# Patient Record
Sex: MALE | NOT HISPANIC OR LATINO | Employment: PART TIME | URBAN - METROPOLITAN AREA
[De-identification: names, ages, dates, MRNs, and addresses within clinical notes are randomized per-mention and may not be internally consistent; named-entity substitution may affect disease eponyms.]

---

## 2017-04-13 DIAGNOSIS — E11.51 TYPE 2 DIABETES MELLITUS WITH PERIPHERAL ANGIOPATHY (HCC): Primary | ICD-10-CM

## 2017-04-13 DIAGNOSIS — E55.9 AVITAMINOSIS D: ICD-10-CM

## 2017-04-13 DIAGNOSIS — N40.0 BENIGN LOCALIZED HYPERPLASIA OF PROSTATE WITHOUT URINARY OBSTRUCTION: ICD-10-CM

## 2017-04-13 DIAGNOSIS — I10 BENIGN ESSENTIAL HTN: ICD-10-CM

## 2017-04-14 ENCOUNTER — RESULTS ENCOUNTER (OUTPATIENT)
Dept: ENDOCRINOLOGY | Age: 82
End: 2017-04-14

## 2017-04-14 DIAGNOSIS — E55.9 AVITAMINOSIS D: ICD-10-CM

## 2017-04-14 DIAGNOSIS — I10 BENIGN ESSENTIAL HTN: ICD-10-CM

## 2017-04-14 DIAGNOSIS — E11.51 TYPE 2 DIABETES MELLITUS WITH PERIPHERAL ANGIOPATHY (HCC): ICD-10-CM

## 2017-04-14 DIAGNOSIS — N40.0 BENIGN LOCALIZED HYPERPLASIA OF PROSTATE WITHOUT URINARY OBSTRUCTION: ICD-10-CM

## 2017-04-20 RX ORDER — ROSUVASTATIN CALCIUM 10 MG/1
TABLET, COATED ORAL
Qty: 90 TABLET | Refills: 2 | OUTPATIENT
Start: 2017-04-20

## 2017-04-25 DIAGNOSIS — N40.0 BENIGN LOCALIZED HYPERPLASIA OF PROSTATE WITHOUT URINARY OBSTRUCTION: ICD-10-CM

## 2017-04-25 DIAGNOSIS — E78.2 MIXED HYPERLIPIDEMIA: ICD-10-CM

## 2017-04-25 DIAGNOSIS — E11.9 DIABETES MELLITUS TYPE 2, NONINSULIN DEPENDENT (HCC): Primary | ICD-10-CM

## 2017-04-25 DIAGNOSIS — I10 BENIGN ESSENTIAL HTN: ICD-10-CM

## 2017-04-25 DIAGNOSIS — E55.9 AVITAMINOSIS D: ICD-10-CM

## 2017-04-26 DIAGNOSIS — E55.9 AVITAMINOSIS D: ICD-10-CM

## 2017-04-26 DIAGNOSIS — E55.9 AVITAMINOSIS D: Primary | ICD-10-CM

## 2017-04-27 LAB
25(OH)D3+25(OH)D2 SERPL-MCNC: 68.2 NG/ML (ref 30–100)
ALBUMIN SERPL-MCNC: 4.7 G/DL (ref 3.5–5.2)
ALBUMIN/GLOB SERPL: 1.5 G/DL
ALP SERPL-CCNC: 55 U/L (ref 39–117)
ALT SERPL-CCNC: 15 U/L (ref 1–41)
AST SERPL-CCNC: 23 U/L (ref 1–40)
BASOPHILS # BLD AUTO: 0.06 10*3/MM3 (ref 0–0.2)
BASOPHILS NFR BLD AUTO: 0.9 % (ref 0–1.5)
BILIRUB SERPL-MCNC: 0.6 MG/DL (ref 0.1–1.2)
BUN SERPL-MCNC: 22 MG/DL (ref 8–23)
BUN/CREAT SERPL: 17.7 (ref 7–25)
C PEPTIDE SERPL-MCNC: 2.8 NG/ML (ref 1.1–4.4)
CALCIUM SERPL-MCNC: 9.7 MG/DL (ref 8.6–10.5)
CHLORIDE SERPL-SCNC: 99 MMOL/L (ref 98–107)
CHOLEST SERPL-MCNC: 143 MG/DL (ref 0–200)
CO2 SERPL-SCNC: 22.9 MMOL/L (ref 22–29)
CREAT SERPL-MCNC: 1.24 MG/DL (ref 0.76–1.27)
EOSINOPHIL # BLD AUTO: 0.21 10*3/MM3 (ref 0–0.7)
EOSINOPHIL NFR BLD AUTO: 3 % (ref 0.3–6.2)
ERYTHROCYTE [DISTWIDTH] IN BLOOD BY AUTOMATED COUNT: 13.8 % (ref 11.5–14.5)
GLOBULIN SER CALC-MCNC: 3.1 GM/DL
GLUCOSE SERPL-MCNC: 103 MG/DL (ref 65–99)
HBA1C MFR BLD: 5.39 % (ref 4.8–5.6)
HCT VFR BLD AUTO: 44.9 % (ref 40.4–52.2)
HDLC SERPL-MCNC: 51 MG/DL (ref 40–60)
HGB BLD-MCNC: 14.5 G/DL (ref 13.7–17.6)
IMM GRANULOCYTES # BLD: 0 10*3/MM3 (ref 0–0.03)
IMM GRANULOCYTES NFR BLD: 0 % (ref 0–0.5)
LDLC SERPL CALC-MCNC: 73 MG/DL (ref 0–100)
LYMPHOCYTES # BLD AUTO: 1.96 10*3/MM3 (ref 0.9–4.8)
LYMPHOCYTES NFR BLD AUTO: 28.2 % (ref 19.6–45.3)
MCH RBC QN AUTO: 28.4 PG (ref 27–32.7)
MCHC RBC AUTO-ENTMCNC: 32.3 G/DL (ref 32.6–36.4)
MCV RBC AUTO: 87.9 FL (ref 79.8–96.2)
MONOCYTES # BLD AUTO: 0.67 10*3/MM3 (ref 0.2–1.2)
MONOCYTES NFR BLD AUTO: 9.6 % (ref 5–12)
NEUTROPHILS # BLD AUTO: 4.06 10*3/MM3 (ref 1.9–8.1)
NEUTROPHILS NFR BLD AUTO: 58.3 % (ref 42.7–76)
PLATELET # BLD AUTO: 204 10*3/MM3 (ref 140–500)
POTASSIUM SERPL-SCNC: 4.5 MMOL/L (ref 3.5–5.2)
PROT SERPL-MCNC: 7.8 G/DL (ref 6–8.5)
PSA SERPL-MCNC: 2.97 NG/ML (ref 0–4)
RBC # BLD AUTO: 5.11 10*6/MM3 (ref 4.6–6)
SODIUM SERPL-SCNC: 138 MMOL/L (ref 136–145)
T4 SERPL-MCNC: 7.95 MCG/DL (ref 4.5–11.7)
TRIGL SERPL-MCNC: 93 MG/DL (ref 0–150)
TSH SERPL DL<=0.005 MIU/L-ACNC: 2.63 MIU/ML (ref 0.27–4.2)
URATE SERPL-MCNC: 6.8 MG/DL (ref 3.4–7)
VLDLC SERPL CALC-MCNC: 18.6 MG/DL (ref 5–40)
WBC # BLD AUTO: 6.96 10*3/MM3 (ref 4.5–10.7)

## 2017-04-28 ENCOUNTER — RESULTS ENCOUNTER (OUTPATIENT)
Dept: ENDOCRINOLOGY | Age: 82
End: 2017-04-28

## 2017-04-28 DIAGNOSIS — E78.2 MIXED HYPERLIPIDEMIA: ICD-10-CM

## 2017-04-28 DIAGNOSIS — N40.0 BENIGN LOCALIZED HYPERPLASIA OF PROSTATE WITHOUT URINARY OBSTRUCTION: ICD-10-CM

## 2017-04-28 DIAGNOSIS — E11.9 DIABETES MELLITUS TYPE 2, NONINSULIN DEPENDENT (HCC): ICD-10-CM

## 2017-04-28 DIAGNOSIS — I10 BENIGN ESSENTIAL HTN: ICD-10-CM

## 2017-04-28 DIAGNOSIS — E55.9 AVITAMINOSIS D: ICD-10-CM

## 2017-06-06 DIAGNOSIS — R05.9 COUGH: Primary | ICD-10-CM

## 2017-06-07 ENCOUNTER — HOSPITAL ENCOUNTER (OUTPATIENT)
Dept: GENERAL RADIOLOGY | Facility: HOSPITAL | Age: 82
Discharge: HOME | End: 2017-06-07
Attending: INTERNAL MEDICINE | Admitting: INTERNAL MEDICINE

## 2017-06-07 DIAGNOSIS — R05.9 COUGH: ICD-10-CM

## 2017-06-07 PROCEDURE — 71020 HC CHEST PA AND LATERAL: CPT

## 2017-07-21 ENCOUNTER — HOSPITAL ENCOUNTER (OUTPATIENT)
Dept: GENERAL RADIOLOGY | Facility: HOSPITAL | Age: 82
Discharge: HOME | End: 2017-07-21
Attending: INTERNAL MEDICINE | Admitting: INTERNAL MEDICINE

## 2017-07-21 ENCOUNTER — HOSPITAL ENCOUNTER (OUTPATIENT)
Dept: GENERAL RADIOLOGY | Facility: HOSPITAL | Age: 82
Discharge: HOME | End: 2017-07-21
Attending: INTERNAL MEDICINE

## 2017-07-21 ENCOUNTER — HOSPITAL ENCOUNTER (OUTPATIENT)
Dept: GENERAL RADIOLOGY | Facility: HOSPITAL | Age: 82
Discharge: HOME | End: 2017-07-21

## 2017-07-21 DIAGNOSIS — R52 PAIN AGGRAVATED BY PHYSICAL ACTIVITY: Primary | ICD-10-CM

## 2017-07-21 DIAGNOSIS — M79.604 PAIN IN RIGHT LEG: ICD-10-CM

## 2017-07-21 DIAGNOSIS — R52 PAIN AGGRAVATED BY PHYSICAL ACTIVITY: ICD-10-CM

## 2017-07-21 DIAGNOSIS — M79.604 PAIN IN RIGHT LEG: Primary | ICD-10-CM

## 2017-07-21 PROCEDURE — 73502 X-RAY EXAM HIP UNI 2-3 VIEWS: CPT

## 2017-07-21 PROCEDURE — 73552 X-RAY EXAM OF FEMUR 2/>: CPT

## 2017-07-21 PROCEDURE — 72110 X-RAY EXAM L-2 SPINE 4/>VWS: CPT

## 2017-08-14 DIAGNOSIS — G89.29 CHRONIC BILATERAL LOW BACK PAIN WITH BILATERAL SCIATICA: Primary | ICD-10-CM

## 2017-08-14 DIAGNOSIS — M54.42 CHRONIC BILATERAL LOW BACK PAIN WITH BILATERAL SCIATICA: Primary | ICD-10-CM

## 2017-08-14 DIAGNOSIS — M54.41 CHRONIC BILATERAL LOW BACK PAIN WITH BILATERAL SCIATICA: Primary | ICD-10-CM

## 2017-08-20 ENCOUNTER — HOSPITAL ENCOUNTER (OUTPATIENT)
Dept: MRI IMAGING | Facility: HOSPITAL | Age: 82
Discharge: HOME | End: 2017-08-20
Attending: INTERNAL MEDICINE | Admitting: INTERNAL MEDICINE

## 2017-08-20 DIAGNOSIS — M54.41 CHRONIC BILATERAL LOW BACK PAIN WITH BILATERAL SCIATICA: ICD-10-CM

## 2017-08-20 DIAGNOSIS — G89.29 CHRONIC BILATERAL LOW BACK PAIN WITH BILATERAL SCIATICA: ICD-10-CM

## 2017-08-20 DIAGNOSIS — M54.42 CHRONIC BILATERAL LOW BACK PAIN WITH BILATERAL SCIATICA: ICD-10-CM

## 2017-08-20 PROCEDURE — 72148 MRI LUMBAR SPINE W/O DYE: CPT

## 2017-08-22 DIAGNOSIS — E78.2 MIXED HYPERLIPIDEMIA: ICD-10-CM

## 2017-08-22 DIAGNOSIS — E55.9 AVITAMINOSIS D: ICD-10-CM

## 2017-08-22 DIAGNOSIS — E11.51 TYPE 2 DIABETES MELLITUS WITH PERIPHERAL ANGIOPATHY (HCC): Primary | ICD-10-CM

## 2017-08-22 DIAGNOSIS — R63.4 WEIGHT LOSS: ICD-10-CM

## 2017-08-22 DIAGNOSIS — R52 WHOLE BODY PAIN: ICD-10-CM

## 2017-08-22 DIAGNOSIS — I10 BENIGN ESSENTIAL HTN: ICD-10-CM

## 2017-08-22 DIAGNOSIS — D64.9 ANEMIA, UNSPECIFIED TYPE: ICD-10-CM

## 2017-08-22 DIAGNOSIS — I10 ESSENTIAL HYPERTENSION: ICD-10-CM

## 2017-08-22 DIAGNOSIS — N40.0 BENIGN LOCALIZED HYPERPLASIA OF PROSTATE WITHOUT URINARY OBSTRUCTION: ICD-10-CM

## 2017-08-22 RX ORDER — CYCLOBENZAPRINE HCL 10 MG
10 TABLET ORAL 2 TIMES DAILY PRN
Qty: 30 TABLET | Refills: 1 | Status: SHIPPED | OUTPATIENT
Start: 2017-08-22 | End: 2018-08-22

## 2017-08-22 RX ORDER — METHYLPREDNISOLONE 4 MG/1
TABLET ORAL
Qty: 21 TABLET | Refills: 1 | Status: SHIPPED | OUTPATIENT
Start: 2017-08-22 | End: 2017-10-22

## 2017-08-23 ENCOUNTER — LAB (OUTPATIENT)
Dept: ENDOCRINOLOGY | Age: 82
End: 2017-08-23

## 2017-08-23 DIAGNOSIS — E11.51 TYPE 2 DIABETES MELLITUS WITH PERIPHERAL ANGIOPATHY (HCC): ICD-10-CM

## 2017-08-23 DIAGNOSIS — R63.4 WEIGHT LOSS: ICD-10-CM

## 2017-08-23 DIAGNOSIS — I10 BENIGN ESSENTIAL HTN: ICD-10-CM

## 2017-08-23 DIAGNOSIS — I10 ESSENTIAL HYPERTENSION: ICD-10-CM

## 2017-08-23 DIAGNOSIS — D64.9 ANEMIA, UNSPECIFIED TYPE: ICD-10-CM

## 2017-08-23 DIAGNOSIS — R52 WHOLE BODY PAIN: ICD-10-CM

## 2017-08-23 DIAGNOSIS — E55.9 AVITAMINOSIS D: ICD-10-CM

## 2017-08-23 DIAGNOSIS — E78.2 MIXED HYPERLIPIDEMIA: ICD-10-CM

## 2017-08-23 DIAGNOSIS — N40.0 BENIGN LOCALIZED HYPERPLASIA OF PROSTATE WITHOUT URINARY OBSTRUCTION: ICD-10-CM

## 2017-08-24 LAB
25(OH)D3+25(OH)D2 SERPL-MCNC: 58 NG/ML (ref 30–100)
AFP-TM SERPL-MCNC: 1.9 NG/ML (ref 0–8.3)
ALBUMIN 24H MFR UR ELPH: 23.1 %
ALBUMIN SERPL-MCNC: 4 G/DL (ref 3.5–5.2)
ALBUMIN/GLOB SERPL: 1.3 G/DL
ALP SERPL-CCNC: 62 U/L (ref 39–117)
ALPHA1 GLOB 24H MFR UR ELPH: 8.3 %
ALPHA2 GLOB 24H MFR UR ELPH: 13.9 %
ALT SERPL-CCNC: 11 U/L (ref 1–41)
AST SERPL-CCNC: 18 U/L (ref 1–40)
B-GLOBULIN MFR UR ELPH: 7.6 %
BASOPHILS # BLD AUTO: 0.04 10*3/MM3 (ref 0–0.2)
BASOPHILS NFR BLD AUTO: 0.3 % (ref 0–1.5)
BILIRUB SERPL-MCNC: 0.7 MG/DL (ref 0.1–1.2)
BUN SERPL-MCNC: 17 MG/DL (ref 8–23)
BUN/CREAT SERPL: 16.3 (ref 7–25)
C PEPTIDE SERPL-MCNC: 2.8 NG/ML (ref 1.1–4.4)
CALCIUM SERPL-MCNC: 9.1 MG/DL (ref 8.6–10.5)
CHLORIDE SERPL-SCNC: 92 MMOL/L (ref 98–107)
CHOLEST SERPL-MCNC: 148 MG/DL (ref 0–200)
CO2 SERPL-SCNC: 22.5 MMOL/L (ref 22–29)
CREAT SERPL-MCNC: 1.04 MG/DL (ref 0.76–1.27)
EOSINOPHIL # BLD AUTO: 0.18 10*3/MM3 (ref 0–0.7)
EOSINOPHIL NFR BLD AUTO: 1.5 % (ref 0.3–6.2)
ERYTHROCYTE [DISTWIDTH] IN BLOOD BY AUTOMATED COUNT: 13.4 % (ref 11.5–14.5)
ERYTHROCYTE [SEDIMENTATION RATE] IN BLOOD BY WESTERGREN METHOD: 17 MM/HR (ref 0–20)
GAMMA GLOB 24H MFR UR ELPH: 47 %
GLOBULIN SER CALC-MCNC: 3.2 GM/DL
GLUCOSE SERPL-MCNC: 122 MG/DL (ref 65–99)
HBA1C MFR BLD: 6 % (ref 4.8–5.6)
HCT VFR BLD AUTO: 40.7 % (ref 40.4–52.2)
HDLC SERPL-MCNC: 52 MG/DL (ref 40–60)
HGB BLD-MCNC: 13.3 G/DL (ref 13.7–17.6)
HIV 1 & 2 AB SER-IMP: NORMAL
IMM GRANULOCYTES # BLD: 0.12 10*3/MM3 (ref 0–0.03)
IMM GRANULOCYTES NFR BLD: 1 % (ref 0–0.5)
INTERPRETATION UR IFE-IMP: NORMAL
LDLC SERPL CALC-MCNC: 82 MG/DL (ref 0–100)
LYMPHOCYTES # BLD AUTO: 1.89 10*3/MM3 (ref 0.9–4.8)
LYMPHOCYTES NFR BLD AUTO: 15.5 % (ref 19.6–45.3)
M PROTEIN 24H MFR UR ELPH: NORMAL %
MCH RBC QN AUTO: 27.5 PG (ref 27–32.7)
MCHC RBC AUTO-ENTMCNC: 32.7 G/DL (ref 32.6–36.4)
MCV RBC AUTO: 84.3 FL (ref 79.8–96.2)
MONOCYTES # BLD AUTO: 1.02 10*3/MM3 (ref 0.2–1.2)
MONOCYTES NFR BLD AUTO: 8.4 % (ref 5–12)
NEUTROPHILS # BLD AUTO: 8.95 10*3/MM3 (ref 1.9–8.1)
NEUTROPHILS NFR BLD AUTO: 73.3 % (ref 42.7–76)
PLATELET # BLD AUTO: 257 10*3/MM3 (ref 140–500)
POTASSIUM SERPL-SCNC: 4.7 MMOL/L (ref 3.5–5.2)
PROT SERPL-MCNC: 7.2 G/DL (ref 6–8.5)
PROT UR-MCNC: 4.4 MG/DL
PSA SERPL-MCNC: 3.15 NG/ML (ref 0–4)
RBC # BLD AUTO: 4.83 10*6/MM3 (ref 4.6–6)
SODIUM SERPL-SCNC: 130 MMOL/L (ref 136–145)
T4 SERPL-MCNC: 7.9 MCG/DL (ref 4.5–11.7)
TRIGL SERPL-MCNC: 68 MG/DL (ref 0–150)
TSH SERPL DL<=0.005 MIU/L-ACNC: 1.82 MIU/ML (ref 0.27–4.2)
URATE SERPL-MCNC: 6.8 MG/DL (ref 3.4–7)
VLDLC SERPL CALC-MCNC: 13.6 MG/DL (ref 5–40)
WBC # BLD AUTO: 12.2 10*3/MM3 (ref 4.5–10.7)

## 2017-08-25 ENCOUNTER — RESULTS ENCOUNTER (OUTPATIENT)
Dept: ENDOCRINOLOGY | Age: 82
End: 2017-08-25

## 2017-08-25 DIAGNOSIS — N40.0 BENIGN LOCALIZED HYPERPLASIA OF PROSTATE WITHOUT URINARY OBSTRUCTION: ICD-10-CM

## 2017-08-25 DIAGNOSIS — E78.2 MIXED HYPERLIPIDEMIA: ICD-10-CM

## 2017-08-25 DIAGNOSIS — D64.9 ANEMIA, UNSPECIFIED TYPE: ICD-10-CM

## 2017-08-25 DIAGNOSIS — R52 WHOLE BODY PAIN: ICD-10-CM

## 2017-08-25 DIAGNOSIS — E11.51 TYPE 2 DIABETES MELLITUS WITH PERIPHERAL ANGIOPATHY (HCC): ICD-10-CM

## 2017-08-25 DIAGNOSIS — I10 BENIGN ESSENTIAL HTN: ICD-10-CM

## 2017-08-25 DIAGNOSIS — R63.4 WEIGHT LOSS: ICD-10-CM

## 2017-08-25 DIAGNOSIS — E55.9 AVITAMINOSIS D: ICD-10-CM

## 2017-08-25 DIAGNOSIS — I10 ESSENTIAL HYPERTENSION: ICD-10-CM

## 2017-09-05 RX ORDER — PREDNISONE 1 MG/1
5 TABLET ORAL 2 TIMES DAILY
Qty: 60 TABLET | Refills: 3 | OUTPATIENT
Start: 2017-09-05 | End: 2019-05-18

## 2017-09-07 LAB
Lab: NORMAL
Lab: NORMAL
VERBAL ADD-ON: NORMAL

## 2017-09-15 LAB
CORTIS SERPL-MCNC: 8.9 UG/DL
RHEUMATOID FACT SERPL-ACNC: <10 IU/ML (ref 0–13.9)
WRITTEN AUTHORIZATION: NORMAL

## 2017-09-26 DIAGNOSIS — R52 DIFFUSE PAIN: Primary | ICD-10-CM

## 2017-09-29 RX ORDER — PIOGLITAZONEHYDROCHLORIDE 30 MG/1
30 TABLET ORAL DAILY
Qty: 90 TABLET | Refills: 3 | Status: SHIPPED | OUTPATIENT
Start: 2017-09-29 | End: 2018-03-30 | Stop reason: SDUPTHER

## 2017-09-29 RX ORDER — NATEGLINIDE 120 MG/1
120 TABLET ORAL
Qty: 270 TABLET | Refills: 3 | Status: SHIPPED | OUTPATIENT
Start: 2017-09-29 | End: 2018-03-30 | Stop reason: SDUPTHER

## 2017-11-29 DIAGNOSIS — I10 ESSENTIAL HYPERTENSION: ICD-10-CM

## 2017-11-29 DIAGNOSIS — E55.9 AVITAMINOSIS D: ICD-10-CM

## 2017-11-29 DIAGNOSIS — E11.51 TYPE 2 DIABETES MELLITUS WITH PERIPHERAL ANGIOPATHY (HCC): Primary | ICD-10-CM

## 2017-11-29 DIAGNOSIS — E78.2 MIXED HYPERLIPIDEMIA: ICD-10-CM

## 2017-11-29 DIAGNOSIS — N40.0 BENIGN LOCALIZED HYPERPLASIA OF PROSTATE WITHOUT URINARY OBSTRUCTION: ICD-10-CM

## 2017-11-29 DIAGNOSIS — I10 BENIGN ESSENTIAL HTN: ICD-10-CM

## 2017-12-01 ENCOUNTER — RESULTS ENCOUNTER (OUTPATIENT)
Dept: ENDOCRINOLOGY | Age: 82
End: 2017-12-01

## 2017-12-01 ENCOUNTER — LAB (OUTPATIENT)
Dept: ENDOCRINOLOGY | Age: 82
End: 2017-12-01

## 2017-12-01 DIAGNOSIS — E78.2 MIXED HYPERLIPIDEMIA: ICD-10-CM

## 2017-12-01 DIAGNOSIS — I10 BENIGN ESSENTIAL HTN: ICD-10-CM

## 2017-12-01 DIAGNOSIS — E11.51 TYPE 2 DIABETES MELLITUS WITH PERIPHERAL ANGIOPATHY (HCC): ICD-10-CM

## 2017-12-01 DIAGNOSIS — E55.9 AVITAMINOSIS D: ICD-10-CM

## 2017-12-01 DIAGNOSIS — N40.0 BENIGN LOCALIZED HYPERPLASIA OF PROSTATE WITHOUT URINARY OBSTRUCTION: ICD-10-CM

## 2017-12-01 DIAGNOSIS — I10 ESSENTIAL HYPERTENSION: ICD-10-CM

## 2017-12-04 LAB
25(OH)D3+25(OH)D2 SERPL-MCNC: 56.2 NG/ML (ref 30–100)
ALBUMIN SERPL-MCNC: 4.3 G/DL (ref 3.5–5.2)
ALBUMIN/GLOB SERPL: 1.4 G/DL
ALP SERPL-CCNC: 72 U/L (ref 39–117)
ALT SERPL-CCNC: 13 U/L (ref 1–41)
AST SERPL-CCNC: 12 U/L (ref 1–40)
BILIRUB SERPL-MCNC: 0.5 MG/DL (ref 0.1–1.2)
BUN SERPL-MCNC: 21 MG/DL (ref 8–23)
BUN/CREAT SERPL: 17.4 (ref 7–25)
C PEPTIDE SERPL-MCNC: 2.1 NG/ML (ref 1.1–4.4)
CALCIUM SERPL-MCNC: 9.6 MG/DL (ref 8.6–10.5)
CHLORIDE SERPL-SCNC: 98 MMOL/L (ref 98–107)
CHOLEST SERPL-MCNC: 226 MG/DL (ref 0–200)
CO2 SERPL-SCNC: 27.8 MMOL/L (ref 22–29)
CREAT SERPL-MCNC: 1.21 MG/DL (ref 0.76–1.27)
ERYTHROCYTE [SEDIMENTATION RATE] IN BLOOD BY WESTERGREN METHOD: 2 MM/HR (ref 0–20)
GFR SERPLBLD CREATININE-BSD FMLA CKD-EPI: 57 ML/MIN/1.73
GFR SERPLBLD CREATININE-BSD FMLA CKD-EPI: 70 ML/MIN/1.73
GLOBULIN SER CALC-MCNC: 3 GM/DL
GLUCOSE SERPL-MCNC: 64 MG/DL (ref 65–99)
HBA1C MFR BLD: 6.3 % (ref 4.8–5.6)
HDLC SERPL-MCNC: 75 MG/DL (ref 40–60)
INTERPRETATION: NORMAL
LDLC SERPL CALC-MCNC: 139 MG/DL (ref 0–100)
Lab: NORMAL
MICROALBUMIN UR-MCNC: 6.2 UG/ML
POTASSIUM SERPL-SCNC: 4.4 MMOL/L (ref 3.5–5.2)
PROT SERPL-MCNC: 7.3 G/DL (ref 6–8.5)
SODIUM SERPL-SCNC: 136 MMOL/L (ref 136–145)
T3FREE SERPL-MCNC: 1.9 PG/ML (ref 2–4.4)
T4 FREE SERPL-MCNC: 1.24 NG/DL (ref 0.93–1.7)
T4 SERPL-MCNC: 6.42 MCG/DL (ref 4.5–11.7)
THYROGLOB AB SERPL-ACNC: <1 IU/ML (ref 0–0.9)
THYROGLOB SERPL-MCNC: 3.5 NG/ML (ref 1.4–29.2)
TRIGL SERPL-MCNC: 59 MG/DL (ref 0–150)
TSH SERPL DL<=0.005 MIU/L-ACNC: 2.89 MIU/ML (ref 0.27–4.2)
URATE SERPL-MCNC: 6.1 MG/DL (ref 3.4–7)
VLDLC SERPL CALC-MCNC: 11.8 MG/DL (ref 5–40)

## 2018-01-17 RX ORDER — LABETALOL 200 MG/1
200 TABLET, FILM COATED ORAL EVERY 12 HOURS SCHEDULED
Qty: 60 TABLET | Refills: 5 | Status: SHIPPED | OUTPATIENT
Start: 2018-01-17 | End: 2018-03-30 | Stop reason: SDUPTHER

## 2018-03-14 DIAGNOSIS — E11.51 TYPE 2 DIABETES MELLITUS WITH PERIPHERAL ANGIOPATHY (HCC): Primary | ICD-10-CM

## 2018-03-14 DIAGNOSIS — I10 BENIGN ESSENTIAL HTN: ICD-10-CM

## 2018-03-14 DIAGNOSIS — E55.9 AVITAMINOSIS D: ICD-10-CM

## 2018-03-14 DIAGNOSIS — D64.9 ANEMIA, UNSPECIFIED TYPE: ICD-10-CM

## 2018-03-14 DIAGNOSIS — I10 ESSENTIAL HYPERTENSION: ICD-10-CM

## 2018-03-14 DIAGNOSIS — E78.2 MIXED HYPERLIPIDEMIA: ICD-10-CM

## 2018-03-14 DIAGNOSIS — N40.0 BENIGN LOCALIZED HYPERPLASIA OF PROSTATE WITHOUT URINARY OBSTRUCTION: Primary | ICD-10-CM

## 2018-03-14 RX ORDER — LANCETS
EACH MISCELLANEOUS
Qty: 100 EACH | Refills: 11 | Status: SHIPPED | OUTPATIENT
Start: 2018-03-14 | End: 2018-03-16 | Stop reason: SDUPTHER

## 2018-03-14 RX ORDER — BLOOD SUGAR DIAGNOSTIC
STRIP MISCELLANEOUS
Qty: 100 EACH | Refills: 12 | Status: SHIPPED | OUTPATIENT
Start: 2018-03-14 | End: 2018-03-16 | Stop reason: SDUPTHER

## 2018-03-15 LAB
25(OH)D3+25(OH)D2 SERPL-MCNC: 70.4 NG/ML (ref 30–100)
ALBUMIN SERPL-MCNC: 4.6 G/DL (ref 3.5–5.2)
ALBUMIN/GLOB SERPL: 1.7 G/DL
ALP SERPL-CCNC: 56 U/L (ref 39–117)
ALT SERPL-CCNC: 17 U/L (ref 1–41)
AST SERPL-CCNC: 21 U/L (ref 1–40)
BASOPHILS # BLD AUTO: 0 10*3/MM3 (ref 0–0.2)
BASOPHILS NFR BLD AUTO: 0 % (ref 0–1.5)
BILIRUB SERPL-MCNC: 1 MG/DL (ref 0.1–1.2)
BUN SERPL-MCNC: 38 MG/DL (ref 8–23)
BUN/CREAT SERPL: 29 (ref 7–25)
C PEPTIDE SERPL-MCNC: 1.8 NG/ML (ref 1.1–4.4)
CALCIUM SERPL-MCNC: 9.9 MG/DL (ref 8.6–10.5)
CHLORIDE SERPL-SCNC: 89 MMOL/L (ref 98–107)
CHOLEST SERPL-MCNC: 171 MG/DL (ref 0–200)
CO2 SERPL-SCNC: 24.7 MMOL/L (ref 22–29)
CREAT SERPL-MCNC: 1.31 MG/DL (ref 0.76–1.27)
EOSINOPHIL # BLD AUTO: 0.01 10*3/MM3 (ref 0–0.7)
EOSINOPHIL NFR BLD AUTO: 0.1 % (ref 0.3–6.2)
ERYTHROCYTE [DISTWIDTH] IN BLOOD BY AUTOMATED COUNT: 16.5 % (ref 11.5–14.5)
GFR SERPLBLD CREATININE-BSD FMLA CKD-EPI: 52 ML/MIN/1.73
GFR SERPLBLD CREATININE-BSD FMLA CKD-EPI: 63 ML/MIN/1.73
GLOBULIN SER CALC-MCNC: 2.7 GM/DL
GLUCOSE SERPL-MCNC: 71 MG/DL (ref 65–99)
HBA1C MFR BLD: 6 % (ref 4.8–5.6)
HCT VFR BLD AUTO: 44.2 % (ref 40.4–52.2)
HDLC SERPL-MCNC: 86 MG/DL (ref 40–60)
HGB BLD-MCNC: 14.9 G/DL (ref 13.7–17.6)
IMM GRANULOCYTES # BLD: 0.1 10*3/MM3 (ref 0–0.03)
IMM GRANULOCYTES NFR BLD: 0.9 % (ref 0–0.5)
INTERPRETATION: NORMAL
LDLC SERPL CALC-MCNC: 66 MG/DL (ref 0–100)
LYMPHOCYTES # BLD AUTO: 1.37 10*3/MM3 (ref 0.9–4.8)
LYMPHOCYTES NFR BLD AUTO: 12.2 % (ref 19.6–45.3)
Lab: NORMAL
MCH RBC QN AUTO: 28.7 PG (ref 27–32.7)
MCHC RBC AUTO-ENTMCNC: 33.7 G/DL (ref 32.6–36.4)
MCV RBC AUTO: 85 FL (ref 79.8–96.2)
MICROALBUMIN UR-MCNC: 5.8 UG/ML
MONOCYTES # BLD AUTO: 0.81 10*3/MM3 (ref 0.2–1.2)
MONOCYTES NFR BLD AUTO: 7.2 % (ref 5–12)
NEUTROPHILS # BLD AUTO: 8.9 10*3/MM3 (ref 1.9–8.1)
NEUTROPHILS NFR BLD AUTO: 79.6 % (ref 42.7–76)
PLATELET # BLD AUTO: 226 10*3/MM3 (ref 140–500)
POTASSIUM SERPL-SCNC: 4.1 MMOL/L (ref 3.5–5.2)
PROT SERPL-MCNC: 7.3 G/DL (ref 6–8.5)
RBC # BLD AUTO: 5.2 10*6/MM3 (ref 4.6–6)
SODIUM SERPL-SCNC: 129 MMOL/L (ref 136–145)
T4 SERPL-MCNC: 6.25 MCG/DL (ref 4.5–11.7)
TRIGL SERPL-MCNC: 94 MG/DL (ref 0–150)
TSH SERPL DL<=0.005 MIU/L-ACNC: 1.52 MIU/ML (ref 0.27–4.2)
URATE SERPL-MCNC: 7.1 MG/DL (ref 3.4–7)
VLDLC SERPL CALC-MCNC: 18.8 MG/DL (ref 5–40)
WBC # BLD AUTO: 11.19 10*3/MM3 (ref 4.5–10.7)

## 2018-03-16 RX ORDER — BLOOD SUGAR DIAGNOSTIC
STRIP MISCELLANEOUS
Qty: 300 EACH | Refills: 3 | Status: SHIPPED | OUTPATIENT
Start: 2018-03-16 | End: 2018-03-19 | Stop reason: SDUPTHER

## 2018-03-16 RX ORDER — LANCETS
EACH MISCELLANEOUS
Qty: 200 EACH | Refills: 3 | Status: SHIPPED | OUTPATIENT
Start: 2018-03-16 | End: 2018-03-19 | Stop reason: CLARIF

## 2018-03-19 RX ORDER — LANCETS
EACH MISCELLANEOUS
Qty: 300 EACH | Refills: 1 | Status: SHIPPED | OUTPATIENT
Start: 2018-03-19 | End: 2018-03-19

## 2018-03-19 RX ORDER — BLOOD SUGAR DIAGNOSTIC
STRIP MISCELLANEOUS
Qty: 300 EACH | Refills: 3 | Status: SHIPPED | OUTPATIENT
Start: 2018-03-19 | End: 2018-03-19 | Stop reason: CLARIF

## 2018-03-19 RX ORDER — LANCETS
EACH MISCELLANEOUS
Qty: 306 EACH | Refills: 3 | Status: SHIPPED | OUTPATIENT
Start: 2018-03-19 | End: 2018-03-27

## 2018-03-19 RX ORDER — BLOOD-GLUCOSE METER
EACH MISCELLANEOUS
Qty: 1 KIT | Refills: 1 | Status: SHIPPED | OUTPATIENT
Start: 2018-03-19

## 2018-03-27 RX ORDER — BLOOD SUGAR DIAGNOSTIC
STRIP MISCELLANEOUS
Qty: 300 EACH | Refills: 3 | Status: SHIPPED | OUTPATIENT
Start: 2018-03-27 | End: 2018-03-30 | Stop reason: SDUPTHER

## 2018-03-27 RX ORDER — LANCETS
EACH MISCELLANEOUS
Qty: 300 EACH | Refills: 3 | Status: SHIPPED | OUTPATIENT
Start: 2018-03-27 | End: 2018-03-30 | Stop reason: SDUPTHER

## 2018-03-30 RX ORDER — AMLODIPINE BESYLATE 5 MG/1
5 TABLET ORAL 2 TIMES DAILY
Qty: 180 TABLET | Refills: 3 | Status: SHIPPED | OUTPATIENT
Start: 2018-03-30 | End: 2019-08-26

## 2018-03-30 RX ORDER — LANCETS
EACH MISCELLANEOUS
Qty: 300 EACH | Refills: 3 | Status: SHIPPED | OUTPATIENT
Start: 2018-03-30 | End: 2018-03-30 | Stop reason: SDUPTHER

## 2018-03-30 RX ORDER — BLOOD SUGAR DIAGNOSTIC
STRIP MISCELLANEOUS
Qty: 300 EACH | Refills: 3 | Status: SHIPPED | OUTPATIENT
Start: 2018-03-30 | End: 2018-03-30 | Stop reason: SDUPTHER

## 2018-03-30 RX ORDER — OMEGA-3-ACID ETHYL ESTERS 1 G/1
2 CAPSULE, LIQUID FILLED ORAL 2 TIMES DAILY
Qty: 360 CAPSULE | Refills: 3 | Status: SHIPPED | OUTPATIENT
Start: 2018-03-30

## 2018-03-30 RX ORDER — NATEGLINIDE 120 MG/1
120 TABLET ORAL
Qty: 270 TABLET | Refills: 3 | Status: SHIPPED | OUTPATIENT
Start: 2018-03-30 | End: 2018-03-30 | Stop reason: SDUPTHER

## 2018-03-30 RX ORDER — LABETALOL 200 MG/1
200 TABLET, FILM COATED ORAL EVERY 12 HOURS SCHEDULED
Qty: 180 TABLET | Refills: 3 | Status: SHIPPED | OUTPATIENT
Start: 2018-03-30 | End: 2018-03-30 | Stop reason: SDUPTHER

## 2018-03-30 RX ORDER — BLOOD SUGAR DIAGNOSTIC
STRIP MISCELLANEOUS
Qty: 300 EACH | Refills: 3 | Status: SHIPPED | OUTPATIENT
Start: 2018-03-30 | End: 2019-06-10 | Stop reason: SDUPTHER

## 2018-03-30 RX ORDER — NATEGLINIDE 120 MG/1
120 TABLET ORAL
Qty: 270 TABLET | Refills: 3 | Status: SHIPPED | OUTPATIENT
Start: 2018-03-30

## 2018-03-30 RX ORDER — PIOGLITAZONEHYDROCHLORIDE 30 MG/1
30 TABLET ORAL DAILY
Qty: 90 TABLET | Refills: 3 | Status: SHIPPED | OUTPATIENT
Start: 2018-03-30 | End: 2018-03-30 | Stop reason: SDUPTHER

## 2018-03-30 RX ORDER — AMLODIPINE BESYLATE 5 MG/1
5 TABLET ORAL 2 TIMES DAILY
Qty: 180 TABLET | Refills: 3 | Status: SHIPPED | OUTPATIENT
Start: 2018-03-30 | End: 2018-03-30 | Stop reason: SDUPTHER

## 2018-03-30 RX ORDER — PIOGLITAZONEHYDROCHLORIDE 30 MG/1
30 TABLET ORAL DAILY
Qty: 90 TABLET | Refills: 3 | Status: SHIPPED | OUTPATIENT
Start: 2018-03-30 | End: 2019-03-30

## 2018-03-30 RX ORDER — LABETALOL 200 MG/1
200 TABLET, FILM COATED ORAL EVERY 12 HOURS SCHEDULED
Qty: 180 TABLET | Refills: 3 | Status: SHIPPED | OUTPATIENT
Start: 2018-03-30 | End: 2019-03-15 | Stop reason: SDUPTHER

## 2018-03-30 RX ORDER — LANCETS
EACH MISCELLANEOUS
Qty: 300 EACH | Refills: 3 | Status: SHIPPED | OUTPATIENT
Start: 2018-03-30 | End: 2019-06-10 | Stop reason: SDUPTHER

## 2018-07-11 ENCOUNTER — LAB (OUTPATIENT)
Dept: ENDOCRINOLOGY | Age: 83
End: 2018-07-11

## 2018-07-11 DIAGNOSIS — E55.9 AVITAMINOSIS D: ICD-10-CM

## 2018-07-11 DIAGNOSIS — N40.0 BENIGN LOCALIZED HYPERPLASIA OF PROSTATE WITHOUT URINARY OBSTRUCTION: ICD-10-CM

## 2018-07-11 DIAGNOSIS — I10 BENIGN ESSENTIAL HTN: ICD-10-CM

## 2018-07-11 DIAGNOSIS — I10 ESSENTIAL HYPERTENSION: ICD-10-CM

## 2018-07-11 DIAGNOSIS — D64.9 ANEMIA, UNSPECIFIED TYPE: ICD-10-CM

## 2018-07-11 DIAGNOSIS — E78.2 MIXED HYPERLIPIDEMIA: ICD-10-CM

## 2018-07-11 DIAGNOSIS — E11.51 TYPE 2 DIABETES MELLITUS WITH PERIPHERAL ANGIOPATHY (HCC): Primary | ICD-10-CM

## 2018-07-11 DIAGNOSIS — E11.51 TYPE 2 DIABETES MELLITUS WITH PERIPHERAL ANGIOPATHY (HCC): ICD-10-CM

## 2018-07-12 LAB
25(OH)D3+25(OH)D2 SERPL-MCNC: 58.4 NG/ML (ref 30–100)
BASOPHILS # BLD AUTO: 0.02 10*3/MM3 (ref 0–0.2)
BASOPHILS NFR BLD AUTO: 0.3 % (ref 0–1.5)
C PEPTIDE SERPL-MCNC: 4.3 NG/ML (ref 1.1–4.4)
CENTROMERE B AB SER-ACNC: <0.2 AI (ref 0–0.9)
CHOLEST SERPL-MCNC: 136 MG/DL (ref 0–200)
CHROMATIN AB SERPL-ACNC: <0.2 AI (ref 0–0.9)
DSDNA AB SER-ACNC: 2 IU/ML (ref 0–9)
ENA JO1 AB SER-ACNC: <0.2 AI (ref 0–0.9)
ENA RNP AB SER-ACNC: <0.2 AI (ref 0–0.9)
ENA SCL70 AB SER-ACNC: <0.2 AI (ref 0–0.9)
ENA SM AB SER-ACNC: <0.2 AI (ref 0–0.9)
ENA SM+RNP AB SER-ACNC: <0.2 AI (ref 0–0.9)
ENA SS-A AB SER-ACNC: <0.2 AI (ref 0–0.9)
ENA SS-B AB SER-ACNC: <0.2 AI (ref 0–0.9)
EOSINOPHIL # BLD AUTO: 0.04 10*3/MM3 (ref 0–0.7)
EOSINOPHIL NFR BLD AUTO: 0.5 % (ref 0.3–6.2)
ERYTHROCYTE [DISTWIDTH] IN BLOOD BY AUTOMATED COUNT: 14.2 % (ref 11.5–14.5)
HBA1C MFR BLD: 5.59 % (ref 4.8–5.6)
HCT VFR BLD AUTO: 39.3 % (ref 40.4–52.2)
HDLC SERPL-MCNC: 66 MG/DL (ref 40–60)
HGB BLD-MCNC: 13 G/DL (ref 13.7–17.6)
IMM GRANULOCYTES # BLD: 0.04 10*3/MM3 (ref 0–0.03)
IMM GRANULOCYTES NFR BLD: 0.5 % (ref 0–0.5)
INTERPRETATION: NORMAL
LDLC SERPL CALC-MCNC: 37 MG/DL (ref 0–100)
LYMPHOCYTES # BLD AUTO: 1.38 10*3/MM3 (ref 0.9–4.8)
LYMPHOCYTES NFR BLD AUTO: 18.7 % (ref 19.6–45.3)
Lab: NORMAL
Lab: NORMAL
MAGNESIUM SERPL-MCNC: 2.2 MG/DL (ref 1.6–2.4)
MCH RBC QN AUTO: 30.7 PG (ref 27–32.7)
MCHC RBC AUTO-ENTMCNC: 33.1 G/DL (ref 32.6–36.4)
MCV RBC AUTO: 92.9 FL (ref 79.8–96.2)
MICROALBUMIN UR-MCNC: <3 UG/ML
MONOCYTES # BLD AUTO: 0.7 10*3/MM3 (ref 0.2–1.2)
MONOCYTES NFR BLD AUTO: 9.5 % (ref 5–12)
NEUTROPHILS # BLD AUTO: 5.23 10*3/MM3 (ref 1.9–8.1)
NEUTROPHILS NFR BLD AUTO: 71 % (ref 42.7–76)
PLATELET # BLD AUTO: 186 10*3/MM3 (ref 140–500)
RBC # BLD AUTO: 4.23 10*6/MM3 (ref 4.6–6)
RIBOSOMAL P AB SER-ACNC: <0.2 AI (ref 0–0.9)
T3FREE SERPL-MCNC: 1.9 PG/ML (ref 2–4.4)
T4 FREE SERPL-MCNC: 1.29 NG/DL (ref 0.93–1.7)
T4 SERPL-MCNC: 6.41 MCG/DL (ref 4.5–11.7)
THYROGLOB AB SERPL-ACNC: <1 IU/ML (ref 0–0.9)
THYROGLOB SERPL-MCNC: 4 NG/ML (ref 1.4–29.2)
TRIGL SERPL-MCNC: 164 MG/DL (ref 0–150)
TSH SERPL DL<=0.005 MIU/L-ACNC: 2.05 MIU/ML (ref 0.27–4.2)
URATE SERPL-MCNC: 5.9 MG/DL (ref 3.4–7)
VLDLC SERPL CALC-MCNC: 32.8 MG/DL (ref 5–40)
WBC # BLD AUTO: 7.37 10*3/MM3 (ref 4.5–10.7)

## 2018-07-23 DIAGNOSIS — G62.89 OTHER POLYNEUROPATHY: Primary | ICD-10-CM

## 2018-07-23 DIAGNOSIS — R20.2 PARESTHESIA OF FOOT, BILATERAL: ICD-10-CM

## 2018-07-23 DIAGNOSIS — E11.51 TYPE 2 DIABETES MELLITUS WITH PERIPHERAL ANGIOPATHY (HCC): Primary | ICD-10-CM

## 2018-07-23 DIAGNOSIS — E11.51 TYPE 2 DIABETES MELLITUS WITH PERIPHERAL ANGIOPATHY (HCC): ICD-10-CM

## 2018-07-23 DIAGNOSIS — E78.2 MIXED HYPERLIPIDEMIA: ICD-10-CM

## 2018-07-23 DIAGNOSIS — E11.43 DIABETIC AUTONOMIC NEUROPATHY ASSOCIATED WITH TYPE 2 DIABETES MELLITUS (HCC): ICD-10-CM

## 2018-07-23 RX ORDER — L-METHYLFOLATE-ALGAE-VIT B12-B6 CAP 3-90.314-2-35 MG 3-90.314-2-35 MG
1 CAP ORAL 2 TIMES DAILY
Qty: 180 CAPSULE | Refills: 3 | Status: SHIPPED | OUTPATIENT
Start: 2018-07-23

## 2018-08-02 DIAGNOSIS — E11.43 DIABETIC AUTONOMIC NEUROPATHY ASSOCIATED WITH TYPE 2 DIABETES MELLITUS (HCC): Primary | ICD-10-CM

## 2018-08-02 DIAGNOSIS — R20.2 PARESTHESIA OF BOTH FEET: Primary | ICD-10-CM

## 2018-08-02 DIAGNOSIS — E11.42 DM TYPE 2 WITH DIABETIC PERIPHERAL NEUROPATHY (HCC): ICD-10-CM

## 2018-08-02 DIAGNOSIS — E11.43 DIABETIC AUTONOMIC NEUROPATHY ASSOCIATED WITH TYPE 2 DIABETES MELLITUS (HCC): ICD-10-CM

## 2018-08-15 ENCOUNTER — HOSPITAL ENCOUNTER (OUTPATIENT)
Dept: INFUSION THERAPY | Facility: HOSPITAL | Age: 83
Discharge: HOME | End: 2018-08-15
Attending: INTERNAL MEDICINE | Admitting: PSYCHIATRY & NEUROLOGY

## 2018-08-15 DIAGNOSIS — E11.43 DIABETIC AUTONOMIC NEUROPATHY ASSOCIATED WITH TYPE 2 DIABETES MELLITUS (HCC): ICD-10-CM

## 2018-08-15 DIAGNOSIS — E11.42 DM TYPE 2 WITH DIABETIC PERIPHERAL NEUROPATHY (HCC): ICD-10-CM

## 2018-08-15 PROCEDURE — 95886 MUSC TEST DONE W/N TEST COMP: CPT

## 2018-08-15 PROCEDURE — 95886 MUSC TEST DONE W/N TEST COMP: CPT | Performed by: PSYCHIATRY & NEUROLOGY

## 2018-08-15 PROCEDURE — 95910 NRV CNDJ TEST 7-8 STUDIES: CPT

## 2018-08-15 PROCEDURE — 95910 NRV CNDJ TEST 7-8 STUDIES: CPT | Performed by: PSYCHIATRY & NEUROLOGY

## 2018-08-15 NOTE — PROCEDURES
EMG and Nerve Conduction Studies    Please see data sheets for details on methods, temperatures and lab standards. EMG muscles tested for upper extremity studies include the deltoid, biceps, triceps, pronator teres, extensor digitorum communis, first dorsal interosseous and abductor pollicis brevis.  EMG muscles tested for lower extremity studies include the vastus lateralis, tibialis anterior, peroneus longus, medial gastrocnemius and extensor digitorum brevis.  Additional muscles tested as needed.  Paraspinal muscles tested as needed.  The complete report includes the data sheets.    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  This has been present off and on for 20 years.  He is diabetic with good control of his blood sugars.  No back pain described.      Ht: 157.5 cm  Wt: Not reported  HbA1C:   Lab Results   Component Value Date    HGBA1C 5.59 07/11/2018     TSH:   Lab Results   Component Value Date    TSH 2.050 07/11/2018       Technical summary:  Nerve conduction studies were obtained in the left leg with some comparisons on the right.  Skin temperature was 30°C measured at the ankles and on the soles of each foot.  Needle examination was obtained on selected muscles in both legs.    Results:  1.  Normal left sural sensory study.  2.  Normal left superficial peroneal sensory study.  3.  Normal medial orthodromic plantar mixed studies bilaterally.  4.  Normal left peroneal motor study.  5.  Normal tibial motor studies bilaterally including the distal latencies along the medial and lateral plantar motor nerves.  6.  Needle examination of selected muscles in both legs showed normal insertional activities throughout.  There were voluntary motor units with normal recruitment in each muscle  tested.    Impression:  Normal study.  No evidence of peripheral neuropathy, a tibial neuropathy at either ankle or a lumbosacral radiculopathy on either side by this study.  Study results were discussed with the patient.    Yehuda Crisostomo M.D.              Dictated utilizing Dragon dictation.

## 2018-10-27 RX ORDER — ROSUVASTATIN CALCIUM 20 MG/1
TABLET, COATED ORAL
Qty: 90 TABLET | Refills: 2 | Status: SHIPPED | OUTPATIENT
Start: 2018-10-27

## 2018-11-02 ENCOUNTER — OFFICE VISIT (OUTPATIENT)
Dept: NEUROLOGY | Facility: CLINIC | Age: 83
End: 2018-11-02

## 2018-11-02 VITALS
SYSTOLIC BLOOD PRESSURE: 126 MMHG | OXYGEN SATURATION: 98 % | HEART RATE: 52 BPM | BODY MASS INDEX: 25.16 KG/M2 | HEIGHT: 63 IN | DIASTOLIC BLOOD PRESSURE: 70 MMHG | WEIGHT: 142 LBS

## 2018-11-02 DIAGNOSIS — I10 ESSENTIAL HYPERTENSION: ICD-10-CM

## 2018-11-02 DIAGNOSIS — M79.672 FOOT PAIN, BILATERAL: Primary | ICD-10-CM

## 2018-11-02 DIAGNOSIS — D64.9 ANEMIA, UNSPECIFIED TYPE: ICD-10-CM

## 2018-11-02 DIAGNOSIS — E11.51 TYPE 2 DIABETES MELLITUS WITH PERIPHERAL ANGIOPATHY (HCC): Primary | ICD-10-CM

## 2018-11-02 DIAGNOSIS — E78.2 MIXED HYPERLIPIDEMIA: ICD-10-CM

## 2018-11-02 DIAGNOSIS — E55.9 AVITAMINOSIS D: ICD-10-CM

## 2018-11-02 DIAGNOSIS — M79.671 FOOT PAIN, BILATERAL: Primary | ICD-10-CM

## 2018-11-02 DIAGNOSIS — I10 BENIGN ESSENTIAL HTN: ICD-10-CM

## 2018-11-02 PROCEDURE — 99214 OFFICE O/P EST MOD 30 MIN: CPT | Performed by: PSYCHIATRY & NEUROLOGY

## 2018-11-02 RX ORDER — PEN NEEDLE, DIABETIC 32GX 5/32"
NEEDLE, DISPOSABLE MISCELLANEOUS
COMMUNITY
Start: 2018-08-10

## 2018-11-02 RX ORDER — TRIAMCINOLONE ACETONIDE 1 MG/G
CREAM TOPICAL
COMMUNITY
Start: 2018-10-29

## 2018-11-02 NOTE — PROGRESS NOTES
CC: Burning on the soles of the feet    HPI:  Radha Azevedo is a  82 y.o. extremely pleasant right-handed white male who was sent for neurologic consultation by Dr. Jiang regarding burning on the soles of his feet.  He had actually been sent previously for an EMG which I performed 8/15/18.  There was no evidence of polyneuropathy, a tibial neuropathy at either ankle or a lumbosacral radiculopathy on either side.  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  He runs every day for 45 minutes to an hour before sunrise.  He has no burning in his feet in the morning.  By noon the burning gradually starts and it becomes a worst at about bedtime.  When he goes to bed however his feet don't really keep him awake and he sleeps okay and wakes up without any pain.  He denies numbness in the feet.  There are no symptoms in the hands.  He does not have back pain.  He has had no operations.  He does have diabetes which is under excellent control as well as hyperlipidemia which is also under excellent control.  He developed apparent polymyalgia rheumatica August 2017 which apparently was a side effect of Januvia and he has been on prednisone since then.  He was taking 5 mg twice daily but developed some facial swelling and hand tremor but upon reducing the dosage to half a tablet twice a day both dissipated.  He continues this drug.  He states that he takes aspirin 325 mg daily which does seem to help his pain.    He has no history of significant head or spine injuries meningitis seizures stroke or syncope.  He has no family history of stroke brain tumor brain hemorrhage or aneurysm of a brain artery.  No family history of polyneuropathy.        Past Medical History:   Diagnosis Date   • Coronary artery disease    • Hyperlipidemia    • Hypertension          Past Surgical History:   Procedure Laterality Date   • CARDIAC CATHETERIZATION     • CAROTID STENT     • COLONOSCOPY     • CORONARY ANGIOPLASTY              Current Outpatient Prescriptions:   •  ACCU-CHEK GUIDE test strip, Check blood glucose 3 Times daily, Disp: 300 each, Rfl: 3  •  Alpha-Lipoic Acid 150 MG capsule, Take 250 mg by mouth 2 (two) times a day., Disp: , Rfl:   •  amLODIPine (NORVASC) 5 MG tablet, Take 1 tablet by mouth 2 (Two) Times a Day., Disp: 180 tablet, Rfl: 3  •  Azilsartan Medoxomil (EDARBI) 40 MG tablet, Take  by mouth., Disp: , Rfl:   •  BD PEN NEEDLE ALEE U/F 32G X 4 MM misc, , Disp: , Rfl:   •  Blood Glucose Monitoring Suppl (ACCU-CHEK MABLE PLUS) w/Device kit, Use 3 times a day, Disp: 1 kit, Rfl: 1  •  COCONUT OIL PO, Take  by mouth., Disp: , Rfl:   •  finasteride (PROSCAR) 5 MG tablet, Take 5 mg by mouth daily., Disp: , Rfl:   •  Insulin Glargine (TOUJEO SOLOSTAR) 300 UNIT/ML solution pen-injector, Inject 5 Units under the skin Daily With Breakfast., Disp: 9 pen, Rfl: 3  •  l-methylfolate-algae-B6-B12 (METANX) 3-90.314-2-35 MG capsule capsule, Take 1 capsule by mouth 2 (Two) Times a Day., Disp: 180 capsule, Rfl: 3  •  labetalol (NORMODYNE) 200 MG tablet, Take 1 tablet by mouth Every 12 (Twelve) Hours., Disp: 180 tablet, Rfl: 3  •  Lancets (ACCU-CHEK MULTICLIX) lancets, Check blood glucose 3 Times daily, Disp: 300 each, Rfl: 3  •  nateglinide (STARLIX) 120 MG tablet, Take 1 tablet by mouth 3 (Three) Times a Day Before Meals., Disp: 270 tablet, Rfl: 3  •  omega-3 acid ethyl esters (LOVAZA) 1 g capsule, Take 2 capsules by mouth 2 (Two) Times a Day., Disp: 360 capsule, Rfl: 3  •  pioglitazone (ACTOS) 30 MG tablet, Take 1 tablet by mouth Daily., Disp: 90 tablet, Rfl: 3  •  rosuvastatin (CRESTOR) 20 MG tablet, TAKE ONE TABLET BY MOUTH DAILY, Disp: 90 tablet, Rfl: 2  •  triamcinolone (KENALOG) 0.1 % cream, , Disp: , Rfl:   •  vitamin D (ERGOCALCIFEROL) 40834 UNITS capsule capsule, TAKE 2 CAPSULES BY MOUTH EVERY WEEK, Disp: 24 capsule, Rfl: 1  •  predniSONE (DELTASONE) 5 MG tablet, Take 1 tablet by mouth 2 (Two) Times a Day., Disp: 60  tablet, Rfl: 3  •  TURMERIC PO, Take  by mouth., Disp: , Rfl:       Family History   Problem Relation Age of Onset   • Alzheimer's disease Mother          Social History     Social History   • Marital status:      Spouse name: N/A   • Number of children: N/A   • Years of education: N/A     Occupational History   • Not on file.     Social History Main Topics   • Smoking status: Never Smoker   • Smokeless tobacco: Never Used   • Alcohol use No   • Drug use: No   • Sexual activity: Defer     Other Topics Concern   • Not on file     Social History Narrative   • No narrative on file         No Known Allergies      Pain Scale:        ROS:  Review of Systems   Constitutional: Negative for chills, fatigue and fever.   HENT: Negative for hearing loss, tinnitus and trouble swallowing.    Eyes: Negative for pain, redness and itching.   Respiratory: Negative for cough, shortness of breath and wheezing.    Cardiovascular: Negative for chest pain, palpitations and leg swelling.   Gastrointestinal: Negative for constipation, diarrhea, nausea and vomiting.   Endocrine: Negative for cold intolerance, heat intolerance and polydipsia.   Genitourinary: Negative for decreased urine volume, difficulty urinating, frequency and urgency.   Musculoskeletal: Negative for back pain, gait problem, neck pain and neck stiffness.   Skin: Negative for color change, rash and wound.   Allergic/Immunologic: Negative for environmental allergies, food allergies and immunocompromised state.   Neurological: Positive for tremors (hand) and numbness. Negative for dizziness, seizures, syncope, facial asymmetry, speech difficulty, weakness, light-headedness and headaches.   Hematological: Negative for adenopathy. Does not bruise/bleed easily.   Psychiatric/Behavioral: Negative for agitation, behavioral problems, confusion, decreased concentration, dysphoric mood, hallucinations, self-injury, sleep disturbance and suicidal ideas. The patient is not  "nervous/anxious and is not hyperactive.            Physical Exam:  Vitals:    11/02/18 0738   BP: 126/70   Pulse: 52   SpO2: 98%   Weight: 64.4 kg (142 lb)   Height: 160 cm (63\")     Body mass index is 25.15 kg/m².    Physical Exam  Gen.: Well-developed white male no acute distress  HEENT: Normocephalic no evidence of trauma.  Discs flat.  No A-V nicking.  He is status post cataract extractions with lens implants.  Throat negative.  Neck: Supple.  No thyromegaly.  No cervical bruits.  Radial pulses were strong and simultaneous.  Heart: Regular rate and rhythm no murmurs.  No pedal edema.      Neurological Exam:   Mental status: Awake alert oriented and conversant without evidence of an affective disorder thought disorder delusions or hallucinations.  HCF: No aphasia or apraxia or dysarthria.  Recent and remote memory intact.  Knowledge of recent events intact.  Cranial nerves: 2-12 intact  Motor: Normal tone and bulk with full power in all muscles tested in the arms and legs.  Reflexes: Diffusely +1 or trace with trace ankle jerks and downgoing toe signs.  Sensory: Normal light touch and pinprick throughout the arms and legs.  Monofilament testing on the soles was normal.  Vibration and position senses were normal in the feet.  Cerebellar: Finger to nose rapid movement heel-to-shin normal  Gait and Station: Casual toe heel and tandem walk normal no Romberg no drift        Results:      Lab Results   Component Value Date    BUN 38 (H) 03/14/2018    CREATININE 1.31 (H) 03/14/2018    EGFRIFNONA 52 (L) 03/14/2018    EGFRIFAFRI 63 03/14/2018    BCR 29.0 (H) 03/14/2018    CO2 24.7 03/14/2018    CALCIUM 9.9 03/14/2018    PROTENTOTREF 7.3 03/14/2018    ALBUMIN 4.60 03/14/2018    LABIL2 1.7 03/14/2018    AST 21 03/14/2018    ALT 17 03/14/2018       Lab Results   Component Value Date    WBC 11.19 (H) 03/14/2018    HGB 13.0 (L) 07/11/2018    HCT 39.3 (L) 07/11/2018    MCV 92.9 07/11/2018     07/11/2018         .No " results found for: RPR      Lab Results   Component Value Date    TSH 2.050 07/11/2018    A6KPOGJ 6.41 07/11/2018         No results found for: [id number]      No results found for: FOLATE      Lab Results   Component Value Date    HGBA1C 5.59 07/11/2018       Result of EMG from 8/15/18 were reviewed as noted above        Assessment:   1.  Bilateral foot burning on the soles of the feet-I suspect this is a soft tissue pain generator and set of small fiber neuropathy although I cannot entirely exclude small fiber neuropathy.  Clinical history is not typical in that the pain is worse the longer he is on his feet and is better when he is off of his feet, it responds to aspirin and he has a normal neurologic exam and nerve conduction studies.  2.  Polymyalgia rheumatica-asymptomatic        Plan:  1.  I had a lengthy discussion with the patient.  Options include a trial of gabapentin however since I don't think the pain generator is neuropathy I am not convinced that gabapentin will help.  I don't recommend daily nonsteroidal anti-inflammatory agents in this age group because of complication hazards but occasional use may be useful.  He may consider seeing a podiatrist or orthopedic surgeon with foot specialty for any further suggestions.  He was invited to return in 6-12 months if symptoms seem to be worsening or if the pattern changes to sound more like neuropathic pain.  2.  Consider slow taper of steroids until off    Return when necessary                          Dictated utilizing Dragon dictation.

## 2018-11-09 ENCOUNTER — RESULTS ENCOUNTER (OUTPATIENT)
Dept: ENDOCRINOLOGY | Age: 83
End: 2018-11-09

## 2018-11-09 DIAGNOSIS — I10 BENIGN ESSENTIAL HTN: ICD-10-CM

## 2018-11-09 DIAGNOSIS — E78.2 MIXED HYPERLIPIDEMIA: ICD-10-CM

## 2018-11-09 DIAGNOSIS — E55.9 AVITAMINOSIS D: ICD-10-CM

## 2018-11-09 DIAGNOSIS — I10 ESSENTIAL HYPERTENSION: ICD-10-CM

## 2018-11-09 DIAGNOSIS — E11.51 TYPE 2 DIABETES MELLITUS WITH PERIPHERAL ANGIOPATHY (HCC): ICD-10-CM

## 2018-11-09 DIAGNOSIS — D64.9 ANEMIA, UNSPECIFIED TYPE: ICD-10-CM

## 2018-12-20 DIAGNOSIS — E78.5 DYSLIPIDEMIA: ICD-10-CM

## 2018-12-20 DIAGNOSIS — N40.0 BENIGN PROSTATIC HYPERPLASIA WITHOUT LOWER URINARY TRACT SYMPTOMS: ICD-10-CM

## 2018-12-20 DIAGNOSIS — I10 BENIGN ESSENTIAL HTN: ICD-10-CM

## 2018-12-20 DIAGNOSIS — D64.9 ANEMIA, UNSPECIFIED TYPE: ICD-10-CM

## 2018-12-20 DIAGNOSIS — E55.9 AVITAMINOSIS D: ICD-10-CM

## 2018-12-20 DIAGNOSIS — E11.51 TYPE 2 DIABETES MELLITUS WITH PERIPHERAL ANGIOPATHY (HCC): Primary | ICD-10-CM

## 2018-12-20 DIAGNOSIS — E78.2 MIXED HYPERLIPIDEMIA: ICD-10-CM

## 2018-12-28 ENCOUNTER — RESULTS ENCOUNTER (OUTPATIENT)
Dept: ENDOCRINOLOGY | Age: 83
End: 2018-12-28

## 2018-12-28 DIAGNOSIS — D64.9 ANEMIA, UNSPECIFIED TYPE: ICD-10-CM

## 2018-12-28 DIAGNOSIS — I10 BENIGN ESSENTIAL HTN: ICD-10-CM

## 2018-12-28 DIAGNOSIS — N40.0 BENIGN PROSTATIC HYPERPLASIA WITHOUT LOWER URINARY TRACT SYMPTOMS: ICD-10-CM

## 2018-12-28 DIAGNOSIS — E78.2 MIXED HYPERLIPIDEMIA: ICD-10-CM

## 2018-12-28 DIAGNOSIS — E78.5 DYSLIPIDEMIA: ICD-10-CM

## 2018-12-28 DIAGNOSIS — E55.9 AVITAMINOSIS D: ICD-10-CM

## 2018-12-28 DIAGNOSIS — E11.51 TYPE 2 DIABETES MELLITUS WITH PERIPHERAL ANGIOPATHY (HCC): ICD-10-CM

## 2018-12-29 LAB
25(OH)D3+25(OH)D2 SERPL-MCNC: 61.7 NG/ML (ref 30–100)
ALBUMIN SERPL-MCNC: 3.7 G/DL (ref 3.5–5.2)
ALBUMIN/GLOB SERPL: 1.2 G/DL
ALP SERPL-CCNC: 60 U/L (ref 39–117)
ALT SERPL-CCNC: 11 U/L (ref 1–41)
AST SERPL-CCNC: 19 U/L (ref 1–40)
BASOPHILS # BLD AUTO: 0.04 10*3/MM3 (ref 0–0.2)
BASOPHILS NFR BLD AUTO: 0.5 % (ref 0–1.5)
BILIRUB SERPL-MCNC: 0.4 MG/DL (ref 0.1–1.2)
BUN SERPL-MCNC: 17 MG/DL (ref 8–23)
BUN/CREAT SERPL: 12.8 (ref 7–25)
C PEPTIDE SERPL-MCNC: 1.4 NG/ML (ref 1.1–4.4)
CALCIUM SERPL-MCNC: 9.3 MG/DL (ref 8.6–10.5)
CHLORIDE SERPL-SCNC: 99 MMOL/L (ref 98–107)
CHOLEST SERPL-MCNC: 120 MG/DL (ref 0–200)
CO2 SERPL-SCNC: 27.1 MMOL/L (ref 22–29)
CREAT SERPL-MCNC: 1.33 MG/DL (ref 0.76–1.27)
EOSINOPHIL # BLD AUTO: 0.12 10*3/MM3 (ref 0–0.7)
EOSINOPHIL NFR BLD AUTO: 1.5 % (ref 0.3–6.2)
ERYTHROCYTE [DISTWIDTH] IN BLOOD BY AUTOMATED COUNT: 14.4 % (ref 11.5–14.5)
FERRITIN SERPL-MCNC: 42.33 NG/ML (ref 30–400)
FOLATE SERPL-MCNC: >20 NG/ML (ref 4.78–24.2)
GLOBULIN SER CALC-MCNC: 3.1 GM/DL
GLUCOSE SERPL-MCNC: 59 MG/DL (ref 65–99)
HBA1C MFR BLD: 5.4 % (ref 4.8–5.6)
HCT VFR BLD AUTO: 42.6 % (ref 40.4–52.2)
HDLC SERPL-MCNC: 54 MG/DL (ref 40–60)
HGB BLD-MCNC: 13.9 G/DL (ref 13.7–17.6)
IMM GRANULOCYTES # BLD: 0.03 10*3/MM3 (ref 0–0.03)
IMM GRANULOCYTES NFR BLD: 0.4 % (ref 0–0.5)
INTERPRETATION: NORMAL
IRON SERPL-MCNC: 101 MCG/DL (ref 59–158)
LDLC SERPL CALC-MCNC: 48 MG/DL (ref 0–100)
LYMPHOCYTES # BLD AUTO: 2.07 10*3/MM3 (ref 0.9–4.8)
LYMPHOCYTES NFR BLD AUTO: 26.3 % (ref 19.6–45.3)
Lab: NORMAL
MCH RBC QN AUTO: 28.1 PG (ref 27–32.7)
MCHC RBC AUTO-ENTMCNC: 32.6 G/DL (ref 32.6–36.4)
MCV RBC AUTO: 86.1 FL (ref 79.8–96.2)
MICROALBUMIN UR-MCNC: 22.1 UG/ML
MONOCYTES # BLD AUTO: 0.96 10*3/MM3 (ref 0.2–1.2)
MONOCYTES NFR BLD AUTO: 12.2 % (ref 5–12)
NEUTROPHILS # BLD AUTO: 4.67 10*3/MM3 (ref 1.9–8.1)
NEUTROPHILS NFR BLD AUTO: 59.5 % (ref 42.7–76)
PLATELET # BLD AUTO: 193 10*3/MM3 (ref 140–500)
POTASSIUM SERPL-SCNC: 4 MMOL/L (ref 3.5–5.2)
PROT SERPL-MCNC: 6.8 G/DL (ref 6–8.5)
PSA SERPL-MCNC: 2.09 NG/ML (ref 0–4)
RBC # BLD AUTO: 4.95 10*6/MM3 (ref 4.6–6)
RETICS/RBC NFR AUTO: 1.96 % (ref 0.5–1.5)
SODIUM SERPL-SCNC: 138 MMOL/L (ref 136–145)
T4 SERPL-MCNC: 6.62 MCG/DL (ref 4.5–11.7)
TRIGL SERPL-MCNC: 91 MG/DL (ref 0–150)
TSH SERPL DL<=0.005 MIU/L-ACNC: 3.59 MIU/ML (ref 0.27–4.2)
URATE SERPL-MCNC: 7.3 MG/DL (ref 3.4–7)
VIT B12 SERPL-MCNC: 561 PG/ML (ref 211–946)
VLDLC SERPL CALC-MCNC: 18.2 MG/DL (ref 5–40)
WBC # BLD AUTO: 7.86 10*3/MM3 (ref 4.5–10.7)

## 2019-01-18 RX ORDER — CEFDINIR 300 MG/1
300 CAPSULE ORAL 2 TIMES DAILY
Qty: 20 CAPSULE | Refills: 3 | OUTPATIENT
Start: 2019-01-18 | End: 2019-05-18

## 2019-03-15 RX ORDER — LABETALOL 200 MG/1
200 TABLET, FILM COATED ORAL EVERY 12 HOURS SCHEDULED
Qty: 180 TABLET | Refills: 3 | Status: SHIPPED | OUTPATIENT
Start: 2019-03-15 | End: 2019-03-18 | Stop reason: SDUPTHER

## 2019-03-18 RX ORDER — LABETALOL 200 MG/1
200 TABLET, FILM COATED ORAL EVERY 12 HOURS SCHEDULED
Qty: 180 TABLET | Refills: 3 | Status: SHIPPED | OUTPATIENT
Start: 2019-03-18 | End: 2019-06-24

## 2019-03-20 RX ORDER — BLOOD SUGAR DIAGNOSTIC
STRIP MISCELLANEOUS
Refills: 3 | OUTPATIENT
Start: 2019-03-20

## 2019-04-04 DIAGNOSIS — E78.2 MIXED HYPERLIPIDEMIA: ICD-10-CM

## 2019-04-04 DIAGNOSIS — I10 BENIGN ESSENTIAL HTN: ICD-10-CM

## 2019-04-04 DIAGNOSIS — E11.51 TYPE 2 DIABETES MELLITUS WITH PERIPHERAL ANGIOPATHY (HCC): Primary | ICD-10-CM

## 2019-04-04 DIAGNOSIS — E55.9 VITAMIN D DEFICIENCY: ICD-10-CM

## 2019-04-04 DIAGNOSIS — D64.9 ANEMIA, UNSPECIFIED TYPE: ICD-10-CM

## 2019-04-04 DIAGNOSIS — E11.9 DIABETES MELLITUS TYPE 2, NONINSULIN DEPENDENT (HCC): ICD-10-CM

## 2019-04-05 ENCOUNTER — RESULTS ENCOUNTER (OUTPATIENT)
Dept: ENDOCRINOLOGY | Age: 84
End: 2019-04-05

## 2019-04-05 DIAGNOSIS — E11.51 TYPE 2 DIABETES MELLITUS WITH PERIPHERAL ANGIOPATHY (HCC): ICD-10-CM

## 2019-04-05 DIAGNOSIS — D64.9 ANEMIA, UNSPECIFIED TYPE: ICD-10-CM

## 2019-04-05 DIAGNOSIS — E78.2 MIXED HYPERLIPIDEMIA: ICD-10-CM

## 2019-04-05 DIAGNOSIS — E11.9 DIABETES MELLITUS TYPE 2, NONINSULIN DEPENDENT (HCC): ICD-10-CM

## 2019-04-05 DIAGNOSIS — I10 BENIGN ESSENTIAL HTN: ICD-10-CM

## 2019-04-05 DIAGNOSIS — E55.9 VITAMIN D DEFICIENCY: ICD-10-CM

## 2019-04-06 LAB
25(OH)D3+25(OH)D2 SERPL-MCNC: 64.7 NG/ML (ref 30–100)
ALBUMIN SERPL-MCNC: 4.3 G/DL (ref 3.5–5.2)
ALBUMIN/GLOB SERPL: 1.7 G/DL
ALP SERPL-CCNC: 63 U/L (ref 39–117)
ALT SERPL-CCNC: 10 U/L (ref 1–41)
AST SERPL-CCNC: 18 U/L (ref 1–40)
BILIRUB SERPL-MCNC: 0.6 MG/DL (ref 0.2–1.2)
BUN SERPL-MCNC: 23 MG/DL (ref 8–23)
BUN/CREAT SERPL: 19.3 (ref 7–25)
C PEPTIDE SERPL-MCNC: 3.6 NG/ML (ref 1.1–4.4)
CALCIUM SERPL-MCNC: 9.4 MG/DL (ref 8.6–10.5)
CHLORIDE SERPL-SCNC: 98 MMOL/L (ref 98–107)
CHOLEST SERPL-MCNC: 120 MG/DL (ref 0–200)
CO2 SERPL-SCNC: 26.5 MMOL/L (ref 22–29)
CREAT SERPL-MCNC: 1.19 MG/DL (ref 0.76–1.27)
ERYTHROCYTE [SEDIMENTATION RATE] IN BLOOD BY WESTERGREN METHOD: 7 MM/HR (ref 0–20)
FERRITIN SERPL-MCNC: 97.4 NG/ML (ref 30–400)
GLOBULIN SER CALC-MCNC: 2.6 GM/DL
GLUCOSE SERPL-MCNC: 88 MG/DL (ref 65–99)
HBA1C MFR BLD: 5.5 % (ref 4.8–5.6)
HDLC SERPL-MCNC: 52 MG/DL (ref 40–60)
INTERPRETATION: NORMAL
IRON SATN MFR SERPL: 28 % (ref 20–50)
IRON SERPL-MCNC: 97 MCG/DL (ref 59–158)
LDLC SERPL CALC-MCNC: 56 MG/DL (ref 0–100)
Lab: NORMAL
MICROALBUMIN UR-MCNC: 8.2 UG/ML
POTASSIUM SERPL-SCNC: 4.3 MMOL/L (ref 3.5–5.2)
PROT SERPL-MCNC: 6.9 G/DL (ref 6–8.5)
SODIUM SERPL-SCNC: 136 MMOL/L (ref 136–145)
T4 SERPL-MCNC: 6.27 MCG/DL (ref 4.5–11.7)
TIBC SERPL-MCNC: 341 MCG/DL
TRIGL SERPL-MCNC: 60 MG/DL (ref 0–150)
TSH SERPL DL<=0.005 MIU/L-ACNC: 0.94 MIU/ML (ref 0.27–4.2)
UIBC SERPL-MCNC: 244 MCG/DL
URATE SERPL-MCNC: 6.4 MG/DL (ref 3.4–7)
VLDLC SERPL CALC-MCNC: 12 MG/DL

## 2019-05-23 RX ORDER — NATEGLINIDE 120 MG/1
TABLET ORAL
Qty: 270 TABLET | Refills: 3 | OUTPATIENT
Start: 2019-05-23

## 2019-05-28 RX ORDER — BLOOD SUGAR DIAGNOSTIC
STRIP MISCELLANEOUS
Refills: 3 | OUTPATIENT
Start: 2019-05-28

## 2019-06-10 RX ORDER — BLOOD SUGAR DIAGNOSTIC
STRIP MISCELLANEOUS
Qty: 300 EACH | Refills: 3 | Status: SHIPPED | OUTPATIENT
Start: 2019-06-10 | End: 2019-07-26 | Stop reason: SDUPTHER

## 2019-06-10 RX ORDER — LANCETS
EACH MISCELLANEOUS
Qty: 300 EACH | Refills: 3 | Status: SHIPPED | OUTPATIENT
Start: 2019-06-10 | End: 2019-08-07 | Stop reason: SDUPTHER

## 2019-06-24 RX ORDER — LABETALOL 300 MG/1
300 TABLET, FILM COATED ORAL 2 TIMES DAILY
Qty: 180 TABLET | Refills: 3 | Status: SHIPPED | OUTPATIENT
Start: 2019-06-24 | End: 2019-07-26 | Stop reason: SDUPTHER

## 2019-07-25 RX ORDER — BLOOD SUGAR DIAGNOSTIC
STRIP MISCELLANEOUS
Refills: 3 | OUTPATIENT
Start: 2019-07-25

## 2019-07-25 RX ORDER — LABETALOL 200 MG/1
TABLET, FILM COATED ORAL
Qty: 180 TABLET | Refills: 3 | OUTPATIENT
Start: 2019-07-25

## 2019-07-26 RX ORDER — BLOOD SUGAR DIAGNOSTIC
STRIP MISCELLANEOUS
Qty: 300 EACH | Refills: 3 | Status: SHIPPED | OUTPATIENT
Start: 2019-07-26 | End: 2019-08-07 | Stop reason: SDUPTHER

## 2019-07-26 RX ORDER — LABETALOL 300 MG/1
300 TABLET, FILM COATED ORAL 2 TIMES DAILY
Qty: 180 TABLET | Refills: 3 | Status: SHIPPED | OUTPATIENT
Start: 2019-07-26 | End: 2019-08-07 | Stop reason: SDUPTHER

## 2019-08-05 RX ORDER — MUPIROCIN CALCIUM 20 MG/G
CREAM TOPICAL
Qty: 30 G | Refills: 3 | Status: SHIPPED | OUTPATIENT
Start: 2019-08-05 | End: 2020-08-04

## 2019-08-05 RX ORDER — AMOXICILLIN AND CLAVULANATE POTASSIUM 875; 125 MG/1; MG/1
1 TABLET, FILM COATED ORAL EVERY 12 HOURS
Qty: 20 TABLET | Refills: 0 | Status: SHIPPED | OUTPATIENT
Start: 2019-08-05 | End: 2019-08-15

## 2019-08-07 ENCOUNTER — PRIOR AUTHORIZATION (OUTPATIENT)
Dept: ENDOCRINOLOGY | Age: 84
End: 2019-08-07

## 2019-08-07 RX ORDER — BLOOD SUGAR DIAGNOSTIC
STRIP MISCELLANEOUS
Qty: 300 EACH | Refills: 3 | Status: SHIPPED | OUTPATIENT
Start: 2019-08-07 | End: 2020-07-30 | Stop reason: SDUPTHER

## 2019-08-07 RX ORDER — LABETALOL 300 MG/1
300 TABLET, FILM COATED ORAL 2 TIMES DAILY
Qty: 180 TABLET | Refills: 3 | Status: SHIPPED | OUTPATIENT
Start: 2019-08-07

## 2019-08-07 RX ORDER — LANCETS
EACH MISCELLANEOUS
Qty: 300 EACH | Refills: 3 | Status: SHIPPED | OUTPATIENT
Start: 2019-08-07

## 2019-08-07 NOTE — TELEPHONE ENCOUNTER
Glucagon 1mg emergency kit    PA Case: [id number], Status: Approved, Coverage Starts on: 8/7/2019 Coverage Ends on: 8/6/2021

## 2019-08-09 RX ORDER — LABETALOL 200 MG/1
TABLET, FILM COATED ORAL
Qty: 180 TABLET | Refills: 3 | OUTPATIENT
Start: 2019-08-09

## 2019-08-26 RX ORDER — AMLODIPINE BESYLATE 10 MG/1
10 TABLET ORAL DAILY
Qty: 90 TABLET | Refills: 3 | Status: SHIPPED | OUTPATIENT
Start: 2019-08-26 | End: 2020-08-25

## 2019-10-24 RX ORDER — ROSUVASTATIN CALCIUM 20 MG/1
TABLET, COATED ORAL
Qty: 90 TABLET | Refills: 1 | OUTPATIENT
Start: 2019-10-24

## 2019-12-02 RX ORDER — ROSUVASTATIN CALCIUM 20 MG/1
TABLET, COATED ORAL
Qty: 90 TABLET | Refills: 1 | OUTPATIENT
Start: 2019-12-02

## 2019-12-03 DIAGNOSIS — E55.9 VITAMIN D DEFICIENCY: ICD-10-CM

## 2019-12-03 DIAGNOSIS — I10 ESSENTIAL HYPERTENSION: ICD-10-CM

## 2019-12-03 DIAGNOSIS — D64.9 ANEMIA, UNSPECIFIED TYPE: ICD-10-CM

## 2019-12-03 DIAGNOSIS — I10 BENIGN ESSENTIAL HTN: ICD-10-CM

## 2019-12-03 DIAGNOSIS — N40.0 BENIGN LOCALIZED HYPERPLASIA OF PROSTATE WITHOUT URINARY OBSTRUCTION: ICD-10-CM

## 2019-12-03 DIAGNOSIS — E11.51 TYPE 2 DIABETES MELLITUS WITH PERIPHERAL ANGIOPATHY (HCC): Primary | ICD-10-CM

## 2019-12-03 DIAGNOSIS — E78.2 MIXED HYPERLIPIDEMIA: ICD-10-CM

## 2019-12-06 ENCOUNTER — RESULTS ENCOUNTER (OUTPATIENT)
Dept: ENDOCRINOLOGY | Age: 84
End: 2019-12-06

## 2019-12-06 DIAGNOSIS — D64.9 ANEMIA, UNSPECIFIED TYPE: ICD-10-CM

## 2019-12-06 DIAGNOSIS — E55.9 VITAMIN D DEFICIENCY: ICD-10-CM

## 2019-12-06 DIAGNOSIS — I10 ESSENTIAL HYPERTENSION: ICD-10-CM

## 2019-12-06 DIAGNOSIS — N40.0 BENIGN LOCALIZED HYPERPLASIA OF PROSTATE WITHOUT URINARY OBSTRUCTION: ICD-10-CM

## 2019-12-06 DIAGNOSIS — E78.2 MIXED HYPERLIPIDEMIA: ICD-10-CM

## 2019-12-06 DIAGNOSIS — I10 BENIGN ESSENTIAL HTN: ICD-10-CM

## 2019-12-06 DIAGNOSIS — E11.51 TYPE 2 DIABETES MELLITUS WITH PERIPHERAL ANGIOPATHY (HCC): ICD-10-CM

## 2019-12-07 LAB
25(OH)D3+25(OH)D2 SERPL-MCNC: 74.3 NG/ML (ref 30–100)
ALBUMIN SERPL-MCNC: 4.4 G/DL (ref 3.5–5.2)
ALBUMIN/GLOB SERPL: 1.6 G/DL
ALP SERPL-CCNC: 64 U/L (ref 39–117)
ALT SERPL-CCNC: 12 U/L (ref 1–41)
AST SERPL-CCNC: 18 U/L (ref 1–40)
BILIRUB SERPL-MCNC: 0.7 MG/DL (ref 0.2–1.2)
BUN SERPL-MCNC: 14 MG/DL (ref 8–23)
BUN/CREAT SERPL: 12.7 (ref 7–25)
C PEPTIDE SERPL-MCNC: 0.9 NG/ML (ref 1.1–4.4)
CALCIUM SERPL-MCNC: 9 MG/DL (ref 8.6–10.5)
CHLORIDE SERPL-SCNC: 97 MMOL/L (ref 98–107)
CHOLEST SERPL-MCNC: 119 MG/DL (ref 0–200)
CO2 SERPL-SCNC: 25.6 MMOL/L (ref 22–29)
CREAT SERPL-MCNC: 1.1 MG/DL (ref 0.76–1.27)
GLOBULIN SER CALC-MCNC: 2.7 GM/DL
GLUCOSE SERPL-MCNC: 66 MG/DL (ref 65–99)
HBA1C MFR BLD: 5.8 % (ref 4.8–5.6)
HDLC SERPL-MCNC: 60 MG/DL (ref 40–60)
INTERPRETATION: NORMAL
LDLC SERPL CALC-MCNC: 48 MG/DL (ref 0–100)
Lab: NORMAL
MICROALBUMIN UR-MCNC: 12.9 UG/ML
POTASSIUM SERPL-SCNC: 4.3 MMOL/L (ref 3.5–5.2)
PROT SERPL-MCNC: 7.1 G/DL (ref 6–8.5)
PSA SERPL-MCNC: 2.72 NG/ML (ref 0–4)
SODIUM SERPL-SCNC: 134 MMOL/L (ref 136–145)
T4 SERPL-MCNC: 7.19 MCG/DL (ref 4.5–11.7)
TRIGL SERPL-MCNC: 57 MG/DL (ref 0–150)
TSH SERPL DL<=0.005 MIU/L-ACNC: 3.78 UIU/ML (ref 0.27–4.2)
URATE SERPL-MCNC: 5.3 MG/DL (ref 3.4–7)
VLDLC SERPL CALC-MCNC: 11.4 MG/DL

## 2020-04-06 DIAGNOSIS — J20.9 ACUTE BRONCHITIS, UNSPECIFIED ORGANISM: Primary | ICD-10-CM

## 2020-04-06 RX ORDER — AZITHROMYCIN 250 MG/1
TABLET, FILM COATED ORAL
Qty: 5 TABLET | Refills: 0 | Status: SHIPPED | OUTPATIENT
Start: 2020-04-06 | End: 2020-04-11

## 2020-05-20 RX ORDER — TELMISARTAN 80 MG/1
80 TABLET ORAL DAILY
Qty: 90 TABLET | Refills: 3 | Status: SHIPPED | OUTPATIENT
Start: 2020-05-20 | End: 2021-05-20

## 2020-06-01 DIAGNOSIS — E78.2 MIXED HYPERLIPIDEMIA: ICD-10-CM

## 2020-06-01 DIAGNOSIS — E78.5 DYSLIPIDEMIA: ICD-10-CM

## 2020-06-01 DIAGNOSIS — D64.9 ANEMIA, UNSPECIFIED TYPE: ICD-10-CM

## 2020-06-01 DIAGNOSIS — N40.0 BENIGN LOCALIZED HYPERPLASIA OF PROSTATE WITHOUT URINARY OBSTRUCTION: ICD-10-CM

## 2020-06-01 DIAGNOSIS — E11.51 TYPE 2 DIABETES MELLITUS WITH PERIPHERAL ANGIOPATHY (HCC): ICD-10-CM

## 2020-06-01 DIAGNOSIS — I10 BENIGN ESSENTIAL HTN: ICD-10-CM

## 2020-06-01 DIAGNOSIS — E11.59 TYPE 2 DIABETES MELLITUS WITH OTHER CIRCULATORY COMPLICATION, WITH LONG-TERM CURRENT USE OF INSULIN (HCC): Primary | ICD-10-CM

## 2020-06-01 DIAGNOSIS — Z79.4 TYPE 2 DIABETES MELLITUS WITH OTHER CIRCULATORY COMPLICATION, WITH LONG-TERM CURRENT USE OF INSULIN (HCC): Primary | ICD-10-CM

## 2020-06-01 DIAGNOSIS — E55.9 AVITAMINOSIS D: ICD-10-CM

## 2020-06-04 LAB
25(OH)D3+25(OH)D2 SERPL-MCNC: 59.7 NG/ML (ref 30–100)
ALBUMIN SERPL-MCNC: 4.2 G/DL (ref 3.5–5.2)
ALBUMIN/GLOB SERPL: 1.6 G/DL
ALP SERPL-CCNC: 71 U/L (ref 39–117)
ALT SERPL-CCNC: 11 U/L (ref 1–41)
AST SERPL-CCNC: 16 U/L (ref 1–40)
BASOPHILS # BLD AUTO: 0.11 10*3/MM3 (ref 0–0.2)
BASOPHILS NFR BLD AUTO: 1 % (ref 0–1.5)
BILIRUB SERPL-MCNC: 0.4 MG/DL (ref 0.2–1.2)
BUN SERPL-MCNC: 19 MG/DL (ref 8–23)
BUN/CREAT SERPL: 19.4 (ref 7–25)
C PEPTIDE SERPL-MCNC: 2.5 NG/ML (ref 1.1–4.4)
CALCIUM SERPL-MCNC: 9.2 MG/DL (ref 8.6–10.5)
CHLORIDE SERPL-SCNC: 95 MMOL/L (ref 98–107)
CHOLEST SERPL-MCNC: 123 MG/DL (ref 100–199)
CO2 SERPL-SCNC: 24 MMOL/L (ref 22–29)
CREAT SERPL-MCNC: 0.98 MG/DL (ref 0.76–1.27)
EOSINOPHIL # BLD AUTO: 0.17 10*3/MM3 (ref 0–0.4)
EOSINOPHIL NFR BLD AUTO: 1.5 % (ref 0.3–6.2)
ERYTHROCYTE [DISTWIDTH] IN BLOOD BY AUTOMATED COUNT: 13 % (ref 12.3–15.4)
FOLATE SERPL-MCNC: >20 NG/ML (ref 4.78–24.2)
GLOBULIN SER CALC-MCNC: 2.6 GM/DL
GLUCOSE SERPL-MCNC: 80 MG/DL (ref 65–99)
HBA1C MFR BLD: 6 % (ref 4.8–5.6)
HCT VFR BLD AUTO: 37.3 % (ref 37.5–51)
HDL SERPL-SCNC: 28.2 UMOL/L
HDLC SERPL-MCNC: 63 MG/DL
HGB BLD-MCNC: 12.5 G/DL (ref 13–17.7)
IMM GRANULOCYTES # BLD AUTO: 0.04 10*3/MM3 (ref 0–0.05)
IMM GRANULOCYTES NFR BLD AUTO: 0.4 % (ref 0–0.5)
LDL SERPL QN: 20.3 NM
LDL SERPL-SCNC: 410 NMOL/L
LDL SMALL SERPL-SCNC: 182 NMOL/L
LDLC SERPL CALC-MCNC: 45 MG/DL (ref 0–99)
LYMPHOCYTES # BLD AUTO: 2.83 10*3/MM3 (ref 0.7–3.1)
LYMPHOCYTES NFR BLD AUTO: 25.8 % (ref 19.6–45.3)
MCH RBC QN AUTO: 28.2 PG (ref 26.6–33)
MCHC RBC AUTO-ENTMCNC: 33.5 G/DL (ref 31.5–35.7)
MCV RBC AUTO: 84 FL (ref 79–97)
MICROALBUMIN UR-MCNC: 7.8 UG/ML
MONOCYTES # BLD AUTO: 1.12 10*3/MM3 (ref 0.1–0.9)
MONOCYTES NFR BLD AUTO: 10.2 % (ref 5–12)
NEUTROPHILS # BLD AUTO: 6.72 10*3/MM3 (ref 1.7–7)
NEUTROPHILS NFR BLD AUTO: 61.1 % (ref 42.7–76)
NRBC BLD AUTO-RTO: 0 /100 WBC (ref 0–0.2)
PLATELET # BLD AUTO: 237 10*3/MM3 (ref 140–450)
POTASSIUM SERPL-SCNC: 4 MMOL/L (ref 3.5–5.2)
PROT SERPL-MCNC: 6.8 G/DL (ref 6–8.5)
RBC # BLD AUTO: 4.44 10*6/MM3 (ref 4.14–5.8)
SODIUM SERPL-SCNC: 127 MMOL/L (ref 136–145)
T4 SERPL-MCNC: 6.61 MCG/DL (ref 4.5–11.7)
TRIGL SERPL-MCNC: 76 MG/DL (ref 0–149)
TSH SERPL DL<=0.005 MIU/L-ACNC: 2.92 UIU/ML (ref 0.27–4.2)
URATE SERPL-MCNC: 5.5 MG/DL (ref 3.4–7)
VIT B12 SERPL-MCNC: 405 PG/ML (ref 211–946)
WBC # BLD AUTO: 10.99 10*3/MM3 (ref 3.4–10.8)

## 2020-06-05 ENCOUNTER — RESULTS ENCOUNTER (OUTPATIENT)
Dept: ENDOCRINOLOGY | Age: 85
End: 2020-06-05

## 2020-06-05 DIAGNOSIS — E78.5 DYSLIPIDEMIA: ICD-10-CM

## 2020-06-05 DIAGNOSIS — I10 BENIGN ESSENTIAL HTN: ICD-10-CM

## 2020-06-05 DIAGNOSIS — D64.9 ANEMIA, UNSPECIFIED TYPE: ICD-10-CM

## 2020-06-05 DIAGNOSIS — N40.0 BENIGN LOCALIZED HYPERPLASIA OF PROSTATE WITHOUT URINARY OBSTRUCTION: ICD-10-CM

## 2020-06-05 DIAGNOSIS — Z79.4 TYPE 2 DIABETES MELLITUS WITH OTHER CIRCULATORY COMPLICATION, WITH LONG-TERM CURRENT USE OF INSULIN (HCC): ICD-10-CM

## 2020-06-05 DIAGNOSIS — E78.2 MIXED HYPERLIPIDEMIA: ICD-10-CM

## 2020-06-05 DIAGNOSIS — E55.9 AVITAMINOSIS D: ICD-10-CM

## 2020-06-05 DIAGNOSIS — E11.51 TYPE 2 DIABETES MELLITUS WITH PERIPHERAL ANGIOPATHY (HCC): ICD-10-CM

## 2020-06-05 DIAGNOSIS — E11.59 TYPE 2 DIABETES MELLITUS WITH OTHER CIRCULATORY COMPLICATION, WITH LONG-TERM CURRENT USE OF INSULIN (HCC): ICD-10-CM

## 2020-07-20 RX ORDER — BLOOD SUGAR DIAGNOSTIC
STRIP MISCELLANEOUS
OUTPATIENT
Start: 2020-07-20

## 2020-07-30 RX ORDER — BLOOD SUGAR DIAGNOSTIC
STRIP MISCELLANEOUS
Qty: 300 EACH | Refills: 1 | Status: SHIPPED | OUTPATIENT
Start: 2020-07-30

## 2020-12-18 RX ORDER — BLOOD SUGAR DIAGNOSTIC
STRIP MISCELLANEOUS
OUTPATIENT
Start: 2020-12-18

## 2021-02-18 RX ORDER — BLOOD SUGAR DIAGNOSTIC
STRIP MISCELLANEOUS
OUTPATIENT
Start: 2021-02-18

## 2021-10-28 ENCOUNTER — OFFICE VISIT (OUTPATIENT)
Dept: NEUROLOGY | Facility: CLINIC | Age: 86
End: 2021-10-28

## 2021-10-28 VITALS
WEIGHT: 145 LBS | BODY MASS INDEX: 24.75 KG/M2 | SYSTOLIC BLOOD PRESSURE: 148 MMHG | DIASTOLIC BLOOD PRESSURE: 90 MMHG | OXYGEN SATURATION: 97 % | HEIGHT: 64 IN

## 2021-10-28 DIAGNOSIS — M79.671 BILATERAL FOOT PAIN: Primary | ICD-10-CM

## 2021-10-28 DIAGNOSIS — M79.672 BILATERAL FOOT PAIN: Primary | ICD-10-CM

## 2021-10-28 PROCEDURE — 99215 OFFICE O/P EST HI 40 MIN: CPT | Performed by: PSYCHIATRY & NEUROLOGY

## 2021-10-28 NOTE — PROGRESS NOTES
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  I saw him previously 11/2/2018 after performing an EMG on the lower extremities 8/15/2018.  He had normal nerve conductions including bilateral medial mixed orthodromic plantar studies.  His physical exam was negative for evidence of peripheral neuropathy.  I thought the source of his symptoms was likely soft tissue/plantar fasciitis.  The patient is an avid runner and he runs for about 1 hour every day for many years.  He continues to do so.  When he gets up in the morning his feet feel fine.  After he has been on them a while the end hurt and they hurt more later in the day but then when he gets off of his feet the pain is improved.  It does have a burning quality he states.  Once in a while he has some burning extends onto the top of the feet but this is unusual.  He denies any history of or current complaints of significant neck or back pain and he denies any weakness or change in his balance or gait.    He denies having seen a foot specialist and has not tried any medication for this.  He does not use any type of orthotics.  He denies dry eye dry mouth syndrome and has no history of congestive heart failure.  He states that his legs sweat normally.  He denies any issues with transit in the GI tract.  He has no family history of neuropathy or congestive heart failure.    He does have history of coronary artery disease status post angioplasty and he has a carotid stent.  He has diabetes which is very well controlled for which he takes Starlix.  He is also on rosuvastatin and labetalol.        Past Medical History:   Diagnosis Date   • Colon polyps     Followed by Dr. Leo Morales at [facility].   • Coronary artery disease    • Hyperlipidemia    • Hypertension          Past Surgical History:   Procedure Laterality Date   • CARDIAC CATHETERIZATION     • CAROTID STENT     •  COLONOSCOPY N/A 01/29/2014    Dr. Leo Morales at [facility].   • COLONOSCOPY N/A 01/13/2016    Dr. Leo Morales at [facility].   • CORONARY ANGIOPLASTY             Current Outpatient Medications:   •  finasteride (PROSCAR) 5 MG tablet, Take 5 mg by mouth daily., Disp: , Rfl:   •  nateglinide (STARLIX) 120 MG tablet, Take 1 tablet by mouth 3 (Three) Times a Day Before Meals., Disp: 270 tablet, Rfl: 3  •  rosuvastatin (CRESTOR) 20 MG tablet, TAKE ONE TABLET BY MOUTH DAILY, Disp: 90 tablet, Rfl: 2  •  ACCU-CHEK GUIDE test strip, DX CODE E11.9 Check blood glucose 3 Times daily, Disp: 300 each, Rfl: 1  •  Alpha-Lipoic Acid 150 MG capsule, Take 250 mg by mouth 2 (two) times a day., Disp: , Rfl:   •  BD PEN NEEDLE ALEE U/F 32G X 4 MM misc, , Disp: , Rfl:   •  Blood Glucose Monitoring Suppl (ACCU-CHEK MABLE PLUS) w/Device kit, Use 3 times a day, Disp: 1 kit, Rfl: 1  •  COCONUT OIL PO, Take  by mouth., Disp: , Rfl:   •  l-methylfolate-algae-B6-B12 (METANX) 3-90.314-2-35 MG capsule capsule, Take 1 capsule by mouth 2 (Two) Times a Day., Disp: 180 capsule, Rfl: 3  •  labetalol (NORMODYNE) 300 MG tablet, Take 1 tablet by mouth 2 (Two) Times a Day., Disp: 180 tablet, Rfl: 3  •  Lancets (ACCU-CHEK MULTICLIX) lancets, Check blood glucose 3 Times daily, Disp: 300 each, Rfl: 3  •  omega-3 acid ethyl esters (LOVAZA) 1 g capsule, Take 2 capsules by mouth 2 (Two) Times a Day., Disp: 360 capsule, Rfl: 3  •  triamcinolone (KENALOG) 0.1 % cream, , Disp: , Rfl:   •  TURMERIC PO, Take  by mouth., Disp: , Rfl:   •  vitamin D (ERGOCALCIFEROL) 72628 UNITS capsule capsule, TAKE 2 CAPSULES BY MOUTH EVERY WEEK, Disp: 24 capsule, Rfl: 1      Family History   Problem Relation Age of Onset   • Alzheimer's disease Mother          Social History     Socioeconomic History   • Marital status:    Tobacco Use   • Smoking status: Never Smoker   • Smokeless tobacco: Never Used   Substance and Sexual Activity   • Alcohol use: No   • Drug use: No   • Sexual  "activity: Defer         No Known Allergies      Pain Scale: 0/10 currently        ROS:  Review of Systems   Constitutional: Negative for activity change, appetite change and fatigue.   HENT: Negative for ear pain, facial swelling and hearing loss.    Eyes: Negative for pain, redness and itching.   Respiratory: Negative for cough, choking and shortness of breath.    Cardiovascular: Negative for chest pain and leg swelling.   Gastrointestinal: Negative for abdominal pain, nausea and vomiting.   Endocrine: Negative for cold intolerance and heat intolerance.   Musculoskeletal: Negative for arthralgias, back pain and joint swelling.   Skin: Negative for color change, pallor and rash.   Allergic/Immunologic: Negative for environmental allergies and food allergies.   Neurological: Negative for dizziness, tremors, seizures, syncope, facial asymmetry, speech difficulty, weakness, light-headedness, numbness and headaches.   Psychiatric/Behavioral: Negative for agitation, behavioral problems, confusion, decreased concentration, dysphoric mood, hallucinations, self-injury, sleep disturbance and suicidal ideas. The patient is not nervous/anxious and is not hyperactive.          I have reviewed and agree with the above ROS completed by the medical assistant.      Physical Exam:  Vitals:    10/28/21 1111   BP: 148/90   SpO2: 97%   Weight: 65.8 kg (145 lb)   Height: 162.6 cm (64\")     Orthostatic BP:    Body mass index is 24.89 kg/m².    Physical Exam  General: Well-developed  male no acute distress.  HEENT: Normocephalic no evidence of trauma.  Discs flat.  No AV nicking.  Throat negative  Neck: Supple.  No Lhermitte sign.  No thyromegaly.  No cervical bruits.  Heart: Regular rate and rhythm no murmurs.  No pedal edema.  Extremities: Radial pulses were strong and simultaneous.      Neurological Exam:   Mental Status: Awake, alert, oriented to person, place and time.  Conversant without evidence of an affective disorder, " thought disorder, delusions or hallucinations.  Attention span and concentration are normal.  HCF: No aphasia, apraxia or dysarthria.  Recent and remote memory intact.  Knowledge of recent events intact.  CN: I:   II: Visual fields full without left inattention   III, IV, VI: Eye movements intact without nystagmus or ptosis.  Pupils equal  round and reactive to light.   V,VII: Light touch and pinprick intact all 3 divisions of V.  Facial muscles symmetrical.   VIII: Hearing intact to finger rub   IX,X: Soft palate elevates symmetrically   XI: Sternomastoid and trapezius are strong.   XII: Tongue midline without atrophy or fasciculations  Motor: Normal tone and bulk in the upper and lower extremities   Power testing: Full power in all muscles tested in the arms and legs also including intrinsic hand and foot muscles.  Reflexes: Upper extremities: +1 diffusely        Lower extremities: +1 diffusely        Toe signs: Downgoing bilaterally  Sensory: Light touch: Diffusely intact arms and legs.  Specifically on the feet and toes including the bottoms of the toes all intact.        Pinprick: Diffusely intact arms and legs similar to light touch        Vibration: Intact at the ankles        Position: Intact at the great toes        Monofilament testing on the soles of the feet was normal bilaterally.    Cerebellar: Finger-to-nose: Normal             Rapid movement: Normal           Heel-to-shin: Normal  Gait and Station: Normal casual toe heel and tandem walk.  No Romberg and no drift    Results:      Lab Results   Component Value Date    BUN 19 06/03/2020    CREATININE 0.98 06/03/2020    EGFRIFNONA 73 06/03/2020    EGFRIFAFRI 88 06/03/2020    BCR 19.4 06/03/2020    CO2 24.0 06/03/2020    CALCIUM 9.2 06/03/2020    PROTENTOTREF 6.8 06/03/2020    ALBUMIN 4.20 06/03/2020    LABIL2 1.6 06/03/2020    AST 16 06/03/2020    ALT 11 06/03/2020       Lab Results   Component Value Date    WBC 10.99 (H) 06/03/2020    HGB 12.5 (L)  06/03/2020    HCT 37.3 (L) 06/03/2020    MCV 84.0 06/03/2020     06/03/2020         .No results found for: RPR      Lab Results   Component Value Date    TSH 2.920 06/03/2020    [id number] 6.61 06/03/2020         Lab Results   Component Value Date    [id number] 405 06/03/2020         Lab Results   Component Value Date    FOLATE >20.00 06/03/2020         Lab Results   Component Value Date    HGBA1C 6.00 (H) 06/03/2020         Lab Results   Component Value Date    BUN 19 06/03/2020    CREATININE 0.98 06/03/2020    EGFRIFNONA 73 06/03/2020    EGFRIFAFRI 88 06/03/2020    BCR 19.4 06/03/2020    K 4.0 06/03/2020    CO2 24.0 06/03/2020    CALCIUM 9.2 06/03/2020    PROTENTOTREF 6.8 06/03/2020    ALBUMIN 4.20 06/03/2020    LABIL2 1.6 06/03/2020    AST 16 06/03/2020    ALT 11 06/03/2020         Lab Results   Component Value Date    WBC 10.99 (H) 06/03/2020    HGB 12.5 (L) 06/03/2020    HCT 37.3 (L) 06/03/2020    MCV 84.0 06/03/2020     06/03/2020       Reviewed previous EMG      Assessment:   1.  Bilateral foot pain with burning quality mostly the soles of the feet-I still do not find evidence that he has peripheral neuropathy.  I think it is most likely due to plantar fasciitis or other soft tissue connective origin.          Plan:  1.  Options were reviewed including skin biopsy to count nerve fibers and look for amyloid.  He does not have any family history or clinical symptom history to suggest amyloidosis however.  Likewise he does not have symptoms to suggest Sjogren's syndrome.  Diabetes can cause a small fiber neuropathy however.  He specifically declines for now.  2.  Another option is to send him to a foot and ankle specialist such as Dr. Marsh.  The patient currently declines.  3.  Other option is a trial of foot cream for neuropathy or perhaps for arthritis if neuropathy cream does not seem to help.  We will choose neuropathy cream #1 at [facility].  Also mention a trial of gabapentin.  Trials  of oral nonsteroidal anti-inflammatory agents I am less inclined to try because of age and history of coronary artery disease decreasing risks of complication.  4.  Follow-up in about a month with ALVARO Arias            Time: 40 minutes                Dictated utilizing Dragon dictation.

## 2022-03-28 ENCOUNTER — TELEPHONE (OUTPATIENT)
Dept: NEUROLOGY | Facility: CLINIC | Age: 87
End: 2022-03-28

## 2023-03-03 ENCOUNTER — TELEPHONE (OUTPATIENT)
Dept: SURGERY | Facility: CLINIC | Age: 88
End: 2023-03-03
Payer: MEDICARE

## 2023-03-03 ENCOUNTER — PREP FOR SURGERY (OUTPATIENT)
Dept: OTHER | Facility: HOSPITAL | Age: 88
End: 2023-03-03

## 2023-03-03 DIAGNOSIS — Z86.010 PERSONAL HISTORY OF COLONIC POLYPS: Primary | ICD-10-CM

## 2023-03-03 NOTE — TELEPHONE ENCOUNTER
CALLED TO SCHEDULE, LVM FOR DR. CHRISTENSEN TO CALL OUR OFFICE AND ASK FOR BETO TO SCHEDULE COLONOSCOPY.

## 2023-03-06 PROBLEM — Z86.010 PERSONAL HISTORY OF COLONIC POLYPS: Status: ACTIVE | Noted: 2023-03-06

## 2023-03-15 ENCOUNTER — ANESTHESIA EVENT (OUTPATIENT)
Dept: GASTROENTEROLOGY | Facility: HOSPITAL | Age: 88
End: 2023-03-15
Payer: MEDICARE

## 2023-03-15 ENCOUNTER — ANESTHESIA (OUTPATIENT)
Dept: GASTROENTEROLOGY | Facility: HOSPITAL | Age: 88
End: 2023-03-15
Payer: MEDICARE

## 2023-03-15 ENCOUNTER — HOSPITAL ENCOUNTER (OUTPATIENT)
Facility: HOSPITAL | Age: 88
Setting detail: HOSPITAL OUTPATIENT SURGERY
Discharge: HOME | End: 2023-03-15
Attending: COLON & RECTAL SURGERY | Admitting: COLON & RECTAL SURGERY
Payer: MEDICARE

## 2023-03-15 VITALS
BODY MASS INDEX: 24.46 KG/M2 | DIASTOLIC BLOOD PRESSURE: 81 MMHG | HEIGHT: 60 IN | RESPIRATION RATE: 14 BRPM | SYSTOLIC BLOOD PRESSURE: 103 MMHG | OXYGEN SATURATION: 98 % | WEIGHT: 124.6 LBS | HEART RATE: 70 BPM

## 2023-03-15 DIAGNOSIS — Z86.010 PERSONAL HISTORY OF COLONIC POLYPS: ICD-10-CM

## 2023-03-15 PROCEDURE — 88305 TISSUE EXAM BY PATHOLOGIST: CPT | Performed by: COLON & RECTAL SURGERY

## 2023-03-15 PROCEDURE — 25010000002 PROPOFOL 10 MG/ML EMULSION: Performed by: NURSE ANESTHETIST, CERTIFIED REGISTERED

## 2023-03-15 RX ORDER — SODIUM CHLORIDE 9 MG/ML
40 INJECTION, SOLUTION INTRAVENOUS AS NEEDED
Status: DISCONTINUED | OUTPATIENT
Start: 2023-03-15 | End: 2023-03-15 | Stop reason: HOSPADM

## 2023-03-15 RX ORDER — SODIUM CHLORIDE, SODIUM LACTATE, POTASSIUM CHLORIDE, CALCIUM CHLORIDE 600; 310; 30; 20 MG/100ML; MG/100ML; MG/100ML; MG/100ML
30 INJECTION, SOLUTION INTRAVENOUS CONTINUOUS PRN
Status: DISCONTINUED | OUTPATIENT
Start: 2023-03-15 | End: 2023-03-15 | Stop reason: HOSPADM

## 2023-03-15 RX ORDER — PROPOFOL 10 MG/ML
VIAL (ML) INTRAVENOUS AS NEEDED
Status: DISCONTINUED | OUTPATIENT
Start: 2023-03-15 | End: 2023-03-15 | Stop reason: SURG

## 2023-03-15 RX ORDER — PROPOFOL 10 MG/ML
VIAL (ML) INTRAVENOUS CONTINUOUS PRN
Status: DISCONTINUED | OUTPATIENT
Start: 2023-03-15 | End: 2023-03-15 | Stop reason: SURG

## 2023-03-15 RX ORDER — LIDOCAINE HYDROCHLORIDE 20 MG/ML
INJECTION, SOLUTION INFILTRATION; PERINEURAL AS NEEDED
Status: DISCONTINUED | OUTPATIENT
Start: 2023-03-15 | End: 2023-03-15 | Stop reason: SURG

## 2023-03-15 RX ADMIN — SODIUM CHLORIDE, POTASSIUM CHLORIDE, SODIUM LACTATE AND CALCIUM CHLORIDE 30 ML/HR: 600; 310; 30; 20 INJECTION, SOLUTION INTRAVENOUS at 07:22

## 2023-03-15 RX ADMIN — LIDOCAINE HYDROCHLORIDE 60 MG: 20 INJECTION, SOLUTION INFILTRATION; PERINEURAL at 07:45

## 2023-03-15 RX ADMIN — Medication 200 MCG/KG/MIN: at 07:45

## 2023-03-15 RX ADMIN — PROPOFOL 80 MG: 10 INJECTION, EMULSION INTRAVENOUS at 07:45

## 2023-03-15 NOTE — ANESTHESIA PREPROCEDURE EVALUATION
Anesthesia Evaluation     no history of anesthetic complications:               Airway   Mallampati: II  TM distance: >3 FB  Dental    (+) partials    Pulmonary    Cardiovascular     (+) hypertension, CAD, cardiac stents hyperlipidemia,  carotid artery disease (stent)      Neuro/Psych  GI/Hepatic/Renal/Endo    (+)   diabetes mellitus,     Musculoskeletal     Abdominal    Substance History      OB/GYN          Other                      Anesthesia Plan    ASA 3     MAC     intravenous induction     Anesthetic plan, risks, benefits, and alternatives have been provided, discussed and informed consent has been obtained with: patient.        CODE STATUS:

## 2023-03-15 NOTE — ANESTHESIA POSTPROCEDURE EVALUATION
"Patient: Radha Azevedo    Procedure Summary     Date: 03/15/23 Room / Location: [facility] ENDOSCOPY 6 /  [facility]    Anesthesia Start: 0740 Anesthesia Stop: 0820    Procedure: COLONOSCOPY TO SIGMOID COLON WITH COLD FORCEP POLYPECTOMIES Diagnosis:       Personal history of colonic polyps      (Personal history of colonic polyps [Z86.010])    Surgeons: Leo Morales MD Provider: Erika Crisostomo MD    Anesthesia Type: MAC ASA Status: 3          Anesthesia Type: MAC    Vitals  Vitals Value Taken Time   /81 03/15/23 0837   Temp     Pulse 70 03/15/23 0831   Resp 14 03/15/23 0837   SpO2 98 % 03/15/23 0837           Post Anesthesia Care and Evaluation    Patient location during evaluation: PHASE II  Patient participation: complete - patient participated  Level of consciousness: awake and alert  Pain management: adequate    Airway patency: patent  Anesthetic complications: No anesthetic complications    Cardiovascular status: acceptable  Respiratory status: acceptable  Hydration status: acceptable    Comments: /81 (BP Location: Left arm, Patient Position: Lying)   Pulse 70   Resp 14   Ht 152.4 cm (60\")   Wt 56.5 kg (124 lb 9.6 oz)   SpO2 98%   BMI 24.33 kg/m²         "

## 2023-03-15 NOTE — H&P
Radha Azevedo is a 87 y.o. male  who is referred by Leo Morales MD for a colonoscopy. He   has an indications: previous adenomatous polyp.     He denies any change in bowel function, melena, or hematochezia.    Past Medical History:   Diagnosis Date   • Colon polyps     Followed by Dr. Leo Morales at [facility].   • Coronary artery disease    • Diabetes mellitus (HCC)    • Hyperlipidemia    • Hypertension        Past Surgical History:   Procedure Laterality Date   • CARDIAC CATHETERIZATION     • CAROTID STENT      patient denies, but family states he did have a stent placed   • COLONOSCOPY N/A 01/29/2014    Dr. Leo Morales at [facility].   • COLONOSCOPY N/A 01/13/2016    Dr. Leo Morales at [facility].   • CORONARY ANGIOPLASTY         Medications Prior to Admission   Medication Sig Dispense Refill Last Dose   • ACCU-CHEK GUIDE test strip DX CODE E11.9 Check blood glucose 3 Times daily 300 each 1    • Alpha-Lipoic Acid 150 MG capsule Take 250 mg by mouth 2 (two) times a day.      • BD PEN NEEDLE ALEE U/F 32G X 4 MM misc       • Blood Glucose Monitoring Suppl (ACCU-CHEK MABLE PLUS) w/Device kit Use 3 times a day 1 kit 1    • COCONUT OIL PO Take  by mouth.      • finasteride (PROSCAR) 5 MG tablet Take 1 tablet by mouth Daily.      • l-methylfolate-algae-B6-B12 (METANX) 3-90.314-2-35 MG capsule capsule Take 1 capsule by mouth 2 (Two) Times a Day. 180 capsule 3    • labetalol (NORMODYNE) 300 MG tablet Take 1 tablet by mouth 2 (Two) Times a Day. 180 tablet 3    • Lancets (ACCU-CHEK MULTICLIX) lancets Check blood glucose 3 Times daily 300 each 3    • nateglinide (STARLIX) 120 MG tablet Take 1 tablet by mouth 3 (Three) Times a Day Before Meals. 270 tablet 3    • omega-3 acid ethyl esters (LOVAZA) 1 g capsule Take 2 capsules by mouth 2 (Two) Times a Day. 360 capsule 3    • rosuvastatin (CRESTOR) 20 MG tablet TAKE ONE TABLET BY MOUTH DAILY 90 tablet 2    • triamcinolone (KENALOG) 0.1 % cream       • TURMERIC PO Take  by mouth.       • vitamin D (ERGOCALCIFEROL) 05159 UNITS capsule capsule TAKE 2 CAPSULES BY MOUTH EVERY WEEK 24 capsule 1 3/13/2023       No Known Allergies    Family History   Problem Relation Age of Onset   • Alzheimer's disease Mother        Social History     Socioeconomic History   • Marital status:    Tobacco Use   • Smoking status: Never   • Smokeless tobacco: Never   Vaping Use   • Vaping Use: Never used   Substance and Sexual Activity   • Alcohol use: No   • Drug use: No   • Sexual activity: Defer       Review of Systems   Gastrointestinal: Negative for abdominal pain, nausea and vomiting.   All other systems reviewed and are negative.      Vitals:    03/15/23 0722   BP: 137/67   Pulse: 99   Resp: 21   SpO2: 99%         Physical Exam  Constitutional:       Appearance: He is well-developed.   HENT:      Head: Normocephalic and atraumatic.   Eyes:      Pupils: Pupils are equal, round, and reactive to light.   Cardiovascular:      Rate and Rhythm: Regular rhythm.   Pulmonary:      Effort: Pulmonary effort is normal.   Abdominal:      General: There is no distension.      Palpations: Abdomen is soft.   Musculoskeletal:         General: Normal range of motion.   Skin:     General: Skin is warm and dry.   Neurological:      Mental Status: He is alert and oriented to person, place, and time.   Psychiatric:         Thought Content: Thought content normal.         Judgment: Judgment normal.           Assessment & Plan      indications: previous adenomatous polyp         I recommend colonoscopy.  I described risks, benefits of the procedure with the patient including but not limited to bleeding, infection, possibility of perforation and possible polypectomy. All of the patient's questions were answered and they would like to proceed with the above recommendations.

## 2023-03-16 LAB
LAB AP CASE REPORT: NORMAL
PATH REPORT.FINAL DX SPEC: NORMAL
PATH REPORT.GROSS SPEC: NORMAL

## 2023-05-16 ENCOUNTER — HOSPITAL ENCOUNTER (INPATIENT)
Facility: HOSPITAL | Age: 88
DRG: 65 | End: 2023-05-16
Attending: PHYSICAL MEDICINE & REHABILITATION | Admitting: PHYSICAL MEDICINE & REHABILITATION
Payer: MEDICARE

## 2023-05-16 DIAGNOSIS — R26.89 IMPAIRED GAIT AND MOBILITY: Primary | ICD-10-CM

## 2023-05-16 PROBLEM — S06.5XAA SUBDURAL HEMATOMA: Status: ACTIVE | Noted: 2023-05-16

## 2023-05-16 LAB
ALBUMIN SERPL-MCNC: 3.7 G/DL (ref 3.5–5.2)
ALBUMIN/GLOB SERPL: 1.1 G/DL
ALP SERPL-CCNC: 134 U/L (ref 39–117)
ALT SERPL W P-5'-P-CCNC: 16 U/L (ref 1–41)
ANION GAP SERPL CALCULATED.3IONS-SCNC: 8.5 MMOL/L (ref 5–15)
AST SERPL-CCNC: 30 U/L (ref 1–40)
BASOPHILS # BLD AUTO: 0.07 10*3/MM3 (ref 0–0.2)
BASOPHILS NFR BLD AUTO: 1.1 % (ref 0–1.5)
BILIRUB SERPL-MCNC: 0.4 MG/DL (ref 0–1.2)
BUN SERPL-MCNC: 15 MG/DL (ref 8–23)
BUN/CREAT SERPL: 14 (ref 7–25)
CALCIUM SPEC-SCNC: 9.1 MG/DL (ref 8.6–10.5)
CHLORIDE SERPL-SCNC: 92 MMOL/L (ref 98–107)
CO2 SERPL-SCNC: 27.5 MMOL/L (ref 22–29)
CREAT SERPL-MCNC: 1.07 MG/DL (ref 0.76–1.27)
DEPRECATED RDW RBC AUTO: 48.6 FL (ref 37–54)
EGFRCR SERPLBLD CKD-EPI 2021: 67.2 ML/MIN/1.73
EOSINOPHIL # BLD AUTO: 0.38 10*3/MM3 (ref 0–0.4)
EOSINOPHIL NFR BLD AUTO: 6.2 % (ref 0.3–6.2)
ERYTHROCYTE [DISTWIDTH] IN BLOOD BY AUTOMATED COUNT: 16.4 % (ref 12.3–15.4)
GLOBULIN UR ELPH-MCNC: 3.5 GM/DL
GLUCOSE SERPL-MCNC: 101 MG/DL (ref 65–99)
HCT VFR BLD AUTO: 30 % (ref 37.5–51)
HGB BLD-MCNC: 9.8 G/DL (ref 13–17.7)
IMM GRANULOCYTES # BLD AUTO: 0.02 10*3/MM3 (ref 0–0.05)
IMM GRANULOCYTES NFR BLD AUTO: 0.3 % (ref 0–0.5)
LYMPHOCYTES # BLD AUTO: 1.64 10*3/MM3 (ref 0.7–3.1)
LYMPHOCYTES NFR BLD AUTO: 26.8 % (ref 19.6–45.3)
MCH RBC QN AUTO: 26.8 PG (ref 26.6–33)
MCHC RBC AUTO-ENTMCNC: 32.7 G/DL (ref 31.5–35.7)
MCV RBC AUTO: 82.2 FL (ref 79–97)
MONOCYTES # BLD AUTO: 0.43 10*3/MM3 (ref 0.1–0.9)
MONOCYTES NFR BLD AUTO: 7 % (ref 5–12)
NEUTROPHILS NFR BLD AUTO: 3.58 10*3/MM3 (ref 1.7–7)
NEUTROPHILS NFR BLD AUTO: 58.6 % (ref 42.7–76)
NRBC BLD AUTO-RTO: 0 /100 WBC (ref 0–0.2)
PLATELET # BLD AUTO: 282 10*3/MM3 (ref 140–450)
PMV BLD AUTO: 9 FL (ref 6–12)
POTASSIUM SERPL-SCNC: 4.2 MMOL/L (ref 3.5–5.2)
PROT SERPL-MCNC: 7.2 G/DL (ref 6–8.5)
RBC # BLD AUTO: 3.65 10*6/MM3 (ref 4.14–5.8)
SODIUM SERPL-SCNC: 128 MMOL/L (ref 136–145)
TSH SERPL DL<=0.05 MIU/L-ACNC: 167 UIU/ML (ref 0.27–4.2)
WBC NRBC COR # BLD: 6.12 10*3/MM3 (ref 3.4–10.8)

## 2023-05-16 PROCEDURE — 25010000002 HEPARIN (PORCINE) PER 1000 UNITS: Performed by: PHYSICAL MEDICINE & REHABILITATION

## 2023-05-16 PROCEDURE — 80053 COMPREHEN METABOLIC PANEL: CPT | Performed by: PHYSICAL MEDICINE & REHABILITATION

## 2023-05-16 PROCEDURE — 84443 ASSAY THYROID STIM HORMONE: CPT | Performed by: STUDENT IN AN ORGANIZED HEALTH CARE EDUCATION/TRAINING PROGRAM

## 2023-05-16 PROCEDURE — 85025 COMPLETE CBC W/AUTO DIFF WBC: CPT | Performed by: PHYSICAL MEDICINE & REHABILITATION

## 2023-05-16 RX ORDER — QUETIAPINE FUMARATE 25 MG/1
12.5 TABLET, FILM COATED ORAL NIGHTLY
Status: DISCONTINUED | OUTPATIENT
Start: 2023-05-16 | End: 2023-06-01 | Stop reason: HOSPADM

## 2023-05-16 RX ORDER — ALUMINA, MAGNESIA, AND SIMETHICONE 2400; 2400; 240 MG/30ML; MG/30ML; MG/30ML
15 SUSPENSION ORAL EVERY 6 HOURS PRN
Status: DISCONTINUED | OUTPATIENT
Start: 2023-05-16 | End: 2023-06-01 | Stop reason: HOSPADM

## 2023-05-16 RX ORDER — HYDROCODONE BITARTRATE AND ACETAMINOPHEN 5; 325 MG/1; MG/1
1 TABLET ORAL EVERY 4 HOURS PRN
Status: DISCONTINUED | OUTPATIENT
Start: 2023-05-16 | End: 2023-05-22

## 2023-05-16 RX ORDER — AMLODIPINE BESYLATE 5 MG/1
5 TABLET ORAL
Status: DISCONTINUED | OUTPATIENT
Start: 2023-05-17 | End: 2023-05-18

## 2023-05-16 RX ORDER — DULOXETIN HYDROCHLORIDE 30 MG/1
30 CAPSULE, DELAYED RELEASE ORAL 2 TIMES DAILY
Status: DISCONTINUED | OUTPATIENT
Start: 2023-05-16 | End: 2023-06-01 | Stop reason: HOSPADM

## 2023-05-16 RX ORDER — HYDRALAZINE HYDROCHLORIDE 25 MG/1
25 TABLET, FILM COATED ORAL EVERY 6 HOURS PRN
Status: DISCONTINUED | OUTPATIENT
Start: 2023-05-16 | End: 2023-05-17

## 2023-05-16 RX ORDER — LEVOTHYROXINE SODIUM 0.03 MG/1
25 TABLET ORAL
Status: DISCONTINUED | OUTPATIENT
Start: 2023-05-17 | End: 2023-05-29

## 2023-05-16 RX ORDER — HEPARIN SODIUM 5000 [USP'U]/ML
5000 INJECTION, SOLUTION INTRAVENOUS; SUBCUTANEOUS EVERY 12 HOURS SCHEDULED
Status: DISCONTINUED | OUTPATIENT
Start: 2023-05-16 | End: 2023-06-01 | Stop reason: HOSPADM

## 2023-05-16 RX ADMIN — HEPARIN SODIUM 5000 UNITS: 5000 INJECTION INTRAVENOUS; SUBCUTANEOUS at 22:35

## 2023-05-16 RX ADMIN — ALUMINUM HYDROXIDE, MAGNESIUM HYDROXIDE, AND DIMETHICONE 15 ML: 400; 400; 40 SUSPENSION ORAL at 23:05

## 2023-05-16 RX ADMIN — DULOXETINE HYDROCHLORIDE 30 MG: 30 CAPSULE, DELAYED RELEASE ORAL at 22:34

## 2023-05-16 RX ADMIN — HYDROCODONE BITARTRATE AND ACETAMINOPHEN 1 TABLET: 5; 325 TABLET ORAL at 20:16

## 2023-05-16 RX ADMIN — QUETIAPINE FUMARATE 12.5 MG: 25 TABLET ORAL at 22:35

## 2023-05-16 NOTE — DISCHARGE INSTRUCTIONS
DVT prophylaxis: Per [facility] ok with SQ heparin, would do SQ heparin x 4 weeks, then ASA 81 mg twice daily x 4 weeks.

## 2023-05-16 NOTE — PROGRESS NOTES
SECTION GG    Eating Performance: Patient completed the activities by themself with no  assistance from a helper.    Eating Discharge Goals: Patient completed the activities by themself with no  assistance from a helper.    Section B. Hearing and Vision  Ability to Hear:  Minimal difficulty - difficulty in some environments (e.g.,  when person speaks softly or setting is noisy)  Ability to See in Adequate Light:  Adequate - sees fine detail, such as regular  print in newspapers/books    Section B. Health Literacy  Frequency of Needing Assistance Reading:  Never    Section D. Mood  Presence of little interest or pleasure in doing things:   No  Frequency of having little interest or pleasure in doing things:   Never or 1  day  Presence of feeling down, depressed, or hopeless:   Yes  Frequency of feeling down, depressed, or hopeless:   2-6 days (several days)   Interview Ended. Above responses do not meet criteria to continue.  Total Severity Score:   1    Section D. Social Isolation  Frequecy of Feeling Lonely or Isolated:  Never    Section J. Health Conditions (Pain Effect on Sleep)  Pain Effect on Sleep:   Occasionally    Signed by: Skylar Evangelista RN

## 2023-05-17 PROCEDURE — 97162 PT EVAL MOD COMPLEX 30 MIN: CPT

## 2023-05-17 PROCEDURE — 25010000002 HEPARIN (PORCINE) PER 1000 UNITS: Performed by: PHYSICAL MEDICINE & REHABILITATION

## 2023-05-17 PROCEDURE — 97166 OT EVAL MOD COMPLEX 45 MIN: CPT

## 2023-05-17 PROCEDURE — 97530 THERAPEUTIC ACTIVITIES: CPT

## 2023-05-17 PROCEDURE — 97535 SELF CARE MNGMENT TRAINING: CPT

## 2023-05-17 PROCEDURE — 97110 THERAPEUTIC EXERCISES: CPT

## 2023-05-17 PROCEDURE — 96125 COGNITIVE TEST BY HC PRO: CPT

## 2023-05-17 RX ORDER — HYDRALAZINE HYDROCHLORIDE 25 MG/1
25 TABLET, FILM COATED ORAL EVERY 6 HOURS PRN
Status: DISCONTINUED | OUTPATIENT
Start: 2023-05-17 | End: 2023-06-01 | Stop reason: HOSPADM

## 2023-05-17 RX ADMIN — LEVOTHYROXINE SODIUM 25 MCG: 0.03 TABLET ORAL at 05:54

## 2023-05-17 RX ADMIN — ALUMINUM HYDROXIDE, MAGNESIUM HYDROXIDE, AND DIMETHICONE 15 ML: 400; 400; 40 SUSPENSION ORAL at 10:30

## 2023-05-17 RX ADMIN — DULOXETINE HYDROCHLORIDE 30 MG: 30 CAPSULE, DELAYED RELEASE ORAL at 07:29

## 2023-05-17 RX ADMIN — HEPARIN SODIUM 5000 UNITS: 5000 INJECTION INTRAVENOUS; SUBCUTANEOUS at 07:30

## 2023-05-17 RX ADMIN — HEPARIN SODIUM 5000 UNITS: 5000 INJECTION INTRAVENOUS; SUBCUTANEOUS at 22:44

## 2023-05-17 RX ADMIN — QUETIAPINE FUMARATE 12.5 MG: 25 TABLET ORAL at 22:44

## 2023-05-17 RX ADMIN — DULOXETINE HYDROCHLORIDE 30 MG: 30 CAPSULE, DELAYED RELEASE ORAL at 22:44

## 2023-05-17 RX ADMIN — HYDROCODONE BITARTRATE AND ACETAMINOPHEN 1 TABLET: 5; 325 TABLET ORAL at 16:56

## 2023-05-17 RX ADMIN — AMLODIPINE BESYLATE 5 MG: 5 TABLET ORAL at 07:29

## 2023-05-17 RX ADMIN — HYDROCODONE BITARTRATE AND ACETAMINOPHEN 1 TABLET: 5; 325 TABLET ORAL at 05:54

## 2023-05-17 RX ADMIN — HYDROCODONE BITARTRATE AND ACETAMINOPHEN 1 TABLET: 5; 325 TABLET ORAL at 22:48

## 2023-05-17 RX ADMIN — DICLOFENAC SODIUM 2 G: 10 GEL TOPICAL at 22:44

## 2023-05-17 NOTE — THERAPY EVALUATION
Inpatient Rehabilitation - Occupational Therapy Initial Evaluation    [facility]     Patient Name: Radha Azevedo  : 1935  MRN: [id number]    Today's Date: 2023                 Admit Date: 2023         ICD-10-CM ICD-9-CM   1. Impaired gait and mobility  R26.89 781.2       Patient Active Problem List   Diagnosis   • Absolute anemia   • Benign essential HTN   • Benign localized hyperplasia of prostate without urinary obstruction   • Type 2 diabetes mellitus with peripheral angiopathy   • Hypertension   • Hyperlipidemia   • Type 2 diabetes mellitus   • Diabetes mellitus type 2, noninsulin dependent   • Avitaminosis D   • Dyslipidemia   • Decrease in the ability to hear   • Personal history of colonic polyps   • Subdural hematoma       Past Medical History:   Diagnosis Date   • Colon polyps     Followed by Dr. Leo Jaeger at [facility].   • Coronary artery disease    • Diabetes mellitus    • Hyperlipidemia    • Hypertension        Past Surgical History:   Procedure Laterality Date   • CARDIAC CATHETERIZATION     • CAROTID STENT      patient denies, but family states he did have a stent placed   • COLONOSCOPY N/A 2014    Dr. Leo Jaeger at [facility].   • COLONOSCOPY N/A 2016    Dr. Leo Jaeger at [facility].   • COLONOSCOPY N/A 03/15/2023    2 TUBULAR ADENOMA POLYPS IN SIGMOID, BARIUM ENEMA ORDERED, DR. LEO JAEGER AT [facility]   • CORONARY ANGIOPLASTY     • FRACTURE SURGERY Left 2023    IM nailing for L femur fracture             IRF OT ASSESSMENT FLOWSHEET (last 12 hours)     IRF OT Evaluation and Treatment     Row Name 23 1519 23 0800       OT Time and Intention    Document Type daily treatment  -KN initial evaluation  -KN    Mode of Treatment occupational therapy  -KN individual therapy;occupational therapy  -KN    Patient Effort good  -KN excellent  -KN    Symptoms Noted During/After Treatment increased pain  -KN increased pain  -KN    Row Name 23 1519 23 0800        General Information    Patient Profile Reviewed yes  -KN yes  -KN    Patient/Family/Caregiver Comments/Observations in wc  -KN --    General Observations of Patient -- supine in bed. not signs of aggitation  -KN    Existing Precautions/Restrictions hip;weight bearing  -KN hip;other (see comments)  WBAT  -KN    Row Name 05/17/23 0800          Previous Level of Function/Home Environm    Bathing/Grooming, Premorbid Functional Level independent  -KN     Dressing, Premorbid Functional Level independent  -KN     Eating/Feeding, Premorbid Functional Level independent  -KN     Toileting, Premorbid Functional Level independent  -KN     BADLs, Premorbid Functional Level independent  -KN     IADLs, Premorbid Functional Level independent  -KN     Bed Mobility, Premorbid Functional Level independent  -KN     Transfers, Premorbid Functional Level independent  -KN     Household Ambulation, Premorbid Functional Level independent  -KN     Stairs, Premorbid Functional Level independent  -KN     Community Ambulation, Premorbid Functional Level independent  -KN     Row Name 05/17/23 0800          Living Environment    Current Living Arrangements home  -KN     People in Home alone  -KN     Primary Care Provided by self  -KN     Row Name 05/17/23 0800          Home Use of Assistive/Adaptive Equipment    Equipment Currently Used at Home none  -KN     Row Name 05/17/23 0800          Occupational Profile    Reason for Services/Referral (Occupational Profile) Subdermal Hematoma/IM nailing L Femur Fracture  -KN     Patient Goals (Occupational Profile) To get back home  -KN     Row Name 05/17/23 0800          Pain Assessment    Pretreatment Pain Rating 6/10  -KN     Posttreatment Pain Rating 7/10  -KN     Pain Location - Side/Orientation Left  -KN     Pain Location upper  -KN     Pain Location - hip  -KN     Row Name 05/17/23 0800          Cognition/Psychosocial    Affect/Mental Status (Cognition) WFL  -KN     Orientation Status (Cognition)  oriented x 3  -KN     Follows Commands (Cognition) 75-90% accuracy  -     Personal Safety Interventions fall prevention program maintained  -     Row Name 05/17/23 0800          Range of Motion (ROM)    Range of Motion ROM is WFL  -[facility] Name 05/17/23 0800          Range of Motion Comprehensive    General Range of Motion no range of motion deficits identified  -[facility] Name 05/17/23 1519 05/17/23 0800       Strength (Manual Muscle Testing)    Strength (Manual Muscle Testing) -- other (see comments)  BUE 3+/5  -KN    Left Hand, Setting 1 (Dynamometer Testing) 20  -KN --    Left Hand, Setting 2 (Dynamometer Testing) 21  -KN --    Left Hand, Setting 3 (Dynamometer Testing) 22  -KN --    Right Hand, Setting 1 (Dynamometer Testing) 28  -KN --    Right Hand, Setting 2 (Dynamometer Testing) 28  -KN --    Right Hand, Setting 3 (Dynamometer Testing) 25  -KN --    Left Hand: Tip (Pincer) Pinch Strength (Pinch Dynamometer Testing) 8  -KN --    Left Hand: Lateral (Key) Pinch Strength (Pinch Dynamometer Testing) 5  -KN --    Right Hand: Tip (Pincer) Pinch Strength (Pinch Dynamometer Testing) 8  -KN --    Right Hand: Lateral (Key) Pinch Strength (Pinch Dynamometer Testing) 5  -KN --    Row Name 05/17/23 1519          Strength Comprehensive (MMT)    General Manual Muscle Testing (MMT) Assessment hand strength deficits identified  -     Hand Strength Assessment hand  strength  -[facility] Name 05/17/23 0800          Basic Activities of Daily Living (BADLs)    Basic Activities of Daily Living bathing;upper body dressing;lower body dressing;grooming;toileting  -[facility] Name 05/17/23 0800          Bathing    [place] Level (Bathing) bathing skills;lower body;upper body;upper extremities;minimum assist (75% patient effort)  -     Set-up Assistance (Bathing) adaptive equipment set-up;adjust water temperature;obtain supplies  -[facility] Name 05/17/23 0800          Upper Body Dressing    [place] Level  (Upper Body Dressing) upper body dressing skills;doff;don;minimum assist (75% or more patient effort)  cues required to don shirt correctly  -KN     Set-up Assistance (Upper Body Dressing) obtain clothing  -KN     Row Name 05/17/23 0800          Lower Body Dressing    [place] Level (Lower Body Dressing) don;pants/bottoms;doff;shoes/slippers;socks;moderate assist (50% patient effort)  Pt able to don/doff all Clothing on right side; unable to don/doff on L UE due to pain in hip  -KN     Set-up Assistance (Lower Body Dressing) obtain clothing;doff;don  -KN     Row Name 05/17/23 1519 05/17/23 0800       Grooming    [place] Level (Grooming) grooming skills;oral care regimen;contact guard assist  -KN grooming skills;deodorant application;hair care, combing/brushing;oral care regimen;set up;standby assist  -KN    Position (Grooming) sink side  with walker  -KN --  wc  -KN    Set-up Assistance (Grooming) -- obtain supplies;adjust water temperature/flow  -    Row Name 05/17/23 0800          Toileting    [place] Level (Toileting) toileting skills;adjust/manage clothing;contact guard assist;minimum assist (75% patient effort)  -     Row Name 05/17/23 0800          Transfer Assessment/Treatment    Transfers toilet transfer;shower transfer;wheelchair transfer  -     Row Name 05/17/23 0800          Toilet Transfer    Type (Toilet Transfer) stand pivot/stand step  -KN     [place] Level (Toilet Transfer) contact guard;1 person assist  -KN     Row Name 05/17/23 0800          Shower Transfer    Type (Shower Transfer) stand pivot/stand step  -KN     [place] Level (Shower Transfer) 1 person assist;contact guard  -     Row Name 05/17/23 0800          Wheelchair Transfer    Type (Wheelchair Transfer) stand pivot/stand step  -KN     [place] Level (Wheelchair Transfer) contact guard;1 person assist  -     Row Name 05/17/23 1519 05/17/23 0800       Motor Skills    Motor Skills -- coordination  -KN     Coordination 9 Hole Peg Test of Fine Motor Coordination Results;other (see comments)  Box and Blocks: R=42 L=38  -KN 9 Hole Peg Test of Fine Motor Coordination Results  -KN    Results, 9 Hole Peg Test of Fine Motor Coordination R=48 L=4  -KN --    Therapeutic Exercise -- hand  -KN    Row Name 05/17/23 1519          Positioning and Restraints    Pre-Treatment Position --  wc  -KN     Post Treatment Position wheelchair  -KN     In Wheelchair --  with RT  -KN     Row Name 05/17/23 0800          Therapy Plan Review/Discharge Plan (OT)    Therapy Plan Review (OT) evaluation/treatment results reviewed;patient;care plan/treatment goals reviewed  -KN     Anticipated Equipment Needs At Discharge (OT) standard walker  -KN     Row Name 05/17/23 0800          IRF OT Goals    Transfer Goal Selection (OT-IRF) transfers, OT goal 1;transfers, OT goal 2  -KN     LB Dressing Goal Selection (OT-IRF) LB dressing, OT goal 1;LB dressing, OT goal 2  -KN     Toileting Goal Selection (OT-IRF) toileting, OT goal 1;toileting, OT goal 2  -KN     Strength Goal Selection (OT-IRF) strength, OT goal 1 (free text)  -KN     Safety Awareness Goal Selection (OT-IRF) safety awareness, OT goal 1  -KN     Row Name 05/17/23 0800          Transfer Goal 1 (OT-IRF)    Activity/Assistive Device (Transfer Goal 1, OT-IRF) shower chair  -KN     [place] Level (Transfer Goal 1, OT-IRF) standby assist  -KN     Time Frame (Transfer Goal 1, OT-IRF) 1 week;short-term goal (STG)  -KN     Row Name 05/17/23 0800          Transfer Goal 2 (OT-IRF)    Activity/Assistive Device (Transfer Goal 2, OT-IRF) shower chair  -KN     [place] Level (Transfer Goal 2, OT-IRF) modified independence  -KN     Time Frame (Transfer Goal 2, OT-IRF) long-term goal (LTG);2 weeks  -KN     Row Name 05/17/23 0800          LB Dressing Goal 1 (OT-IRF)    Activity/Device (LB Dressing Goal 1, OT-IRF) lower body dressing  -KN     [place] (LB Dressing Goal 1, OT-IRF) minimum assist (75%  or more patient effort)  -KN     Time Frame (LB Dressing Goal 1, OT-IRF) short-term goal (STG);1 week  -KN     Row Name 05/17/23 0800          LB Dressing Goal 2 (OT-IRF)    Activity/Device (LB Dressing Goal 2, OT-IRF) lower body dressing  -KN     [place] (LB Dressing Goal 2, OT-IRF) modified independence  -KN     Time Frame (LB Dressing Goal 2, OT-IRF) long-term goal (LTG);2 weeks  -KN     Row Name 05/17/23 0800          Toileting Goal 1 (OT-IRF)    Activity/Device (Toileting Goal 1, OT-IRF) toileting skills, all  -KN     [place] Level (Toileting Goal 1, OT-IRF) contact guard required  -KN     Time Frame (Toileting Goal 1, OT-IRF) short-term goal (STG);1 week  -KN     Row Name 05/17/23 0800          Toileting Goal 2 (OT-IRF)    Activity/Device (Toileting Goal 2, OT-IRF) toileting skills, all  -KN     [place] Level (Toileting Goal 2, OT-IRF) modified independence  -KN     Row Name 05/17/23 0800          Strength Goal 1 (OT-IRF)    Strength Goal 1 (OT-IRF) Pt will increase BUE strength to 4/5 demo through MMT  -KN     Time Frame (Strength Goal 1, OT-IRF) long-term goal (LTG);2 weeks  -KN     Row Name 05/17/23 0800          Safety Awareness Goal 1 (OT-IRF)    Activity (Safety Awareness Goal 1, OT-IRF) insight into deficits/self-awareness;judgment  -KN     [place]/Cues/Accuracy (Safety Awareness Goal 1, OT-IRF) independent;with minimum;with 90% accuracy;verbal cues/redirection  -KN     Time Frame (Safety Awareness Goal 1, OT-IRF) short-term goal (STG);1 week  -KN           User Key  (r) = Recorded By, (t) = Taken By, (c) = Cosigned By    Initials Name Effective Dates    Main Montanez, OT 08/02/22 -                  Occupational Therapy Education     Title: PT OT SLP Therapies (Done)     Topic: Occupational Therapy (Done)     Point: ADL training (Done)     Description:   Instruct learner(s) on proper safety adaptation and remediation techniques during self care or transfers.   Instruct in proper  use of assistive devices.              Learning Progress Summary           Patient Acceptance, E, VU by  at 5/17/2023 1524                   Point: Home exercise program (Done)     Description:   Instruct learner(s) on appropriate technique for monitoring, assisting and/or progressing therapeutic exercises/activities.              Learning Progress Summary           Patient Acceptance, E, VU by  at 5/17/2023 1524                   Point: Precautions (Done)     Description:   Instruct learner(s) on prescribed precautions during self-care and functional transfers.              Learning Progress Summary           Patient Acceptance, E, VU by  at 5/17/2023 1524                   Point: Body mechanics (Done)     Description:   Instruct learner(s) on proper positioning and spine alignment during self-care, functional mobility activities and/or exercises.              Learning Progress Summary           Patient Acceptance, E, VU by  at 5/17/2023 1524                               User Key     Initials Effective Dates Name Provider Type Discipline     08/02/22 -  Main De La Garza OT Occupational Therapist OT                    OT Recommendation and Plan                         Time Calculation:      Time Calculation- OT     Row Name 05/17/23 1526 05/17/23 1519          Time Calculation- OT    OT Start Time 1230  -KN 0800  -KN     OT Stop Time 1300  -KN 0830  -KN     OT Time Calculation (min) 30 min  -KN 30 min  -KN     OT Received On 05/17/23 -KN --     OT Goal Re-Cert Due Date 05/31/23 -KN --        Timed Charges    08157 - OT Therapeutic Activity Minutes 15  -KN --     02524 - OT Self Care/Mgmt Minutes 15  -KN --        Untimed Charges    OT Eval/Re-eval Minutes -- 30  -KN        Total Minutes    Timed Charges Total Minutes 30  -KN --     Untimed Charges Total Minutes -- 30  -KN      Total Minutes 30  -KN 30  -KN           User Key  (r) = Recorded By, (t) = Taken By, (c) = Cosigned By    Initials Name Provider  Type    KN Main De La Garza OT Occupational Therapist              Therapy Charges for Today     Code Description Service Date Service Provider Modifiers Qty    17413297073 HC OT EVAL MOD COMPLEXITY 3 5/17/2023 Main De La Garza OT GO 1    44879341400 HC OT SELF CARE/MGMT/TRAIN EA 15 MIN 5/17/2023 Main De La Garza OT GO 1    36269795382 HC OT THERAPEUTIC ACT EA 15 MIN 5/17/2023 Main De La Garza OT GO 1                   Main De La Garza OT  5/17/2023

## 2023-05-17 NOTE — THERAPY EVALUATION
Inpatient Rehabilitation - Physical Therapy Initial Evaluation       [facility]     Patient Name: Radha Azevedo  : 1935  MRN: [id number]    Today's Date: 2023                    Admit Date: 2023      Visit Dx:     ICD-10-CM ICD-9-CM   1. Impaired gait and mobility  R26.89 781.2       Patient Active Problem List   Diagnosis   • Absolute anemia   • Benign essential HTN   • Benign localized hyperplasia of prostate without urinary obstruction   • Type 2 diabetes mellitus with peripheral angiopathy   • Hypertension   • Hyperlipidemia   • Type 2 diabetes mellitus   • Diabetes mellitus type 2, noninsulin dependent   • Avitaminosis D   • Dyslipidemia   • Decrease in the ability to hear   • Personal history of colonic polyps   • Subdural hematoma       Past Medical History:   Diagnosis Date   • Colon polyps     Followed by Dr. Leo Jaeger at [facility].   • Coronary artery disease    • Diabetes mellitus    • Hyperlipidemia    • Hypertension        Past Surgical History:   Procedure Laterality Date   • CARDIAC CATHETERIZATION     • CAROTID STENT      patient denies, but family states he did have a stent placed   • COLONOSCOPY N/A 2014    Dr. Leo Jaeger at [facility].   • COLONOSCOPY N/A 2016    Dr. Leo Jaeger at [facility].   • COLONOSCOPY N/A 03/15/2023    2 TUBULAR ADENOMA POLYPS IN SIGMOID, BARIUM ENEMA ORDERED, DR. LEO JAEGER AT [facility]   • CORONARY ANGIOPLASTY     • FRACTURE SURGERY Left 2023    IM nailing for L femur fracture       PT ASSESSMENT (last 12 hours)     IRF PT Evaluation and Treatment     Row Name 23 0830          PT Time and Intention    Document Type initial evaluation  -MAURILIO     Mode of Treatment physical therapy  -MAURILIO     Patient/Family/Caregiver Comments/Observations Pt up in w/c after OT. Agreeable to PT.  -MAURILIO     Row Name 23 2208          General Information    Patient Profile Reviewed yes  -MAURILIO     General Observations of Patient In w/c, no c/o pain  -MAURILIO      "Existing Precautions/Restrictions hip;weight bearing  WBAT L LE  -     Row Name 05/17/23 0830          Previous Level of Function/Home Environm    Community Ambulation, Premorbid Functional Level independent  Pt said he used rolling walker \"off and on\" before recent falls.  -     Row Name 05/17/23 0830          Living Environment    Current Living Arrangements home  -     People in Home alone  has hired caregiver at night who stays in basement  -[facility] Name 05/17/23 0830          Home Use of Assistive/Adaptive Equipment    Equipment Currently Used at Home none  has rolling walker at home  -[facility] Name 05/17/23 0830          Pain Assessment    Pretreatment Pain Rating 0/10 - no pain  at rest  -     Posttreatment Pain Rating 7/10  with movement  -     Pain Location - Side/Orientation Left  -     Pain Location upper  -     Pain Location - hip  -[facility] Name 05/17/23 0830          Cognition/Psychosocial    Affect/Mental Status (Cognition) [facility]     Orientation Status (Cognition) oriented x 3  -     Follows Commands (Cognition) 75-90% accuracy;follows one-step commands;physical/tactile prompts required;verbal cues/prompting required;visual cue  -     Personal Safety Interventions fall prevention program maintained;gait belt;muscle strengthening facilitated  -     Row Name 05/17/23 0830          Range of Motion Comprehensive    Comment, General Range of Motion B LE's WFLs  -[facility] Name 05/17/23 0830          Strength (Manual Muscle Testing)    Strength (Manual Muscle Testing) left lower extremity strength;right lower extremity strength  -     Left Lower Extremity Strength hip;knee;ankle  -     Hip, Left (Strength) 3/5  -MAURILIO     Knee, Left (Strength) 4/5  -MAURILIO     Ankle, Left (Strength) 4/5  -MAURILIO     Right Lower Extremity Strength right LE strength is WFL  -     Row Name 05/17/23 0830          Bed Mobility    Bed Mobility rolling left;rolling right;supine-sit;sit-supine  -     " Rolling Left [place] (Bed Mobility) contact guard  -MAURILIO     Rolling Right [place] (Bed Mobility) standby assist  -MAURILIO     Supine-Sit [place] (Bed Mobility) minimum assist (75% patient effort);verbal cues  -MAURILIO     Sit-Supine [place] (Bed Mobility) contact guard;standby assist  -MAURILIO     Bed Mobility, Safety Issues decreased use of legs for bridging/pushing  -MAURILIO     Assistive Device (Bed Mobility) bed rails  -MAURILIO     Row Name 05/17/23 0830          Transfer Assessment/Treatment    Transfers bed-chair transfer;chair-bed transfer;sit-stand transfer;stand-sit transfer;car transfer  -MAURILIO     Row Name 05/17/23 0830          Bed-Chair Transfer    Bed-Chair [place] (Transfers) contact guard;verbal cues  -MAURILIO     Assistive Device (Bed-Chair Transfers) wheelchair  -MAURILIO     Comment, (Bed-Chair Transfer) stand pivot  -MAURILIO     Row Name 05/17/23 0830          Chair-Bed Transfer    Chair-Bed [place] (Transfers) contact guard;verbal cues  -MAURILIO     Assistive Device (Chair-Bed Transfers) wheelchair  -MAURILIO     Comment, (Chair-Bed Transfer) stand pivot  -MAURILIO     Row Name 05/17/23 0830          Sit-Stand Transfer    Sit-Stand [place] (Transfers) contact guard;verbal cues  -MAURILIO     Assistive Device (Sit-Stand Transfers) walker, front-wheeled  -MAURILIO     Comment, (Sit-Stand Transfer) cues for hand placement  -MAURILIO     Row Name 05/17/23 0830          Stand-Sit Transfer    Stand-Sit [place] (Transfers) contact guard;verbal cues;nonverbal cues (demo/gesture)  -MAURILIO     Assistive Device (Stand-Sit Transfers) walker, front-wheeled  -MAURILIO     Comment, (Stand-Sit Transfer) cues to fully turn prior to sit  -MAURILIO     Row Name 05/17/23 0830          Car Transfer    Type (Car Transfer) stand pivot/stand step  -MAURILIO     [place] Level (Car Transfer) contact guard;verbal cues;nonverbal cues (demo/gesture)  -MAURILIO     Assistive Device (Car Transfer) walker, front-wheeled  -MAURILIO     Row Name 05/17/23 0830          Gait/Stairs (Locomotion)     "[place] Level (Gait) contact guard;verbal cues;nonverbal cues (demo/gesture)  -MAURILIO     Assistive Device (Gait) walker, front-wheeled  -MAURILIO     Distance in Feet (Gait) 70', 50'  -MAURILIO     Pattern (Gait) step-through  -MAURILIO     Deviations/Abnormal Patterns (Gait) bilateral deviations;base of support, narrow;gait speed decreased;stride length decreased;weight shifting decreased  almost stutter stepping with turning around, getting close to steps, etc.  -MAURILIO     Bilateral Gait Deviations heel strike decreased;weight shift ability decreased  tendency for posterior lean with turns, negotiate around objects  -MAURILIO     [place] Level (Stairs) contact guard;verbal cues  -MAURILIO     Handrail Location (Stairs) both sides  like home  -MAURILIO     Number of Steps (Stairs) 4  -MAURILIO     Ascending Technique (Stairs) step-over-step  -MAURILIO     Descending Technique (Stairs) step-to-step  -MAURILIO     Stairs, Safety Issues balance decreased during turns  tends to lean posteriorly  -MAURILIO     Stairs, Impairments impaired balance;strength decreased;pain  -MAURILIO     Comment, (Gait/Stairs) 6\" curb: walker, min of one; small stutter steps, assist to place walker up/down step; ambulated 10' on uneven surface CG/min  -     Row Name 05/17/23 0830          Balance    Balance Assessment sitting static balance;sitting dynamic balance;standing static balance  -     Static Sitting Balance independent  -     Dynamic Sitting Balance independent  -     Static Standing Balance contact guard  with walker  -     Row Name 05/17/23 0830          Hip (Therapeutic Exercise)    Hip (Therapeutic Exercise) isometric exercises;strengthening exercise  -     Hip Isometrics (Therapeutic Exercise) bilateral;gluteal sets;2 sets;10 repetitions  -     Hip Strengthening (Therapeutic Exercise) left;right;supine;heel slides;aBduction;aDduction;2 sets;10 repetitions  -     Row Name 05/17/23 0830          Knee (Therapeutic Exercise)    Knee (Therapeutic Exercise) isometric " exercises;strengthening exercise  -     Knee Isometrics (Therapeutic Exercise) bilateral;supine;quad sets;2 sets;10 repetitions  -     Knee Strengthening (Therapeutic Exercise) left;right;supine;SAQ (short arc quad);2 sets;10 repetitions  -     Row Name 05/17/23 0830          Ankle (Therapeutic Exercise)    Ankle (Therapeutic Exercise) AROM (active range of motion)  -     Ankle AROM (Therapeutic Exercise) bilateral;dorsiflexion;plantarflexion;supine;20 repititions  -     Row Name 05/17/23 0830          Positioning and Restraints    Pre-Treatment Position sitting in chair/recliner  -     Post Treatment Position wheelchair  -     In Wheelchair sitting;call light within reach;encouraged to call for assist;exit alarm on  -     Row Name 05/17/23 0830          Therapy Assessment/Plan (PT)    Patient's Goals For Discharge return home  hiring more assist if needed.  -     Row Name 05/17/23 0830          Therapy Assessment/Plan (PT)    Rehab Potential/Prognosis (PT) good, to achieve stated therapy goals  -     Frequency of Treatment (PT) 5 times per week;60 minutes per session  -     Estimated Duration of Therapy (PT) 2 weeks  -     Problem List (PT) cognition;strength;pain;balance  -     Comment, Therapy Assessment/Plan (PT) Pt is an 88 y/o male who is s/p SDH of frontoparietal area and fracture L greater trochanter due to fall at home while transferring into bed on 5/7/23. Pt s/p IM nailing L femur on 5/8/23. Pt has history of another recent fall on 4/15/23 in which displaced fracture of L femoral neck but no surgical intervention and went to subacute briefly. Pt has deficits with bed mobility, transfers and ambulation. He c/o some pain in L hip with mobility but not letting it limit his activity in therapy. Pt requires some increased cues with following directions. He also demonstrates some mild posterior lean with ambulation mostly at turns, transfers, stairs, curb possibly fear of falling due  to 2 recent falls. Pt will benefit from inpt acute at [facility] to work on improving independence so he can return home with most likely with hired assist.  -MAURILIO           User Key  (r) = Recorded By, (t) = Taken By, (c) = Cosigned By    Initials Name Provider Type    Ashlie Voss, PT Physical Therapist              Wound 05/16/23 1831 Left lateral hip Incision (Active)   Dressing Appearance dry;intact 05/16/23 2016   Closure Staples 05/16/23 2016   Drainage Amount none 05/16/23 2016     Physical Therapy Education     Title: PT OT SLP Therapies (In Progress)     Topic: Physical Therapy (In Progress)     Point: Mobility training (Done)     Learning Progress Summary           Patient Acceptance, E,TB, VU,NR by MAURILIO at 5/17/2023 1209                   Point: Home exercise program (Not Started)     Learner Progress:  Not documented in this visit.          Point: Body mechanics (Not Started)     Learner Progress:  Not documented in this visit.          Point: Precautions (Not Started)     Learner Progress:  Not documented in this visit.                      User Key     Initials Effective Dates Name Provider Type Discipline     06/16/21 -  Ashlie Arnold, PT Physical Therapist PT                PT Recommendation and Plan    Planned Therapy Interventions (PT): balance training, bed mobility training, gait training, home exercise program, neuromuscular re-education, patient/family education, ROM (range of motion), stair training, strengthening, transfer training  Frequency of Treatment (PT): 5 times per week, 60 minutes per session                     Time Calculation:      PT Charges     Row Name 05/17/23 1210 05/17/23 1209          Time Calculation    Start Time 1100  -MAURILIO 0830  -MAURILIO     Stop Time 1130  -MAURILIO 0900  -MAURILIO     Time Calculation (min) 30 min  -MAURILIO 30 min  -MAURILIO     PT Received On -- 05/17/23  -MAURILIO     PT - Next Appointment -- 05/18/23  -MAURILIO     PT Goal Re-Cert Due Date -- 05/24/23  -MAURILIO        Time Calculation-  PT    Total Timed Code Minutes- PT 30 minute(s)  -MAURILIO 30 minute(s)  -MAURILIO           User Key  (r) = Recorded By, (t) = Taken By, (c) = Cosigned By    Initials Name Provider Type    Ashlie Voss, PT Physical Therapist                Therapy Charges for Today     Code Description Service Date Service Provider Modifiers Qty    [id number]  PT EVAL MOD COMPLEXITY 3 5/17/2023 Ashlie Arnold, PT GP 1    [id number]  PT THER PROC EA 15 MIN 5/17/2023 Ashlie Arnold, PT GP 3                   Ashlie Arnold, PT  5/17/2023

## 2023-05-17 NOTE — PROGRESS NOTES
Inpatient Rehabilitation Plan of Care Note    Plan of Care  Updated Problems/Interventions  Mobility    [PT] Bed/Chair/Wheelchair(Active)  Current Status(05/17/2023): CG/min  Weekly Goal(05/24/2023): CG/SBA  Discharge Goal: mod Independent    [PT] Walk(Active)  Current Status(05/17/2023): 80' CG with rolling walker  Weekly Goal(05/24/2023): to bathroom with nursing  Discharge Goal: SBA/independent with walker and 150'    [PT] Stairs(Active)  Current Status(05/17/2023): CG/min 4 steps, 2 rails  Weekly Goal(05/24/2023): CG with 4 steps and 2 rails like home  Discharge Goal: CG/SBA and 2 rails 4 steps    [PT] Bed Mobility(Active)  Current Status(05/17/2023): CG/min - 1  Weekly Goal(05/24/2023): CG  Discharge Goal: mod independent    Signed by: Ashlie Arnold, PT

## 2023-05-17 NOTE — PROGRESS NOTES
Case Management  Inpatient Rehabilitation Plan of Care and Discharge Plan Note    Rehabilitation Diagnosis:  L femur fracture, SDH  Date of Onset:  05/07/2023    Medical Summary:  86yo M with PMH of HTN and depression/mood disorder who  presented to [facility] after a fall and subsequent L femoral neck  fracture s/p IM nail placement on 5/8/2023 and SDH on the frontoparietal region.  This past year the patient's wife passed away and he was placed on Xanax 0.25  PRN for anxiety and mood changes. In April 2023, he stated that he stopped  taking Xanax abruptly and had a fall. He was diagnosed with a nondisplaced L  femoral neck fracture at that time and did not require surgical intervention. He  was discharged to subacute rehab from 4/15/2023 to 4/23/2023. He had a fall in  subacute rehab while transferring to the bed, falling on his L side and hitting  the back of his head, and was transferred to [facility] ED. CT head revealed  frontoparietal SDH at that time and did not require surgical intervention. Xray  of L hip/femur displaced L femoral fracture and underwent IM Nail placement on  5/8/2023. Patient is WBAT in LLE. Also, patient had a laceration on the  posterior head which required sutures while in the ED. Hospital course was  complicated by hypothyroidism with increased TSH and was started on Synthroid  25mcg daily. Patient states that he had significant weight loss and depression  over the past year since his wifes passing. Hyponatremia during course possibly  related to hypothyroidism as well. Patient worked with therapies during acute  care stay and inpatient rehab was recommended.  ?  On admission, patient states that his pain is controlled in his L hip with Norco  5 PRN. Denies headache, dizziness, blurry vision. States that his cognition is  at its baseline and does not recognize any cognitive changes since his fall.  ?  Functional hx: Patient lives alone in a 2 story house in [place] with 2  NORMA.  He has assistance to help him out at home with cooking and cleaning. He was not  reqiuring assistive devices with ambulation prior to admission. His sons live in  [place] and [facility] [facility] respectively and will be alternating weeks traveling  to help him out at home on discharge. 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 His sons are also physicians  and will be able to provide intermittent supervision at his home on discharge.  ?  Review of Systems  Constitutional: Negative for fatigue and fever.  HENT: Negative for congestion and sore throat.  Eyes: Negative for discharge and itching.  Respiratory: Negative for cough and shortness of breath.  Cardiovascular: Negative for chest pain and palpitations.  Gastrointestinal: Negative for abdominal distention, nausea and vomiting.  Endocrine: Negative for cold intolerance and heat intolerance.  Genitourinary: Negative for dysuria and hematuria.  Musculoskeletal: Negative for arthralgias and back pain.       Pain in L thigh at surgical site  Skin: Negative for pallor and rash.  Neurological: Negative for light-headedness and headaches.  Hematological: Negative for adenopathy. Does not bruise/bleed easily.  Psychiatric/Behavioral: Negative for agitation and confusion.    ?    ?  Past Medical History: Past Medical History:  DiagnosisDate  ?Colon polyps?  ?Followed by Dr. Leo Morales at [facility].  ?Coronary artery disease?  ?Diabetes mellitus?  ?Hyperlipidemia?  ?Hypertension?    ?  ?  Surgical History  Past Surgical History:  ProcedureLateralityDate  ?CARDIAC CATHETERIZATION??  ?CAROTID STENT??  ?patient denies, but family states he did have a stent placed  ?COLONOSCOPYN/A01/29/2014  ?Dr. Leo Morales at [facility].  ?COLONOSCOPYN/A01/13/2016  ?Dr. Leo Morales at [facility].  ?COLONOSCOPYN/A03/15/2023  ?2 TUBULAR ADENOMA POLYPS IN SIGMOID,  BARIUM ENEMA ORDERED, DR. GEORGI JAEGER AT  [facility]  ?CORONARY ANGIOPLASTY??  ?FRACTURE SURGERYLeft05/08/2023  ?IM nailing for L femur fracture    Plan of Care  Updated Problems/Interventions  Field    Expected Intensity:  Average of 3 hours of therapy 5 days/week.  Interdisciplinary Team:  Interdisciplinary Team: Medical Supervision and 24 Hour Rehabilitation Nursing.,  Physical Therapy:, Occupational Therapy:, Speech and Language Therapy:, Social  Work, Therapeutic Recreation., Psychology., Registered Dietitian.  Physical Therapy Intensity/Duration: 1 hour / day, 5 days / week, for  approximately 1-2 weeks.  Occupational Therapy Intensity/Duration: 1 hour / day, 5 days / week, for  approximately 1-2 weeks.  Speech Language Pathology  Intensity/Duration: 1 hour / day, 5 days / week, for  approximately 1-2 weeks.  Estimated Length of Stay/Anticipated Discharge Date: 1-2 weeks  Anticipated Discharge Destination:  Anticipated discharge destination from inpatient rehabilitation is community  discharge with assistance. Home with hired caregiver and patient has a good  group of friends in [place] that can check in on patient.  Sons both live out  of state.      Based on the patient's medical and functional status, their prognosis and  expected level of functional improvement is:  Goals are to achieve a level of  supervision with  mobility and self-care and improved ADLs.   Rehabilitation  prognosis good.  Medical prognosis good.    Signed by: Alicia Estrada RN

## 2023-05-17 NOTE — PROGRESS NOTES
Discharge Planning Assessment  [facility]     Patient Name: Radha Azevedo  MRN: [id number]  Today's Date: 5/17/2023    Admit Date: 5/16/2023    Plan: Patient plans to d/c home, where he lives alone. His two sons live out of town but will take turns coming to stay with patient. He has a private caregiver that assists him overnight and his sons are looking for more private caregivers to assist during the day.   Discharge Needs Assessment    No documentation.                Discharge Plan     Row Name 05/17/23 1401       Plan    Plan Patient plans to d/c home, where he lives alone. His two sons live out of town but will take turns coming to stay with patient. He has a private caregiver that assists him overnight and his sons are looking for more private caregivers to assist during the day.    Patient/Family in Agreement with Plan yes    Plan Comments Completed assessment with patient and spoke with patient's son, on the phone. Patient [facility] with 2 steps to enter. He has been living alone since his wife passed away in March 2022. They were  56 years. 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 He retired three years ago. Patient has two sons; one lives in [facility] and the other lives in [facility] [facility]. Patient has a private caregiver that stays overnight. They are currently hiring more private caregivers to be with patient during the day. Patient's sons are taking turns being in [place] with patient right now and when he initially goes home. Patient was fairly indpendent before coming to the hospital. He has a shower chair at home but no other equipment. He has never had home health. Patient's sons would like patient to stay in his home for as long as possible as going to an assisted  living/nursing home is not part of their culture and would be very hard on him. Having patient move to live with one of his sons is not an option as he has many friends and enjoys spending time with them. SW explained her role on the unit and team and family conference. SW will follow up with patient and son tomorrow after team conference.              Continued Care and Services - Admitted Since 5/16/2023    Coordination has not been started for this encounter.          Demographic Summary    No documentation.                Functional Status     Row Name 05/17/23 1438       Functional Status    Usual Activity Tolerance good    Current Activity Tolerance moderate       Functional Status, IADL    Medications independent    Meal Preparation independent    Housekeeping assistive person    Laundry independent    Shopping independent               Psychosocial     Row Name 05/17/23 1350       Behavior WDL    Behavior WDL WDL       Emotion Mood WDL    Emotion/Mood/Affect WDL WDL;emotion mood    Emotion/Mood calm       Mental Health    Little Interest or Pleasure in Doing Things 0-->not at all    Feeling Down, Depressed or Hopeless 0-->not at all    Do you feel stress - tense, restless, nervous, or anxious, or unable to sleep at night because your mind is troubled all the time - these days? Not at all       Speech WDL    Speech WDL WDL       Perceptual State WDL    Perceptual State WDL WDL       Thought Process WDL    Thought Process WDL judgment and insight;thought content;thought process    Judgment and Insight judgment appropriate to situation;insight appropriate to situation    Thought Content logical       Intellectual Performance WDL    Intellectual Performance WDL WDL;intellectual performance    Intellectual Performance memory deficit, recent       Coping/Stress    Major Change/Loss/Stressor loss of independence    Patient Personal Strengths strong support system;positive attitude    Sources of Support  friend(s);adult child(anel)    Reaction to Health Status adjusting;accepting    Understanding of Condition and Treatment adequate understanding of medical condition       Developmental Stage (Eriksson's)    Developmental Stage Stage 8 (65 years-death/Late Adulthood) Integrity vs. Despair       C-SSRS (Recent)    Q1 Wished to be Dead (Past Month) no    Q2 Suicidal Thoughts (Past Month) no    Q6 Suicide Behavior (Lifetime) no       Violence Risk    Feels Like Hurting Others no    Previous Attempt to Harm Others no               Abuse/Neglect     Row Name 05/17/23 1038       Personal Safety    Feels Unsafe at Home or Work/School no    Feels Threatened by Someone no    Does Anyone Try to Keep You From Having Contact with Others or Doing Things Outside Your Home? no    Physical Signs of Abuse Present no               Legal    No documentation.                Substance Abuse     Row Name 05/17/23 6325       AUDIT-C (Alcohol Use Disorders ID Test)    Q1: How often do you have a drink containing alcohol? Never    Q2: How many drinks containing alcohol do you have on a typical day when you are drinking? None    Q3: How often do you have six or more drinks on one occasion? Never    Audit-C Score 0               Patient Forms    No documentation.                   KEERTHI Jameson

## 2023-05-17 NOTE — H&P
[facility]   HISTORY AND PHYSICAL    Patient Name: Radha Azevedo  : 1935  MRN: [id number]  Primary Care Physician:  Amina Jiang MD  Date of admission: 2023    Subjective   Subjective     Chief Complaint: L femur fracture, SDH    History of Present Illness  86yo M with PMH of HTN and depression/mood disorder who presented to [facility] after a fall and subsequent L femoral neck fracture s/p IM nail placement on 2023 and SDH on the frontoparietal region. This past year the patient's wife passed away and he was placed on Xanax 0.25 PRN for anxiety and mood changes. In 2023, he stated that he stopped taking Xanax abruptly and had a fall. He was diagnosed with a nondisplaced L femoral neck fracture at that time and did not require surgical intervention. He was discharged to subacute rehab from 4/15/2023 to 2023. He had a fall in subacute rehab while transferring to the bed, falling on his L side and hitting the back of his head, and was transferred to [facility] ED. CT head revealed frontoparietal SDH at that time and did not require surgical intervention. Xray of L hip/femur displaced L femoral fracture and underwent IM Nail placement on 2023. Patient is WBAT in LLE. Also, patient had a laceration on the posterior head which required sutures while in the ED. Hospital course was complicated by hypothyroidism with increased TSH and was started on Synthroid 25mcg daily. Patient states that he had significant weight loss and depression over the past year since his wifes passing. Hyponatremia during course possibly related to hypothyroidism as well. Patient worked with therapies during acute care stay and inpatient rehab was recommended.    On admission, patient states that his pain is controlled in his L hip with Norco 5 PRN. Denies headache, dizziness, blurry vision. States that his cognition is at its baseline and does not recognize any cognitive changes since his  fall.    Functional hx: Patient lives alone in a 2 story house in [place] with 2 NORMA. He has assistance to help him out at home with cooking and cleaning. He was not reqiuring assistive devices with ambulation prior to admission. His sons live in [place] [facility] respectively and will be alternating weeks traveling to help him out at home on discharge. 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 His sons are also physicians and will be able to provide intermittent supervision at his home on discharge.    Review of Systems   Constitutional: Negative for fatigue and fever.   HENT: Negative for congestion and sore throat.    Eyes: Negative for discharge and itching.   Respiratory: Negative for cough and shortness of breath.    Cardiovascular: Negative for chest pain and palpitations.   Gastrointestinal: Negative for abdominal distention, nausea and vomiting.   Endocrine: Negative for cold intolerance and heat intolerance.   Genitourinary: Negative for dysuria and hematuria.   Musculoskeletal: Negative for arthralgias and back pain.        Pain in L thigh at surgical site   Skin: Negative for pallor and rash.   Neurological: Negative for light-headedness and headaches.   Hematological: Negative for adenopathy. Does not bruise/bleed easily.   Psychiatric/Behavioral: Negative for agitation and confusion.        Personal History     Past Medical History:   Diagnosis Date   • Colon polyps     Followed by Dr. Leo Morales at [facility].   • Coronary artery disease    • Diabetes mellitus    • Hyperlipidemia    • Hypertension        Past Surgical History:   Procedure Laterality Date   • CARDIAC CATHETERIZATION     • CAROTID STENT      patient denies, but family states he did have a stent placed   • COLONOSCOPY N/A 01/29/2014    Dr. Leo Morales at [facility].   • COLONOSCOPY N/A 01/13/2016     Dr. Leo Jaeger at [facility].   • COLONOSCOPY N/A 03/15/2023    2 TUBULAR ADENOMA POLYPS IN SIGMOID, BARIUM ENEMA ORDERED, DR. LEO JAEGER AT [facility]   • CORONARY ANGIOPLASTY     • FRACTURE SURGERY Left 05/08/2023    IM nailing for L femur fracture       Family History: family history includes Alzheimer's disease in his mother. Otherwise pertinent FHx was reviewed and not pertinent to current issue.    Social History:  reports that he has never smoked. He has never used smokeless tobacco. He reports that he does not drink alcohol and does not use drugs.    Home Medications:  Accu-Chek Mallorie Plus, Alpha-Lipoic Acid, Coconut Oil, Insulin Pen Needle, Turmeric, accu-chek multiclix, finasteride, glucose blood, l-methylfolate-algae-B6-B12, labetalol, nateglinide, omega-3 acid ethyl esters, rosuvastatin, triamcinolone, and vitamin D    Allergies:  No Known Allergies    Objective    Objective     Vitals:   Temp:  [98 °F (36.7 °C)-98.7 °F (37.1 °C)] 98 °F (36.7 °C)  Heart Rate:  [63-77] 63  Resp:  [18] 18  BP: (164-178)/(76-80) 166/76    Physical Exam  General: The patient is well-developed, well-nourished and in no apparent distress.   HEENT: EOMI, hearing intact b/l. MMM. Sutures intact in occipital region   Respiratory: Lungs CTAB, no wheezes or rhonchi     Cardiac: RRR, no m/r/g, no pedal edema     Abdomen: soft, NT abdomen     Skin: L femoral IM placement incisions clean with sutures in place   Psych: Appropriate affect and behavior         Neuro:  Mental Status:  AOx4   Speech: fluent without dysarthria or scanning. Unable to do basic money calculations. Unable to spell last name backwards.  CN II-XII: grossly intact, EOMI, PERRL, no visual field cut, tongue midline   Motor: No fasciculations or tremor at rest, low-normal bulk of arms & legs. No ataxia seen with thumb to finger testing   Tone (Modified Vera Scale): 0/4 in upper & lower ext bilaterally.   MMT:   LUE 5/5 EF, 5/5 EE, 5/5 WE, 5/5  strength, 5/5 finger  abduction   RUE 5/5 EF, 5/5 EE, 5/5 WE, 5/5  strength, 5/5 finger abduction   RLE 5/5 HF, 5/5 KE, 5/5 DF, 5/5 great toe ext, 5/5 PF   LLE 5/5 HF, 5/5 KE, 5/5 DF, 5/5 great toe ext, 5/5 PF    Reflexes: DTRs 2+ on B/L upper and lower extremities. Hoffmans negative B/L   Sensation: sensation appears to be intact in all extremities         Result Review    Result Review:  I have personally reviewed the results from the time of this admission to 5/17/2023 11:14 EDT and agree with these findings:  [x]  Laboratory list / accordion  [x]  Microbiology  [x]  Radiology  []  EKG/Telemetry   []  Cardiology/Vascular   []  Pathology  []  Old records  []  Other:  Most notable findings include: ,  Na 128      Assessment & Plan   Assessment / Plan     Brief Patient Summary:  Radha Azevedo is a 87 y.o. male with PMH of HTN and depression/mood disorder who presented to [facility] after a fall and subsequent L femoral neck fracture s/p IM nail placement on 5/8/2023 and SDH on the frontoparietal region.    Active Hospital Problems:  Active Hospital Problems    Diagnosis    • **Subdural hematoma      Plan:   Impairments: ADLs, endurance, gait, balance, transfers, cognition, comprehension    Now admit for comprehensive acute inpatient rehabilitation .  This would be an interdisciplinary program with physical therapy 1 hour,  occupational therapy 1 hour, and speech therapy 1 hour, 5 days a week.  Rehabilitation nursing for carryover, monitoring of neurologic status, bowel and bladder, and skin  Ongoing physician follow-up.  Weekly team conferences.  Goals are to achieve a level of supervision with  mobility and self-care and improved ADLs.   Rehabilitation prognosis good.  Medical prognosis good.  Estimated length of stay is approximately 1-2 weeks , but is only an estimation.       #Functional Impairment 2/2 SDH and L femur fracture   -Initiate comprehensive rehab program consisting of PT/OT/SLP 3 hours/day, 5  days/week with medical management of above disorder, comorbidities, pain, bowels, and bladder.       -WB/activity status:?no restrictions, WBAT       #SDH  -No gross neurologic deficits on exam  -Will order CT Head for baseline or transfer imaging from [facility]    #L displaced femur fracture  -S/p L IM Nail placement in L femur  -WBAT in LLE     #Pain Control     -Prn Norco 5 q4h       #Hypothyroidism  - on admission  -Significant weight loss and depression over the past year  -Continue Synthroid 25 daily  -Will follow-up on TSH and T4 values tomorrow    #Hyponatremia  -Na 128 on admission, 128-130 a [facility]  -Possibly related to hypothyroidism  -Fluid restriction to 1500ml daily  -Will continue to monitor    #HTN  -Continue Norvasc 5 daily    #Mood disorder  -Continue Cymbalta 30 BID and Seroquel 12.5 at bedtime   -PRN Xanax held because cause of his falls    #FEN     -nutrition: Cardiac diet, Regular texture, Liquid Consistency: Thin Liquid. Fluid restriction 1500cc daily   -admit labs reviewed     #PPx    -DVT: Heparin 5000U q8h        DVT prophylaxis:  Medical and mechanical DVT prophylaxis orders are present.    CODE STATUS:       Admission Status:  I believe this patient meets acute inpatient rehabilitation status.    Artemio Price, DO

## 2023-05-17 NOTE — PROGRESS NOTES
Case Management  Inpatient Rehabilitation Plan of Care and Discharge Plan Note    Rehabilitation Diagnosis:  Branch  Date of Onset:  Branch    Medical Summary:  Branch  Past Medical History: Branch    Plan of Care  Updated Problems/Interventions  Field    Expected Intensity:  Branch  Interdisciplinary Team:  Hillary  Estimated Length of Stay/Anticipated Discharge Date: Branch  Anticipated Discharge Destination:  Anticipated discharge destination from inpatient rehabilitation is community  discharge with assistance. Patient plans to return home at d/c. His sons will be  able to assist initially and he also has private caregivers to assist.      Based on the patient's medical and functional status, their prognosis and  expected level of functional improvement is:  Branch    Signed by: Alyssa Salas, Social Work

## 2023-05-17 NOTE — PROGRESS NOTES
Inpatient Rehabilitation Plan of Care Note    Plan of Care  Care Plan Reviewed - Updates as Follows    Psychosocial    [RN] Coping/Adjustment(Active)  Current Status(05/16/2023): At risk for poor coping due to recent medical  issues.  Weekly Goal(05/23/2023): Identify progress in functional status.  Discharge Goal: Demonstrates healthy coping skills.    Performed Intervention(s)  Medication.  Group therapy.      Safety    [RN] Potential for Injury(Active)  Current Status(05/16/2023): Hx. falls. At risk for falling in the hospital.  Weekly Goal(05/23/2023): Cue to use the call bell.  Discharge Goal: Patient/ family will be aware of the risk of falling in the home  setting.    Performed Intervention(s)  Items within reach.  Safety rounds.  Wheelchair, bed, and chair alarms.      Sphincter Control    [RN] Bladder Management(Active)  Current Status(05/16/2023): 100% continent of bladder.  Weekly Goal(05/23/2023): Remains 100% continent of bladder.  Discharge Goal: Goes home continent of bladder.    [RN] Bowel Management(Active)  Current Status(05/16/2023): 100% continent of bowel.  Weekly Goal(05/23/2023): Remains continent of bowel.  Discharge Goal: goes home continent of bowel.    Performed Intervention(s)  Offer restroom.  Bladder training program.  Use incontinence products.  Encourage appropriate diet.  Encourage fluid intake.    Signed by: Irlanda Foreman RN

## 2023-05-17 NOTE — THERAPY EVALUATION
Inpatient Rehabilitation - Speech Language Pathology Initial Evaluation    [facility]     Patient Name: Radha Azevedo  : 1935  MRN: [id number]    Today's Date: 2023                   Admit Date: 2023       Visit Dx:      ICD-10-CM ICD-9-CM   1. Impaired gait and mobility  R26.89 781.2       Patient Active Problem List   Diagnosis   • Absolute anemia   • Benign essential HTN   • Benign localized hyperplasia of prostate without urinary obstruction   • Type 2 diabetes mellitus with peripheral angiopathy   • Hypertension   • Hyperlipidemia   • Type 2 diabetes mellitus   • Diabetes mellitus type 2, noninsulin dependent   • Avitaminosis D   • Dyslipidemia   • Decrease in the ability to hear   • Personal history of colonic polyps   • Subdural hematoma       Past Medical History:   Diagnosis Date   • Colon polyps     Followed by Dr. Leo Jaeger at [facility].   • Coronary artery disease    • Diabetes mellitus    • Hyperlipidemia    • Hypertension        Past Surgical History:   Procedure Laterality Date   • CARDIAC CATHETERIZATION     • CAROTID STENT      patient denies, but family states he did have a stent placed   • COLONOSCOPY N/A 2014    Dr. Leo Jaeger at [facility].   • COLONOSCOPY N/A 2016    Dr. Leo Jaeger at [facility].   • COLONOSCOPY N/A 03/15/2023    2 TUBULAR ADENOMA POLYPS IN SIGMOID, BARIUM ENEMA ORDERED, DR. 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 The Cognitive Linguistic Quick Test (CLQT)  "was used as a standardized assessment to evaluate patient's current cognitive skills (i.e., problem solving, reasoning, mental flexibility, memory, judgement, and speed of processing). Patient received an overall composite score of 3.0/4.0 indicating mild cognitive impairment. The results of the assessment were as followed: Attention - Mild, Memory - Mild, Executive Functions - Mild, Language -Mild, Visuospatial skills -Mild, and Clock Drawing - Severe. Quality of assessment judged to be fair-good due to English as a third language. Patient comfortable completing evaluation in English. 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 Comprehension/language may have impacted the following subtests: clock drawing, mazes, and design generation. For example, patient wrote the numbers 1-20 on the clock face provided. When asked to draw the hands to \"ten minutes after eleven\" patient wrote the digits \"11:10\" in the middle of the clock. Question comprehension of task vs. cognitive deficit. Verbal sequencing, episodic long term memory, and confrontational naming appeared WNL. Demonstrated mild difficulty with concrete/abstract generative naming, attention (selective/attention to detail), and planning/organization. Recommend ongoing diagnostic assessment of cognitive skills with functional tasks (i.e, finance management, medication management). (05/17/23 0966)                SLP EVALUATION (last 72 hours)     SLP SLC Evaluation     Row Name 05/17/23 0930          Document Type evaluation  -SR    Subjective Information no complaints  -SR    Patient Observations alert;cooperative;agree to therapy  -SR    Patient/Family/Caregiver Comments/Observations Patient up in WC in room. Agreeable and pleasant.  -SR    Patient Effort good  -SR    Symptoms Noted During/After Treatment none  -SR          Patient Profile Reviewed yes  -SR    Pertinent History Of Current Problem 87 y.o. male; " Presented to [facility] acute rehab after a fall and subsequent L femoral neck fracture s/p IM nail placement on 5/8/2023 and SDH on the frontoparietal region.  -SR    Precautions/Limitations, Vision WFL with corrective lenses  -SR    Precautions/Limitations, Hearing WFL  -SR    Prior Level of Function-Communication WFL  -SR    Plans/Goals Discussed with patient;agreed upon  -SR    Barriers to Rehab none identified  -SR    Patient's Goals for Discharge return to home  -SR          Pretreatment Pain Rating 0/10 - no pain  -SR    Posttreatment Pain Rating 0/10 - no pain  -SR          Oral Motor Structure and Function WNL  -SR    Dentition Assessment natural, present and adequate  -SR          Cognitive/Memory Tests CLQT: Cognitive Linguistic Quick Test  -SR          Attention Domain Score 128  -SR    Attention Severity Rating 3: Mild  -SR    Memory Domain Score 135  -SR    Memory Severity Rating 3: Mild  -SR    Executive Function Domain Score 14  -SR    Executive Function Severity Rating 3: Mild  -SR    Language Domain Score 27  -SR    Language Severity Rating 3: Mild  -SR    Visuospatial Domain Score 54  -SR    Visuospatial Severity Rating 3: Mild  -SR    Clock Drawing Total Score 4  -SR    Clock Drawing Severity Rating Severe  -SR    Composite Severity Rating 3.0  -SR    Composite Severity Rating Range 3.4 - 2.5: Mild  -SR    CLQT Comments Cognitive-linguistic evaluation completed this date. The Cognitive Linguistic Quick Test (CLQT) was used as a standardized assessment to evaluate patient's current cognitive skills (i.e., problem solving, reasoning, mental flexibility, memory, judgement, and speed of processing). Patient received an overall composite score of 3.0/4.0 indicating mild cognitive impairment. The results of the assessment were as followed: Attention - Mild, Memory - Mild, Executive Functions - Mild, Language -Mild, Visuospatial skills -Mild, and Clock Drawing - Severe. Quality of assessment judged to be  "fair-good due to English as a third language. Patient comfortable completing evaluation in English. 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 Comprehension/language may have impacted the following subtests: clock drawing, mazes, and design generation. For example, patient wrote the numbers 1-20 on the clock face provided. When asked to draw the hands to \"ten minutes after eleven\" patient wrote the digits \"11:10\" in the middle of the clock. Question comprehension of task vs. cognitive deficit. Verbal sequencing, episodic long term memory, and confrontational naming appeared WNL. Demonstrated mild difficulty with concrete/abstract generative naming, attention (selective/attention to detail), and planning/organization. Recommend ongoing diagnostic assessment of cognitive skills with functional tasks (i.e, finance management, medication management).  -SR          SLP Diagnosis mild;cognitive-linguistic disorder  -SR    Rehab Potential/Prognosis excellent  -SR    SLC Criteria for Skilled Therapy Interventions Met yes  -SR    Functional Impact difficulty completing home management task;functional impact in social situations  -SR          Therapy Frequency (SLP SLC) 5 days per week  -SR    Predicted Duration Therapy Intervention (Days) until discharge  -SR    Anticipated Discharge Disposition (SLP) home with 24/7 care  -SR          Cognitive Linguistic Treatment Objectives Cognitive Linguistic Treatment Objectives (Group)  -SR          Attention Selection attention, SLP goal 1  -SR    Reasoning Selection reasoning, SLP goal 1  -SR    Executive Function Skills Selection executive function skills, SLP goal 1  -SR          Improve Attention by Goal 1 (SLP) complete selective attention task;complete sustained attention task;80%;independently (over 90% accuracy)  -SR    Time Frame (Attention Goal 1, SLP) by discharge  -SR          Improve Reasoning Through Goal 1 (SLP) complete " deductive reasoning task;complete mental flexibility task;80%;independently (over 90% accuracy)  -SR    Time Frame (Reasoning Goal 1, SLP) by discharge  -SR          Improve Executive Function Skills Goal 1 (SLP) home management activity;time management activity;80%;independently (over 90% accuracy)  -SR    Time Frame (Executive Function Skills Goal 1, SLP) by discharge  -SR          User Key  (r) = Recorded By, (t) = Taken By, (c) = Cosigned By    Initials Name Effective Dates    SR Hope Mary CCC-SLP 11/10/22 -                    EDUCATION    The patient has been educated in the following areas:       Cognitive Impairment.             SLP GOALS     Row Name 05/17/23 0930             Attention Goal 1 (SLP)    Improve Attention by Goal 1 (SLP) complete selective attention task;complete sustained attention task;80%;independently (over 90% accuracy)  -SR      Time Frame (Attention Goal 1, SLP) by discharge  -SR         Reasoning Goal 1 (SLP)    Improve Reasoning Through Goal 1 (SLP) complete deductive reasoning task;complete mental flexibility task;80%;independently (over 90% accuracy)  -SR      Time Frame (Reasoning Goal 1, SLP) by discharge  -SR         Executive Functional Skills Goal 1 (SLP)    Improve Executive Function Skills Goal 1 (SLP) home management activity;time management activity;80%;independently (over 90% accuracy)  -SR      Time Frame (Executive Function Skills Goal 1, SLP) by discharge  -SR            User Key  (r) = Recorded By, (t) = Taken By, (c) = Cosigned By    Initials Name Provider Type    Hope West CCC-SLP Speech and Language Pathologist                            Time Calculation:        Time Calculation- SLP     Row Name 05/17/23 1543             Time Calculation- SLP    SLP Start Time 0930  -SR      SLP Stop Time 1030  -SR      SLP Time Calculation (min) 60 min  -SR      Total Timed Code Minutes-  minute(s)  0956-7455  -SR      SLP Received On 05/17/23  -SR             User Key  (r) = Recorded By, (t) = Taken By, (c) = Cosigned By    Initials Name Provider Type    SR Hope Mary CCC-SLP Speech and Language Pathologist                  Therapy Charges for Today     Code Description Service Date Service Provider Modifiers Qty    [id number]  ST STD COG PERF TEST PER HOUR 5/17/2023 Hope Mary CCC-SLP GN 2                           KATIE Victoria  5/17/2023

## 2023-05-17 NOTE — PROGRESS NOTES
Nutrition Services    Patient Name:  Radha Azevedo  YOB: 1935  MRN: [id number]  Admit Date:  5/16/2023      Assessment Date:  05/17/23    CLINICAL NUTRITION ASSESSMENT    Comments: New admission to rehab.  L femur fracture s/p repair 5/8.  SDH.  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  Sons are also physicians.      Wife passed away within past year.  Reports of significant weight loss and depression over past year.  100% x 2 meals per chart review.    Patient unavailable (not in room) at time of attempted visit.  RD to follow up to interview.        Reason for Assessment Acute Rehab Admission     Admitting Diagnosis SDH, L femur fracture     Medical/Surgical History   Past Medical History:   Diagnosis Date   • Colon polyps     Followed by Dr. Leo Jaegre at [facility].   • Coronary artery disease    • Diabetes mellitus    • Hyperlipidemia    • Hypertension        Past Surgical History:   Procedure Laterality Date   • CARDIAC CATHETERIZATION     • CAROTID STENT      patient denies, but family states he did have a stent placed   • COLONOSCOPY N/A 01/29/2014    Dr. Leo Jaeger at [facility].   • COLONOSCOPY N/A 01/13/2016    Dr. Leo Jaeger at [facility].   • COLONOSCOPY N/A 03/15/2023    2 TUBULAR ADENOMA POLYPS IN SIGMOID, BARIUM ENEMA ORDERED, DR. LEO JAEGER AT [facility]   • CORONARY ANGIOPLASTY     • FRACTURE SURGERY Left 05/08/2023    IM nailing for L femur fracture        Encounter Information        Nutrition History:     Food Preferences:    Supplements:    Factors Affecting Intake: No factors at this time     Current Nutrition Orders & Evaluation of Intake       Oral Nutrition     Food Allergies NKFA   Current PO Diet Diet: Regular/House Diet, Fluid Restriction (240 mL/tray) Diets; 1500 mL/day; Texture: Regular Texture (IDDSI 7); Fluid Consistency: Thin (IDDSI 0)   Supplement n/a   PO Evaluation     % PO Intake 100% x 2 meals    # of Days Evaluated 2   --  Anthropometrics  "       Current Height  Current Weight  BMI kg/m2 Height: 152.5 cm (60.02\")  Weight: 57.6 kg (126 lb 15.8 oz) (05/17/23 1254)  Body mass index is 24.78 kg/m².   Adj BMI (if applicable)    BMI Category Normal/Healthy (18.4 - 24.9)       Ideal Body Weight (IBW) 106 lb (48.2 kg)   Adj IBW (if applicable)        Usual Body Weight (UBW) 124-150 lb   Weight Change/Trend Unknown       Weight History Wt Readings from Last 15 Encounters:   05/17/23 1254 57.6 kg (126 lb 15.8 oz)   05/16/23 1754 57.6 kg (126 lb 15.8 oz)   03/15/23 0703 56.5 kg (124 lb 9.6 oz)   10/28/21 1111 65.8 kg (145 lb)   05/18/19 1220 63.5 kg (140 lb)   11/02/18 0738 64.4 kg (142 lb)   08/20/17 1342 65.8 kg (145 lb)   11/25/16 1509 74.4 kg (164 lb)   09/07/16 1558 68 kg (150 lb)   08/10/16 1633 68 kg (150 lb)      --  Physical Findings          Physical Appearance alert, oriented, room air   Oral/Mouth Cavity tooth or teeth missing   Edema  no edema   Gastrointestinal non-distended , last bowel movement:5/17   Skin  surgical incision, other: L hip inc, abrasion   Tubes/Drains/Lines none   NFPE Not applicable at this time     Tests/Procedures/Labs        Tests/Procedures Other:s/p L femur fracture repair 5/8       Pertinent Labs     Results from last 7 days   Lab Units 05/16/23 2021   SODIUM mmol/L 128*   POTASSIUM mmol/L 4.2   CHLORIDE mmol/L 92*   CO2 mmol/L 27.5   BUN mg/dL 15   CREATININE mg/dL 1.07   CALCIUM mg/dL 9.1   BILIRUBIN mg/dL 0.4   ALK PHOS U/L 134*   ALT (SGPT) U/L 16   AST (SGOT) U/L 30   GLUCOSE mg/dL 101*     Results from last 7 days   Lab Units 05/16/23 2021   HEMOGLOBIN g/dL 9.8*   HEMATOCRIT % 30.0*   WBC 10*3/mm3 6.12   ALBUMIN g/dL 3.7     Results from last 7 days   Lab Units 05/16/23 2021   PLATELETS 10*3/mm3 282     SARS-CoV-2, JEET   Date Value Ref Range Status   05/22/2022 Negative Negative Final     Comment:     SARS-CoV-2, JEET (COVID-19) EUA    This is a molecular, nucleic acid amplification (NAAT) test performed by RT-PCR, " (also known as PCR).      Negative (Not-Detected) results do not preclude COVID-19 infection and should not be used as the sole basis for treatment or other patient management decisions. Consider retesting of negative patients if clinical features are suspicious for COVID-19 infection, and other common causative agents have been ruled out.    This test has not been FDA cleared or approved. This test has been authorized by the FDA under an EUA for use by authorized laboratories. This test is only authorized for the duration of time the declaration that circumstances exist justifying the authorization of the emergency use of in vitro diagnostic tests for detection of SARS-CoV-2 virus and/or diagnosis of COVID-19 infection under section 564(b)(1) of the Act, 21 U.S.C. 360bbb-3(b)(1), unless the authorization is terminated or revoked sooner.    This laboratory is certified under the Clinical Laboratory Improvement Amendments (CLIA) Act, to perform, at minimum, Waived Testing.  This test's performance characteristics have been verified. These results are not intended to be used as the sole means for clinical diagnosis or patient management decisions.    Testing performed using the Cepheid Xpert Xpress SARS-CoV-2/Flu/RSV or the Cepheid Xpert Xpress SARS-CoV-2/Flu/RSVplus, PCR Testing    Patients-Please access the Cepheid Xpert Xpress SARS-CoV-2/Flu/RSV and the Cepheid Xpert Xpress SARS-CoV-2/Flu/RSVplus FDA FACT sheet at the following  address: [email or web address]   Providers-Please access the Cepheid Xpert Xpress SARS-CoV-2/Flu/RSV and the Cepheid Xpert Xpress SARS-CoV-2/Flu/RSV plus FDA FACT Sheet at the following web  address: [email or web address]     Lab Results   Component Value Date    HGBA1C 6.00 (H) 06/03/2020        Medications           Scheduled Medications amLODIPine, 5 mg, Oral, Q24H  DULoxetine, 30 mg, Oral, BID  heparin (porcine), 5,000 Units, Subcutaneous, Q12H  levothyroxine, 25  mcg, Oral, Q AM  QUEtiapine, 12.5 mg, Oral, Nightly       Infusions     PRN Medications •  aluminum-magnesium hydroxide-simethicone  •  hydrALAZINE  •  HYDROcodone-acetaminophen        Nutrition Diagnosis        Nutrition Dx Problem  Problem: Predicted Suboptimal Intake  Etiology: Medical Diagnosis (SDH)  Signs/Symptoms: Report/Observation    Comment:      NUTRITION INTERVENTION / PLAN OF CARE  Goals        Intervention Goal(s) Maintain nutrition status, Reduce/improve symptoms, Disease management/therapy, Maintain intake and Maintain weight     Nutrition Intervention        RD Action Follow Tx Progress and Care plan reviewed     Prescription        Diet Prescription    Supplement Prescription    Snack Prescription    EN Prescription    New Prescription Ordered? No changes at this time     Monitor/Evaluation        Monitor Per protocol   Discharge Needs Pending clinical course   Education Will instruct as appropriate       RD to follow up per protocol.    Electronically signed by:  Tiffanie Amado RD  05/17/23 13:59 EDT

## 2023-05-17 NOTE — PLAN OF CARE
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 Subdural hematoma after a fall. Femur break. Hx. glaucoma, CAD, HLD, HTN, DM2, kyphoplasty. Lost 40 lbs. Health issues more controlled since weight-loss. L. hip border dressing intact. New diagnosis of hypothyroidism. Is on synthroid. Sodium is low. On oral replacement. Meds whole with water. Fluid restriction to 1500 ml/daily. 240ml/ per tray. Continent B&B. Last BM 5/17. Participated fully in therapy.

## 2023-05-17 NOTE — PLAN OF CARE
"Goal Outcome Evaluation:              Outcome Evaluation: Cognitive-linguistic evaluation completed this date. The Cognitive Linguistic Quick Test (CLQT) was used as a standardized assessment to evaluate patient's current cognitive skills (i.e., problem solving, reasoning, mental flexibility, memory, judgement, and speed of processing). Patient received an overall composite score of 3.0/4.0 indicating mild cognitive impairment. The results of the assessment were as followed: Attention - Mild, Memory - Mild, Executive Functions - Mild, Language -Mild, Visuospatial skills -Mild, and Clock Drawing - Severe. Quality of assessment judged to be fair-good due to English as a third language. Patient comfortable completing evaluation in English. 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 Comprehension/language may have impacted the following subtests: clock drawing, mazes, and design generation. For example, patient wrote the numbers 1-20 on the clock face provided. When asked to draw the hands to \"ten minutes after eleven\" patient wrote the digits \"11:10\" in the middle of the clock. Question comprehension of task vs. cognitive deficit. Verbal sequencing, episodic long term memory, and confrontational naming appeared WNL. Demonstrated mild difficulty with concrete/abstract generative naming, attention (selective/attention to detail), and planning/organization. Recommend ongoing diagnostic assessment of cognitive skills with functional tasks (i.e, finance management, medication management).         "

## 2023-05-17 NOTE — PROGRESS NOTES
Inpatient Rehabilitation Functional Measures Assessment    Functional Measures  CELINE Eating:  Branch  [facility] Grooming: Branch  [facility] Bathing:  Branch  [facility] Upper Body Dressing:  Branch  [facility] Lower Body Dressing:  [facility] Toileting:  [facility] Bladder Management  Level of Assistance:  [place]  Frequency/Number of Accidents this Shift:  [facility] Bowel Management  Level of Assistance: [place]  Frequency/Number of Accidents this Shift: [facility] Bed/Chair/Wheelchair Transfer:  [facility] Toilet Transfer:  [facility] Tub/Shower Transfer:  [place]    Previously Documented Mode of Locomotion at Discharge: Field  CELINE Expected Mode of Locomotion at Discharge: [facility] Walk/Wheelchair:  [facility] Stairs:  [facility] Comprehension:  [facility] Expression:  [facility] Social Interaction:  [facility] Problem Solving:  [facility] Memory:  [place]    Therapy Mode Minutes  Occupational Therapy: [place]  Physical Therapy: Individual: 60 minutes.  Speech Language Pathology:  [place]    Signed by: Ashlie Arnold PT

## 2023-05-17 NOTE — PLAN OF CARE
Goal Outcome Evaluation:  Plan of Care Reviewed With: patient        Progress: improving  Outcome Evaluation: Arrived to floor previous shift.  W/family at bedside.  alert & oriented.  dressing clean/dry/intact to left hip.  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  C/o acid reflux pain, prn maalox ordered and administered.  all meds whole w/thins.  Up to bathroom assist x1 w/wheelchair.  cont of bladder.  pain meds x1.  hyponatremia- fluid restriction 1500ml/24hr.  Weight bearing as tolerated.

## 2023-05-17 NOTE — PROGRESS NOTES
Inpatient Rehabilitation Admission  Section A. Transportation  Issues Due to Lack of Transportation:   No    Signed by: Alyssa Salas, Social Work

## 2023-05-17 NOTE — THERAPY EVALUATION
Inpatient Rehabilitation - Physical Therapy Initial Evaluation       [facility]     Patient Name: Radha Azevedo  : 1935  MRN: [id number]    Today's Date: 2023                    Admit Date: 2023      Visit Dx:     ICD-10-CM ICD-9-CM   1. Impaired gait and mobility  R26.89 781.2       Patient Active Problem List   Diagnosis   • Absolute anemia   • Benign essential HTN   • Benign localized hyperplasia of prostate without urinary obstruction   • Type 2 diabetes mellitus with peripheral angiopathy   • Hypertension   • Hyperlipidemia   • Type 2 diabetes mellitus   • Diabetes mellitus type 2, noninsulin dependent   • Avitaminosis D   • Dyslipidemia   • Decrease in the ability to hear   • Personal history of colonic polyps   • Subdural hematoma       Past Medical History:   Diagnosis Date   • Colon polyps     Followed by Dr. Leo Jaeger at [facility].   • Coronary artery disease    • Diabetes mellitus    • Hyperlipidemia    • Hypertension        Past Surgical History:   Procedure Laterality Date   • CARDIAC CATHETERIZATION     • CAROTID STENT      patient denies, but family states he did have a stent placed   • COLONOSCOPY N/A 2014    Dr. Leo Jaeger at [facility].   • COLONOSCOPY N/A 2016    Dr. Leo Jaeger at [facility].   • COLONOSCOPY N/A 03/15/2023    2 TUBULAR ADENOMA POLYPS IN SIGMOID, BARIUM ENEMA ORDERED, DR. LEO JAEGER AT [facility]   • CORONARY ANGIOPLASTY     • FRACTURE SURGERY Left 2023    IM nailing for L femur fracture       PT ASSESSMENT (last 12 hours)     IRF PT Evaluation and Treatment     Row Name 23 0830          PT Time and Intention    Document Type initial evaluation  -MAURILIO     Mode of Treatment physical therapy  -MAURILIO     Patient/Family/Caregiver Comments/Observations Pt up in w/c after OT. Agreeable to PT.  -MAURILIO     Row Name 23 7197          General Information    Patient Profile Reviewed yes  -MAURILIO     General Observations of Patient In w/c, no c/o pain  -MAURILIO      "Existing Precautions/Restrictions hip;weight bearing  WBAT L LE  -     Row Name 05/17/23 0830          Previous Level of Function/Home Environm    Community Ambulation, Premorbid Functional Level independent  Pt said he used rolling walker \"off and on\" before recent falls.  -     Row Name 05/17/23 0830          Living Environment    Current Living Arrangements home  -     People in Home alone  has hired caregiver at night who stays in basement  -[facility] Name 05/17/23 0830          Home Use of Assistive/Adaptive Equipment    Equipment Currently Used at Home none  has rolling walker at home  -[facility] Name 05/17/23 0830          Pain Assessment    Pretreatment Pain Rating 0/10 - no pain  at rest  -     Posttreatment Pain Rating 7/10  with movement  -     Pain Location - Side/Orientation Left  -     Pain Location upper  -     Pain Location - hip  -[facility] Name 05/17/23 0830          Cognition/Psychosocial    Affect/Mental Status (Cognition) [facility]     Orientation Status (Cognition) oriented x 3  -     Follows Commands (Cognition) 75-90% accuracy;follows one-step commands;physical/tactile prompts required;verbal cues/prompting required;visual cue  -     Personal Safety Interventions fall prevention program maintained;gait belt;muscle strengthening facilitated  -     Row Name 05/17/23 0830          Range of Motion Comprehensive    Comment, General Range of Motion B LE's WFLs  -[facility] Name 05/17/23 0830          Strength (Manual Muscle Testing)    Strength (Manual Muscle Testing) left lower extremity strength;right lower extremity strength  -     Left Lower Extremity Strength hip;knee;ankle  -     Hip, Left (Strength) 3/5  -MAURILIO     Knee, Left (Strength) 4/5  -MAURILIO     Ankle, Left (Strength) 4/5  -MAURILIO     Right Lower Extremity Strength right LE strength is WFL  -     Row Name 05/17/23 0830          Bed Mobility    Bed Mobility rolling left;rolling right;supine-sit;sit-supine  -     " Rolling Left [place] (Bed Mobility) contact guard  -MAURILIO     Rolling Right [place] (Bed Mobility) standby assist  -MAURILIO     Supine-Sit [place] (Bed Mobility) minimum assist (75% patient effort);verbal cues  -MAURILIO     Sit-Supine [place] (Bed Mobility) contact guard;standby assist  -MAURILIO     Bed Mobility, Safety Issues decreased use of legs for bridging/pushing  -MAURILIO     Assistive Device (Bed Mobility) bed rails  -MAURILIO     Row Name 05/17/23 0830          Transfer Assessment/Treatment    Transfers bed-chair transfer;chair-bed transfer;sit-stand transfer;stand-sit transfer;car transfer  -MAURILIO     Row Name 05/17/23 0830          Bed-Chair Transfer    Bed-Chair [place] (Transfers) contact guard;verbal cues  -MAURILIO     Assistive Device (Bed-Chair Transfers) wheelchair  -MAURILIO     Comment, (Bed-Chair Transfer) stand pivot  -MAURILIO     Row Name 05/17/23 0830          Chair-Bed Transfer    Chair-Bed [place] (Transfers) contact guard;verbal cues  -MAURILIO     Assistive Device (Chair-Bed Transfers) wheelchair  -MAURILIO     Comment, (Chair-Bed Transfer) stand pivot  -MAURILIO     Row Name 05/17/23 0830          Sit-Stand Transfer    Sit-Stand [place] (Transfers) contact guard;verbal cues  -MAURILIO     Assistive Device (Sit-Stand Transfers) walker, front-wheeled  -MAURILIO     Comment, (Sit-Stand Transfer) cues for hand placement  -MAURILIO     Row Name 05/17/23 0830          Stand-Sit Transfer    Stand-Sit [place] (Transfers) contact guard;verbal cues;nonverbal cues (demo/gesture)  -MAURILIO     Assistive Device (Stand-Sit Transfers) walker, front-wheeled  -MAURILIO     Comment, (Stand-Sit Transfer) cues to fully turn prior to sit  -MAURILIO     Row Name 05/17/23 0830          Car Transfer    Type (Car Transfer) stand pivot/stand step  -MAURILIO     [place] Level (Car Transfer) contact guard;verbal cues;nonverbal cues (demo/gesture)  -MAURILIO     Assistive Device (Car Transfer) walker, front-wheeled  -MAURILIO     Row Name 05/17/23 0830          Gait/Stairs (Locomotion)     "[place] Level (Gait) contact guard;verbal cues;nonverbal cues (demo/gesture)  -MAURILIO     Assistive Device (Gait) walker, front-wheeled  -MAURILIO     Distance in Feet (Gait) 70', 50'  -MAURILIO     Pattern (Gait) step-through  -MAURILIO     Deviations/Abnormal Patterns (Gait) bilateral deviations;base of support, narrow;gait speed decreased;stride length decreased;weight shifting decreased  almost stutter stepping with turning around, getting close to steps, etc.  -MAURILIO     Bilateral Gait Deviations heel strike decreased;weight shift ability decreased  tendency for posterior lean with turns, negotiate around objects  -MAURILIO     [place] Level (Stairs) contact guard;verbal cues  -MAURILIO     Handrail Location (Stairs) both sides  like home  -MAURILIO     Number of Steps (Stairs) 4  -MAURILIO     Ascending Technique (Stairs) step-over-step  -MAURILIO     Descending Technique (Stairs) step-to-step  -MAURILIO     Stairs, Safety Issues balance decreased during turns  tends to lean posteriorly  -MAURILIO     Stairs, Impairments impaired balance;strength decreased;pain  -MAURILIO     Comment, (Gait/Stairs) 6\" curb: walker, min of one; small stutter steps, assist to place walker up/down step; ambulated 10' on uneven surface CG/min  -     Row Name 05/17/23 0830          Balance    Balance Assessment sitting static balance;sitting dynamic balance;standing static balance  -     Static Sitting Balance independent  -     Dynamic Sitting Balance independent  -     Static Standing Balance contact guard  with walker  -     Row Name 05/17/23 0830          Hip (Therapeutic Exercise)    Hip (Therapeutic Exercise) isometric exercises;strengthening exercise  -     Hip Isometrics (Therapeutic Exercise) bilateral;gluteal sets;2 sets;10 repetitions  -     Hip Strengthening (Therapeutic Exercise) left;right;supine;heel slides;aBduction;aDduction;2 sets;10 repetitions  -     Row Name 05/17/23 0830          Knee (Therapeutic Exercise)    Knee (Therapeutic Exercise) isometric " exercises;strengthening exercise  -     Knee Isometrics (Therapeutic Exercise) bilateral;supine;quad sets;2 sets;10 repetitions  -     Knee Strengthening (Therapeutic Exercise) left;right;supine;SAQ (short arc quad);2 sets;10 repetitions  -     Row Name 05/17/23 0830          Ankle (Therapeutic Exercise)    Ankle (Therapeutic Exercise) AROM (active range of motion)  -     Ankle AROM (Therapeutic Exercise) bilateral;dorsiflexion;plantarflexion;supine;20 repititions  -     Row Name 05/17/23 0830          Positioning and Restraints    Pre-Treatment Position sitting in chair/recliner  -     Post Treatment Position wheelchair  -     In Wheelchair sitting;call light within reach;encouraged to call for assist;exit alarm on  -     Row Name 05/17/23 0830          Therapy Assessment/Plan (PT)    Patient's Goals For Discharge return home  hiring more assist if needed.  -     Row Name 05/17/23 0830          Therapy Assessment/Plan (PT)    Rehab Potential/Prognosis (PT) good, to achieve stated therapy goals  -     Frequency of Treatment (PT) 5 times per week;60 minutes per session  -     Estimated Duration of Therapy (PT) 2 weeks  -     Problem List (PT) cognition;strength;pain;balance  -     Comment, Therapy Assessment/Plan (PT) Pt is an 88 y/o male who is s/p SDH of frontoparietal area and fracture L greater trochanter due to fall at home while transferring into bed on 5/7/23. Pt s/p IM nailing L femur on 5/8/23. Pt has history of another recent fall on 4/15/23 in which displaced fracture of L femoral neck but no surgical intervention and went to subacute briefly. Pt has deficits with bed mobility, transfers and ambulation. He c/o some pain in L hip with mobility but not letting it limit his activity in therapy. Pt requires some increased cues with following directions. He also demonstrates some mild posterior lean with ambulation mostly at turns, transfers, stairs, curb possibly fear of falling due  to 2 recent falls. Pt will benefit from inpt acute at [facility] to work on improving independence so he can return home with most likely with hired assist.  -MAURILIO     Row Name 05/17/23 0830          IRF PT Goals    Bed Mobility Goal Selection (PT-IRF) bed mobility, PT goal 1  -MAURILIO     Transfer Goal Selection (PT-IRF) transfers, PT goal 1  -MAURILIO     Gait (Walking Locomotion) Goal Selection (PT-IRF) gait, PT goal 1  -MAURILIO     Stairs Goal Selection (PT-IRF) stairs, PT goal 1  -MAURILIO     Row Name 05/17/23 0830          Bed Mobility Goal 1 (PT-IRF)    Activity/Assistive Device (Bed Mobility Goal 1, PT-IRF) bed mobility activities, all  -MAURILIO     [place] Level (Bed Mobility Goal 1, PT-IRF) modified independence  -MAURILIO     Time Frame (Bed Mobility Goal 1, PT-IRF) 14 days or less  -MAURILIO     Progress/Outcomes (Bed Mobility Goal 1, PT-IRF) continuing progress toward goal  -MAURILIO     Row Name 05/17/23 0830          Transfer Goal 1 (PT-IRF)    Activity/Assistive Device (Transfer Goal 1, PT-IRF) all transfers  -MAURILIO     [place] Level (Transfer Goal 1, PT-IRF) supervision required;modified independence  -MAURILIO     Time Frame (Transfer Goal 1, PT-IRF) 14 days or less  -MAURILIO     Progress/Outcomes (Transfer Goal 1, PT-IRF) continuing progress toward goal  -MAURILIO     Row Name 05/17/23 0830          Gait/Walking Locomotion Goal 1 (PT-IRF)    Activity/Assistive Device (Gait/Walking Locomotion Goal 1, PT-IRF) gait (walking locomotion);walker, rolling  -MAURILIO     Gait/Walking Locomotion Distance Goal 1 (PT-IRF) 150  -MAURILIO     [place] Level (Gait/Walking Locomotion Goal 1, PT-IRF) standby assist;modified independence  -MAURILIO     Time Frame (Gait/Walking Locomotion Goal 1, PT-IRF) 14 days or less  -MAURILIO     Progress/Outcomes (Gait/Walking Locomotion Goal 1, PT-IRF) continuing progress toward goal  -MAURILIO     Row Name 05/17/23 0830          Stairs Goal 1 (PT-IRF)    Activity/Assistive Device (Stairs Goal 1, PT-IRF) stairs, all skills;using handrail, left;using handrail,  right  -MAURILIO     Number of Stairs (Stairs Goal 1, PT-IRF) 4  -MAURILIO     [place] Level (Stairs Goal 1, PT-IRF) contact guard required;standby assist  -MAURILIO     Time Frame (Stairs Goal 1, PT-IRF) 14 days or less  -MAURILIO     Progress/Outcomes (Stairs Goal 1, PT-IRF) continuing progress toward goal  -MAURILIO           User Key  (r) = Recorded By, (t) = Taken By, (c) = Cosigned By    Initials Name Provider Type    Ashlie Voss, PT Physical Therapist              Wound 05/16/23 1831 Left lateral hip Incision (Active)   Dressing Appearance dry;intact 05/16/23 2016   Closure [place] 05/16/23 2016   Drainage Amount none 05/16/23 2016     Physical Therapy Education     Title: PT OT SLP Therapies (In Progress)     Topic: Physical Therapy (In Progress)     Point: Mobility training (Done)     Learning Progress Summary           Patient Acceptance, E,TB, VU,NR by MAURILIO at 5/17/2023 1209                   Point: Home exercise program (Not Started)     Learner Progress:  Not documented in this visit.          Point: Body mechanics (Not Started)     Learner Progress:  Not documented in this visit.          Point: Precautions (Not Started)     Learner Progress:  Not documented in this visit.                      User Key     Initials Effective Dates Name Provider Type Discipline     06/16/21 -  Ashlie Arnold, PT Physical Therapist PT                PT Recommendation and Plan    Planned Therapy Interventions (PT): balance training, bed mobility training, gait training, home exercise program, neuromuscular re-education, patient/family education, ROM (range of motion), stair training, strengthening, transfer training  Frequency of Treatment (PT): 5 times per week, 60 minutes per session                     Time Calculation:      PT Charges     Row Name 05/17/23 1210 05/17/23 1209          Time Calculation    Start Time 1100  -MAURILIO 0830  -MAURILIO     Stop Time 1130  -MAURILIO 0900  -MAURILIO     Time Calculation (min) 30 min  -MAURILIO 30 min  -MAURILIO     PT  Received On -- 05/17/23  -MAURILIO     PT - Next Appointment -- 05/18/23  -MAURILIO     PT Goal Re-Cert Due Date -- 05/24/23  -MAURILIO        Time Calculation- PT    Total Timed Code Minutes- PT 30 minute(s)  -MAURILIO 30 minute(s)  -MAURILIO           User Key  (r) = Recorded By, (t) = Taken By, (c) = Cosigned By    Initials Name Provider Type    Ashlie Voss, PT Physical Therapist                Therapy Charges for Today     Code Description Service Date Service Provider Modifiers Qty    06363305639  PT EVAL MOD COMPLEXITY 3 5/17/2023 Ashlie Arnold, PT GP 1    [id number] HC PT THER PROC EA 15 MIN 5/17/2023 Ashlie Arnold, PT GP 3                   Ashlie Arnold, PT  5/17/2023

## 2023-05-17 NOTE — PROGRESS NOTES
Physical Medicine and Rehabilitation  Inpatient Rehabilitation Interdisciplinary Plan of Care    Demographics            Age: 87Y            Gender: Male    Admission Date: 5/16/2023 5:45:00 PM  Rehabilitation Diagnosis:  L femur fracture, SDH  #Functional Impairment 2/2 SDH and L femur fracture  -Initiate comprehensive rehab program consisting of PT/OT/SLP 3 hours/day, 5  days/week with medical management of above disorder, comorbidities, pain,  bowels, and bladder.  -WB/activity status:?no restrictions, WBAT    #SDH  -No gross neurologic deficits on exam  -Will order CT Head for baseline or transfer imaging from [facility]    #L displaced femur fracture  -S/p L IM Nail placement in L femur  -WBAT in LLE     #Pain Control  -Prn Norco 5 q4h    #Hypothyroidism  - on admission  -Significant weight loss and depression over the past year  -Continue Synthroid 25 daily  -Will follow-up on TSH and T4 values tomorrow    #Hyponatremia  -Na 128 on admission, 128-130 a [facility]  -Possibly related to hypothyroidism  -Fluid restriction to 1500ml daily  -Will continue to monitor    #HTN  -Continue Norvasc 5 daily    #Mood disorder  -Continue Cymbalta 30 BID and Seroquel 12.5 at bedtime  -PRN Xanax held because cause of his falls    #FEN  -nutrition: Cardiac diet, Regular texture, Liquid Consistency: Thin Liquid.  Fluid restriction 1500cc daily  -admit labs reviewed    #PPx  -DVT: Heparin 5000U q8h      Plan of Care  Anticipated Discharge Date/Estimated Length of Stay: 1-2 weeks  Anticipated Discharge Destination: Community discharge with assistance  Discharge Plan : Home with hired caregiver and patient has a good group of  friends in [place] that can check in on patient.  Sons both live out of  state.  Medical Necessity Expected Level Rationale: Goals are to achieve a level of  supervision with  mobility and self-care and improved ADLs.   Rehabilitation  prognosis good.  Medical prognosis good.  Intensity and Duration: an average  of 3 hours/5 days per week  Medical Supervision and 24 Hour Rehab Nursing: x  Physical Therapy: x  PT Intensity/Duration: 1 hour / day, 5 days / week, for approximately 1-2 weeks.  Occupational Therapy: x  OT Intensity/Duration: 1 hour / day, 5 days / week, for approximately 1-2 weeks.  Speech and Language Therapy: x  SLP Intensity/Duration: 1 hour / day, 5 days / week, for approximately 1-2  weeks.  Social Work: x  Therapeutic Recreation: x  Psychology: x  Registered Dietician: x  Updated (if changes indicated)  No changes to plan.    Based on the patient's medical and functional status, their prognosis and  expected level of functional improvement is: Goals are to achieve a level of  supervision with  mobility and self-care and improved ADLs.   Rehabilitation  prognosis good.  Medical prognosis good.    Interdisciplinary Problem/Goals/Status  Psychosocial    [RN] Coping/Adjustment(Active)  Current Status(05/16/2023): At risk for poor coping due to recent medical  issues.  Weekly Goal(05/23/2023): Identify progress in functional status.  Discharge Goal: Demonstrates healthy coping skills.        Safety    [RN] Potential for Injury(Active)  Current Status(05/16/2023): Hx. falls. At risk for falling in the hospital.  Weekly Goal(05/23/2023): Cue to use the call bell.  Discharge Goal: Patient/ family will be aware of the risk of falling in the home  setting.        Sphincter Control    [RN] Bladder Management(Active)  Current Status(05/16/2023): 100% continent of bladder.  Weekly Goal(05/23/2023): Remains 100% continent of bladder.  Discharge Goal: Goes home continent of bladder.    [RN] Bowel Management(Active)  Current Status(05/16/2023): 100% continent of bowel.  Weekly Goal(05/23/2023): Remains continent of bowel.  Discharge Goal: goes home continent of bowel.        Self Care    [OT] Bathing(Active)  Current Status(05/17/2023): Min A  Weekly Goal(05/24/2023): CGA  Discharge Goal: Mod I    [OT] Dressing  (Lower)(Active)  Current Status(05/17/2023): Mod A  Weekly Goal(05/24/2023): CGA  Discharge Goal: Mod I    [OT] Dressing (Upper)(Active)  Current Status(05/17/2023): Min A  Weekly Goal(05/24/2023): GARCIA  Discharge Goal: I    [OT] Grooming(Active)  Current Status(05/17/2023): GARCIA  Weekly Goal(05/24/2023): I  Discharge Goal: I    [OT] Toileting(Active)  Current Status(05/17/2023): Mod A  Weekly Goal(05/24/2023): CGA  Discharge Goal: Mod I        Mobility    [OT] Toilet Transfers(Active)  Current Status(05/17/2023): CGA  Weekly Goal(05/24/2023): SBA  Discharge Goal: Mod I    [OT] Tub/Shower Transfers(Active)  Current Status(05/17/2023): CGA  Weekly Goal(05/24/2023): SBA  Discharge Goal: Mod I    [PT] Bed/Chair/Wheelchair(Active)  Current Status(05/17/2023): CG/min  Weekly Goal(05/24/2023): CG/SBA  Discharge Goal: mod Independent    [PT] Walk(Active)  Current Status(05/17/2023): 80' CG with rolling walker  Weekly Goal(05/24/2023): to bathroom with nursing  Discharge Goal: SBA/independent with walker and 150'    [PT] Stairs(Active)  Current Status(05/17/2023): CG/min 4 steps, 2 rails  Weekly Goal(05/24/2023): CG with 4 steps and 2 rails like home  Discharge Goal: CG/SBA and 2 rails 4 steps    [PT] Bed Mobility(Active)  Current Status(05/17/2023): CG/min - 1  Weekly Goal(05/24/2023): CG  Discharge Goal: mod independent        Cognition    [ST] Executive Functions(Active)  Current Status(05/17/2023): Mild cognitive impairment. Verbal sequencing,  episodic long term memory, and confrontational naming appeared WNL. Demonstrated  mild difficulty with concrete/abstract generative naming, attention  (selective/attention to detail), and planning/organization.  Weekly Goal(05/24/2023): Patient will participate in functional therapy  activities given MIN-MOD cues  Discharge Goal: Functional cognition for home      Comments:    Signed by: Luis Llanos MD

## 2023-05-17 NOTE — PROGRESS NOTES
SECTION GG    Mobility Performance:     Roll Left and Right: [place] provides verbal cues and/or touching/steadying  and/or contact guard assistance as patient completes activity. Assistance may be  provided throughout the activity or intermittently.   Sit to Lying: [place] provides verbal cues and/or touching/steadying and/or  contact guard assistance as patient completes activity. Assistance may be  provided throughout the activity or intermittently.   Lying to Sitting on Side of Bed: [place] does less than half the effort. [place]  lifts, holds or supports trunk or limbs but provides less than half the effort.   Sit to Stand: [place] does less than half the effort. [place] lifts, holds or  supports trunk or limbs but provides less than half the effort.   Chair/Bed to Chair Transfer: [place] does less than half the effort. [place]  lifts, holds or supports trunk or limbs but provides less than half the effort.   Car Transfer: [place] does less than half the effort. [place] lifts, holds or  supports trunk or limbs but provides less than half the effort.   Walk 10 Feet:   [place] does less than half the effort. [place] lifts, holds or  supports trunk or limbs but provides less than half the effort.  Walk 50 Feet with 2 Turns:   [place] does less than half the effort. [place]  lifts, holds or supports trunk or limbs but provides less than half the effort.  Walk 150 Feet:   Not attempted due to medical or safety concerns.  Walking 10 Feet on Uneven Surfaces:   [place] does less than half the effort.  [place] lifts, holds or supports trunk or limbs but provides less than half the  effort.  1 Step Over Curb or Up/Down Stair:   [place] does less than half the effort.  [place] lifts, holds or supports trunk or limbs but provides less than half the  effort.  4 Steps Up and Down, With/Without Rail:   [place] does less than half the effort.  [place] lifts, holds or supports trunk or limbs but provides less than half the  effort.  12 Steps Up  and Down, With/Without Rail:   Not attempted due to medical or  safety concerns.  Picking up an Object:   Not attempted due to medical or safety concerns.  Uses Wheelchair/Scooter: Yes  Wheel 50 Feet with Two Turns: Not attempted due to medical or safety concerns.  Type of Wheelchair or Scooter Used: Manual  Wheel 150 Feet: Not attempted due to medical or safety concerns.  Type of Wheelchair/Scooter Used: Manual    Mobility Discharge Goals:   Sit to Stand: [place] provides verbal cues or touching/steadying assistance as  patient completes activity.    Section J. Health Conditions (Pain):  Pain Interference with Therapy Activities:   Occasionally  Pain Interference with Day-to-Day Activities:   Occasionally    Signed by: Ashlie Arnold PT

## 2023-05-18 PROCEDURE — 25010000002 HEPARIN (PORCINE) PER 1000 UNITS: Performed by: PHYSICAL MEDICINE & REHABILITATION

## 2023-05-18 PROCEDURE — 97129 THER IVNTJ 1ST 15 MIN: CPT

## 2023-05-18 PROCEDURE — 97110 THERAPEUTIC EXERCISES: CPT

## 2023-05-18 PROCEDURE — 97530 THERAPEUTIC ACTIVITIES: CPT

## 2023-05-18 PROCEDURE — 97130 THER IVNTJ EA ADDL 15 MIN: CPT

## 2023-05-18 PROCEDURE — 97535 SELF CARE MNGMENT TRAINING: CPT

## 2023-05-18 RX ORDER — PANTOPRAZOLE SODIUM 40 MG/1
40 TABLET, DELAYED RELEASE ORAL DAILY
Status: DISCONTINUED | OUTPATIENT
Start: 2023-05-18 | End: 2023-06-01 | Stop reason: HOSPADM

## 2023-05-18 RX ORDER — AMLODIPINE BESYLATE 10 MG/1
10 TABLET ORAL
Status: DISCONTINUED | OUTPATIENT
Start: 2023-05-18 | End: 2023-06-01 | Stop reason: HOSPADM

## 2023-05-18 RX ADMIN — HYDROCODONE BITARTRATE AND ACETAMINOPHEN 1 TABLET: 5; 325 TABLET ORAL at 18:07

## 2023-05-18 RX ADMIN — DULOXETINE HYDROCHLORIDE 30 MG: 30 CAPSULE, DELAYED RELEASE ORAL at 07:55

## 2023-05-18 RX ADMIN — DULOXETINE HYDROCHLORIDE 30 MG: 30 CAPSULE, DELAYED RELEASE ORAL at 20:50

## 2023-05-18 RX ADMIN — AMLODIPINE BESYLATE 10 MG: 10 TABLET ORAL at 08:04

## 2023-05-18 RX ADMIN — LEVOTHYROXINE SODIUM 25 MCG: 0.03 TABLET ORAL at 07:25

## 2023-05-18 RX ADMIN — HEPARIN SODIUM 5000 UNITS: 5000 INJECTION INTRAVENOUS; SUBCUTANEOUS at 07:56

## 2023-05-18 RX ADMIN — PANTOPRAZOLE SODIUM 40 MG: 40 TABLET, DELAYED RELEASE ORAL at 08:04

## 2023-05-18 RX ADMIN — QUETIAPINE FUMARATE 12.5 MG: 25 TABLET ORAL at 20:50

## 2023-05-18 RX ADMIN — DICLOFENAC SODIUM 2 G: 10 GEL TOPICAL at 07:55

## 2023-05-18 RX ADMIN — HEPARIN SODIUM 5000 UNITS: 5000 INJECTION INTRAVENOUS; SUBCUTANEOUS at 20:50

## 2023-05-18 RX ADMIN — HYDROCODONE BITARTRATE AND ACETAMINOPHEN 1 TABLET: 5; 325 TABLET ORAL at 07:56

## 2023-05-18 NOTE — PROGRESS NOTES
Inpatient Rehabilitation Plan of Care Note    Plan of Care  Care Plan Reviewed - No updates at this time.    Psychosocial    Performed Intervention(s)  Medication.  Group therapy.      Safety    Performed Intervention(s)  Items within reach.  Safety rounds.  Wheelchair, bed, and chair alarms.      Sphincter Control    Performed Intervention(s)  Offer restroom.  Bladder training program.  Use incontinence products.  Encourage appropriate diet.  Encourage fluid intake.    Signed by: Irlanda Foreman RN

## 2023-05-18 NOTE — PROGRESS NOTES
Reviewed goals and progress with patient after team conference. Also spoke with patient's son. Patient's ELOS is 1.5-2 weeks. Patient's sons are working to find more private caregivers for patient when he returns home. The sons will take turns initially being with patient after d/c for the first few weeks and then private caregivers will provide care. SW LM for [facility] to see if they could come meet with patient's son at the hospital. Also, LM for [facility], per son's request. SW will continue to assess for d/c needs.

## 2023-05-18 NOTE — PROGRESS NOTES
Case Management  Inpatient Rehabilitation Team Conference    Conference Date/Time: 5/18/2023 8:02:03 AM    Team Conference Attendees:  Clyde Brooks SW Ashlee Erlandson, JACK Ricketts, OT  Hope Mary, CECILIA Cesar, CTRS  Alicia Estrada, MATT Foreman, MATT Omalley MD    Demographics            Age: 87Y            Gender: Male    Admission Date: 5/16/2023 5:45:00 PM  Rehabilitation Diagnosis:  L femur fracture, SDH  Past Medical History: Past Medical History:  DiagnosisDate  ?Colon polyps?  ?Followed by Dr. Georgi Morales at [facility].  ?Coronary artery disease?  ?Diabetes mellitus?  ?Hyperlipidemia?  ?Hypertension?    ?  ?  Surgical History  Past Surgical History:  ProcedureLateralityDate  ?CARDIAC CATHETERIZATION??  ?CAROTID STENT??  ?patient denies, but family states he did have a stent placed  ?COLONOSCOPYN/A01/29/2014  ?Dr. Georgi Morales at [facility].  ?COLONOSCOPYN/A01/13/2016  ?Dr. Georgi Morales at [facility].  ?COLONOSCOPYN/A03/15/2023  ?2 TUBULAR ADENOMA POLYPS IN SIGMOID, BARIUM ENEMA ORDERED, DR. GEORGI MORALES AT  [facility]  ?CORONARY ANGIOPLASTY??  ?FRACTURE SURGERYLeft05/08/2023  ?IM nailing for L femur fracture      Plan of Care  Anticipated Discharge Date/Estimated Length of Stay: 1-2 weeks  Anticipated Discharge Destination: Community discharge with assistance  Discharge Plan : Home with hired caregiver and patient has a good group of  friends in [place] that can check in on patient.  Sons both live out of  state.  Medical Necessity Expected Level Rationale: Goals are to achieve a level of  supervision with  mobility and self-care and improved ADLs.   Rehabilitation  prognosis good.  Medical prognosis good.  Intensity and Duration: an average of 3 hours/5 days per week  Medical Supervision and 24 Hour Rehab Nursing: x  Physical Therapy: x  PT Intensity/Duration: 1 hour / day, 5 days / week, for approximately 1-2 weeks.  Occupational Therapy: x  OT Intensity/Duration: 1  hour / day, 5 days / week, for approximately 1-2 weeks.  Speech and Language Therapy: x  SLP Intensity/Duration: 1 hour / day, 5 days / week, for approximately 1-2  weeks.  Social Work: x  Therapeutic Recreation: x  Psychology: x  Registered Dietician: x  Updated (if changes indicated)    Anticipated Discharge Date/Estimated Length of Stay:   ELOS: 1.5 - 2 weeks    Based on the patient's medical and functional status, their prognosis and  expected level of functional improvement is: Goals are to achieve a level of  supervision with  mobility and self-care and improved ADLs.   Rehabilitation  prognosis good.  Medical prognosis good.      Interdisciplinary Problem/Goals/Status    All Rehab Problems:  Psychosocial    [RN] Coping/Adjustment(Active)  Current Status(05/16/2023): At risk for poor coping due to recent medical  issues.  Weekly Goal(05/23/2023): Identify progress in functional status.  Discharge Goal: Demonstrates healthy coping skills.        Safety    [RN] Potential for Injury(Active)  Current Status(05/16/2023): Hx. falls. At risk for falling in the hospital.  Weekly Goal(05/23/2023): Cue to use the call bell.  Discharge Goal: Patient/ family will be aware of the risk of falling in the home  setting.        Sphincter Control    [RN] Bladder Management(Active)  Current Status(05/16/2023): 100% continent of bladder.  Weekly Goal(05/23/2023): Remains 100% continent of bladder.  Discharge Goal: Goes home continent of bladder.    [RN] Bowel Management(Active)  Current Status(05/16/2023): 100% continent of bowel.  Weekly Goal(05/23/2023): Remains continent of bowel.  Discharge Goal: goes home continent of bowel.        Self Care    [OT] Bathing(Active)  Current Status(05/17/2023): Min A  Weekly Goal(05/24/2023): CGA  Discharge Goal: Mod I    [OT] Dressing (Lower)(Active)  Current Status(05/17/2023): Mod A  Weekly Goal(05/24/2023): CGA  Discharge Goal: Mod I    [OT] Dressing (Upper)(Active)  Current  Status(05/17/2023): Min A  Weekly Goal(05/24/2023): GARCIA  Discharge Goal: I    [OT] Grooming(Active)  Current Status(05/17/2023): GARCIA  Weekly Goal(05/24/2023): I  Discharge Goal: I    [OT] Toileting(Active)  Current Status(05/17/2023): Mod A  Weekly Goal(05/24/2023): CGA  Discharge Goal: Mod I        Mobility    [OT] Toilet Transfers(Active)  Current Status(05/17/2023): CGA  Weekly Goal(05/24/2023): SBA  Discharge Goal: Mod I    [OT] Tub/Shower Transfers(Active)  Current Status(05/17/2023): CGA  Weekly Goal(05/24/2023): SBA  Discharge Goal: Mod I    [PT] Bed/Chair/Wheelchair(Active)  Current Status(05/17/2023): CG/min  Weekly Goal(05/24/2023): CG/SBA  Discharge Goal: mod Independent    [PT] Walk(Active)  Current Status(05/17/2023): 80' CG with rolling walker  Weekly Goal(05/24/2023): to bathroom with nursing  Discharge Goal: SBA/independent with walker and 150'    [PT] Stairs(Active)  Current Status(05/17/2023): CG/min 4 steps, 2 rails  Weekly Goal(05/24/2023): CG with 4 steps and 2 rails like home  Discharge Goal: CG/SBA and 2 rails 4 steps    [PT] Bed Mobility(Active)  Current Status(05/17/2023): CG/min - 1  Weekly Goal(05/24/2023): CG  Discharge Goal: mod independent        Cognition    [ST] Executive Functions(Active)  Current Status(05/17/2023): Mild cognitive impairment. Verbal sequencing,  episodic long term memory, and confrontational naming appeared WNL. Demonstrated  mild difficulty with concrete/abstract generative naming, attention  (selective/attention to detail), and planning/organization.  Weekly Goal(05/24/2023): Patient will participate in functional therapy  activities given MIN-MOD cues  Discharge Goal: Functional cognition for home        Comments: 5/18 Bed mob CG / Min x 1, CG / Min transfers, amb 80' CG rwx,  completed 4 steps x 2 rails CGA / Min A.  CGA toilet and shower transfer.  Mod A  toileting.  Mod A LBD.  Mild cognitive impairment.  Cont bowel and bladder.  BP  running high, increased  Amlodipine.    Signed by: Alicia Estrada RN    Physician CoSigned By: Artemio Omalley 05/18/2023 10:20:34

## 2023-05-18 NOTE — THERAPY TREATMENT NOTE
Inpatient Rehabilitation - Occupational Therapy Treatment Note    [facility]     Patient Name: Radha Azevedo  : 1935  MRN: [id number]    Today's Date: 2023                 Admit Date: 2023         ICD-10-CM ICD-9-CM   1. Impaired gait and mobility  R26.89 781.2       Patient Active Problem List   Diagnosis   • Absolute anemia   • Benign essential HTN   • Benign localized hyperplasia of prostate without urinary obstruction   • Type 2 diabetes mellitus with peripheral angiopathy   • Hypertension   • Hyperlipidemia   • Type 2 diabetes mellitus   • Diabetes mellitus type 2, noninsulin dependent   • Avitaminosis D   • Dyslipidemia   • Decrease in the ability to hear   • Personal history of colonic polyps   • Subdural hematoma       Past Medical History:   Diagnosis Date   • Colon polyps     Followed by Dr. Leo Jaeger at [facility].   • Coronary artery disease    • Diabetes mellitus    • Hyperlipidemia    • Hypertension        Past Surgical History:   Procedure Laterality Date   • CARDIAC CATHETERIZATION     • CAROTID STENT      patient denies, but family states he did have a stent placed   • COLONOSCOPY N/A 2014    Dr. Leo Jaeger at [facility].   • COLONOSCOPY N/A 2016    Dr. Leo Jaeger at [facility].   • COLONOSCOPY N/A 03/15/2023    2 TUBULAR ADENOMA POLYPS IN SIGMOID, BARIUM ENEMA ORDERED, DR. LEO JAEGER AT [facility]   • CORONARY ANGIOPLASTY     • FRACTURE SURGERY Left 2023    IM nailing for L femur fracture             IRF OT ASSESSMENT FLOWSHEET (last 12 hours)     IRF OT Evaluation and Treatment     Row Name 23 1330 23 0900       OT Time and Intention    Document Type daily treatment  -CE daily treatment  -CE    Mode of Treatment occupational therapy  -CE occupational therapy  -CE    Total Minutes, Occupational Therapy 30  -CE 30  -CE    Patient Effort good  -CE good  -CE    Row Name 23 0900          General Information    Patient Profile Reviewed yes  -CE      Patient/Family/Caregiver Comments/Observations --  up in w/c w/ PT prior to session  -CE     Existing Precautions/Restrictions hip;weight bearing;fall  -CE     Row Name 05/18/23 1330 05/18/23 0900       Pain Assessment    Pretreatment Pain Rating 0/10 - no pain  -CE 4/10  -CE    Posttreatment Pain Rating 0/10 - no pain  -CE 5/10  -CE    Pain Location - Side/Orientation -- Left  -CE    Pain Location - -- hip  -CE    Row Name 05/18/23 1330 05/18/23 0900       Cognition/Psychosocial    Affect/Mental Status (Cognition) -- WFL  -CE    Orientation Status (Cognition) oriented x 3  -CE --    Personal Safety Interventions fall prevention program maintained  -CE --    Row Name 05/18/23 0900          Basic Activities of Daily Living (BADLs)    86228 - OT Self Care/Mgmt Minutes 30  -CE     Basic Activities of Daily Living bathing;upper body dressing;lower body dressing;grooming  -CE     Row Name 05/18/23 0900          Bathing    [place] Level (Bathing) bathing skills;lower body;upper body;upper extremities;minimum assist (75% patient effort)  -CE     Position (Bathing) supported sitting  -CE     Set-up Assistance (Bathing) obtain supplies  -CE     Row Name 05/18/23 0900          Upper Body Dressing    [place] Level (Upper Body Dressing) don;doff;pull over garment;set up assistance  -CE     Position (Upper Body Dressing) supported sitting  -CE     Set-up Assistance (Upper Body Dressing) obtain clothing  -CE     Row Name 05/18/23 0900          Lower Body Dressing    [place] Level (Lower Body Dressing) doff;don;pants/bottoms;socks;shoes/slippers;moderate assist (50% patient effort)  -CE     Position (Lower Body Dressing) supported sitting;supported standing  -CE     Set-up Assistance (Lower Body Dressing) obtain clothing  -CE     Comment (Lower Body Dressing) alternating sit<>stand  -CE     Row Name 05/18/23 1330 05/18/23 0900       Grooming    [place] Level (Grooming) shave face;contact guard assist  standing  at sink  -CE grooming skills;hair care, combing/brushing;oral care regimen;wash face, hands;supervision;set up  -CE    Position (Grooming) supported standing  -CE supported sitting  -CE    Row Name 05/18/23 1330          Toileting    [place] Level (Toileting) adjust/manage clothing;contact guard assist  -CE     Assistive Device Use (Toileting) grab bar/safety frame  -CE     Position (Toileting) supported standing  -CE     Row Name 05/18/23 1330          Functional Mobility    Functional Mobility- Ind. Level minimum assist (75% patient effort);contact guard assist;verbal cues required  -CE     Functional Mobility- Device walker, front-wheeled  -     Functional Mobility-Maintain WBing able to maintain weight bearing status  -CE     Functional Mobility- Safety Issues balance decreased during turns;step length decreased;steps too close front assistive device  -CE     Functional Mobility- Comment Pt able to complete functional mobility in kitchen simulating obtaining items from fridge and counter in prep for ADLs at home. Pt requiring verbal and physical cues for safe placement of AD during task.  -CE     Row Name 05/18/23 0900          Transfer Assessment/Treatment    Transfers shower transfer  -CE     Comment, (Transfers) min cues for placement of hands using grab bars once in shower  -CE     Row Name 05/18/23 1330          Sit-Stand Transfer    Sit-Stand [place] (Transfers) contact guard;1 person assist;verbal cues  -CE     Assistive Device (Sit-Stand Transfers) walker, front-wheeled  -CE     Row Name 05/18/23 1330          Stand-Sit Transfer    Stand-Sit [place] (Transfers) verbal cues;contact guard;1 person assist  -CE     Assistive Device (Stand-Sit Transfers) walker, front-wheeled  -CE     Row Name 05/18/23 0900          Shower Transfer    Type (Shower Transfer) sit-stand;stand-sit  -CE     [place] Level (Shower Transfer) minimum assist (75% patient effort);1 person assist  -CE     Assistive  Device (Shower Transfer) grab bar, tub/shower;walker, front-wheeled  -CE     Row Name 05/18/23 1330 05/18/23 0900       Positioning and Restraints    Pre-Treatment Position sitting in chair/recliner  -CE sitting in chair/recliner  -CE    Post Treatment Position wheelchair  -CE wheelchair  -CE    In Wheelchair with SLP  -CE call light within reach;encouraged to call for assist;exit alarm on  -CE    Row Name 05/18/23 1330 05/18/23 0900       Daily Progress Summary (OT)    Overall Progress Toward Functional Goals (OT) progressing toward functional goals as expected  -CE progressing toward functional goals as expected  -CE    Row Name 05/18/23 0900          Weekly Progress Summary (OT)    Overall Progress Toward Functional Goals (OT) progressing toward functional goals as expected  -CE     Row Name 05/18/23 0900          Therapy Plan Review/Discharge Plan (OT)    Therapy Plan Review (OT) patient  -CE           User Key  (r) = Recorded By, (t) = Taken By, (c) = Cosigned By    Initials Name Effective Dates    CE Carrie Morales, OT 10/17/22 -                  Occupational Therapy Education     Title: PT OT SLP Therapies (Done)     Topic: Occupational Therapy (Done)     Point: ADL training (Done)     Description:   Instruct learner(s) on proper safety adaptation and remediation techniques during self care or transfers.   Instruct in proper use of assistive devices.              Learning Progress Summary           Patient Acceptance, E, VU by DARVIN at 5/17/2023 1524                   Point: Home exercise program (Done)     Description:   Instruct learner(s) on appropriate technique for monitoring, assisting and/or progressing therapeutic exercises/activities.              Learning Progress Summary           Patient Acceptance, E, VU by DARVIN at 5/17/2023 1524                   Point: Precautions (Done)     Description:   Instruct learner(s) on prescribed precautions during self-care and functional transfers.               Learning Progress Summary           Patient Acceptance, E, VU by DARVIN at 5/17/2023 1524                   Point: Body mechanics (Done)     Description:   Instruct learner(s) on proper positioning and spine alignment during self-care, functional mobility activities and/or exercises.              Learning Progress Summary           Patient Acceptance, E, VU by DARVIN at 5/17/2023 1524                               User Key     Initials Effective Dates Name Provider Type [facility] 08/02/22 -  Main De La Garza OT Occupational Therapist OT                    OT Recommendation and Plan            Daily Progress Summary (OT)  Overall Progress Toward Functional Goals (OT): progressing toward functional goals as expected            Time Calculation:      Time Calculation- OT     Row Name 05/18/23 1442 05/18/23 1230 05/18/23 0900       Time Calculation- OT    OT Start Time 1330  -CE 0930  -CE --    OT Stop Time 1400  -CE 1000  -CE --    OT Time Calculation (min) 30 min  -CE 30 min  -CE --    Total Timed Code Minutes- OT 30 minute(s)  -CE 30 minute(s)  -CE --    OT Received On 05/18/23  -CE 05/18/23  -CE --       Timed Charges    89665 - OT Therapeutic Activity Minutes 15  -CE -- --    52201 - OT Self Care/Mgmt Minutes 15  -CE 30  -CE 30  -CE       Total Minutes    Timed Charges Total Minutes 30  -CE 30  -CE 30  -CE     Total Minutes 30  -CE 30  -CE 30  -CE          User Key  (r) = Recorded By, (t) = Taken By, (c) = Cosigned By    Initials Name Provider Type    CE Carrie Morales OT Occupational Therapist              Therapy Charges for Today     Code Description Service Date Service Provider Modifiers Qty    98931666544 HC OT SELF CARE/MGMT/TRAIN EA 15 MIN 5/18/2023 Carrie Morales, OT GO 2    83080189758 HC OT THERAPEUTIC ACT EA 15 MIN 5/18/2023 Carrie Morales OT GO 1    27050065234 HC OT SELF CARE/MGMT/TRAIN EA 15 MIN 5/18/2023 Carrie Morales OT GO 1                   Carrie Morales OT  5/18/2023   loss

## 2023-05-18 NOTE — PLAN OF CARE
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 Subdural hematoma after a fall. Femur break. Hx. glaucoma, CAD, HLD, HTN, DM2, kyphoplasty. Lost 40 lbs. Health issues more controlled since weight-loss. L. hip border dressing intact. New diagnosis of hypothyroidism. Is on synthroid. Sodium is low. Meds whole with water. Fluid restriction to 1500 ml/daily. 240ml/ per tray. Continent B&B. Last BM 5/17. Participated fully in therapy. Has voltaren gel at bedside. Assistx1 walker or wheelchair. Code status; no intubation.

## 2023-05-18 NOTE — PLAN OF CARE
Problem: Rehabilitation (IRF) Plan of Care  Goal: Plan of Care Review  Outcome: Ongoing, Progressing  Flowsheets (Taken 5/18/2023 0439)  Progress: improving  Plan of Care Reviewed With: patient  Outcome Evaluation: Pt is A&Ox4, cooperative/pleasant, up w/ asst x1, cont of B/B, c/o pain, gave pain meds at bedtime, dressing to L hip C/D/I, pt VSS, he did want parameters for hydralazine changed and I did speak w/ Dr. Gomez and it was changed to SBP >160, pt was agreeable, on lovenox, refused scds, resting well, will continue to monitor.   Goal Outcome Evaluation:  Plan of Care Reviewed With: patient        Progress: improving  Outcome Evaluation: Pt is A&Ox4, cooperative/pleasant, up w/ asst x1, cont of B/B, c/o pain, gave pain meds at bedtime, dressing to L hip C/D/I, pt VSS, he did want parameters for hydralazine changed and I did speak w/ Dr. Gomez and it was changed to SBP >160, pt was agreeable, on lovenox, refused scds, resting well, will continue to monitor.

## 2023-05-18 NOTE — PROGRESS NOTES
[facility]     Progress Note    Patient Name: Radha Azevedo  : 1935  MRN: [id number]  Primary Care Physician:  Amina Jiang MD  Date of admission: 2023    Subjective  Pt is awake and alert. Pt remains pleased with his progress.   Subjective     Chief Complaint: same    History of Present Illness  Patient Reports    Review of Systems    Objective exam unchanged. Calves soft and NT. resp unlabored and regular  Objective     Vitals:   Temp:  [97.5 °F (36.4 °C)-98.1 °F (36.7 °C)] 97.5 °F (36.4 °C)  Heart Rate:  [59-68] 59  Resp:  [18] 18  BP: (132-160)/(65-73) 132/65    Physical Exam     Result Review    Result Review:  I have personally reviewed the results from the time of this admission to 2023 11:00 EDT and agree with these findings:  []  Laboratory list / accordion  []  Microbiology  []  Radiology  []  EKG/Telemetry   []  Cardiology/Vascular   []  Pathology  []  Old records  []  Other:  Most notable findings include: No new labs to review today.       Assessment & Plan Continue to prepare for dc.., I participated in the care coordination conf this am and returned to the pt's room to discuss dc plan with the patient. MARTIN -2 weeks.   Assessment / Plan     Brief Patient Summary:  Radha Azevedo is a 87 y.o. male who     Active Hospital Problems:  Active Hospital Problems    Diagnosis    • **Subdural hematoma      Plan:       DVT prophylaxis:  Medical and mechanical DVT prophylaxis orders are present.    CODE STATUS:       Disposition:  I expect patient to be discharged     Artemio Omalley MD

## 2023-05-18 NOTE — THERAPY TREATMENT NOTE
Inpatient Rehabilitation - Physical Therapy Treatment Note       [facility]     Patient Name: Radha Azevedo  : 1935  MRN: [id number]    Today's Date: 2023                    Admit Date: 2023      Visit Dx:     ICD-10-CM ICD-9-CM   1. Impaired gait and mobility  R26.89 781.2       Patient Active Problem List   Diagnosis   • Absolute anemia   • Benign essential HTN   • Benign localized hyperplasia of prostate without urinary obstruction   • Type 2 diabetes mellitus with peripheral angiopathy   • Hypertension   • Hyperlipidemia   • Type 2 diabetes mellitus   • Diabetes mellitus type 2, noninsulin dependent   • Avitaminosis D   • Dyslipidemia   • Decrease in the ability to hear   • Personal history of colonic polyps   • Subdural hematoma       Past Medical History:   Diagnosis Date   • Colon polyps     Followed by Dr. Leo Jaeger at [facility].   • Coronary artery disease    • Diabetes mellitus    • Hyperlipidemia    • Hypertension        Past Surgical History:   Procedure Laterality Date   • CARDIAC CATHETERIZATION     • CAROTID STENT      patient denies, but family states he did have a stent placed   • COLONOSCOPY N/A 2014    Dr. Leo Jaeger at [facility].   • COLONOSCOPY N/A 2016    Dr. Leo Jaeger at [facility].   • COLONOSCOPY N/A 03/15/2023    2 TUBULAR ADENOMA POLYPS IN SIGMOID, BARIUM ENEMA ORDERED, DR. LEO JAEGER AT [facility]   • CORONARY ANGIOPLASTY     • FRACTURE SURGERY Left 2023    IM nailing for L femur fracture       PT ASSESSMENT (last 12 hours)     IRF PT Evaluation and Treatment     Row Name 23 9413          PT Time and Intention    Document Type daily treatment  -MAURILIO     Mode of Treatment physical therapy  -MAURILIO     Patient/Family/Caregiver Comments/Observations up in recliner, eating, visiting with son  -MAURILIO     Row Name 23 5756          General Information    Patient Profile Reviewed yes  -MAURILIO     General Observations of Patient Son told PT that pt has always done  "things on his own, quickly and family is hoping rehab will help him slow down and prevent falls.  -     Existing Precautions/Restrictions hip;weight bearing;fall  WBAT L LE  -[facility] Name 05/18/23 0830          Home Use of Assistive/Adaptive Equipment    Equipment Currently Used at Home --  pt states his walker is borrowed and \"old\"; might need a new one  -[facility] Name 05/18/23 0830          Pain Assessment    Pretreatment Pain Rating 0/10 - no pain  -[facility] Name 05/18/23 0830          Cognition/Psychosocial    Affect/Mental Status (Cognition) WFL  -     Orientation Status (Cognition) oriented x 3  -     Follows Commands (Cognition) 75-90% accuracy;follows one-step commands;physical/tactile prompts required;verbal cues/prompting required;visual cue  -     Personal Safety Interventions fall prevention program maintained;gait belt;muscle strengthening facilitated;other (see comments)  some impulsivity  -     Row Name 05/18/23 0830          Mobility    Advanced Gait Activity curb negotiation  -[facility] Name 05/18/23 0830          Bed-Chair Transfer    Bed-Chair [place] (Transfers) contact guard;verbal cues  -MAURILIO     Assistive Device (Bed-Chair Transfers) walker, front-wheeled  -     Comment, (Bed-Chair Transfer) cues to scoot to edge, hand placement (tends to pull up on walker) and leans posteriorly minimally. Bracing back of legs on chair seat. Practiced this transfer a few times.  -     Row Name 05/18/23 0830          Chair-Bed Transfer    Chair-Bed [place] (Transfers) contact guard;verbal cues  -     Assistive Device (Chair-Bed Transfers) walker, front-wheeled  -     Comment, (Chair-Bed Transfer) cues for hand placement and to fully back up to seat prior to sitting  -     Row Name 05/18/23 0830          Sit-Stand Transfer    Sit-Stand [place] (Transfers) contact guard;verbal cues  -     Assistive Device (Sit-Stand Transfers) walker, front-wheeled  -     Comment, " (Sit-Stand Transfer) cues to scoot to edge, hand placement (tends to pull up on walker) and leans posteriorly minimally. Bracing back of legs on chair seat. Practiced this transfer a few times.  -     Row Name 05/18/23 0830          Stand-Sit Transfer    Stand-Sit [place] (Transfers) contact guard;verbal cues  -     Assistive Device (Stand-Sit Transfers) walker, front-wheeled  -     Comment, (Stand-Sit Transfer) cues to fully turn prior to sit and hand placement  -     Row Name 05/18/23 0830          Toilet Transfer    Type (Toilet Transfer) --  Pt stood to urinate at toilet, cues to keep walker with him  -     [place] Level (Toilet Transfer) contact guard;1 person assist  -     Row Name 05/18/23 0830          Gait/Stairs (Locomotion)    [place] Level (Gait) contact guard;verbal cues  -     Assistive Device (Gait) walker, front-wheeled  -     Distance in Feet (Gait) 180', 80', 60'  -     Deviations/Abnormal Patterns (Gait) bilateral deviations;base of support, narrow;gait speed decreased;stride length decreased;weight shifting decreased  -     Bilateral Gait Deviations heel strike decreased;weight shift ability decreased  slight posterior lean with turns, mostly with backing up to chair  -     Row Name 05/18/23 0830          Curb Negotiation (Mobility)    [place], Curb Negotiation verbal cues;minimal assist, 75% or more patient effort  -     Assistive Device (Curb Negotiation) walker, front-wheeled  -     Comment, [place] Negotiation (Mobility) assist to place walker up/down step, slight posterior lean, stutter steps as pt got close to step  -     Row Name 05/18/23 0830          Hip (Therapeutic Exercise)    Hip Isometrics (Therapeutic Exercise) bilateral;gluteal sets;2 sets;10 repetitions;supine  -     Hip Strengthening (Therapeutic Exercise) left;right;aBduction;aDduction;heel slides;supine;20 repititions  -     Row Name 05/18/23 0830          Knee (Therapeutic  Exercise)    Knee Isometrics (Therapeutic Exercise) bilateral;supine;quad sets;2 sets;10 repetitions  -     Knee Strengthening (Therapeutic Exercise) left;right;SAQ (short arc quad);supine;20 repititions;sitting;LAQ (long arc quad);10 repetitions  -     Row Name 05/18/23 0830          Ankle (Therapeutic Exercise)    Ankle AROM (Therapeutic Exercise) bilateral;dorsiflexion;plantarflexion;supine;sitting;20 repititions  -           User Key  (r) = Recorded By, (t) = Taken By, (c) = Cosigned By    Initials Name Provider Type     Ashlie Arnold, PT Physical Therapist              Wound 05/16/23 1831 Left lateral hip Incision (Active)   Dressing Appearance dry;intact 05/18/23 0756   Closure [place] 05/18/23 0756   Base dressing in place, unable to visualize 05/18/23 0756   Drainage Amount none 05/18/23 0756     Physical Therapy Education     Title: PT OT SLP Therapies (Done)     Topic: Physical Therapy (Done)     Point: Mobility training (Done)     Learning Progress Summary           Patient Acceptance, E,TB, VU,NR by  at 5/18/2023 1206    Acceptance, E, VU by  at 5/17/2023 1524    Acceptance, E,TB, VU,NR by MAURILIO at 5/17/2023 1209                   Point: Home exercise program (Done)     Learning Progress Summary           Patient Acceptance, E, VU by  at 5/17/2023 1524                   Point: Body mechanics (Done)     Learning Progress Summary           Patient Acceptance, E, VU by  at 5/17/2023 1524                   Point: Precautions (Done)     Learning Progress Summary           Patient Acceptance, E, VU by  at 5/17/2023 1524                               User Key     Initials Effective Dates Name Provider Type Discipline     06/16/21 -  Ashlie Arnold, PT Physical Therapist PT     08/02/22 -  Main De La Garza OT Occupational Therapist OT                PT Recommendation and Plan    Planned Therapy Interventions (PT): balance training, bed mobility training, gait training, home exercise  program, neuromuscular re-education, patient/family education, ROM (range of motion), stair training, strengthening, transfer training  Frequency of Treatment (PT): 5 times per week, 60 minutes per session  Therapy Frequency (PT): 5 times/wk                     Time Calculation:      PT Charges     Row Name 05/18/23 1210 05/18/23 1209          Time Calculation    Start Time 1100  -MAURILIO 0830  -MAURILIO     Stop Time 1130  -MAURILIO 0900  -MAURILIO     Time Calculation (min) 30 min  -MAURILIO 30 min  -MAURILIO     PT Received On -- 05/18/23  -MAURILIO     PT - Next Appointment -- 05/19/23  -MAURILIO        Time Calculation- PT    Total Timed Code Minutes- PT 30 minute(s)  -MAURILIO 30 minute(s)  -MAURILIO           User Key  (r) = Recorded By, (t) = Taken By, (c) = Cosigned By    Initials Name Provider Type    Ashlie Voss, PT Physical Therapist                Therapy Charges for Today     Code Description Service Date Service Provider Modifiers Qty    47176144168 HC PT EVAL MOD COMPLEXITY 3 5/17/2023 Ashlie Arnold, PT GP 1    40163284507 HC PT THER PROC EA 15 MIN 5/17/2023 Ashlie Arnold, PT GP 3    17904040562 HC PT THER PROC EA 15 MIN 5/18/2023 Ashlie Arnold, PT GP 4                   Ashlie Arnold, PT  5/18/2023

## 2023-05-18 NOTE — THERAPY TREATMENT NOTE
Inpatient Rehabilitation - Occupational Therapy Treatment Note    [facility]     Patient Name: Radha Azevedo  : 1935  MRN: [id number]    Today's Date: 2023                 Admit Date: 2023         ICD-10-CM ICD-9-CM   1. Impaired gait and mobility  R26.89 781.2       Patient Active Problem List   Diagnosis   • Absolute anemia   • Benign essential HTN   • Benign localized hyperplasia of prostate without urinary obstruction   • Type 2 diabetes mellitus with peripheral angiopathy   • Hypertension   • Hyperlipidemia   • Type 2 diabetes mellitus   • Diabetes mellitus type 2, noninsulin dependent   • Avitaminosis D   • Dyslipidemia   • Decrease in the ability to hear   • Personal history of colonic polyps   • Subdural hematoma       Past Medical History:   Diagnosis Date   • Colon polyps     Followed by Dr. Leo Jaeger at [facility].   • Coronary artery disease    • Diabetes mellitus    • Hyperlipidemia    • Hypertension        Past Surgical History:   Procedure Laterality Date   • CARDIAC CATHETERIZATION     • CAROTID STENT      patient denies, but family states he did have a stent placed   • COLONOSCOPY N/A 2014    Dr. Leo Jaeger at [facility].   • COLONOSCOPY N/A 2016    Dr. Leo Jaeger at [facility].   • COLONOSCOPY N/A 03/15/2023    2 TUBULAR ADENOMA POLYPS IN SIGMOID, BARIUM ENEMA ORDERED, DR. LEO JAEGER AT [facility]   • CORONARY ANGIOPLASTY     • FRACTURE SURGERY Left 2023    IM nailing for L femur fracture             IRF OT ASSESSMENT FLOWSHEET (last 12 hours)     IRF OT Evaluation and Treatment     Row Name 23 0900          OT Time and Intention    Document Type daily treatment  -CE     Mode of Treatment occupational therapy  -CE     Total Minutes, Occupational Therapy 30  -CE     Patient Effort good  -CE     Row Name 23 0900          General Information    Patient Profile Reviewed yes  -CE     Patient/Family/Caregiver Comments/Observations --  up in w/c w/ PT prior to  session  -CE     Existing Precautions/Restrictions hip;weight bearing;fall  -CE     Row Name 05/18/23 0900          Pain Assessment    Pretreatment Pain Rating 4/10  -CE     Posttreatment Pain Rating 5/10  -CE     Pain Location - Side/Orientation Left  -CE     Pain Location - hip  -CE     Row Name 05/18/23 0900          Cognition/Psychosocial    Affect/Mental Status (Cognition) WFL  -CE     Row Name 05/18/23 0900          Basic Activities of Daily Living (BADLs)    11590 - OT Self Care/Mgmt Minutes 30  -CE     Basic Activities of Daily Living bathing;upper body dressing;lower body dressing;grooming  -CE     Row Name 05/18/23 0900          Bathing    [place] Level (Bathing) bathing skills;lower body;upper body;upper extremities;minimum assist (75% patient effort)  -CE     Position (Bathing) supported sitting  -CE     Set-up Assistance (Bathing) obtain supplies  -CE     Row Name 05/18/23 0900          Upper Body Dressing    [place] Level (Upper Body Dressing) don;doff;pull over garment;set up assistance  -CE     Position (Upper Body Dressing) supported sitting  -CE     Set-up Assistance (Upper Body Dressing) obtain clothing  -CE     Row Name 05/18/23 0900          Lower Body Dressing    [place] Level (Lower Body Dressing) doff;don;pants/bottoms;socks;shoes/slippers;moderate assist (50% patient effort)  -CE     Position (Lower Body Dressing) supported sitting;supported standing  -CE     Set-up Assistance (Lower Body Dressing) obtain clothing  -CE     Comment (Lower Body Dressing) alternating sit<>stand  -CE     Row Name 05/18/23 0900          Grooming    [place] Level (Grooming) grooming skills;hair care, combing/brushing;oral care regimen;wash face, hands;supervision;set up  -CE     Position (Grooming) supported sitting  -CE     Row Name 05/18/23 0900          Transfer Assessment/Treatment    Transfers shower transfer  -CE     Comment, (Transfers) min cues for placement of hands using grab bars  once in shower  -CE     Row Name 05/18/23 0900          Shower Transfer    Type (Shower Transfer) sit-stand;stand-sit  -CE     [place] Level (Shower Transfer) minimum assist (75% patient effort);1 person assist  -CE     Assistive Device (Shower Transfer) grab bar, tub/shower;walker, front-wheeled  -CE     Row Name 05/18/23 0900          Positioning and Restraints    Pre-Treatment Position sitting in chair/recliner  -CE     Post Treatment Position wheelchair  -CE     In Wheelchair call light within reach;encouraged to call for assist;exit alarm on  -CE     Row Name 05/18/23 0900          Daily Progress Summary (OT)    Overall Progress Toward Functional Goals (OT) progressing toward functional goals as expected  -CE     Row Name 05/18/23 0900          Weekly Progress Summary (OT)    Overall Progress Toward Functional Goals (OT) progressing toward functional goals as expected  -CE     Row Name 05/18/23 0900          Therapy Plan Review/Discharge Plan (OT)    Therapy Plan Review (OT) patient  -CE           User Key  (r) = Recorded By, (t) = Taken By, (c) = Cosigned By    Initials Name Effective Dates    CE Carrie Morales, OT 10/17/22 -                  Occupational Therapy Education     Title: PT OT SLP Therapies (Done)     Topic: Occupational Therapy (Done)     Point: ADL training (Done)     Description:   Instruct learner(s) on proper safety adaptation and remediation techniques during self care or transfers.   Instruct in proper use of assistive devices.              Learning Progress Summary           Patient Acceptance, E, VU by DARVIN at 5/17/2023 1524                   Point: Home exercise program (Done)     Description:   Instruct learner(s) on appropriate technique for monitoring, assisting and/or progressing therapeutic exercises/activities.              Learning Progress Summary           Patient Acceptance, E, VU by DARVIN at 5/17/2023 1524                   Point: Precautions (Done)     Description:    Instruct learner(s) on prescribed precautions during self-care and functional transfers.              Learning Progress Summary           Patient Acceptance, E, VU by DARVIN at 5/17/2023 1524                   Point: Body mechanics (Done)     Description:   Instruct learner(s) on proper positioning and spine alignment during self-care, functional mobility activities and/or exercises.              Learning Progress Summary           Patient Acceptance, E, VU by DARVIN at 5/17/2023 1524                               User Key     Initials Effective Dates Name Provider Type [facility]    DARVIN 08/02/22 -  Main De La Garza OT Occupational Therapist OT                    OT Recommendation and Plan            Daily Progress Summary (OT)  Overall Progress Toward Functional Goals (OT): progressing toward functional goals as expected            Time Calculation:      Time Calculation- OT     Row Name 05/18/23 1230 05/18/23 0900          Time Calculation- OT    OT Start Time 0930  -CE --     OT Stop Time 1000  -CE --     OT Time Calculation (min) 30 min  -CE --     Total Timed Code Minutes- OT 30 minute(s)  -CE --     OT Received On 05/18/23  -CE --        Timed Charges    38772 - OT Self Care/Mgmt Minutes 30  -CE 30  -CE        Total Minutes    Timed Charges Total Minutes 30  -CE 30  -CE      Total Minutes 30  -CE 30  -CE           User Key  (r) = Recorded By, (t) = Taken By, (c) = Cosigned By    Initials Name Provider Type    CE Carrie Morales OT Occupational Therapist              Therapy Charges for Today     Code Description Service Date Service Provider Modifiers Qty    64133184893 HC OT SELF CARE/MGMT/TRAIN EA 15 MIN 5/18/2023 Carrie Morales OT GO 2                   Carrie Morales OT  5/18/2023

## 2023-05-18 NOTE — THERAPY TREATMENT NOTE
Inpatient Rehabilitation - Speech Language Pathology Treatment Note    [facility]     Patient Name: Radha Azevedo  : 1935  MRN: [id number]    Today's Date: 2023                   Admit Date: 2023       Visit Dx:      ICD-10-CM ICD-9-CM   1. Impaired gait and mobility  R26.89 781.2       Patient Active Problem List   Diagnosis   • Absolute anemia   • Benign essential HTN   • Benign localized hyperplasia of prostate without urinary obstruction   • Type 2 diabetes mellitus with peripheral angiopathy   • Hypertension   • Hyperlipidemia   • Type 2 diabetes mellitus   • Diabetes mellitus type 2, noninsulin dependent   • Avitaminosis D   • Dyslipidemia   • Decrease in the ability to hear   • Personal history of colonic polyps   • Subdural hematoma       Past Medical History:   Diagnosis Date   • Colon polyps     Followed by Dr. Leo Jaeger at [facility].   • Coronary artery disease    • Diabetes mellitus    • Hyperlipidemia    • Hypertension        Past Surgical History:   Procedure Laterality Date   • CARDIAC CATHETERIZATION     • CAROTID STENT      patient denies, but family states he did have a stent placed   • COLONOSCOPY N/A 2014    Dr. Leo Jaeger at [facility].   • COLONOSCOPY N/A 2016    Dr. Leo Jaeger at [facility].   • COLONOSCOPY N/A 03/15/2023    2 TUBULAR ADENOMA POLYPS IN SIGMOID, BARIUM ENEMA ORDERED, DR. LEO JAEGER AT [facility]   • CORONARY ANGIOPLASTY     • FRACTURE SURGERY Left 2023    IM nailing for L femur fracture       SLP Recommendation and Plan                                                            SLP EVALUATION (last 72 hours)     SLP SLC Evaluation     Row Name 23 1400 23 1000 23 0930             Communication Assessment/Intervention    Document Type therapy note (daily note)  -SR therapy note (daily note)  -SR evaluation  -SR      Subjective Information no complaints  -SR no complaints  -SR no complaints  -SR      Patient Observations  alert;cooperative;agree to therapy  -SR alert;cooperative;agree to therapy  -SR alert;cooperative;agree to therapy  -SR      Patient/Family/Caregiver Comments/Observations -- -- Patient up in WC in room. Agreeable and pleasant.  -SR      Patient Effort good  -SR good  -SR good  -SR      Symptoms Noted During/After Treatment none  -SR none  -SR none  -SR         General Information    Patient Profile Reviewed -- -- yes  -SR      Pertinent History Of Current Problem -- -- 87 y.o. male; Presented to [facility] acute rehab after a fall and subsequent L femoral neck fracture s/p IM nail placement on 5/8/2023 and SDH on the frontoparietal region.  -SR      Precautions/Limitations, Vision -- -- WFL with corrective lenses  -SR      Precautions/Limitations, Hearing -- -- WFL  -SR      Prior Level of Function-Communication -- -- WFL  -SR      Plans/Goals Discussed with -- -- patient;agreed upon  -SR      Barriers to Rehab -- -- none identified  -SR      Patient's Goals for Discharge -- -- return to home  -SR         Pain Scale: Numbers Pre/Post-Treatment    Pretreatment Pain Rating 0/10 - no pain  -SR 0/10 - no pain  -SR 0/10 - no pain  -SR      Posttreatment Pain Rating 0/10 - no pain  -SR 0/10 - no pain  -SR 0/10 - no pain  -SR         Oral Motor Structure and Function    Oral Motor Structure and Function -- -- WNL  -SR      Dentition Assessment -- -- natural, present and adequate  -SR         Standardized Tests    Cognitive/Memory Tests -- -- CLQT: Cognitive Linguistic Quick Test  -SR         CLQT (The Cognitive Linguistic Quick Test)    Attention Domain Score -- -- 128  -SR      Attention Severity Rating -- -- 3: Mild  -SR      Memory Domain Score -- -- 135  -SR      Memory Severity Rating -- -- 3: Mild  -SR      Executive Function Domain Score -- -- 14  -SR      Executive Function Severity Rating -- -- 3: Mild  -SR      Language Domain Score -- -- 27  -SR      Language Severity Rating -- -- 3: Mild  -SR      Visuospatial Domain  "Score -- -- 54  -SR      Visuospatial Severity Rating -- -- 3: Mild  -SR      Clock Drawing Total Score -- -- 4  -SR      Clock Drawing Severity Rating -- -- Severe  -SR      Composite Severity Rating -- -- 3.0  -SR      Composite Severity Rating Range -- -- 3.4 - 2.5: Mild  -SR      CLQT Comments -- -- Cognitive-linguistic evaluation completed this date. The Cognitive Linguistic Quick Test (CLQT) was used as a standardized assessment to evaluate patient's current cognitive skills (i.e., problem solving, reasoning, mental flexibility, memory, judgement, and speed of processing). Patient received an overall composite score of 3.0/4.0 indicating mild cognitive impairment. The results of the assessment were as followed: Attention - Mild, Memory - Mild, Executive Functions - Mild, Language -Mild, Visuospatial skills -Mild, and Clock Drawing - Severe. Quality of assessment judged to be fair-good due to English as a third language. Patient comfortable completing evaluation in English. 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 Comprehension/language may have impacted the following subtests: clock drawing, mazes, and design generation. For example, patient wrote the numbers 1-20 on the clock face provided. When asked to draw the hands to \"ten minutes after eleven\" patient wrote the digits \"11:10\" in the middle of the clock. Question comprehension of task vs. cognitive deficit. Verbal sequencing, episodic long term memory, and confrontational naming appeared WNL. Demonstrated mild difficulty with concrete/abstract generative naming, attention (selective/attention to detail), and planning/organization. Recommend ongoing diagnostic assessment of cognitive skills with functional tasks (i.e, finance management, medication management).  -SR         SLP Evaluation Clinical Impressions    SLP Diagnosis -- -- mild;cognitive-linguistic disorder  -SR      Rehab Potential/Prognosis -- " -- excellent  -SR      SLC Criteria for Skilled Therapy Interventions Met -- -- yes  -SR      Functional Impact -- -- difficulty completing home management task;functional impact in social situations  -SR         Recommendations    Therapy Frequency (SLP SLC) -- -- 5 days per week  -SR      Predicted Duration Therapy Intervention (Days) -- -- until discharge  -SR      Anticipated Discharge Disposition (SLP) -- -- home with 24/7 care  -SR         Communication Treatment Objective and Progress Goals (SLP)    Cognitive Linguistic Treatment Objectives -- -- Cognitive Linguistic Treatment Objectives (Group)  -SR         Cognitive Linguistic Treatment Objectives    Attention Selection -- -- attention, SLP goal 1  -SR      Reasoning Selection -- -- reasoning, SLP goal 1  -SR      Executive Function Skills Selection -- -- executive function skills, SLP goal 1  -SR         Attention Goal 1 (SLP)    Improve Attention by Goal 1 (SLP) -- -- complete selective attention task;complete sustained attention task;80%;independently (over 90% accuracy)  -SR      Time Frame (Attention Goal 1, SLP) -- -- by discharge  -SR         Reasoning Goal 1 (SLP)    Improve Reasoning Through Goal 1 (SLP) -- -- complete deductive reasoning task;complete mental flexibility task;80%;independently (over 90% accuracy)  -SR      Time Frame (Reasoning Goal 1, SLP) -- -- by discharge  -SR         Executive Functional Skills Goal 1 (SLP)    Improve Executive Function Skills Goal 1 (SLP) -- -- home management activity;time management activity;80%;independently (over 90% accuracy)  -SR      Time Frame (Executive Function Skills Goal 1, SLP) -- -- by discharge  -SR            User Key  (r) = Recorded By, (t) = Taken By, (c) = Cosigned By    Initials Name Effective Dates    SR Hope Mary CCC-SLP 11/10/22 -                    EDUCATION    The patient has been educated in the following areas:       Cognitive Impairment.             SLP GOALS     Row Name  05/18/23 1400 05/18/23 1000 05/17/23 0930       Attention Goal 1 (SLP)    Improve Attention by Goal 1 (SLP) complete selective attention task;complete sustained attention task;80%;independently (over 90% accuracy)  -SR -- complete selective attention task;complete sustained attention task;80%;independently (over 90% accuracy)  -SR    Time Frame (Attention Goal 1, SLP) by discharge  -SR -- by discharge  -SR    Progress/Outcomes (Attention Goal 1, SLP) goal ongoing  -SR -- --       Reasoning Goal 1 (SLP)    Improve Reasoning Through Goal 1 (SLP) -- -- complete deductive reasoning task;complete mental flexibility task;80%;independently (over 90% accuracy)  -SR    Time Frame (Reasoning Goal 1, SLP) -- -- by discharge  -SR       Executive Functional Skills Goal 1 (SLP)    Improve Executive Function Skills Goal 1 (SLP) home management activity;time management activity;80%;independently (over 90% accuracy)  -SR home management activity;time management activity;80%;independently (over 90% accuracy)  -SR home management activity;time management activity;80%;independently (over 90% accuracy)  -SR    Time Frame (Executive Function Skills Goal 1, SLP) by discharge  -SR by discharge  -SR by discharge  -SR    Progress/Outcomes (Executive Function Skills Goal 1, SLP) goal ongoing  -SR goal ongoing  -SR --    Comment (Executive Function Skills Goal 1, SLP) Simple medication management task completed. Patient followed written/verbal directions and sorted 10 mock medications by day/time with 40% acc given MIN cues; 70% acc given MOD cues; 100% acc given MAX cues. Increased difficulty noted with working memory. Patient re-read directions multiple times prior to carrying out the task. Extended time provided to complete activity.  -SR Checkbook balance activity completed during AM therapy session to target attention, executive function, and planning/organization. Overall, patient required MOD-MAX cues to determine the final balance  given transaction/deposit amounts. Demonstrated increased difficulty with working memory and attention. Repetition of instruction required throughout the task.  -SR --          User Key  (r) = Recorded By, (t) = Taken By, (c) = Cosigned By    Initials Name Provider Type    Hope West CCC-SLP Speech and Language Pathologist                            Time Calculation:        Time Calculation- SLP     Row Name 05/18/23 1549 05/18/23 1157          Time Calculation- SLP    SLP Start Time 1400  -SR 1000  -SR     SLP Stop Time 1430  -SR 1030  -SR     SLP Time Calculation (min) 30 min  -SR 30 min  -SR           User Key  (r) = Recorded By, (t) = Taken By, (c) = Cosigned By    Initials Name Provider Type    Hope West CCC-SLP Speech and Language Pathologist                  Therapy Charges for Today     Code Description Service Date Service Provider Modifiers Qty    58275632304 HC ST STD COG PERF TEST PER HOUR 5/17/2023 Hope Mary CCC-SLP GN 2    61871553111 HC ST DEV OF COGN SKILLS INITIAL 15 MIN 5/18/2023 Hope Mary CCC-SLP  1    13891138959 HC ST DEV OF COGN SKILLS EACH ADDT'L 15 MIN 5/18/2023 Hope Mary CCC-SLP  3                           KATIE Victoria  5/18/2023

## 2023-05-18 NOTE — PROGRESS NOTES
Inpatient Rehabilitation Plan of Care Note    Plan of Care  Care Plan Reviewed - No updates at this time.    Psychosocial    Performed Intervention(s)  Medication.  Group therapy.      Safety    Performed Intervention(s)  Items within reach.  Safety rounds.  Wheelchair, bed, and chair alarms.      Sphincter Control    Performed Intervention(s)  Offer restroom.  Bladder training program.  Use incontinence products.  Encourage appropriate diet.  Encourage fluid intake.    Signed by: Laquita Nieves RN

## 2023-05-19 LAB
ANION GAP SERPL CALCULATED.3IONS-SCNC: 9 MMOL/L (ref 5–15)
BASOPHILS # BLD AUTO: 0.08 10*3/MM3 (ref 0–0.2)
BASOPHILS NFR BLD AUTO: 1.6 % (ref 0–1.5)
BUN SERPL-MCNC: 23 MG/DL (ref 8–23)
BUN/CREAT SERPL: 21.5 (ref 7–25)
CALCIUM SPEC-SCNC: 9.1 MG/DL (ref 8.6–10.5)
CHLORIDE SERPL-SCNC: 97 MMOL/L (ref 98–107)
CO2 SERPL-SCNC: 27 MMOL/L (ref 22–29)
CREAT SERPL-MCNC: 1.07 MG/DL (ref 0.76–1.27)
DEPRECATED RDW RBC AUTO: 51.8 FL (ref 37–54)
EGFRCR SERPLBLD CKD-EPI 2021: 67.2 ML/MIN/1.73
EOSINOPHIL # BLD AUTO: 0.34 10*3/MM3 (ref 0–0.4)
EOSINOPHIL NFR BLD AUTO: 6.7 % (ref 0.3–6.2)
ERYTHROCYTE [DISTWIDTH] IN BLOOD BY AUTOMATED COUNT: 17 % (ref 12.3–15.4)
GLUCOSE SERPL-MCNC: 106 MG/DL (ref 65–99)
HCT VFR BLD AUTO: 29.4 % (ref 37.5–51)
HGB BLD-MCNC: 9.6 G/DL (ref 13–17.7)
IMM GRANULOCYTES # BLD AUTO: 0.01 10*3/MM3 (ref 0–0.05)
IMM GRANULOCYTES NFR BLD AUTO: 0.2 % (ref 0–0.5)
LYMPHOCYTES # BLD AUTO: 1.79 10*3/MM3 (ref 0.7–3.1)
LYMPHOCYTES NFR BLD AUTO: 35.5 % (ref 19.6–45.3)
MCH RBC QN AUTO: 27.4 PG (ref 26.6–33)
MCHC RBC AUTO-ENTMCNC: 32.7 G/DL (ref 31.5–35.7)
MCV RBC AUTO: 84 FL (ref 79–97)
MONOCYTES # BLD AUTO: 0.39 10*3/MM3 (ref 0.1–0.9)
MONOCYTES NFR BLD AUTO: 7.7 % (ref 5–12)
NEUTROPHILS NFR BLD AUTO: 2.43 10*3/MM3 (ref 1.7–7)
NEUTROPHILS NFR BLD AUTO: 48.3 % (ref 42.7–76)
NRBC BLD AUTO-RTO: 0 /100 WBC (ref 0–0.2)
PLATELET # BLD AUTO: 286 10*3/MM3 (ref 140–450)
PMV BLD AUTO: 9.2 FL (ref 6–12)
POTASSIUM SERPL-SCNC: 4.3 MMOL/L (ref 3.5–5.2)
RBC # BLD AUTO: 3.5 10*6/MM3 (ref 4.14–5.8)
SODIUM SERPL-SCNC: 133 MMOL/L (ref 136–145)
WBC NRBC COR # BLD: 5.04 10*3/MM3 (ref 3.4–10.8)

## 2023-05-19 PROCEDURE — 97129 THER IVNTJ 1ST 15 MIN: CPT

## 2023-05-19 PROCEDURE — 97112 NEUROMUSCULAR REEDUCATION: CPT

## 2023-05-19 PROCEDURE — 97535 SELF CARE MNGMENT TRAINING: CPT

## 2023-05-19 PROCEDURE — 97130 THER IVNTJ EA ADDL 15 MIN: CPT

## 2023-05-19 PROCEDURE — 85025 COMPLETE CBC W/AUTO DIFF WBC: CPT | Performed by: PHYSICAL MEDICINE & REHABILITATION

## 2023-05-19 PROCEDURE — 97116 GAIT TRAINING THERAPY: CPT

## 2023-05-19 PROCEDURE — 97110 THERAPEUTIC EXERCISES: CPT

## 2023-05-19 PROCEDURE — 25010000002 HEPARIN (PORCINE) PER 1000 UNITS: Performed by: PHYSICAL MEDICINE & REHABILITATION

## 2023-05-19 PROCEDURE — 80048 BASIC METABOLIC PNL TOTAL CA: CPT | Performed by: PHYSICAL MEDICINE & REHABILITATION

## 2023-05-19 PROCEDURE — 97530 THERAPEUTIC ACTIVITIES: CPT

## 2023-05-19 RX ADMIN — DULOXETINE HYDROCHLORIDE 30 MG: 30 CAPSULE, DELAYED RELEASE ORAL at 20:26

## 2023-05-19 RX ADMIN — LEVOTHYROXINE SODIUM 25 MCG: 0.03 TABLET ORAL at 05:50

## 2023-05-19 RX ADMIN — PANTOPRAZOLE SODIUM 40 MG: 40 TABLET, DELAYED RELEASE ORAL at 07:49

## 2023-05-19 RX ADMIN — DICLOFENAC SODIUM 2 G: 10 GEL TOPICAL at 20:27

## 2023-05-19 RX ADMIN — HEPARIN SODIUM 5000 UNITS: 5000 INJECTION INTRAVENOUS; SUBCUTANEOUS at 20:26

## 2023-05-19 RX ADMIN — DICLOFENAC SODIUM 2 G: 10 GEL TOPICAL at 07:53

## 2023-05-19 RX ADMIN — HYDROCODONE BITARTRATE AND ACETAMINOPHEN 1 TABLET: 5; 325 TABLET ORAL at 20:26

## 2023-05-19 RX ADMIN — QUETIAPINE FUMARATE 12.5 MG: 25 TABLET ORAL at 20:26

## 2023-05-19 RX ADMIN — HEPARIN SODIUM 5000 UNITS: 5000 INJECTION INTRAVENOUS; SUBCUTANEOUS at 07:52

## 2023-05-19 RX ADMIN — AMLODIPINE BESYLATE 10 MG: 10 TABLET ORAL at 07:52

## 2023-05-19 RX ADMIN — DULOXETINE HYDROCHLORIDE 30 MG: 30 CAPSULE, DELAYED RELEASE ORAL at 07:52

## 2023-05-19 RX ADMIN — HYDROCODONE BITARTRATE AND ACETAMINOPHEN 1 TABLET: 5; 325 TABLET ORAL at 05:50

## 2023-05-19 NOTE — PLAN OF CARE
Problem: Rehabilitation (IRF) Plan of Care  Goal: Plan of Care Review  Outcome: Ongoing, Progressing  Flowsheets (Taken 5/19/2023 0147)  Progress: improving  Plan of Care Reviewed With: patient  Outcome Evaluation: Pt is A&Ox4, forgetful at times, had some confusion noted earlier on day shift but was able to answer all questions correctly tonight, dressing to L thigh C/D/I, medication whole w/ water, pt remains on 1500 ml/day fluid restriction due to low sodium, pt is cont of B/B, up w/ asst x1, note left for MD, he wants to see if pt. IM MD may be able to assist with BP meds, resting well tonight will continue to monitor.   Goal Outcome Evaluation:  Plan of Care Reviewed With: patient        Progress: improving  Outcome Evaluation: Pt is A&Ox4, forgetful at times, had some confusion noted earlier on day shift but was able to answer all questions correctly tonight, dressing to L thigh C/D/I, medication whole w/ water, pt remains on 1500 ml/day fluid restriction due to low sodium, pt is cont of B/B, up w/ asst x1, note left for MD, he wants to see if pt. IM MD may be able to assist with BP meds, resting well tonight will continue to monitor.

## 2023-05-19 NOTE — PROGRESS NOTES
Inpatient Rehabilitation Plan of Care Note    Plan of Care  Care Plan Reviewed - No updates at this time.    Psychosocial    Performed Intervention(s)  Medication.  Group therapy.      Safety    Performed Intervention(s)  Items within reach.  Safety rounds.  Wheelchair, bed, and chair alarms.      Sphincter Control    Performed Intervention(s)  Offer restroom.  Bladder training program.  Use incontinence products.  Encourage appropriate diet.  Encourage fluid intake.    Signed by: Charito Luis RN

## 2023-05-19 NOTE — THERAPY TREATMENT NOTE
Inpatient Rehabilitation - Occupational Therapy Treatment Note    [facility]     Patient Name: Radha Azevedo  : 1935  MRN: [id number]    Today's Date: 2023                 Admit Date: 2023         ICD-10-CM ICD-9-CM   1. Impaired gait and mobility  R26.89 781.2       Patient Active Problem List   Diagnosis   • Absolute anemia   • Benign essential HTN   • Benign localized hyperplasia of prostate without urinary obstruction   • Type 2 diabetes mellitus with peripheral angiopathy   • Hypertension   • Hyperlipidemia   • Type 2 diabetes mellitus   • Diabetes mellitus type 2, noninsulin dependent   • Avitaminosis D   • Dyslipidemia   • Decrease in the ability to hear   • Personal history of colonic polyps   • Subdural hematoma       Past Medical History:   Diagnosis Date   • Colon polyps     Followed by Dr. Leo Jaeger at [facility].   • Coronary artery disease    • Diabetes mellitus    • Hyperlipidemia    • Hypertension        Past Surgical History:   Procedure Laterality Date   • CARDIAC CATHETERIZATION     • CAROTID STENT      patient denies, but family states he did have a stent placed   • COLONOSCOPY N/A 2014    Dr. Leo Jaeger at [facility].   • COLONOSCOPY N/A 2016    Dr. Leo Jaeger at [facility].   • COLONOSCOPY N/A 03/15/2023    2 TUBULAR ADENOMA POLYPS IN SIGMOID, BARIUM ENEMA ORDERED, DR. LEO JAEGER AT [facility]   • CORONARY ANGIOPLASTY     • FRACTURE SURGERY Left 2023    IM nailing for L femur fracture             IRF OT ASSESSMENT FLOWSHEET (last 12 hours)     IRF OT Evaluation and Treatment     Row Name 23 1239          OT Time and Intention    Document Type daily treatment  -KA     Mode of Treatment individual therapy;occupational therapy  -KA     Patient Effort good  -KA     Row Name 23 3749          General Information    Patient Profile Reviewed yes  -KA     Patient/Family/Caregiver Comments/Observations Pt sitting up in bed finishing up breakfast upon arrival  with son present.  -KA     Existing Precautions/Restrictions hip;weight bearing;fall;other (see comments)  WBAT LLE  -     Row Name 05/19/23 1239          Pain Assessment    Pretreatment Pain Rating 0/10 - no pain  -KA     Posttreatment Pain Rating 0/10 - no pain  -     Row Name 05/19/23 1239          Cognition/Psychosocial    Orientation Status (Cognition) oriented x 3  -KA     Follows Commands (Cognition) 75-90% accuracy;follows one-step commands;physical/tactile prompts required;verbal cues/prompting required;visual cue  -     Personal Safety Interventions fall prevention program maintained;gait belt;nonskid shoes/slippers when out of bed  -     Row Name 05/19/23 1239          Bathing    [place] Level (Bathing) bathing skills;lower body;upper body;upper extremities;minimum assist (75% patient effort)  -KA     Position (Bathing) supported sitting  -KA     Set-up Assistance (Bathing) obtain supplies  [facility]     Row Name 05/19/23 1239          Upper Body Dressing    [place] Level (Upper Body Dressing) upper body dressing skills;doff;don;pull over garment;set up assistance  -KA     Position (Upper Body Dressing) supported sitting  -KA     Set-up Assistance (Upper Body Dressing) obtain clothing  -     Row Name 05/19/23 1239          Lower Body Dressing    [place] Level (Lower Body Dressing) doff;don;pants/bottoms;socks;shoes/slippers;moderate assist (50% patient effort)  -KA     Position (Lower Body Dressing) supported sitting;supported standing  -KA     Set-up Assistance (Lower Body Dressing) obtain clothing  -     Row Name 05/19/23 1239          Grooming    [place] Level (Grooming) grooming skills;oral care regimen;wash face, hands;standby assist  Performed in 2nd AM session. Pt's son present and stated pt wanted to trim his hair which he does often at home. Pt performed with supervision seated in front of sink.  -KA     Position (Grooming) supported sitting  -KA     Set-up Assistance  (Grooming) obtain supplies  -     Row Name 05/19/23 1239          Toileting    [place] Level (Toileting) adjust/manage clothing;contact guard assist  -     Assistive Device Use (Toileting) grab bar/safety frame  -     Position (Toileting) supported sitting;supported standing  -     Row Name 05/19/23 1239          Bed Mobility    Supine-Sit [place] (Bed Mobility) standby assist  -     Row Name 05/19/23 1239          Functional Mobility    Functional Mobility- Comment Performed functional mobility within room to bathroom with CGA/min A and use of rwx  -     Row Name 05/19/23 1239          Bed-Chair Transfer    Bed-Chair [place] (Transfers) contact guard;minimum assist (75% patient effort)  -     Assistive Device (Bed-Chair Transfers) walker, front-wheeled  -     Row Name 05/19/23 1239          Sit-Stand Transfer    Sit-Stand [place] (Transfers) standby assist;contact guard  -     Assistive Device (Sit-Stand Transfers) wheelchair;walker, front-wheeled  -     Row Name 05/19/23 1239          Positioning and Restraints    Pre-Treatment Position in bed  -     Post Treatment Position wheelchair  -     In Wheelchair sitting;call light within reach;encouraged to call for assist;exit alarm on  -           User Key  (r) = Recorded By, (t) = Taken By, (c) = Cosigned By    Initials Name Effective Dates    Alecia Harp OT 09/22/22 -                  Occupational Therapy Education     Title: PT OT SLP Therapies (Done)     Topic: Occupational Therapy (Done)     Point: ADL training (Done)     Description:   Instruct learner(s) on proper safety adaptation and remediation techniques during self care or transfers.   Instruct in proper use of assistive devices.              Learning Progress Summary           Patient Acceptance, E, VU by DARVIN at 5/17/2023 1524                   Point: Home exercise program (Done)     Description:   Instruct learner(s) on appropriate technique for  monitoring, assisting and/or progressing therapeutic exercises/activities.              Learning Progress Summary           Patient Acceptance, E, VU by  at 5/17/2023 1524                   Point: Precautions (Done)     Description:   Instruct learner(s) on prescribed precautions during self-care and functional transfers.              Learning Progress Summary           Patient Acceptance, E, VU by  at 5/17/2023 1524                   Point: Body mechanics (Done)     Description:   Instruct learner(s) on proper positioning and spine alignment during self-care, functional mobility activities and/or exercises.              Learning Progress Summary           Patient Acceptance, E, VU by  at 5/17/2023 1524                               User Key     Initials Effective Dates Name Provider Type [facility] 08/02/22 -  Main De La Garza OT Occupational Therapist OT                    OT Recommendation and Plan                         Time Calculation:      Time Calculation- OT     Row Name 05/19/23 1245 05/19/23 1244          Time Calculation- OT    OT Start Time 1030  -KA 0800  -KA     OT Stop Time 1100  -KA 0830  -KA     OT Time Calculation (min) 30 min  -KA 30 min  -KA           User Key  (r) = Recorded By, (t) = Taken By, (c) = Cosigned By    Initials Name Provider Type     Alecia Dumont OT Occupational Therapist              Therapy Charges for Today     Code Description Service Date Service Provider Modifiers Qty    87688580247 HC OT SELF CARE/MGMT/TRAIN EA 15 MIN 5/19/2023 Alecia Dumont OT GO 2    58163402045 HC OT SELF CARE/MGMT/TRAIN EA 15 MIN 5/19/2023 Alecia Dumont OT GO 2                   Alecia Dumont OT  5/19/2023

## 2023-05-19 NOTE — PROGRESS NOTES
[facility]     Progress Note    Patient Name: Radha Azevedo  : 1935  MRN: [id number]  Primary Care Physician:  Amina Jiang MD  Date of admission: 2023    Subjective Pt is awake and alert. Well oriented. Son present. We discussed plan of care, pt's confusion last pm. Only med change was increasing Amlodipine to 10 q day. We also discussed post dc md f/u appts.   Subjective     Chief Complaint:same    History of Present Illness  Patient Reports     Review of Systems    Objective  exam unchanged. Calves soft and NT. Resp unlabored and regular  Objective     Vitals:   Temp:  [97 °F (36.1 °C)-98.5 °F (36.9 °C)] 97 °F (36.1 °C)  Heart Rate:  [63-67] 66  Resp:  [18] 18  BP: ()/(55-74) 116/66    Physical Exam     Result Review    Result Review:  I have personally reviewed the results from the time of this admission to 2023 08:47 EDT and agree with these findings:  []  Laboratory list / accordion  []  Microbiology  []  Radiology  []  EKG/Telemetry   []  Cardiology/Vascular   []  Pathology  []  Old records  []  Other:  Most notable findings include: Na+ 133, Cl- 97, CBC 9.6/29.4,       Assessment & Plan  Continue eval phase.   Assessment / Plan     Brief Patient Summary:  Radha Azevedo is a 87 y.o. male who     Active Hospital Problems:  Active Hospital Problems    Diagnosis    • **Subdural hematoma      Plan:       DVT prophylaxis:  Medical and mechanical DVT prophylaxis orders are present.    CODE STATUS:       Disposition:  I expect patient to be discharged     Artemio Omalley MD

## 2023-05-19 NOTE — PLAN OF CARE
Goal Outcome Evaluation:  Plan of Care Reviewed With: patient        Progress: improving  Outcome Evaluation: Pt is A&OX4, calm and cooperative. Assist X1 to wc. Surgical Dsg intact to L hip. Small laceration to posterior side of head, clean, dry and scabbed. WBAT LLE. No c/o pain thus far. Meds whole with water. Son visitied this AM and afternoon. Con of B&B. Na level 133 this AM FR 1500cc. Pt educated on use of call light for assistance.

## 2023-05-19 NOTE — THERAPY TREATMENT NOTE
Inpatient Rehabilitation - Speech Language Pathology Treatment Note    [facility]     Patient Name: Radha Azevedo  : 1935  MRN: [id number]    Today's Date: 2023                   Admit Date: 2023       Visit Dx:      ICD-10-CM ICD-9-CM   1. Impaired gait and mobility  R26.89 781.2       Patient Active Problem List   Diagnosis   • Absolute anemia   • Benign essential HTN   • Benign localized hyperplasia of prostate without urinary obstruction   • Type 2 diabetes mellitus with peripheral angiopathy   • Hypertension   • Hyperlipidemia   • Type 2 diabetes mellitus   • Diabetes mellitus type 2, noninsulin dependent   • Avitaminosis D   • Dyslipidemia   • Decrease in the ability to hear   • Personal history of colonic polyps   • Subdural hematoma       Past Medical History:   Diagnosis Date   • Colon polyps     Followed by Dr. Leo Jaeger at [facility].   • Coronary artery disease    • Diabetes mellitus    • Hyperlipidemia    • Hypertension        Past Surgical History:   Procedure Laterality Date   • CARDIAC CATHETERIZATION     • CAROTID STENT      patient denies, but family states he did have a stent placed   • COLONOSCOPY N/A 2014    Dr. Leo Jaeger at [facility].   • COLONOSCOPY N/A 2016    Dr. Leo Jaeger at [facility].   • COLONOSCOPY N/A 03/15/2023    2 TUBULAR ADENOMA POLYPS IN SIGMOID, BARIUM ENEMA ORDERED, DR. LEO JAEGER AT [facility]   • CORONARY ANGIOPLASTY     • FRACTURE SURGERY Left 2023    IM nailing for L femur fracture       SLP Recommendation and Plan                                                            SLP EVALUATION (last 72 hours)     SLP SLC Evaluation     Row Name 23 1400 23 0900 23 1400 23 1000 23 0930       Communication Assessment/Intervention    Document Type therapy note (daily note)  -SR therapy note (daily note)  -SR therapy note (daily note)  -SR therapy note (daily note)  -SR evaluation  -SR    Subjective Information no  complaints  -SR no complaints  -SR no complaints  -SR no complaints  -SR no complaints  -SR    Patient Observations alert;cooperative;agree to therapy  -SR alert;cooperative;agree to therapy  -SR alert;cooperative;agree to therapy  -SR alert;cooperative;agree to therapy  -SR alert;cooperative;agree to therapy  -SR    Patient/Family/Caregiver Comments/Observations -- -- -- -- Patient up in WC in room. Agreeable and pleasant.  -SR    Patient Effort good  -SR good  -SR good  -SR good  -SR good  -SR    Symptoms Noted During/After Treatment none  -SR none  -SR none  -SR none  -SR none  -SR       General Information    Patient Profile Reviewed -- -- -- -- yes  -SR    Pertinent History Of Current Problem -- -- -- -- 87 y.o. male; Presented to [facility] acute rehab after a fall and subsequent L femoral neck fracture s/p IM nail placement on 5/8/2023 and SDH on the frontoparietal region.  -SR    Precautions/Limitations, Vision -- -- -- -- WFL with corrective lenses  -SR    Precautions/Limitations, Hearing -- -- -- -- WFL  -SR    Prior Level of Function-Communication -- -- -- -- WFL  -SR    Plans/Goals Discussed with -- -- -- -- patient;agreed upon  -SR    Barriers to Rehab -- -- -- -- none identified  -SR    Patient's Goals for Discharge -- -- -- -- return to home  -SR       Pain Scale: Numbers Pre/Post-Treatment    Pretreatment Pain Rating 0/10 - no pain  -SR 0/10 - no pain  -SR 0/10 - no pain  -SR 0/10 - no pain  -SR 0/10 - no pain  -SR    Posttreatment Pain Rating 0/10 - no pain  -SR 0/10 - no pain  -SR 0/10 - no pain  -SR 0/10 - no pain  -SR 0/10 - no pain  -SR       Oral Motor Structure and Function    Oral Motor Structure and Function -- -- -- -- WNL  -SR    Dentition Assessment -- -- -- -- natural, present and adequate  -SR       Standardized Tests    Cognitive/Memory Tests -- -- -- -- CLQT: Cognitive Linguistic Quick Test  -SR       CLQT (The Cognitive Linguistic Quick Test)    Attention Domain Score -- -- -- -- 128  -SR  "   Attention Severity Rating -- -- -- -- 3: Mild  -SR    Memory Domain Score -- -- -- -- 135  -SR    Memory Severity Rating -- -- -- -- 3: Mild  -SR    Executive Function Domain Score -- -- -- -- 14  -SR    Executive Function Severity Rating -- -- -- -- 3: Mild  -SR    Language Domain Score -- -- -- -- 27  -SR    Language Severity Rating -- -- -- -- 3: Mild  -SR    Visuospatial Domain Score -- -- -- -- 54  -SR    Visuospatial Severity Rating -- -- -- -- 3: Mild  -SR    Clock Drawing Total Score -- -- -- -- 4  -SR    Clock Drawing Severity Rating -- -- -- -- Severe  -SR    Composite Severity Rating -- -- -- -- 3.0  -SR    Composite Severity Rating Range -- -- -- -- 3.4 - 2.5: Mild  -SR    CLQT Comments -- -- -- -- Cognitive-linguistic evaluation completed this date. The Cognitive Linguistic Quick Test (CLQT) was used as a standardized assessment to evaluate patient's current cognitive skills (i.e., problem solving, reasoning, mental flexibility, memory, judgement, and speed of processing). Patient received an overall composite score of 3.0/4.0 indicating mild cognitive impairment. The results of the assessment were as followed: Attention - Mild, Memory - Mild, Executive Functions - Mild, Language -Mild, Visuospatial skills -Mild, and Clock Drawing - Severe. Quality of assessment judged to be fair-good due to English as a third language. Patient comfortable completing evaluation in English. 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 Comprehension/language may have impacted the following subtests: clock drawing, mazes, and design generation. For example, patient wrote the numbers 1-20 on the clock face provided. When asked to draw the hands to \"ten minutes after eleven\" patient wrote the digits \"11:10\" in the middle of the clock. Question comprehension of task vs. cognitive deficit. Verbal sequencing, episodic long term memory, and confrontational naming appeared WNL. " Demonstrated mild difficulty with concrete/abstract generative naming, attention (selective/attention to detail), and planning/organization. Recommend ongoing diagnostic assessment of cognitive skills with functional tasks (i.e, finance management, medication management).  -SR       SLP Evaluation Clinical Impressions    SLP Diagnosis -- -- -- -- mild;cognitive-linguistic disorder  -SR    Rehab Potential/Prognosis -- -- -- -- excellent  -SR    [facility] Criteria for Skilled Therapy Interventions Met -- -- -- -- yes  -SR    Functional Impact -- -- -- -- difficulty completing home management task;functional impact in social situations  -SR       Recommendations    Therapy Frequency ([facility]) -- -- -- -- 5 days per week  -SR    Predicted Duration Therapy Intervention (Days) -- -- -- -- until discharge  -SR    Anticipated Discharge Disposition (SLP) -- -- -- -- home with 24/[facility]  -SR       Communication Treatment Objective and Progress Goals (SLP)    Cognitive Linguistic Treatment Objectives -- -- -- -- Cognitive Linguistic Treatment Objectives (Group)  -SR       Cognitive Linguistic Treatment Objectives    Attention Selection -- -- -- -- attention, SLP goal 1  -SR    Reasoning Selection -- -- -- -- reasoning, SLP goal 1  -SR    Executive Function Skills Selection -- -- -- -- executive function skills, SLP goal 1  -SR       Attention Goal 1 (SLP)    Improve Attention by Goal 1 (SLP) -- -- -- -- complete selective attention task;complete sustained attention task;80%;independently (over 90% accuracy)  -SR    Time Frame (Attention Goal 1, SLP) -- -- -- -- by discharge  -SR       Reasoning Goal 1 (SLP)    Improve Reasoning Through Goal 1 (SLP) -- -- -- -- complete deductive reasoning task;complete mental flexibility task;80%;independently (over 90% accuracy)  -SR    Time Frame (Reasoning Goal 1, SLP) -- -- -- -- by discharge  -SR       Executive Functional Skills Goal 1 (SLP)    Improve Executive Function Skills Goal 1 (SLP) --  -- -- -- home management activity;time management activity;80%;independently (over 90% accuracy)  -SR    Time Frame (Executive Function Skills Goal 1, SLP) -- -- -- -- by discharge  -SR          User Key  (r) = Recorded By, (t) = Taken By, (c) = Cosigned By    Initials Name Effective Dates    SR Hope Mary, CCC-SLP 11/10/22 -                    EDUCATION    The patient has been educated in the following areas:       Cognitive Impairment.             SLP GOALS     Row Name 05/19/23 1400 05/19/23 0900 05/18/23 1400       Attention Goal 1 (SLP)    Improve Attention by Goal 1 (SLP) complete selective attention task;complete sustained attention task;80%;independently (over 90% accuracy)  -SR -- complete selective attention task;complete sustained attention task;80%;independently (over 90% accuracy)  -SR    Time Frame (Attention Goal 1, SLP) by discharge  -SR -- by discharge  -SR    Progress/Outcomes (Attention Goal 1, SLP) goal ongoing  -SR -- goal ongoing  -SR    Comment (Attention Goal 1, SLP) Sustained/selective attn; patient answered questions about everyday items (i.e, weather forecast, recipes, grocery list, etc) by locating target information on the label with 80% acc given MOD cues.  -SR -- --       Memory Skills Goal 1 (SLP)    Improve Memory Skills Through Goal 1 (SLP) use written schedule;use memory strategies;recall details of the day;80%;with minimal cues (75-90%)  -SR -- --    Time Frame (Memory Skills Goal 1, SLP) by discharge  -SR -- --    Comment (Memory Skills Goal 1, SLP) Patient participated in immediate memory task with use of the [employer] los. Patient listened to audio recording of a voicemail message and answered questions about the content with 80% acc given MIN cues. Accuracy increased given choice of three options per question.  -SR -- --       Reasoning Goal 1 (SLP)    Improve Reasoning Through Goal 1 (SLP) -- complete deductive reasoning task;complete mental flexibility  task;80%;independently (over 90% accuracy)  -SR --    Time Frame (Reasoning Goal 1, SLP) -- by discharge  -SR --    Progress/Outcomes (Reasoning Goal 1, SLP) -- goal ongoing  -SR --    Comment (Reasoning Goal 1, SLP) -- Patient participated in checkbook balance task to target attention, working memory, and planning/organization. Extended time provided to complete task. Overall, patient completed x5 stimulus items given MOD-MAX verbal/visual cues. Demonstrated increased difficulty with working memory during task.  -SR --       Executive Functional Skills Goal 1 (SLP)    Improve Executive Function Skills Goal 1 (SLP) -- home management activity;time management activity;80%;independently (over 90% accuracy)  -SR home management activity;time management activity;80%;independently (over 90% accuracy)  -SR    Time Frame (Executive Function Skills Goal 1, SLP) -- by discharge  -SR by discharge  -SR    Progress/Outcomes (Executive Function Skills Goal 1, SLP) -- goal ongoing  -SR goal ongoing  -SR    Comment (Executive Function Skills Goal 1, SLP) -- Patient's son present for beginning of session. Reported that his brother has recently taken responsibility for patient's finances, however, the patient independently manages his medication. SLP provided education regarding results of assessment and plan of care. Both patient and son agreeable to recommendations.  -SR Simple medication management task completed. Patient followed written/verbal directions and sorted 10 mock medications by day/time with 40% acc given MIN cues; 70% acc given MOD cues; 100% acc given MAX cues. Increased difficulty noted with working memory. Patient re-read directions multiple times prior to carrying out the task. Extended time provided to complete activity.  -SR    Row Name 05/18/23 1000 05/17/23 0930          Attention Goal 1 (SLP)    Improve Attention by Goal 1 (SLP) -- complete selective attention task;complete sustained attention  task;80%;independently (over 90% accuracy)  -SR     Time Frame (Attention Goal 1, SLP) -- by discharge  -SR        Reasoning Goal 1 (SLP)    Improve Reasoning Through Goal 1 (SLP) -- complete deductive reasoning task;complete mental flexibility task;80%;independently (over 90% accuracy)  -SR     Time Frame (Reasoning Goal 1, SLP) -- by discharge  -SR        Executive Functional Skills Goal 1 (SLP)    Improve Executive Function Skills Goal 1 (SLP) home management activity;time management activity;80%;independently (over 90% accuracy)  -SR home management activity;time management activity;80%;independently (over 90% accuracy)  -SR     Time Frame (Executive Function Skills Goal 1, SLP) by discharge  -SR by discharge  -SR     Progress/Outcomes (Executive Function Skills Goal 1, SLP) goal ongoing  -SR --     Comment (Executive Function Skills Goal 1, SLP) Checkbook balance activity completed during AM therapy session to target attention, executive function, and planning/organization. Overall, patient required MOD-MAX cues to determine the final balance given transaction/deposit amounts. Demonstrated increased difficulty with working memory and attention. Repetition of instruction required throughout the task.  -SR --           User Key  (r) = Recorded By, (t) = Taken By, (c) = Cosigned By    Initials Name Provider Type    Hope West CCC-SLP Speech and Language Pathologist                            Time Calculation:        Time Calculation- SLP     Row Name 05/19/23 1455 05/19/23 0930          Time Calculation- SLP    SLP Start Time 1400  -SR 0900  -SR     SLP Stop Time 1430  -SR 0930  -SR     SLP Time Calculation (min) 30 min  -SR 30 min  -SR           User Key  (r) = Recorded By, (t) = Taken By, (c) = Cosigned By    Initials Name Provider Type    Hope West CCC-SLP Speech and Language Pathologist                  Therapy Charges for Today     Code Description Service Date Service Provider Modifiers  Qty    06209908645 HC ST DEV OF COGN SKILLS INITIAL 15 MIN 5/18/2023 Hope Mary CCC-SLP  1    39147016031 HC ST DEV OF COGN SKILLS EACH ADDT'L 15 MIN 5/18/2023 Hope Mary CCC-SLP  3    29616547710 HC ST DEV OF COGN SKILLS INITIAL 15 MIN 5/19/2023 Hope Mary CCC-SLP  1    52069131079 HC ST DEV OF COGN SKILLS EACH ADDT'L 15 MIN 5/19/2023 Hope Mary CCC-SLP  3                           KATIE Victoria  5/19/2023

## 2023-05-19 NOTE — THERAPY TREATMENT NOTE
Inpatient Rehabilitation - Physical Therapy Treatment Note       [facility]     Patient Name: Radha Azevedo  : 1935  MRN: [id number]    Today's Date: 2023                    Admit Date: 2023      Visit Dx:     ICD-10-CM ICD-9-CM   1. Impaired gait and mobility  R26.89 781.2       Patient Active Problem List   Diagnosis   • Absolute anemia   • Benign essential HTN   • Benign localized hyperplasia of prostate without urinary obstruction   • Type 2 diabetes mellitus with peripheral angiopathy   • Hypertension   • Hyperlipidemia   • Type 2 diabetes mellitus   • Diabetes mellitus type 2, noninsulin dependent   • Avitaminosis D   • Dyslipidemia   • Decrease in the ability to hear   • Personal history of colonic polyps   • Subdural hematoma       Past Medical History:   Diagnosis Date   • Colon polyps     Followed by Dr. Leo Jaeger at [facility].   • Coronary artery disease    • Diabetes mellitus    • Hyperlipidemia    • Hypertension        Past Surgical History:   Procedure Laterality Date   • CARDIAC CATHETERIZATION     • CAROTID STENT      patient denies, but family states he did have a stent placed   • COLONOSCOPY N/A 2014    Dr. Leo Jaeger at [facility].   • COLONOSCOPY N/A 2016    Dr. Leo Jaeger at [facility].   • COLONOSCOPY N/A 03/15/2023    2 TUBULAR ADENOMA POLYPS IN SIGMOID, BARIUM ENEMA ORDERED, DR. LEO JAEGER AT [facility]   • CORONARY ANGIOPLASTY     • FRACTURE SURGERY Left 2023    IM nailing for L femur fracture       PT ASSESSMENT (last 12 hours)     IRF PT Evaluation and Treatment     Row Name 23 0800          PT Time and Intention    Document Type daily treatment  -AE     Mode of Treatment individual therapy;physical therapy  -AE     Patient/Family/Caregiver Comments/Observations 2 family members at bedside. prefers to concentrate on therapy without any visitors in gym  -AE     Row Name 23 0800          General Information    Patient Profile Reviewed yes  -AE      Existing Precautions/Restrictions hip;weight bearing;fall;other (see comments)  WBAT LLE  -AE     Row Name 05/19/23 0800          Pain Assessment    Pretreatment Pain Rating 2/10  -AE     Posttreatment Pain Rating 2/10  -AE     Pain Location - Side/Orientation Left  -AE     Pain Location proximal  -AE     Pain Location - hip  -AE     Row Name 05/19/23 0800          Cognition/Psychosocial    Affect/Mental Status (Cognition) WFL;confused  -AE     Orientation Status (Cognition) oriented x 3  -AE     Follows Commands (Cognition) 75-90% accuracy;follows one-step commands;physical/tactile prompts required;verbal cues/prompting required;visual cue  -AE     Personal Safety Interventions fall prevention program maintained;gait belt;muscle strengthening facilitated;nonskid shoes/slippers when out of bed;supervised activity  -AE     Row Name 05/19/23 0800          Bed Mobility    Rolling Left [place] (Bed Mobility) standby assist  -AE     Rolling Right [place] (Bed Mobility) standby assist  -AE     Supine-Sit [place] (Bed Mobility) standby assist  -AE     Sit-Supine [place] (Bed Mobility) standby assist  -AE     Comment, (Bed Mobility) flat mat no rails  -AE     Row Name 05/19/23 0800          Transfer Assessment/Treatment    Comment, (Transfers) forgetful. tends to turn too soon. constant cues for close proximity prior to pivot turn.  -AE     Row Name 05/19/23 0800          Bed-Chair Transfer    Bed-Chair [place] (Transfers) contact guard;minimum assist (75% patient effort)  -AE     Assistive Device (Bed-Chair Transfers) walker, front-wheeled  -AE     Row Name 05/19/23 0800          Chair-Bed Transfer    Chair-Bed [place] (Transfers) contact guard;minimum assist (75% patient effort)  -AE     Assistive Device (Chair-Bed Transfers) walker, front-wheeled  -AE     Row Name 05/19/23 0800          Sit-Stand Transfer    Sit-Stand [place] (Transfers) standby assist;contact guard  -AE     Assistive  Device (Sit-Stand Transfers) wheelchair;walker, front-wheeled  -AE     Row Name 05/19/23 0800          Stand-Sit Transfer    Stand-Sit [place] (Transfers) standby assist;contact guard  -AE     Assistive Device (Stand-Sit Transfers) wheelchair;walker, front-wheeled  -AE     Comment, (Stand-Sit Transfer) sometimes tends to sit too soon  -AE     Row Name 05/19/23 0800          Gait/Stairs (Locomotion)    Distance in Feet (Gait) FWW 160ft, 20ft x 4, rollator 160ft  -AE     Pattern (Gait) step-through  -AE     Deviations/Abnormal Patterns (Gait) bilateral deviations;base of support, narrow;gait speed decreased;stride length decreased;weight shifting decreased  -AE     Bilateral Gait Deviations heel strike decreased;weight shift ability decreased  -AE     Gait Assessment/Intervention increased postural sway highlighted with rollator compared to walker. tends to have excessive use of UE use. difficulty walking backwards, falls into retropulsion.  -AE     Row Name 05/19/23 0800          Motor Skills    Therapeutic Exercise core strength  -AE     Row Name 05/19/23 0800          Hip (Therapeutic Exercise)    Hip Strengthening (Therapeutic Exercise) bilateral;sidelying;external rotation;3 sets;5 repetitions  -AE     Row Name 05/19/23 0800          Knee (Therapeutic Exercise)    Knee Strengthening (Therapeutic Exercise) bilateral;SLR (straight leg raise);3 sets;5 repetitions  -AE     Row Name 05/19/23 0800          Core Strength (Therapeutic Exercise)    Core Strength (Therapeutic Exercise) bridging, bilateral lower extremities;3 sets;3 repetitions  -AE     Row Name 05/19/23 0800          Positioning and Restraints    Pre-Treatment Position sitting in chair/recliner  -AE     Post Treatment Position wheelchair  -AE     In Wheelchair sitting;call light within reach;encouraged to call for assist;exit alarm on  -AE           User Key  (r) = Recorded By, (t) = Taken By, (c) = Cosigned By    Initials Name Provider Type    AE  Mervat Light, JACK Physical Therapist              Wound 05/16/23 1831 Left lateral hip Incision (Active)   Dressing Appearance intact;dry 05/19/23 0750   Closure GUME 05/19/23 0750   Base dressing in place, unable to visualize 05/19/23 0750     Physical Therapy Education     Title: PT OT SLP Therapies (Done)     Topic: Physical Therapy (Done)     Point: Mobility training (Done)     Learning Progress Summary           Patient Acceptance, E,TB, VU,NR by MAURILIO at 5/18/2023 1206    Acceptance, E, VU by  at 5/17/2023 1524    Acceptance, E,TB, VU,NR by MAURILIO at 5/17/2023 1209                   Point: Home exercise program (Done)     Learning Progress Summary           Patient Acceptance, E, VU by  at 5/17/2023 1524                   Point: Body mechanics (Done)     Learning Progress Summary           Patient Acceptance, E, VU by  at 5/17/2023 1524                   Point: Precautions (Done)     Learning Progress Summary           Patient Acceptance, E, VU by  at 5/17/2023 1524                               User Key     Initials Effective Dates Name Provider Type Discipline    MAURILIO 06/16/21 -  Ashlie Arnold, PT Physical Therapist PT     08/02/22 -  Main De La Garza OT Occupational Therapist OT                PT Recommendation and Plan                          Time Calculation:      PT Charges     Row Name 05/19/23 1535 05/19/23 1216          Time Calculation    Start Time 1100  -AE 0830  -AE     Stop Time 1130  -AE 0900  -AE     Time Calculation (min) 30 min  -AE 30 min  -AE     PT Received On 05/19/23  -AE 05/19/23  -AE     PT - Next Appointment -- 05/20/23  -AE        Time Calculation- PT    Total Timed Code Minutes- PT 30 minute(s)  -AE 30 minute(s)  -AE           User Key  (r) = Recorded By, (t) = Taken By, (c) = Cosigned By    Initials Name Provider Type    Mervat Ortega, JACK Physical Therapist                Therapy Charges for Today     Code Description Service Date Service Provider Modifiers Qty     60448418051  GAIT TRAINING EA 15 MIN 5/19/2023 Mervat Light, PT GP 1    35660799294 HC PT NEUROMUSC RE EDUCATION EA 15 MIN 5/19/2023 Mervat Light, PT GP 1    11381667268 HC PT THER PROC EA 15 MIN 5/19/2023 Mervat Light, PT GP 1    55400945949 HC PT THERAPEUTIC ACT EA 15 MIN 5/19/2023 Mervat Light, PT GP 1                   Mervat Light, PT  5/19/2023

## 2023-05-20 PROCEDURE — 97535 SELF CARE MNGMENT TRAINING: CPT

## 2023-05-20 PROCEDURE — 97110 THERAPEUTIC EXERCISES: CPT

## 2023-05-20 PROCEDURE — 25010000002 HEPARIN (PORCINE) PER 1000 UNITS: Performed by: PHYSICAL MEDICINE & REHABILITATION

## 2023-05-20 PROCEDURE — 97130 THER IVNTJ EA ADDL 15 MIN: CPT

## 2023-05-20 PROCEDURE — 97530 THERAPEUTIC ACTIVITIES: CPT

## 2023-05-20 PROCEDURE — 97129 THER IVNTJ 1ST 15 MIN: CPT

## 2023-05-20 RX ADMIN — HYDROCODONE BITARTRATE AND ACETAMINOPHEN 1 TABLET: 5; 325 TABLET ORAL at 08:07

## 2023-05-20 RX ADMIN — DICLOFENAC SODIUM 2 G: 10 GEL TOPICAL at 08:06

## 2023-05-20 RX ADMIN — DULOXETINE HYDROCHLORIDE 30 MG: 30 CAPSULE, DELAYED RELEASE ORAL at 08:06

## 2023-05-20 RX ADMIN — AMLODIPINE BESYLATE 10 MG: 10 TABLET ORAL at 08:06

## 2023-05-20 RX ADMIN — QUETIAPINE FUMARATE 12.5 MG: 25 TABLET ORAL at 20:36

## 2023-05-20 RX ADMIN — DULOXETINE HYDROCHLORIDE 30 MG: 30 CAPSULE, DELAYED RELEASE ORAL at 20:36

## 2023-05-20 RX ADMIN — PANTOPRAZOLE SODIUM 40 MG: 40 TABLET, DELAYED RELEASE ORAL at 08:07

## 2023-05-20 RX ADMIN — LEVOTHYROXINE SODIUM 25 MCG: 0.03 TABLET ORAL at 05:41

## 2023-05-20 RX ADMIN — DICLOFENAC SODIUM 2 G: 10 GEL TOPICAL at 20:36

## 2023-05-20 RX ADMIN — HEPARIN SODIUM 5000 UNITS: 5000 INJECTION INTRAVENOUS; SUBCUTANEOUS at 20:36

## 2023-05-20 RX ADMIN — HEPARIN SODIUM 5000 UNITS: 5000 INJECTION INTRAVENOUS; SUBCUTANEOUS at 08:07

## 2023-05-20 RX ADMIN — HYDROCODONE BITARTRATE AND ACETAMINOPHEN 1 TABLET: 5; 325 TABLET ORAL at 18:23

## 2023-05-20 NOTE — THERAPY TREATMENT NOTE
Inpatient Rehabilitation - Speech Language Pathology Treatment Note    [facility]     Patient Name: Radha Azevedo  : 1935  MRN: [id number]    Today's Date: 2023                   Admit Date: 2023       Visit Dx:      ICD-10-CM ICD-9-CM   1. Impaired gait and mobility  R26.89 781.2       Patient Active Problem List   Diagnosis   • Absolute anemia   • Benign essential HTN   • Benign localized hyperplasia of prostate without urinary obstruction   • Type 2 diabetes mellitus with peripheral angiopathy   • Hypertension   • Hyperlipidemia   • Type 2 diabetes mellitus   • Diabetes mellitus type 2, noninsulin dependent   • Avitaminosis D   • Dyslipidemia   • Decrease in the ability to hear   • Personal history of colonic polyps   • Subdural hematoma       Past Medical History:   Diagnosis Date   • Colon polyps     Followed by Dr. Leo Jaeger at [facility].   • Coronary artery disease    • Diabetes mellitus    • Hyperlipidemia    • Hypertension        Past Surgical History:   Procedure Laterality Date   • CARDIAC CATHETERIZATION     • CAROTID STENT      patient denies, but family states he did have a stent placed   • COLONOSCOPY N/A 2014    Dr. Leo Jaeger at [facility].   • COLONOSCOPY N/A 2016    Dr. Leo Jaeger at [facility].   • COLONOSCOPY N/A 03/15/2023    2 TUBULAR ADENOMA POLYPS IN SIGMOID, BARIUM ENEMA ORDERED, DR. LEO JAEGER AT [facility]   • CORONARY ANGIOPLASTY     • FRACTURE SURGERY Left 2023    IM nailing for L femur fracture       SLP Recommendation and Plan                                                            SLP EVALUATION (last 72 hours)     SLP SLC Evaluation     Row Name 23 1400 23 0900 23 1400 23 1000          Communication Assessment/Intervention    Document Type therapy note (daily note)  -SR therapy note (daily note)  -SR therapy note (daily note)  -SR therapy note (daily note)  -SR     Subjective Information no complaints  -SR no complaints   -SR no complaints  -SR no complaints  -SR     Patient Observations alert;cooperative;agree to therapy  -SR alert;cooperative;agree to therapy  -SR alert;cooperative;agree to therapy  -SR alert;cooperative;agree to therapy  -SR     Patient Effort good  -SR good  -SR good  -SR good  -SR     Symptoms Noted During/After Treatment none  -SR none  -SR none  -SR none  -SR        Pain Scale: Numbers Pre/Post-Treatment    Pretreatment Pain Rating 0/10 - no pain  -SR 0/10 - no pain  -SR 0/10 - no pain  -SR 0/10 - no pain  -SR     Posttreatment Pain Rating 0/10 - no pain  -SR 0/10 - no pain  -SR 0/10 - no pain  -SR 0/10 - no pain  -SR           User Key  (r) = Recorded By, (t) = Taken By, (c) = Cosigned By    Initials Name Effective Dates    SR Hope Mary CCC-SLP 11/10/22 -                    EDUCATION    The patient has been educated in the following areas:       Cognitive Impairment.             SLP GOALS     Row Name 05/20/23 0800 05/19/23 1400 05/19/23 0900       Attention Goal 1 (SLP)    Improve Attention by Goal 1 (SLP) looking at speaker;80%  -LS complete selective attention task;complete sustained attention task;80%;independently (over 90% accuracy)  -SR --    Time Frame (Attention Goal 1, SLP) -- by discharge  -SR --    Progress/Outcomes (Attention Goal 1, SLP) -- goal ongoing  -SR --    Comment (Attention Goal 1, SLP) -- Sustained/selective attn; patient answered questions about everyday items (i.e, weather forecast, recipes, grocery list, etc) by locating target information on the label with 80% acc given MOD cues.  -SR --       Memory Skills Goal 1 (SLP)    Improve Memory Skills Through Goal 1 (SLP) -- use written schedule;use memory strategies;recall details of the day;80%;with minimal cues (75-90%)  -SR --    Time Frame (Memory Skills Goal 1, SLP) -- by discharge  -SR --    Comment (Memory Skills Goal 1, SLP) -- Patient participated in immediate memory task with use of the 3TEN8 Therapy los. Patient  listened to audio recording of a voicemail message and answered questions about the content with 80% acc given MIN cues. Accuracy increased given choice of three options per question.  -SR --       Reasoning Goal 1 (SLP)    Improve Reasoning Through Goal 1 (SLP) -- -- complete deductive reasoning task;complete mental flexibility task;80%;independently (over 90% accuracy)  -SR    Time Frame (Reasoning Goal 1, SLP) -- -- by discharge  -SR    Progress/Outcomes (Reasoning Goal 1, SLP) -- -- goal ongoing  -SR    Comment (Reasoning Goal 1, SLP) SLP provided  pt with a month from a calendar. Pt was asked to find a particular date given instructions.  The pt became very confused with the instructions.  SLP changed the task and  provided the pt with a  checkbook balance task.  Pt was given a balance, debits and credits   Pt required mod to max assistance to perform the task.  -LS -- Patient participated in checkbook balance task to target attention, working memory, and planning/organization. Extended time provided to complete task. Overall, patient completed x5 stimulus items given MOD-MAX verbal/visual cues. Demonstrated increased difficulty with working memory during task.  -SR       Executive Functional Skills Goal 1 (SLP)    Improve Executive Function Skills Goal 1 (SLP) -- -- home management activity;time management activity;80%;independently (over 90% accuracy)  -SR    Time Frame (Executive Function Skills Goal 1, SLP) -- -- by discharge  -SR    Progress/Outcomes (Executive Function Skills Goal 1, SLP) -- -- goal ongoing  -SR    Comment (Executive Function Skills Goal 1, SLP) -- -- Patient's son present for beginning of session. Reported that his brother has recently taken responsibility for patient's finances, however, the patient independently manages his medication. SLP provided education regarding results of assessment and plan of care. Both patient and son agreeable to recommendations.  -SR    Row Name 05/18/23  1400 05/18/23 1000          Attention Goal 1 (SLP)    Improve Attention by Goal 1 (SLP) complete selective attention task;complete sustained attention task;80%;independently (over 90% accuracy)  -SR --     Time Frame (Attention Goal 1, SLP) by discharge  -SR --     Progress/Outcomes (Attention Goal 1, SLP) goal ongoing  -SR --        Executive Functional Skills Goal 1 (SLP)    Improve Executive Function Skills Goal 1 (SLP) home management activity;time management activity;80%;independently (over 90% accuracy)  -SR home management activity;time management activity;80%;independently (over 90% accuracy)  -SR     Time Frame (Executive Function Skills Goal 1, SLP) by discharge  -SR by discharge  -SR     Progress/Outcomes (Executive Function Skills Goal 1, SLP) goal ongoing  -SR goal ongoing  -SR     Comment (Executive Function Skills Goal 1, SLP) Simple medication management task completed. Patient followed written/verbal directions and sorted 10 mock medications by day/time with 40% acc given MIN cues; 70% acc given MOD cues; 100% acc given MAX cues. Increased difficulty noted with working memory. Patient re-read directions multiple times prior to carrying out the task. Extended time provided to complete activity.  -SR Checkbook balance activity completed during AM therapy session to target attention, executive function, and planning/organization. Overall, patient required MOD-MAX cues to determine the final balance given transaction/deposit amounts. Demonstrated increased difficulty with working memory and attention. Repetition of instruction required throughout the task.  -SR           User Key  (r) = Recorded By, (t) = Taken By, (c) = Cosigned By    Initials Name Provider Type    Doe Miller MS CCC-SLP Speech and Language Pathologist     Hope Mary CCC-SLP Speech and Language Pathologist                            Time Calculation:        Time Calculation- SLP     Row Name 05/20/23 1428 05/20/23 0891           Time Calculation- SLP    SLP Start Time 0800  -LS 0800  -LS     SLP Stop Time 0900  -LS --     SLP Time Calculation (min) 60 min  -LS --           User Key  (r) = Recorded By, (t) = Taken By, (c) = Cosigned By    Initials Name Provider Type    Doe Miller MS CCC-SLP Speech and Language Pathologist                  Therapy Charges for Today     Code Description Service Date Service Provider Modifiers Qty    99508396675 HC ST DEV OF COGN SKILLS INITIAL 15 MIN 5/20/2023 Doe Yung MS CCC-SLP  1    [id number] HC ST DEV OF COGN SKILLS EACH ADDT'L 15 MIN 5/20/2023 Doe Yung MS CCC-CECILIA  3                           MS KATIE Betancourt  5/20/2023

## 2023-05-20 NOTE — PROGRESS NOTES
[facility]     Progress Note    Patient Name: Radha Azevedo  : 1935  MRN: [id number]  Primary Care Physician:  Amina Jiang MD  Date of admission: 2023    Subjective Pt alert and appropriately responsive. Denies H/A, N/V, F/C, CP, SOB. Son present.   Subjective     Chief Complaint: Cognitive and fxnal deficits related to traumatic SDH    History of Present Illness  88yo M with PMH of HTN and depression/mood disorder who presented to [facility] after a fall and subsequent L femoral neck fracture s/p IM nail placement on 2023 and SDH on the frontoparietal region. This past year the patient's wife passed away and he was placed on Xanax 0.25 PRN for anxiety and mood changes. In 2023, he stated that he stopped taking Xanax abruptly and had a fall. He was diagnosed with a nondisplaced L femoral neck fracture at that time and did not require surgical intervention. He was discharged to subacute rehab from 4/15/2023 to 2023. He had a fall in subacute rehab while transferring to the bed, falling on his L side and hitting the back of his head, and was transferred to [facility] ED. CT head revealed frontoparietal SDH at that time and did not require surgical intervention. Xray of L hip/femur displaced L femoral fracture and underwent IM Nail placement on 2023. Patient is WBAT in LLE. Also, patient had a laceration on the posterior head which required sutures while in the ED. Hospital course was complicated by hypothyroidism with increased TSH and was started on Synthroid 25mcg daily. Patient states that he had significant weight loss and depression over the past year since his wifes passing. Hyponatremia during course possibly related to hypothyroidism as well. Patient worked with therapies during acute care stay and inpatient rehab was recommended.    Review of Systems Denies CP, SOB, N/V F/C      Objective     Vitals:   Temp:  [97 °F (36.1 °C)-98.1 °F (36.7 °C)] 97.4 °F (36.3  °C)  Heart Rate:  [61-66] 66  Resp:  [18] 18  BP: (105-122)/(62-64) 118/64    Physical Exam   General: The patient is well-developed, well-nourished and in no apparent distress.   HEENT: EOMI, hearing intact b/l. MMM. Sutures intact in occipital region   Respiratory: Lungs CTAB, no wheezes or rhonchi     Cardiac: RRR, no m/r/g, no pedal edema     Abdomen: soft, NT abdomen     Skin: L femoral IM placement incisions clean with sutures in place   Psych: Appropriate affect and behavior         Neuro:  Mental Status:  AOx4   Speech: fluent without dysarthria or scanning.   CN II-XII: grossly intact, EOMI, PERRL, no visual field cut, tongue midline   Tone (Modified Vera Scale): 0/4 in upper & lower ext bilaterally.   MMT:   LUE 5/5 EF, 5/5 EE, 5/5 WE, 5/5  strength, 5/5 finger abduction   RUE 5/5 EF, 5/5 EE, 5/5 WE, 5/5  strength, 5/5 finger abduction   RLE 5/5 HF, 5/5 KE, 5/5 DF, 5/5 great toe ext, 5/5 PF   LLE 5/5 HF, 5/5 KE, 5/5 DF, 5/5 great toe ext, 5/5 PF    Reflexes: DTRs 2+ on B/L upper and lower extremities. Hoffmans negative B/L   Sensation: sensation appears to be intact in all extremities           Assessment & Plan  Continue eval phase.   Assessment / Plan     Brief Patient Summary:   88yo M with PMH of HTN and depression/mood disorder who presented to [facility] after a fall and subsequent L femoral neck fracture s/p IM nail placement on 5/8/2023 and SDH on the frontoparietal region. This past year the patient's wife passed away and he was placed on Xanax 0.25 PRN for anxiety and mood changes. In April 2023, he stated that he stopped taking Xanax abruptly and had a fall. He was diagnosed with a nondisplaced L femoral neck fracture at that time and did not require surgical intervention. He was discharged to subacute rehab from 4/15/2023 to 4/23/2023. He had a fall in subacute rehab while transferring to the bed, falling on his L side and hitting the back of his head, and was transferred  to [facility] ED. CT head revealed frontoparietal SDH at that time and did not require surgical intervention. Xray of L hip/femur displaced L femoral fracture and underwent IM Nail placement on 5/8/2023. Patient is WBAT in LLE. Also, patient had a laceration on the posterior head which required sutures while in the ED. Hospital course was complicated by hypothyroidism with increased TSH and was started on Synthroid 25mcg daily. Patient states that he had significant weight loss and depression over the past year since his wifes passing. Hyponatremia during course possibly related to hypothyroidism as well. Patient worked with therapies during acute care stay and inpatient rehab was recommended.    Active Hospital Problems:  Active Hospital Problems    Diagnosis    • **Subdural hematoma        Plan:   Impairments: ADLs, endurance, gait, balance, transfers, cognition, comprehension     Now admit for comprehensive acute inpatient rehabilitation .  This would be an interdisciplinary program with physical therapy 1 hour,  occupational therapy 1 hour, and speech therapy 1 hour, 5 days a week.  Rehabilitation nursing for carryover, monitoring of neurologic status, bowel and bladder, and skin  Ongoing physician follow-up.  Weekly team conferences.  Goals are to achieve a level of supervision with  mobility and self-care and improved ADLs.   Rehabilitation prognosis good.  Medical prognosis good.  Estimated length of stay is approximately 1-2 weeks , but is only an estimation.         #Functional Impairment 2/2 SDH and L femur fracture   -Initiate comprehensive rehab program consisting of PT/OT/SLP 3 hours/day, 5 days/week with medical management of above disorder, comorbidities, pain, bowels, and bladder.       -WB/activity status:?no restrictions, WBAT        #SDH  -No gross neurologic deficits on exam  -Will order CT Head for baseline or transfer imaging from [facility]     #L displaced femur fracture  -S/p L IM Nail placement in L  femur  -WBAT in LLE      #Pain Control     -Prn Norco 5 q4h        #Hypothyroidism  - on admission  -Significant weight loss and depression over the past year  -Continue Synthroid 25 daily  -Will follow-up on TSH and T4 values tomorrow  - 5/20 - I will increase synthroid to 50mcg daily and monitor TSH and Na+     #Hyponatremia  -Na 128 on admission, 128-130 a NBH. -Fluid restriction to 1500ml daily  -Possibly related to hypothyroidism  - 5/20 - I will increase synthroid to 50mcg daily and monitor TSH and Na+  -Will continue to monitor     #HTN  -Continue Norvasc 5 daily     #Mood disorder  -Continue Cymbalta 30 BID and Seroquel 12.5 at bedtime   -PRN Xanax held because cause of his falls     #FEN     -nutrition: Cardiac diet, Regular texture, Liquid Consistency: Thin Liquid. Fluid restriction 1500cc daily   -admit labs reviewed      #PPx    -DVT: Heparin 5000U q8h          DVT prophylaxis:  Medical and mechanical DVT prophylaxis orders are present.    CODE STATUS:    Medical Intervention Limits: NO intubation (DNI)  Level Of Support Discussed With: Patient; Next of Kin (If No Surrogate)  Code Status (Patient has no pulse and is not breathing): CPR (Attempt to Resuscitate)  Medical Interventions (Patient has pulse or is breathing): Limited Support  Comments: Discussed with patient and son  Release to patient: Routine Release    Disposition:  I expect patient to be discharged     Artemio Omalley MD

## 2023-05-20 NOTE — THERAPY TREATMENT NOTE
Inpatient Rehabilitation - Occupational Therapy Treatment Note    [facility]     Patient Name: Radha Azevedo  : 1935  MRN: [id number]    Today's Date: 2023                 Admit Date: 2023         ICD-10-CM ICD-9-CM   1. Impaired gait and mobility  R26.89 781.2       Patient Active Problem List   Diagnosis   • Absolute anemia   • Benign essential HTN   • Benign localized hyperplasia of prostate without urinary obstruction   • Type 2 diabetes mellitus with peripheral angiopathy   • Hypertension   • Hyperlipidemia   • Type 2 diabetes mellitus   • Diabetes mellitus type 2, noninsulin dependent   • Avitaminosis D   • Dyslipidemia   • Decrease in the ability to hear   • Personal history of colonic polyps   • Subdural hematoma       Past Medical History:   Diagnosis Date   • Colon polyps     Followed by Dr. Leo Jaeger at [facility].   • Coronary artery disease    • Diabetes mellitus    • Hyperlipidemia    • Hypertension        Past Surgical History:   Procedure Laterality Date   • CARDIAC CATHETERIZATION     • CAROTID STENT      patient denies, but family states he did have a stent placed   • COLONOSCOPY N/A 2014    Dr. Leo Jaeger at [facility].   • COLONOSCOPY N/A 2016    Dr. Leo Jaeger at [facility].   • COLONOSCOPY N/A 03/15/2023    2 TUBULAR ADENOMA POLYPS IN SIGMOID, BARIUM ENEMA ORDERED, DR. LEO JAEGER AT [facility]   • CORONARY ANGIOPLASTY     • FRACTURE SURGERY Left 2023    IM nailing for L femur fracture             IRF OT ASSESSMENT FLOWSHEET (last 12 hours)     IRF OT Evaluation and Treatment     Row Name 23 1330 23 1100       OT Time and Intention    Document Type daily treatment  -CE daily treatment  -CE    Mode of Treatment occupational therapy  -CE occupational therapy  -CE    Total Minutes, Occupational Therapy 15  -CE 45  -CE    Patient Effort good  -CE good  -CE    Symptoms Noted During/After Treatment increased pain  -CE --    Row Name 23 1330 23  1100       General Information    Patient Profile Reviewed yes  -CE yes  -CE    Patient/Family/Caregiver Comments/Observations pt up in w/c prior to tx; son present  -CE pt seated in w/c prior to session  -CE    Existing Precautions/Restrictions fall;left  WBAT  -CE fall;left  WBAT  -CE    Row Name 05/20/23 1330 05/20/23 1100       Pain Assessment    Pretreatment Pain Rating 6/10  -CE 0/10 - no pain  -CE    Posttreatment Pain Rating 6/10  -CE 0/10 - no pain  -CE    Pain Location - Side/Orientation Left  -CE Left  -CE    Pain Location - hip  -CE hip  -CE    Additional Documentation --  treated with rest and repositioning  -CE --    Row Name 05/20/23 1330 05/20/23 1100       Cognition/Psychosocial    Affect/Mental Status (Cognition) WFL  -CE WFL  -CE    Follows Commands (Cognition) -- follows two-step commands;repetition of directions required  -CE    Personal Safety Interventions -- fall prevention program maintained  -CE    Row Name 05/20/23 1100          Basic Activities of Daily Living (BADLs)    72193 - OT Self Care/Mgmt Minutes 45  -CE     Row Name 05/20/23 1100          Bathing    [place] Level (Bathing) bathing skills;lower body;upper body;minimum assist (75% patient effort)  alternating sit<>stand  -CE     Assistive Device (Bathing) shower chair;grab bar/tub rail;hand held shower spray hose  -CE     Position (Bathing) supported sitting;supported standing  -CE     Set-up Assistance (Bathing) adjust water temperature;obtain supplies  -CE     Row Name 05/20/23 1100          Upper Body Dressing    [place] Level (Upper Body Dressing) doff;don;pull over garment;set up assistance  -CE     Position (Upper Body Dressing) supported sitting  -CE     Set-up Assistance (Upper Body Dressing) obtain clothing  -CE     Row Name 05/20/23 1100          Lower Body Dressing    [place] Level (Lower Body Dressing) doff;don;pants/bottoms;shoes/slippers;socks;underwear;moderate assist (50% patient effort)  assist for  donning socks and shoes  -CE     Position (Lower Body Dressing) --  alternating sit<>stand from shower chair using grab bar  -CE     Set-up Assistance (Lower Body Dressing) obtain clothing  -CE     Row Name 05/20/23 1100          Grooming    [place] Level (Grooming) grooming skills;hair care, combing/brushing;oral care regimen;shave face;set up;contact guard assist  -CE     Position (Grooming) sink side;unsupported standing  -CE     Set-up Assistance (Grooming) obtain supplies  -CE     Row Name 05/20/23 1100          Toileting    [place] Level (Toileting) contact guard assist  -CE     Assistive Device Use (Toileting) grab bar/safety frame  -CE     Position (Toileting) supported standing  -CE     Comment (Toileting) standing at toilet  -CE     Row Name 05/20/23 1330          Bed Mobility    Bed Mobility sit-supine  -CE     Sit-Supine [place] (Bed Mobility) standby assist  -CE     Row Name 05/20/23 1330          Functional Mobility    Functional Mobility- Ind. Level contact guard assist  -CE     Functional Mobility- Device walker, front-wheeled  -CE     Functional Mobility-Maintain WBing able to maintain weight bearing status  -CE     Functional Mobility- Safety Issues balance decreased during turns  putting AD too far in front of self at times; cues to slow down  -CE     Functional Mobility- Comment Pt able to ambulate from gym back to room using FWW and CGA. Pt able to complete without stopping for seated or standing rest focusing on increasing functional endurance in prep for I/ADLs at home.  -CE     Row Name 05/20/23 1330 05/20/23 1100       Sit-Stand Transfer    Sit-Stand [place] (Transfers) standby assist;verbal cues  cues for hand placement  -CE standby assist;verbal cues;1 person assist  -CE    Assistive Device (Sit-Stand Transfers) walker, front-wheeled  -CE walker, front-wheeled  -CE    Row Name 05/20/23 1100          Stand-Sit Transfer    Stand-Sit [place] (Transfers) standby assist   -CE     Assistive Device (Stand-Sit Transfers) wheelchair;walker, front-wheeled  -CE     Row Name 05/20/23 1100          Shower Transfer    Type (Shower Transfer) sit-stand;stand-sit  -CE     [place] Level (Shower Transfer) minimum assist (75% patient effort);1 person assist  -CE     Assistive Device (Shower Transfer) grab bar, tub/shower;tub bench;walker, front-wheeled  -CE     Row Name 05/20/23 1330          Motor Skills    Motor Skills coordination  -CE     Coordination fine motor deficit;bilateral;upper extremity;bimanual skills  completing standing bimanula FM task with CGA for safety. Pt noting increasing pain in R hand (2/2 long-standing arthritis?). Noted to have drecreased /pinch strength to complete task without OT loosening bolts to beging threading/unthreading.  -CE     Therapeutic Exercise aerobic  -CE     Row Name 05/20/23 1330          Aerobic Exercise    Type (Aerobic Exercise) arm bike;for 5 minutes  -CE     Comment, Aerobic Exercise (Therapeutic Exercise) completed standing with SBA for safety  -CE     Row Name 05/20/23 1330 05/20/23 1100       Positioning and Restraints    Pre-Treatment Position sitting in chair/recliner  -CE sitting in chair/recliner  -CE    Post Treatment Position bed  -CE wheelchair  -CE    In Bed supine;call light within reach;exit alarm on;encouraged to call for assist;with family/caregiver  -CE --    In Wheelchair -- sitting;call light within reach;encouraged to call for assist;exit alarm on;with family/caregiver  -CE          User Key  (r) = Recorded By, (t) = Taken By, (c) = Cosigned By    Initials Name Effective Dates    CE Carrie Morales OT 10/17/22 -                  Occupational Therapy Education     Title: PT OT SLP Therapies (In Progress)     Topic: Occupational Therapy (Done)     Point: ADL training (Done)     Description:   Instruct learner(s) on proper safety adaptation and remediation techniques during self care or transfers.   Instruct in proper  use of assistive devices.              Learning Progress Summary           Patient Acceptance, E, VU by WN at 5/19/2023 2232    Acceptance, E, VU by KN at 5/17/2023 1524                   Point: Home exercise program (Done)     Description:   Instruct learner(s) on appropriate technique for monitoring, assisting and/or progressing therapeutic exercises/activities.              Learning Progress Summary           Patient Acceptance, E, VU by WN at 5/19/2023 2232    Acceptance, E, VU by KN at 5/17/2023 1524                   Point: Precautions (Done)     Description:   Instruct learner(s) on prescribed precautions during self-care and functional transfers.              Learning Progress Summary           Patient Acceptance, E, VU by WN at 5/19/2023 2232    Acceptance, E, VU by DARVIN at 5/17/2023 1524                   Point: Body mechanics (Done)     Description:   Instruct learner(s) on proper positioning and spine alignment during self-care, functional mobility activities and/or exercises.              Learning Progress Summary           Patient Acceptance, E, VU by WN at 5/19/2023 2232    Acceptance, E, VU by DARVIN at 5/17/2023 1524                               User Key     Initials Effective Dates Name Provider Type Discipline    RICKI 08/23/22 -  Kamaljit Elias, RN Registered Nurse Nurse    DARVIN 08/02/22 -  Main De La Garza OT Occupational Therapist OT                    OT Recommendation and Plan            Daily Progress Summary (OT)  Overall Progress Toward Functional Goals (OT): progressing toward functional goals as expected            Time Calculation:      Time Calculation- OT     Row Name 05/20/23 1415 05/20/23 1405 05/20/23 1100       Time Calculation- OT    OT Start Time 1330  -CE 1100  -CE --    OT Stop Time 1345  -CE 1145  -CE --    OT Time Calculation (min) 15 min  -CE 45 min  -CE --    Total Timed Code Minutes- OT 15 minute(s)  -CE 45 minute(s)  -CE --       Timed Charges    88182 - OT Therapeutic Activity  Minutes 15  -CE -- --    16381 - OT Self Care/Mgmt Minutes -- 45  -CE 45  -CE       Total Minutes    Timed Charges Total Minutes 15  -CE 45  -CE 45  -CE     Total Minutes 15  -CE 45  -CE 45  -CE          User Key  (r) = Recorded By, (t) = Taken By, (c) = Cosigned By    Initials Name Provider Type    Carrie Matthew OT Occupational Therapist              Therapy Charges for Today     Code Description Service Date Service Provider Modifiers Qty    27555307521  OT SELF CARE/MGMT/TRAIN EA 15 MIN 5/20/2023 Carrie Morales OT GO 3    [id number]  OT THERAPEUTIC ACT EA 15 MIN 5/20/2023 Carrie Morales OT GO 1                   Carrie Morales OT  5/20/2023

## 2023-05-20 NOTE — PROGRESS NOTES
Inpatient Rehabilitation Plan of Care Note    Plan of Care  Care Plan Reviewed - No updates at this time.    Psychosocial    Performed Intervention(s)  Medication.  Group therapy.      Safety    Performed Intervention(s)  Items within reach.  Safety rounds.  Wheelchair, bed, and chair alarms.      Sphincter Control    Performed Intervention(s)  Offer restroom.  Bladder training program.  Use incontinence products.  Encourage appropriate diet.  Encourage fluid intake.    Signed by: Kamaljit Elias RN

## 2023-05-20 NOTE — THERAPY TREATMENT NOTE
Inpatient Rehabilitation - Physical Therapy Treatment Note       [facility]     Patient Name: Radha Azevedo  : 1935  MRN: [id number]    Today's Date: 2023                    Admit Date: 2023      Visit Dx:     ICD-10-CM ICD-9-CM   1. Impaired gait and mobility  R26.89 781.2       Patient Active Problem List   Diagnosis   • Absolute anemia   • Benign essential HTN   • Benign localized hyperplasia of prostate without urinary obstruction   • Type 2 diabetes mellitus with peripheral angiopathy   • Hypertension   • Hyperlipidemia   • Type 2 diabetes mellitus   • Diabetes mellitus type 2, noninsulin dependent   • Avitaminosis D   • Dyslipidemia   • Decrease in the ability to hear   • Personal history of colonic polyps   • Subdural hematoma       Past Medical History:   Diagnosis Date   • Colon polyps     Followed by Dr. Leo Jaeger at [facility].   • Coronary artery disease    • Diabetes mellitus    • Hyperlipidemia    • Hypertension        Past Surgical History:   Procedure Laterality Date   • CARDIAC CATHETERIZATION     • CAROTID STENT      patient denies, but family states he did have a stent placed   • COLONOSCOPY N/A 2014    Dr. Leo Jaeger at [facility].   • COLONOSCOPY N/A 2016    Dr. Leo Jaeger at [facility].   • COLONOSCOPY N/A 03/15/2023    2 TUBULAR ADENOMA POLYPS IN SIGMOID, BARIUM ENEMA ORDERED, DR. LEO JAEGER AT [facility]   • CORONARY ANGIOPLASTY     • FRACTURE SURGERY Left 2023    IM nailing for L femur fracture       PT ASSESSMENT (last 12 hours)     IRF PT Evaluation and Treatment     Row Name 23 0980          PT Time and Intention    Document Type daily treatment  -     Mode of Treatment physical therapy  -     Patient/Family/Caregiver Comments/Observations pt seated in WC no acute distress  -     Row Name 23 0911          General Information    Patient Profile Reviewed yes  -     Existing Precautions/Restrictions hip;left  WBAT  -     Row Name 23  0925          Pain Assessment    Pretreatment Pain Rating 0/10 - no pain  -     Posttreatment Pain Rating 0/10 - no pain  -     Row Name 05/20/23 0925          Cognition/Psychosocial    Orientation Status (Cognition) oriented x 3  -LH     Follows Commands (Cognition) follows one-step commands;75-90% accuracy;repetition of directions required  -     Personal Safety Interventions fall prevention program maintained;gait belt;supervised activity  -     Row Name 05/20/23 0925          Mobility    Extremity Weight-bearing Status left lower extremity  -     Left Lower Extremity (Weight-bearing Status) weight-bearing as tolerated (WBAT)  -     Advanced Gait Activity other (see comments)  figure 8 around 3 lg cones CGA rwx Vcs for smoother navigation w tight turning  -     Row Name 05/20/23 0925          Sit-Stand Transfer    Sit-Stand [place] (Transfers) standby assist;contact guard  -     Assistive Device (Sit-Stand Transfers) wheelchair;walker, front-wheeled  -     Row Name 05/20/23 0925          Stand-Sit Transfer    Stand-Sit [place] (Transfers) standby assist;contact guard  -     Assistive Device (Stand-Sit Transfers) wheelchair;walker, front-wheeled  -     Row Name 05/20/23 0925          Gait/Stairs (Locomotion)    [place] Level (Gait) contact guard;verbal cues  -     Assistive Device (Gait) walker, front-wheeled  -     Distance in Feet (Gait) 160x3  -     Pattern (Gait) step-through  -     Deviations/Abnormal Patterns (Gait) bilateral deviations;base of support, narrow;gait speed decreased;stride length decreased;weight shifting decreased  -     Bilateral Gait Deviations forward flexed posture;heel strike decreased  -     Left Sided Gait Deviations weight shift ability decreased  -     Comment, (Gait/Stairs) quick turns, Vcs for slower smoother navigation w turning  -     Row Name 05/20/23 0925          Balance    Balance Interventions standing;moderate  challenge;supported  ball toss and balloon taps  -     Row Name 05/20/23 0925          Motor Skills    Therapeutic Exercise --  APs, LAQ, MIP, ham curls RTB, hip abd RTB x 20  -     Row Name 05/20/23 0925          Positioning and Restraints    Pre-Treatment Position sitting in chair/recliner  -     Post Treatment Position wheelchair  -     In Wheelchair sitting;call light within reach;encouraged to call for assist;exit alarm on  -           User Key  (r) = Recorded By, (t) = Taken By, (c) = Cosigned By    Initials Name Provider Type     Aicha Payne, PT Physical Therapist              Wound 05/16/23 1831 Left lateral hip Incision (Active)   Dressing Appearance dry;intact 05/19/23 2026   Closure GUME 05/19/23 2026   Base dressing in place, unable to visualize 05/19/23 2026     Physical Therapy Education     Title: PT OT SLP Therapies (In Progress)     Topic: Physical Therapy (In Progress)     Point: Mobility training (In Progress)     Learning Progress Summary           Patient Acceptance, E, NR by  at 5/20/2023 0927    Acceptance, E, VU by WN at 5/19/2023 2232    Acceptance, E,TB, VU,NR by MAURILIO at 5/18/2023 1206    Acceptance, E, VU by DARVIN at 5/17/2023 1524    Acceptance, E,TB, VU,NR by MAURILIO at 5/17/2023 1209                   Point: Home exercise program (In Progress)     Learning Progress Summary           Patient Acceptance, E, NR by  at 5/20/2023 0927    Acceptance, E, VU by WN at 5/19/2023 2232    Acceptance, E, VU by DARVIN at 5/17/2023 1524                   Point: Body mechanics (In Progress)     Learning Progress Summary           Patient Acceptance, E, NR by  at 5/20/2023 0927    Acceptance, E, VU by WN at 5/19/2023 2232    Acceptance, E, VU by DARVIN at 5/17/2023 1524                   Point: Precautions (In Progress)     Learning Progress Summary           Patient Acceptance, E, NR by  at 5/20/2023 0927    Acceptance, E, VU by WN at 5/19/2023 2232    Acceptance, E, VU by DARVIN at 5/17/2023 1524                                User Key     Initials Effective Dates Name Provider Type Discipline     06/16/21 -  Ashlie Arnold, PT Physical Therapist PT     06/16/21 -  Aicha Payne, PT Physical Therapist PT     08/23/22 -  Kamaljit Elias, RN Registered Nurse Nurse     08/02/22 -  Main De La Garza OT Occupational Therapist OT                PT Recommendation and Plan                          Time Calculation:      PT Charges     Row Name 05/20/23 0927             Time Calculation    Start Time 0900  -      Stop Time 1000  -      Time Calculation (min) 60 min  -      PT Received On 05/20/23  -      PT - Next Appointment 05/22/23  -         Time Calculation- PT    Total Timed Code Minutes- PT 60 minute(s)  -            User Key  (r) = Recorded By, (t) = Taken By, (c) = Cosigned By    Initials Name Provider Type     iAcha Payne, PT Physical Therapist                Therapy Charges for Today     Code Description Service Date Service Provider Modifiers Qty    14814099005 HC PT THER PROC EA 15 MIN 5/20/2023 Aicha Payne, PT GP 2    69645458226 HC PT THERAPEUTIC ACT EA 15 MIN 5/20/2023 Aicha Payne, PT GP 2                   Aicha Payne, PT  5/20/2023

## 2023-05-20 NOTE — PLAN OF CARE
Problem: Skin Injury Risk Increased  Goal: Skin Health and Integrity  Outcome: Ongoing, Progressing  Intervention: Promote and Optimize Oral Intake  Recent Flowsheet Documentation  Taken 5/19/2023 2026 by Kamaljit Elias RN  Oral Nutrition Promotion:   social interaction promoted   physical activity promoted  Intervention: Optimize Skin Protection  Recent Flowsheet Documentation  Taken 5/19/2023 2026 by Kamaljit Elias RN  Pressure Reduction Techniques:   weight shift assistance provided   frequent weight shift encouraged  Head of Bed (HOB) Positioning: HOB lowered  Pressure Reduction Devices: positioning supports utilized  Skin Protection: adhesive use limited     Problem: Fall Injury Risk  Goal: Absence of Fall and Fall-Related Injury  Outcome: Ongoing, Progressing  Intervention: Identify and Manage Contributors  Recent Flowsheet Documentation  Taken 5/19/2023 2026 by Kamaljit Elias RN  Medication Review/Management: medications reviewed  Self-Care Promotion: independence encouraged  Intervention: Promote Injury-Free Environment  Recent Flowsheet Documentation  Taken 5/19/2023 2026 by Kamaljit Elias RN  Safety Promotion/Fall Prevention:   safety round/check completed   activity supervised   assistive device/personal items within reach   clutter free environment maintained   fall prevention program maintained   lighting adjusted   nonskid shoes/slippers when out of bed   room organization consistent     Problem: Rehabilitation (IRF) Plan of Care  Goal: Plan of Care Review  Outcome: Ongoing, Progressing  Flowsheets (Taken 5/19/2023 2232)  Progress: improving  Plan of Care Reviewed With: patient  Outcome Evaluation: left hip surgical incision appears with dressing in place, pt with forgetifullness, safety education completed, NAD noted  Goal: Patient-Specific Goal (Individualized)  Outcome: Ongoing, Progressing  Goal: Absence of New-Onset Illness or Injury  Outcome: Ongoing, Progressing  Intervention:  Prevent Fall and Fall Injury  Recent Flowsheet Documentation  Taken 5/19/2023 2026 by Kamaljit Elisa RN  Safety Promotion/Fall Prevention:   safety round/check completed   activity supervised   assistive device/personal items within reach   clutter free environment maintained   fall prevention program maintained   lighting adjusted   nonskid shoes/slippers when out of bed   room organization consistent  Intervention: Prevent Infection  Recent Flowsheet Documentation  Taken 5/19/2023 2026 by Kamaljit Elias RN  Infection Prevention:   environmental surveillance performed   hand hygiene promoted   single patient room provided  Intervention: Prevent VTE (Venous Thromboembolism)  Recent Flowsheet Documentation  Taken 5/19/2023 2026 by Kamaljit Elias RN  VTE Prevention/Management:   bilateral   sequential compression devices off   patient refused intervention  Goal: Optimal Comfort and Wellbeing  Outcome: Ongoing, Progressing  Goal: Home and Community Transition Plan Established  Outcome: Ongoing, Progressing     Problem: Hypertension Comorbidity  Goal: Blood Pressure in Desired Range  Outcome: Ongoing, Progressing  Intervention: Maintain Blood Pressure Management  Recent Flowsheet Documentation  Taken 5/19/2023 2026 by Kamaljit Elias RN  Medication Review/Management: medications reviewed   Goal Outcome Evaluation:  Plan of Care Reviewed With: patient        Progress: improving  Outcome Evaluation: left hip surgical incision appears with dressing in place, pt with forgetifullness, safety education completed, NAD noted

## 2023-05-20 NOTE — PLAN OF CARE
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 Subdural hematoma after a fall. Femur break. Hx. glaucoma, CAD, HLD, HTN, DM2, kyphoplasty. Lost 40 lbs. Health issues more controlled since weight-loss. L. hip border dressing intact. New diagnosis of hypothyroidism. Is on synthroid. Sodium is low. Meds whole with water. Fluid restriction to 1500 ml/daily. 240ml/ per tray. Continent B&B. Last BM 5/20. Participated fully in therapy. Has voltaren gel at bedside. Assistx1 walker or wheelchair. Code status; no intubation.

## 2023-05-21 PROCEDURE — 25010000002 HEPARIN (PORCINE) PER 1000 UNITS: Performed by: PHYSICAL MEDICINE & REHABILITATION

## 2023-05-21 RX ADMIN — DULOXETINE HYDROCHLORIDE 30 MG: 30 CAPSULE, DELAYED RELEASE ORAL at 09:00

## 2023-05-21 RX ADMIN — HYDROCODONE BITARTRATE AND ACETAMINOPHEN 1 TABLET: 5; 325 TABLET ORAL at 09:01

## 2023-05-21 RX ADMIN — DICLOFENAC SODIUM 2 G: 10 GEL TOPICAL at 09:01

## 2023-05-21 RX ADMIN — PANTOPRAZOLE SODIUM 40 MG: 40 TABLET, DELAYED RELEASE ORAL at 09:00

## 2023-05-21 RX ADMIN — LEVOTHYROXINE SODIUM 25 MCG: 0.03 TABLET ORAL at 06:22

## 2023-05-21 RX ADMIN — HEPARIN SODIUM 5000 UNITS: 5000 INJECTION INTRAVENOUS; SUBCUTANEOUS at 20:45

## 2023-05-21 RX ADMIN — QUETIAPINE FUMARATE 12.5 MG: 25 TABLET ORAL at 20:46

## 2023-05-21 RX ADMIN — DICLOFENAC SODIUM 2 G: 10 GEL TOPICAL at 20:47

## 2023-05-21 RX ADMIN — HYDROCODONE BITARTRATE AND ACETAMINOPHEN 1 TABLET: 5; 325 TABLET ORAL at 20:44

## 2023-05-21 RX ADMIN — DULOXETINE HYDROCHLORIDE 30 MG: 30 CAPSULE, DELAYED RELEASE ORAL at 20:46

## 2023-05-21 RX ADMIN — AMLODIPINE BESYLATE 10 MG: 10 TABLET ORAL at 09:00

## 2023-05-21 RX ADMIN — HEPARIN SODIUM 5000 UNITS: 5000 INJECTION INTRAVENOUS; SUBCUTANEOUS at 09:01

## 2023-05-21 NOTE — PLAN OF CARE
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 Subdural hematoma after a fall. Femur break. Hx. glaucoma, CAD, HLD, HTN, DM2, kyphoplasty. Lost 40 lbs. Health issues more controlled since weight-loss. L. hip border dressing intact. New diagnosis of hypothyroidism. Is on synthroid. Sodium is low. Meds whole with water. Fluid restriction to 1500 ml/daily. 240ml/ per tray. Continent B&B. Last BM 5/21. Participated fully in therapy. Has voltaren gel at bedside. Assistx1 walker or wheelchair. Code status; no intubation.

## 2023-05-21 NOTE — PLAN OF CARE
Problem: Skin Injury Risk Increased  Goal: Skin Health and Integrity  Outcome: Ongoing, Progressing  Intervention: Promote and Optimize Oral Intake  Recent Flowsheet Documentation  Taken 5/20/2023 2048 by Kamaljit Elias RN  Oral Nutrition Promotion:   physical activity promoted   social interaction promoted  Intervention: Optimize Skin Protection  Recent Flowsheet Documentation  Taken 5/20/2023 2048 by Kamaljit Elias RN  Pressure Reduction Techniques:   frequent weight shift encouraged   weight shift assistance provided  Head of Bed (HOB) Positioning: HOB lowered  Pressure Reduction Devices: positioning supports utilized  Skin Protection: adhesive use limited     Problem: Fall Injury Risk  Goal: Absence of Fall and Fall-Related Injury  Outcome: Ongoing, Progressing  Intervention: Identify and Manage Contributors  Recent Flowsheet Documentation  Taken 5/20/2023 2048 by Kamaljit Elias RN  Medication Review/Management: medications reviewed  Self-Care Promotion: independence encouraged  Intervention: Promote Injury-Free Environment  Recent Flowsheet Documentation  Taken 5/20/2023 2048 by Kamaljit Elias RN  Safety Promotion/Fall Prevention:   activity supervised   clutter free environment maintained   assistive device/personal items within reach   safety round/check completed   nonskid shoes/slippers when out of bed   lighting adjusted   fall prevention program maintained   room organization consistent     Problem: Rehabilitation (IRF) Plan of Care  Goal: Plan of Care Review  Outcome: Ongoing, Progressing  Flowsheets (Taken 5/20/2023 2214)  Plan of Care Reviewed With: patient  Goal: Patient-Specific Goal (Individualized)  Outcome: Ongoing, Progressing  Goal: Absence of New-Onset Illness or Injury  Outcome: Ongoing, Progressing  Intervention: Prevent Fall and Fall Injury  Recent Flowsheet Documentation  Taken 5/20/2023 2048 by Kamaljit Elias RN  Safety Promotion/Fall Prevention:   activity supervised    clutter free environment maintained   assistive device/personal items within reach   safety round/check completed   nonskid shoes/slippers when out of bed   lighting adjusted   fall prevention program maintained   room organization consistent  Intervention: Prevent Infection  Recent Flowsheet Documentation  Taken 5/20/2023 2048 by Kamaljit Elias RN  Infection Prevention:   environmental surveillance performed   hand hygiene promoted   single patient room provided  Goal: Optimal Comfort and Wellbeing  Outcome: Ongoing, Progressing  Goal: Home and Community Transition Plan Established  Outcome: Ongoing, Progressing     Problem: Hypertension Comorbidity  Goal: Blood Pressure in Desired Range  Outcome: Ongoing, Progressing  Intervention: Maintain Blood Pressure Management  Recent Flowsheet Documentation  Taken 5/20/2023 2048 by Kamaljit Elias RN  Medication Review/Management: medications reviewed   Goal Outcome Evaluation:  Plan of Care Reviewed With: patient        Progress: improving  Outcome Evaluation: left hip surgical incision appears with dressing in place, pt with forgetifullness, safety education completed, NAD noted

## 2023-05-21 NOTE — PROGRESS NOTES
[facility]     Progress Note    Patient Name: Radha Azevedo  : 1935  MRN: [id number]  Primary Care Physician:  Amina Jiang MD  Date of admission: 2023    Subjective Pt alert and appropriately responsive. Denies H/A, N/V, F/C, CP, SOB. Has mild left hip pain controlled with meds  Subjective     Chief Complaint: Cognitive and fxnal deficits related to traumatic SDH    History of Present Illness  88yo M with PMH of HTN and depression/mood disorder who presented to [facility] after a fall and subsequent L femoral neck fracture s/p IM nail placement on 2023 and SDH on the frontoparietal region. This past year the patient's wife passed away and he was placed on Xanax 0.25 PRN for anxiety and mood changes. In 2023, he stated that he stopped taking Xanax abruptly and had a fall. He was diagnosed with a nondisplaced L femoral neck fracture at that time and did not require surgical intervention. He was discharged to subacute rehab from 4/15/2023 to 2023. He had a fall in subacute rehab while transferring to the bed, falling on his L side and hitting the back of his head, and was transferred to [facility] ED. CT head revealed frontoparietal SDH at that time and did not require surgical intervention. Xray of L hip/femur displaced L femoral fracture and underwent IM Nail placement on 2023. Patient is WBAT in LLE. Also, patient had a laceration on the posterior head which required sutures while in the ED. Hospital course was complicated by hypothyroidism with increased TSH and was started on Synthroid 25mcg daily. Patient states that he had significant weight loss and depression over the past year since his wifes passing. Hyponatremia during course possibly related to hypothyroidism as well. Patient worked with therapies during acute care stay and inpatient rehab was recommended.    Review of Systems Denies CP, SOB, N/V F/C      Objective     Vitals:   Temp:  [98 °F (36.7 °C)-98.2  °F (36.8 °C)] 98.2 °F (36.8 °C)  Heart Rate:  [62-67] 67  Resp:  [18] 18  BP: (119-129)/(58-67) 119/59    Physical Exam   General: The patient is well-developed, well-nourished and in no apparent distress.   HEENT: EOMI, hearing intact b/l. MMM. Sutures intact in occipital region   Respiratory: Lungs CTAB, no wheezes or rhonchi     Cardiac: RRR, no m/r/g, no pedal edema     Abdomen: soft, NT abdomen     Skin: L femoral IM placement incisions clean with bandage in place CDI   Psych: Appropriate affect and behavior         Neuro:  Mental Status:  AOx4   Speech: fluent without dysarthria or scanning.   CN II-XII: grossly intact, EOMI, PERRL, no visual field cut, tongue midline   Tone (Modified Vera Scale): 0/4 in upper & lower ext bilaterally.   MMT:   LUE 5/5 EF, 5/5 EE, 5/5 WE, 5/5  strength, 5/5 finger abduction   RUE 5/5 EF, 5/5 EE, 5/5 WE, 5/5  strength, 5/5 finger abduction   RLE 5/5 HF, 5/5 KE, 5/5 DF, 5/5 great toe ext, 5/5 PF   LLE 5/5 HF, 5/5 KE, 5/5 DF, 5/5 great toe ext, 5/5 PF    Reflexes: DTRs 2+ on B/L upper and lower extremities. Hoffmans negative B/L   Sensation: sensation appears to be intact in all extremities           Assessment & Plan  Continue eval phase.   Assessment / Plan     Brief Patient Summary:   88yo M with PMH of HTN and depression/mood disorder who presented to [facility] after a fall and subsequent L femoral neck fracture s/p IM nail placement on 5/8/2023 and SDH on the frontoparietal region. This past year the patient's wife passed away and he was placed on Xanax 0.25 PRN for anxiety and mood changes. In April 2023, he stated that he stopped taking Xanax abruptly and had a fall. He was diagnosed with a nondisplaced L femoral neck fracture at that time and did not require surgical intervention. He was discharged to subacute rehab from 4/15/2023 to 4/23/2023. He had a fall in subacute rehab while transferring to the bed, falling on his L side and hitting the back  of his head, and was transferred to [facility] ED. CT head revealed frontoparietal SDH at that time and did not require surgical intervention. Xray of L hip/femur displaced L femoral fracture and underwent IM Nail placement on 5/8/2023. Patient is WBAT in LLE. Also, patient had a laceration on the posterior head which required sutures while in the ED. Hospital course was complicated by hypothyroidism with increased TSH and was started on Synthroid 25mcg daily. Patient states that he had significant weight loss and depression over the past year since his wifes passing. Hyponatremia during course possibly related to hypothyroidism as well. Patient worked with therapies during acute care stay and inpatient rehab was recommended.    Active Hospital Problems:  Active Hospital Problems    Diagnosis    • **Subdural hematoma        Plan:   Impairments: ADLs, endurance, gait, balance, transfers, cognition, comprehension     Now admit for comprehensive acute inpatient rehabilitation .  This would be an interdisciplinary program with physical therapy 1 hour,  occupational therapy 1 hour, and speech therapy 1 hour, 5 days a week.  Rehabilitation nursing for carryover, monitoring of neurologic status, bowel and bladder, and skin  Ongoing physician follow-up.  Weekly team conferences.  Goals are to achieve a level of supervision with  mobility and self-care and improved ADLs.   Rehabilitation prognosis good.  Medical prognosis good.  Estimated length of stay is approximately 1-2 weeks , but is only an estimation.         #Functional Impairment 2/2 SDH and L femur fracture   -Initiate comprehensive rehab program consisting of PT/OT/SLP 3 hours/day, 5 days/week with medical management of above disorder, comorbidities, pain, bowels, and bladder.       -WB/activity status:?no restrictions, WBAT        #SDH  -No gross neurologic deficits on exam  -Will order CT Head for baseline or transfer imaging from [facility]     #L displaced femur  fracture  -S/p L IM Nail placement in L femur  -WBAT in LLE      #Pain Control     -Prn Norco 5 q4h        #Hypothyroidism  - on admission  -Significant weight loss and depression over the past year  -Continue Synthroid 25 daily  -Will follow-up on TSH and T4 values tomorrow  - 5/20 - I will increase synthroid to 50mcg daily and monitor TSH and Na+     #Hyponatremia  -Na 128 on admission, 128-130 a NBH. -Fluid restriction to 1500ml daily  -Possibly related to hypothyroidism  - 5/20 - I will increase synthroid to 50mcg daily and monitor TSH and Na+  -Will continue to monitor     #HTN  -Continue Norvasc 5 daily     #Mood disorder  -Continue Cymbalta 30 BID and Seroquel 12.5 at bedtime   -PRN Xanax held because cause of his falls     #FEN     -nutrition: Cardiac diet, Regular texture, Liquid Consistency: Thin Liquid. Fluid restriction 1500cc daily   -admit labs reviewed      #PPx    -DVT: Heparin 5000U q8h          DVT prophylaxis:  Medical and mechanical DVT prophylaxis orders are present.    CODE STATUS:    Medical Intervention Limits: NO intubation (DNI)  Level Of Support Discussed With: Patient; Next of Kin (If No Surrogate)  Code Status (Patient has no pulse and is not breathing): CPR (Attempt to Resuscitate)  Medical Interventions (Patient has pulse or is breathing): Limited Support  Comments: Discussed with patient and son  Release to patient: Routine Release    Disposition:  I expect patient to be discharged     Artemio Omalley MD

## 2023-05-21 NOTE — PROGRESS NOTES
Inpatient Rehabilitation Plan of Care Note    Plan of Care  Care Plan Reviewed - No updates at this time.    Psychosocial    Performed Intervention(s)  Medication.  Group therapy.      Safety    Performed Intervention(s)  Items within reach.  Safety rounds.  Wheelchair, bed, and chair alarms.      Sphincter Control    Performed Intervention(s)  Offer restroom.  Bladder training program.  Use incontinence products.  Encourage appropriate diet.  Encourage fluid intake.    Signed by: Yelitza Talavera RN

## 2023-05-22 ENCOUNTER — APPOINTMENT (OUTPATIENT)
Dept: CT IMAGING | Facility: HOSPITAL | Age: 88
DRG: 65 | End: 2023-05-22
Payer: MEDICARE

## 2023-05-22 VITALS
WEIGHT: 126.98 LBS | HEART RATE: 61 BPM | RESPIRATION RATE: 18 BRPM | HEIGHT: 60 IN | SYSTOLIC BLOOD PRESSURE: 130 MMHG | OXYGEN SATURATION: 97 % | DIASTOLIC BLOOD PRESSURE: 69 MMHG | BODY MASS INDEX: 24.93 KG/M2 | TEMPERATURE: 97.1 F

## 2023-05-22 LAB
ANION GAP SERPL CALCULATED.3IONS-SCNC: 12.4 MMOL/L (ref 5–15)
BASOPHILS # BLD AUTO: 0.07 10*3/MM3 (ref 0–0.2)
BASOPHILS NFR BLD AUTO: 1.4 % (ref 0–1.5)
BUN SERPL-MCNC: 24 MG/DL (ref 8–23)
BUN/CREAT SERPL: 21.4 (ref 7–25)
CALCIUM SPEC-SCNC: 9.3 MG/DL (ref 8.6–10.5)
CHLORIDE SERPL-SCNC: 93 MMOL/L (ref 98–107)
CO2 SERPL-SCNC: 23.6 MMOL/L (ref 22–29)
CREAT SERPL-MCNC: 1.12 MG/DL (ref 0.76–1.27)
DEPRECATED RDW RBC AUTO: 52.3 FL (ref 37–54)
EGFRCR SERPLBLD CKD-EPI 2021: 63.6 ML/MIN/1.73
EOSINOPHIL # BLD AUTO: 0.23 10*3/MM3 (ref 0–0.4)
EOSINOPHIL NFR BLD AUTO: 4.4 % (ref 0.3–6.2)
ERYTHROCYTE [DISTWIDTH] IN BLOOD BY AUTOMATED COUNT: 17.1 % (ref 12.3–15.4)
GLUCOSE SERPL-MCNC: 97 MG/DL (ref 65–99)
HCT VFR BLD AUTO: 33.5 % (ref 37.5–51)
HGB BLD-MCNC: 10.6 G/DL (ref 13–17.7)
IMM GRANULOCYTES # BLD AUTO: 0 10*3/MM3 (ref 0–0.05)
IMM GRANULOCYTES NFR BLD AUTO: 0 % (ref 0–0.5)
LYMPHOCYTES # BLD AUTO: 2.68 10*3/MM3 (ref 0.7–3.1)
LYMPHOCYTES NFR BLD AUTO: 51.7 % (ref 19.6–45.3)
MCH RBC QN AUTO: 26.4 PG (ref 26.6–33)
MCHC RBC AUTO-ENTMCNC: 31.6 G/DL (ref 31.5–35.7)
MCV RBC AUTO: 83.5 FL (ref 79–97)
MONOCYTES # BLD AUTO: 0.35 10*3/MM3 (ref 0.1–0.9)
MONOCYTES NFR BLD AUTO: 6.8 % (ref 5–12)
NEUTROPHILS NFR BLD AUTO: 1.85 10*3/MM3 (ref 1.7–7)
NEUTROPHILS NFR BLD AUTO: 35.7 % (ref 42.7–76)
NRBC BLD AUTO-RTO: 0 /100 WBC (ref 0–0.2)
PLATELET # BLD AUTO: 312 10*3/MM3 (ref 140–450)
PMV BLD AUTO: 9.3 FL (ref 6–12)
POTASSIUM SERPL-SCNC: 4 MMOL/L (ref 3.5–5.2)
RBC # BLD AUTO: 4.01 10*6/MM3 (ref 4.14–5.8)
SODIUM SERPL-SCNC: 129 MMOL/L (ref 136–145)
WBC NRBC COR # BLD: 5.18 10*3/MM3 (ref 3.4–10.8)

## 2023-05-22 PROCEDURE — 85025 COMPLETE CBC W/AUTO DIFF WBC: CPT | Performed by: PHYSICAL MEDICINE & REHABILITATION

## 2023-05-22 PROCEDURE — 97129 THER IVNTJ 1ST 15 MIN: CPT

## 2023-05-22 PROCEDURE — 97112 NEUROMUSCULAR REEDUCATION: CPT

## 2023-05-22 PROCEDURE — 70450 CT HEAD/BRAIN W/O DYE: CPT

## 2023-05-22 PROCEDURE — 97535 SELF CARE MNGMENT TRAINING: CPT

## 2023-05-22 PROCEDURE — 80048 BASIC METABOLIC PNL TOTAL CA: CPT | Performed by: PHYSICAL MEDICINE & REHABILITATION

## 2023-05-22 PROCEDURE — 25010000002 HEPARIN (PORCINE) PER 1000 UNITS: Performed by: PHYSICAL MEDICINE & REHABILITATION

## 2023-05-22 PROCEDURE — 97530 THERAPEUTIC ACTIVITIES: CPT

## 2023-05-22 PROCEDURE — 97130 THER IVNTJ EA ADDL 15 MIN: CPT

## 2023-05-22 RX ORDER — OXYCODONE HYDROCHLORIDE 5 MG/1
5 TABLET ORAL 2 TIMES DAILY PRN
Status: DISPENSED | OUTPATIENT
Start: 2023-05-22 | End: 2023-05-29

## 2023-05-22 RX ORDER — ACETAMINOPHEN 325 MG/1
650 TABLET ORAL EVERY 6 HOURS PRN
Status: DISCONTINUED | OUTPATIENT
Start: 2023-05-22 | End: 2023-06-01 | Stop reason: HOSPADM

## 2023-05-22 RX ADMIN — DICLOFENAC SODIUM 2 G: 10 GEL TOPICAL at 19:49

## 2023-05-22 RX ADMIN — OXYCODONE HYDROCHLORIDE 5 MG: 5 TABLET ORAL at 09:08

## 2023-05-22 RX ADMIN — DULOXETINE HYDROCHLORIDE 30 MG: 30 CAPSULE, DELAYED RELEASE ORAL at 19:48

## 2023-05-22 RX ADMIN — DICLOFENAC SODIUM 2 G: 10 GEL TOPICAL at 17:40

## 2023-05-22 RX ADMIN — DICLOFENAC SODIUM 2 G: 10 GEL TOPICAL at 12:34

## 2023-05-22 RX ADMIN — DICLOFENAC SODIUM 2 G: 10 GEL TOPICAL at 08:10

## 2023-05-22 RX ADMIN — DULOXETINE HYDROCHLORIDE 30 MG: 30 CAPSULE, DELAYED RELEASE ORAL at 08:09

## 2023-05-22 RX ADMIN — HEPARIN SODIUM 5000 UNITS: 5000 INJECTION INTRAVENOUS; SUBCUTANEOUS at 19:49

## 2023-05-22 RX ADMIN — HYDROCODONE BITARTRATE AND ACETAMINOPHEN 1 TABLET: 5; 325 TABLET ORAL at 06:24

## 2023-05-22 RX ADMIN — QUETIAPINE FUMARATE 12.5 MG: 25 TABLET ORAL at 19:49

## 2023-05-22 RX ADMIN — HEPARIN SODIUM 5000 UNITS: 5000 INJECTION INTRAVENOUS; SUBCUTANEOUS at 08:09

## 2023-05-22 RX ADMIN — LEVOTHYROXINE SODIUM 25 MCG: 0.03 TABLET ORAL at 06:22

## 2023-05-22 RX ADMIN — OXYCODONE HYDROCHLORIDE 5 MG: 5 TABLET ORAL at 22:13

## 2023-05-22 RX ADMIN — PANTOPRAZOLE SODIUM 40 MG: 40 TABLET, DELAYED RELEASE ORAL at 08:09

## 2023-05-22 RX ADMIN — AMLODIPINE BESYLATE 10 MG: 10 TABLET ORAL at 08:09

## 2023-05-22 NOTE — PROGRESS NOTES
LOS: 6 days   Patient Care Team:  Amina Jiang MD as PCP - General (Endocrinology)      [facility]  1935    Diagnoses    1. IMPAIRED GAIT AND MOBILITY       ADMITTING DIAGNOSIS:  SDH  L femur fracture    Subjective   Complains of L hip and thigh pain. Denies radiating or burning/tingling pain. Asking to switch around pain medications for further control.    Objective     Vitals:    05/22/23 0500   BP: 126/73   Pulse: 64   Resp: 20   Temp: 97.9 °F (36.6 °C)   SpO2: 98%       PHYSICAL EXAM:   General: The patient is well-developed, well-nourished and in no apparent distress.   HEENT: EOMI, hearing intact b/l. MMM. Sutures intact in occipital region   Respiratory: Lungs CTAB, no wheezes or rhonchi     Cardiac: RRR, no m/r/g, no pedal edema     Abdomen: soft, NT abdomen     Skin: L femoral IM placement incisions clean with Aquacell surgical dressing  Ext: Tenderness on L anterior thigh  Psych: Appropriate affect and behavior         Neuro:  Mental Status:  AOx4   Speech: fluent without dysarthria or scanning.   CN II-XII: grossly intact, EOMI, PERRL, no visual field cut, tongue midline   Tone (Modified Vera Scale): 0/4 in upper & lower ext bilaterally.   MMT:   LUE 5/5 EF, 5/5 EE, 5/5 WE, 5/5  strength, 5/5 finger abduction   RUE 5/5 EF, 5/5 EE, 5/5 WE, 5/5  strength, 5/5 finger abduction   RLE 5/5 HF, 5/5 KE, 5/5 DF, 5/5 great toe ext, 5/5 PF   LLE 5/5 HF, 5/5 KE, 5/5 DF, 5/5 great toe ext, 5/5 PF    Reflexes: DTRs 2+ on B/L upper and lower extremities. Hoffmans negative B/L   Sensation: sensation appears to be intact in all extremities       MEDICATIONS  Scheduled Meds:amLODIPine, 10 mg, Oral, Q24H  Diclofenac Sodium, 2 g, Topical, 4x Daily  DULoxetine, 30 mg, Oral, BID  heparin (porcine), 5,000 Units, Subcutaneous, Q12H  levothyroxine, 25 mcg, Oral, Q AM  pantoprazole, 40 mg, Oral, Daily  QUEtiapine, 12.5 mg, Oral, Nightly      Continuous Infusions:   PRN Meds:.•  acetaminophen  •   aluminum-magnesium hydroxide-simethicone  •  hydrALAZINE  •  oxyCODONE      RESULTS  No results found for: POCGLU  Results from last 7 days   Lab Units 05/22/23  0726 05/19/23  0600 05/16/23 2021   WBC 10*3/mm3 5.18 5.04 6.12   HEMOGLOBIN g/dL 10.6* 9.6* 9.8*   HEMATOCRIT % 33.5* 29.4* 30.0*   PLATELETS 10*3/mm3 312 286 282     Results from last 7 days   Lab Units 05/22/23  0727 05/19/23  0600 05/16/23 2021   SODIUM mmol/L 129* 133* 128*   POTASSIUM mmol/L 4.0 4.3 4.2   CHLORIDE mmol/L 93* 97* 92*   CO2 mmol/L 23.6 27.0 27.5   BUN mg/dL 24* 23 15   CREATININE mg/dL 1.12 1.07 1.07   CALCIUM mg/dL 9.3 9.1 9.1   BILIRUBIN mg/dL  --   --  0.4   ALK PHOS U/L  --   --  134*   ALT (SGPT) U/L  --   --  16   AST (SGOT) U/L  --   --  30   GLUCOSE mg/dL 97 106* 101*           ASSESSMENT and PLAN    Subdural hematoma    Impairments: ADLs, endurance, gait, balance, transfers, cognition, comprehension     Now admit for comprehensive acute inpatient rehabilitation .  This would be an interdisciplinary program with physical therapy 1 hour,  occupational therapy 1 hour, and speech therapy 1 hour, 5 days a week.  Rehabilitation nursing for carryover, monitoring of neurologic status, bowel and bladder, and skin  Ongoing physician follow-up.  Weekly team conferences.  Goals are to achieve a level of supervision with  mobility and self-care and improved ADLs.   Rehabilitation prognosis good.  Medical prognosis good.  Estimated length of stay is approximately 1-2 weeks , but is only an estimation.         #Functional Impairment 2/2 SDH and L femur fracture   -Initiate comprehensive rehab program consisting of PT/OT/SLP 3 hours/day, 5 days/week with medical management of above disorder, comorbidities, pain, bowels, and bladder.       -WB/activity status:?no restrictions, WBAT        #SDH  -No gross neurologic deficits on exam  -Will order CT Head for baseline or transfer imaging from [facility]  May 22- Order CT Head for follow-up SDH  evaluation. Will send imaging to [facility]     #L displaced femur fracture  -S/p L IM Nail placement in L femur  -WBAT in LLE      #Pain Control     -Prn Norco 5 q4h     May 22- Will DC Norco and change to Oxycodone 5 BID PRN. Patient was only getting Norco 3 times a day and is the same MME as Oxycodone 5 BID PRN. Will also add Tylenol 650 q6h PRN for intermittent pain relief in between Oxycodone doses.    #Hypothyroidism  - on admission  -Significant weight loss and depression over the past year  -Continue Synthroid 25 daily  -Will follow-up on TSH and T4 values tomorrow  - 5/20 - I will increase synthroid to 50mcg daily and monitor TSH and Na+     #Hyponatremia  -Na 128 on admission, 128-130 a NBH. -Fluid restriction to 1500ml daily  -Possibly related to hypothyroidism  - 5/20 - I will increase synthroid to 50mcg daily and monitor TSH and Na+  -Will continue to monitor     #HTN  -Continue Norvasc 5 daily  May 22- Increased Norvasc to 10 daily over the weekend. BP improved and is more controlled, will continue to monitor.    #Mood disorder  -Continue Cymbalta 30 BID and Seroquel 12.5 at bedtime   -PRN Xanax held because cause of his falls     #FEN     -nutrition: Cardiac diet, Regular texture, Liquid Consistency: Thin Liquid. Fluid restriction 1500cc daily   -admit labs reviewed      #PPx    -DVT: Heparin 5000U q8h            Artemio Price DO      During rounds, used appropriate personal protective equipment including mask and gloves.  Additional gown if indicated.  Mask used was standard procedure mask. Appropriate PPE was worn during the entire visit.  Hand hygiene was completed before and after.

## 2023-05-22 NOTE — THERAPY TREATMENT NOTE
Inpatient Rehabilitation - Speech Language Pathology Treatment Note    [facility]     Patient Name: Radha Azevedo  : 1935  MRN: [id number]    Today's Date: 2023                   Admit Date: 2023       Visit Dx:      ICD-10-CM ICD-9-CM   1. Impaired gait and mobility  R26.89 781.2       Patient Active Problem List   Diagnosis   • Absolute anemia   • Benign essential HTN   • Benign localized hyperplasia of prostate without urinary obstruction   • Type 2 diabetes mellitus with peripheral angiopathy   • Hypertension   • Hyperlipidemia   • Type 2 diabetes mellitus   • Diabetes mellitus type 2, noninsulin dependent   • Avitaminosis D   • Dyslipidemia   • Decrease in the ability to hear   • Personal history of colonic polyps   • Subdural hematoma       Past Medical History:   Diagnosis Date   • Colon polyps     Followed by Dr. Leo Jaeger at [facility].   • Coronary artery disease    • Diabetes mellitus    • Hyperlipidemia    • Hypertension        Past Surgical History:   Procedure Laterality Date   • CARDIAC CATHETERIZATION     • CAROTID STENT      patient denies, but family states he did have a stent placed   • COLONOSCOPY N/A 2014    Dr. Leo Jaeger at [facility].   • COLONOSCOPY N/A 2016    Dr. Leo Jaeger at [facility].   • COLONOSCOPY N/A 03/15/2023    2 TUBULAR ADENOMA POLYPS IN SIGMOID, BARIUM ENEMA ORDERED, DR. LEO JAEGER AT [facility]   • CORONARY ANGIOPLASTY     • FRACTURE SURGERY Left 2023    IM nailing for L femur fracture       SLP Recommendation and Plan                                                            SLP EVALUATION (last 72 hours)     SLP SLC Evaluation     Row Name 23 1230 23 0800                Communication Assessment/Intervention    Document Type therapy note (daily note)  -SR therapy note (daily note)  -SR       Subjective Information no complaints  -SR no complaints  -SR       Patient Observations alert;cooperative;agree to therapy  -SR  alert;cooperative;agree to therapy  -SR       Patient Effort good  -SR good  -SR       Symptoms Noted During/After Treatment none  -SR none  -SR          Pain    Additional Documentation -- Pain Scale: Numbers Pre/Post-Treatment (Group)  -SR          Pain Scale: Numbers Pre/Post-Treatment    Pretreatment Pain Rating 7/10  -SR 8/10  -SR       Posttreatment Pain Rating 7/10  -SR 8/10  -SR       Pain Location - Side/Orientation Left  -SR Left  -SR       Pain Location proximal  -SR --       Pain Location - hip  -SR hip  -SR       Pre/Posttreatment Pain Comment -- RN notified  -SR             User Key  (r) = Recorded By, (t) = Taken By, (c) = Cosigned By    Initials Name Effective Dates    SR Hope Mary CCC-SLP 11/10/22 -                    EDUCATION    The patient has been educated in the following areas:       Cognitive Impairment.             SLP GOALS     Row Name 05/22/23 1230 05/22/23 0800 05/20/23 0800       Attention Goal 1 (SLP)    Improve Attention by Goal 1 (SLP) -- 80%;complete selective attention task;complete sustained attention task;with minimal cues (75-90%)  -SR looking at speaker;80%  -LS    Time Frame (Attention Goal 1, SLP) -- by discharge  -SR --    Progress/Outcomes (Attention Goal 1, SLP) -- goal ongoing  -SR --    Comment (Attention Goal 1, SLP) -- Participated in divergent naming activity during AM session. Given concrete category, patient named 8 items given NO cues; 12 items given MIN cues. With abstract category, patient named 5 items given NO cues; 10 items given MIN cues.  -SR --       Memory Skills Goal 1 (SLP)    Improve Memory Skills Through Goal 1 (SLP) use written schedule;use memory strategies;recall details of the day;80%;with minimal cues (75-90%)  -SR -- --    Time Frame (Memory Skills Goal 1, SLP) by discharge  -SR -- --    Progress/Outcomes (Memory Skills Goal 1, SLP) goal ongoing  -SR -- --       Reasoning Goal 1 (SLP)    Improve Reasoning Through Goal 1 (SLP) -- complete  deductive reasoning task;complete mental flexibility task;80%;independently (over 90% accuracy)  -SR --    Time Frame (Reasoning Goal 1, SLP) -- by discharge  -SR --    Progress/Outcomes (Reasoning Goal 1, SLP) -- goal ongoing  -SR --    Comment (Reasoning Goal 1, SLP) -- Patient labeled 5 written steps (activities related to ADLs) in order occurance with 80% acc given MIN cues. Cues provided for attn to detail.  -SR SLP provided  pt with a month from a calendar. Pt was asked to find a particular date given instructions.  The pt became very confused with the instructions.  SLP changed the task and  provided the pt with a  checkbook balance task.  Pt was given a balance, debits and credits   Pt required mod to max assistance to perform the task.  -       Executive Functional Skills Goal 1 (SLP)    Improve Executive Function Skills Goal 1 (SLP) home management activity;time management activity;80%;independently (over 90% accuracy)  -SR -- --    Time Frame (Executive Function Skills Goal 1, SLP) by discharge  -SR -- --    Progress/Outcomes (Executive Function Skills Goal 1, SLP) goal ongoing  -SR -- --    Comment (Executive Function Skills Goal 1, SLP) Simple medication management task completed during PM therapy session. Patient sorted x10 mock medication by day/time given written directions. Medications were  into sets of 5. Patient completed activity with 50% acc given NO cues; 70% acc given MIN cues; 90% acc given MOD cues. Cues provided for attention to detail. Patient continuing progress toward goal. Anticipate supervision of medications following discharge.  -SR -- --          User Key  (r) = Recorded By, (t) = Taken By, (c) = Cosigned By    Initials Name Provider Type    Doe Miller, MS CCC-SLP Speech and Language Pathologist     Hope Mary CCC-SLP Speech and Language Pathologist                            Time Calculation:        Time Calculation- SLP     Row Name 05/22/23 1693  05/22/23 0830          Time Calculation- SLP    SLP Start Time 1230  -SR 0800  -SR     SLP Stop Time 1300  -SR 0830  -SR     SLP Time Calculation (min) 30 min  -SR 30 min  -SR           User Key  (r) = Recorded By, (t) = Taken By, (c) = Cosigned By    Initials Name Provider Type    SR Hope Mary CCC-SLP Speech and Language Pathologist                  Therapy Charges for Today     Code Description Service Date Service Provider Modifiers Qty    [id number]  ST DEV OF COGN SKILLS INITIAL 15 MIN 5/22/2023 Hope Mary CCC-SLP  1    [id number]  ST DEV OF COGN SKILLS EACH ADDT'L 15 MIN 5/22/2023 Hope Mary CCC-SLP  3                           KATIE Victoria  5/22/2023

## 2023-05-22 NOTE — PLAN OF CARE
Goal Outcome Evaluation:  Plan of Care Reviewed With: patient        Progress: improving     Patient is calm and cooperative. Alert and oriented to person, place, and time. PRN pain medication given. He his taking his medication whole with thin liquids. He is on a 1,500 ml fluid restriction. Using the call light for assistance. Ambulating to and from the bathroom with walker and 1 person assist. Left leg weaker than right. Dressing at left hip/upper left leg dry, clean and intact. Participated in therapies. Will continue to monitor.

## 2023-05-22 NOTE — THERAPY TREATMENT NOTE
Inpatient Rehabilitation - Physical Therapy Treatment Note       [facility]     Patient Name: Radha Azevedo  : 1935  MRN: [id number]    Today's Date: 2023                    Admit Date: 2023      Visit Dx:     ICD-10-CM ICD-9-CM   1. Impaired gait and mobility  R26.89 781.2       Patient Active Problem List   Diagnosis   • Absolute anemia   • Benign essential HTN   • Benign localized hyperplasia of prostate without urinary obstruction   • Type 2 diabetes mellitus with peripheral angiopathy   • Hypertension   • Hyperlipidemia   • Type 2 diabetes mellitus   • Diabetes mellitus type 2, noninsulin dependent   • Avitaminosis D   • Dyslipidemia   • Decrease in the ability to hear   • Personal history of colonic polyps   • Subdural hematoma       Past Medical History:   Diagnosis Date   • Colon polyps     Followed by Dr. Leo Jaeger at [facility].   • Coronary artery disease    • Diabetes mellitus    • Hyperlipidemia    • Hypertension        Past Surgical History:   Procedure Laterality Date   • CARDIAC CATHETERIZATION     • CAROTID STENT      patient denies, but family states he did have a stent placed   • COLONOSCOPY N/A 2014    Dr. Leo Jaeger at [facility].   • COLONOSCOPY N/A 2016    Dr. Leo Jaeger at [facility].   • COLONOSCOPY N/A 03/15/2023    2 TUBULAR ADENOMA POLYPS IN SIGMOID, BARIUM ENEMA ORDERED, DR. LEO JAEGER AT [facility]   • CORONARY ANGIOPLASTY     • FRACTURE SURGERY Left 2023    IM nailing for L femur fracture       PT ASSESSMENT (last 12 hours)     IRF PT Evaluation and Treatment     Row Name 23 0818          PT Time and Intention    Document Type daily treatment  -DP     Mode of Treatment individual therapy;physical therapy  -DP     Patient/Family/Caregiver Comments/Observations Pt sitting up in w/c upon PT arrival  -DP     Row Name 23 0818          General Information    Patient Profile Reviewed yes  -DP     Existing Precautions/Restrictions fall;left  WBAT   -DP     Row Name 05/22/23 0818          Pain Assessment    Pretreatment Pain Rating 7/10  -DP     Posttreatment Pain Rating 6/10  -DP     Pain Location - Side/Orientation Left  -DP     Pain Location proximal  -DP     Pain Location - hip  -DP     Row Name 05/22/23 0818          Cognition/Psychosocial    Affect/Mental Status (Cognition) WFL  -DP     Orientation Status (Cognition) oriented x 3  -DP     Follows Commands (Cognition) follows two-step commands;repetition of directions required  -DP     Personal Safety Interventions safety round/check completed;elopement precautions initiated;fall prevention program maintained;gait belt;muscle strengthening facilitated;nonskid shoes/slippers when out of bed;supervised activity  -DP     Row Name 05/22/23 0818          Sit-Stand Transfer    Sit-Stand [place] (Transfers) standby assist;verbal cues  -DP     Assistive Device (Sit-Stand Transfers) walker, front-wheeled  -DP     Row Name 05/22/23 0818          Stand-Sit Transfer    Stand-Sit [place] (Transfers) standby assist  -DP     Assistive Device (Stand-Sit Transfers) wheelchair;walker, front-wheeled  -DP     Row Name 05/22/23 0818          Gait/Stairs (Locomotion)    [place] Level (Gait) contact guard;verbal cues  -DP     Assistive Device (Gait) walker, front-wheeled  -DP     Distance in Feet (Gait) 160'x2, 15'x3 HHA Silver  -DP     Pattern (Gait) step-through  -DP     Deviations/Abnormal Patterns (Gait) bilateral deviations;base of support, narrow;gait speed decreased;stride length decreased;weight shifting decreased  -DP     Bilateral Gait Deviations forward flexed posture;heel strike decreased  -DP     Left Sided Gait Deviations weight shift ability decreased  -DP     Row Name 05/22/23 0818          Balance    Comment, Balance standing on blue foam mat with throwing beach ball against wall but had severe difficulty catching ball with increased posterior lean. Pt then performed static standing balance with  feet together and apart with EO/EC for 15 seconds each with minimal sway but no LOB. in PM session the pt stood on blue foam mat with feet together and feet apart with EO and EC for 30 seconds each aside from EC on mat performed 3x for 5-8 seconds. Semi tandem and tandem on ground surface and blue foam mat all for 15 seconds. Alt tapping on on 2inch board and cones 5x each but did require max VC to perform correctly with 1UE and attempted with no UE using Silver  -DP     Row Name 05/22/23 0818          Motor Skills    Additional Documentation Advanced Stepping/Walking Interventions (Group)  -DP     Row Name 05/22/23 0818          Advanced Stepping/Walking Interventions    Stepping/Walking Interventions backward walking  -DP     Backward Walking (Stepping/Walking Interventions) 8'x1 with no UE using CGA/Silver from PT.  small steps  -DP     Row Name 05/22/23 0818          Positioning and Restraints    Pre-Treatment Position sitting in chair/recliner  -DP     Post Treatment Position chair  -DP     In Chair sitting;call light within reach;encouraged to call for assist;exit alarm on  -DP           User Key  (r) = Recorded By, (t) = Taken By, (c) = Cosigned By    Initials Name Provider Type    George Recio, PT Physical Therapist              Wound 05/16/23 1831 Left lateral hip Incision (Active)   Dressing Appearance dry;intact 05/22/23 0908   Closure GUME 05/22/23 0908   Base dressing in place, unable to visualize 05/22/23 0908     Physical Therapy Education     Title: PT OT SLP Therapies (Done)     Topic: Physical Therapy (Done)     Point: Mobility training (Done)     Learning Progress Summary           Patient Acceptance, E,D, VU,DU by RAVI at 5/22/2023 1148    Acceptance, E, VU by RICKI at 5/20/2023 2214    Acceptance, E, NR by ABDIEL at 5/20/2023 0927    Acceptance, E, VU by RICKI at 5/19/2023 2232    Acceptance, E,TB, VU,NR by MAURILIO at 5/18/2023 1206    Acceptance, E, VU by DARVIN at 5/17/2023 1524    Acceptance, E,TB, VU,NR by MAURILIO at  5/17/2023 1209                   Point: Home exercise program (Done)     Learning Progress Summary           Patient Acceptance, E,D, VU,DU by DP at 5/22/2023 1148    Acceptance, E, VU by WN at 5/20/2023 2214    Acceptance, E, NR by  at 5/20/2023 0927    Acceptance, E, VU by WN at 5/19/2023 2232    Acceptance, E, VU by KN at 5/17/2023 1524                   Point: Body mechanics (Done)     Learning Progress Summary           Patient Acceptance, E,D, VU,DU by DP at 5/22/2023 1148    Acceptance, E, VU by WN at 5/20/2023 2214    Acceptance, E, NR by  at 5/20/2023 0927    Acceptance, E, VU by WN at 5/19/2023 2232    Acceptance, E, VU by KN at 5/17/2023 1524                   Point: Precautions (Done)     Learning Progress Summary           Patient Acceptance, E,D, VU,DU by DP at 5/22/2023 1148    Acceptance, E, VU by WN at 5/20/2023 2214    Acceptance, E, NR by  at 5/20/2023 0927    Acceptance, E, VU by WN at 5/19/2023 2232    Acceptance, E, VU by KN at 5/17/2023 1524                               User Key     Initials Effective Dates Name Provider Type Discipline     06/16/21 -  Ashlie Arnold, PT Physical Therapist PT     06/16/21 -  Aicha Payne, PT Physical Therapist PT     08/23/22 -  Kamaljit Elias, RN Registered Nurse Nurse    DP 08/24/21 -  George Lehman, PT Physical Therapist PT    KN 08/02/22 -  Main De La Garza OT Occupational Therapist OT                PT Recommendation and Plan                          Time Calculation:      PT Charges     Row Name 05/22/23 1438 05/22/23 1149          Time Calculation    Start Time 1400  -DP 0830  -DP     Stop Time 1430  -DP 0900  -DP     Time Calculation (min) 30 min  -DP 30 min  -DP     PT Received On -- 05/22/23  -DP     PT - Next Appointment -- 05/23/23  -DP        Time Calculation- PT    Total Timed Code Minutes- PT 30 minute(s)  -DP 30 minute(s)  -DP           User Key  (r) = Recorded By, (t) = Taken By, (c) = Cosigned By    Initials Name Provider  Type    DP George Lehman, PT Physical Therapist                Therapy Charges for Today     Code Description Service Date Service Provider Modifiers Qty    10526608744 HC PT THERAPEUTIC ACT EA 15 MIN 5/22/2023 George Lehman, PT GP 1    90261546565 HC PT NEUROMUSC RE EDUCATION EA 15 MIN 5/22/2023 George Lehman, PT GP 1    [id number]  PT NEUROMUSC RE EDUCATION EA 15 MIN 5/22/2023 George Lehman, PT GP 2                   George Lehman, PT  5/22/2023

## 2023-05-22 NOTE — PROGRESS NOTES
Inpatient Rehabilitation Plan of Care Note    Plan of Care  Care Plan Reviewed - No updates at this time.    Psychosocial    [RN] Coping/Adjustment(Active)  Current Status(05/22/2023): At risk for poor coping due to recent medical  issues.  Weekly Goal(05/30/2023): Identify progress in functional status.  Discharge Goal: Demonstrates healthy coping skills.    Performed Intervention(s)  Medication.  Group therapy.      Safety    [RN] Potential for Injury(Active)  Current Status(05/22/2023): Hx. falls. At risk for falling in the hospital.  Weekly Goal(05/30/2023): Cue to use the call bell.  Discharge Goal: Patient/ family will be aware of the risk of falling in the home  setting.    Performed Intervention(s)  Items within reach.  Safety rounds.  Wheelchair, bed, and chair alarms.      Sphincter Control    [RN] Bladder Management(Active)  Current Status(05/22/2023): 100% continent of bladder.  Weekly Goal(05/30/2023): Remains 100% continent of bladder.  Discharge Goal: Goes home continent of bladder.    [RN] Bowel Management(Active)  Current Status(05/22/2023): 100% continent of bowel.  Weekly Goal(05/30/2023): Remains continent of bowel.  Discharge Goal: goes home continent of bowel.    Performed Intervention(s)  Offer restroom.  Bladder training program.  Use incontinence products.  Encourage appropriate diet.  Encourage fluid intake.    Signed by: Rafaela Adam RN

## 2023-05-22 NOTE — THERAPY TREATMENT NOTE
Inpatient Rehabilitation - Occupational Therapy Treatment Note    [facility]     Patient Name: Radha Azevedo  : 1935  MRN: [id number]    Today's Date: 2023                 Admit Date: 2023         ICD-10-CM ICD-9-CM   1. Impaired gait and mobility  R26.89 781.2       Patient Active Problem List   Diagnosis   • Absolute anemia   • Benign essential HTN   • Benign localized hyperplasia of prostate without urinary obstruction   • Type 2 diabetes mellitus with peripheral angiopathy   • Hypertension   • Hyperlipidemia   • Type 2 diabetes mellitus   • Diabetes mellitus type 2, noninsulin dependent   • Avitaminosis D   • Dyslipidemia   • Decrease in the ability to hear   • Personal history of colonic polyps   • Subdural hematoma       Past Medical History:   Diagnosis Date   • Colon polyps     Followed by Dr. Leo Jaeger at [facility].   • Coronary artery disease    • Diabetes mellitus    • Hyperlipidemia    • Hypertension        Past Surgical History:   Procedure Laterality Date   • CARDIAC CATHETERIZATION     • CAROTID STENT      patient denies, but family states he did have a stent placed   • COLONOSCOPY N/A 2014    Dr. Leo Jaeger at [facility].   • COLONOSCOPY N/A 2016    Dr. Leo Jaeger at [facility].   • COLONOSCOPY N/A 03/15/2023    2 TUBULAR ADENOMA POLYPS IN SIGMOID, BARIUM ENEMA ORDERED, DR. LEO JAEGER AT [facility]   • CORONARY ANGIOPLASTY     • FRACTURE SURGERY Left 2023    IM nailing for L femur fracture             IRF OT ASSESSMENT FLOWSHEET (last 12 hours)     IRF OT Evaluation and Treatment     Row Name 23 1536          OT Time and Intention    Document Type daily treatment  -AF     Mode of Treatment occupational therapy  -AF     Patient Effort good  -AF     Row Name 23 1536          General Information    Patient/Family/Caregiver Comments/Observations pt sitting up in recliner chair in room  -AF     Existing Precautions/Restrictions fall  LLE WBAT  -AF     Row Name  05/22/23 1536          Pain Assessment    Pretreatment Pain Rating 7/10  -AF     Posttreatment Pain Rating 7/10  -AF     Pain Location - Side/Orientation Left  -AF     Pain Location proximal  -AF     Pain Location - hip  -AF     Row Name 05/22/23 1536          Cognition/Psychosocial    Affect/Mental Status (Cognition) WFL  -AF     Orientation Status (Cognition) oriented x 3  -AF     Follows Commands (Cognition) follows two-step commands;repetition of directions required  -AF     Personal Safety Interventions fall prevention program maintained;gait belt;nonskid shoes/slippers when out of bed  -AF     Row Name 05/22/23 1536          Bathing    [place] Level (Bathing) bathing skills;lower body;upper body;contact guard assist  -AF     Assistive Device (Bathing) grab bar/tub rail;hand held shower spray hose;tub bench  -AF     Position (Bathing) supported sitting;supported standing  -AF     Row Name 05/22/23 1536          Upper Body Dressing    [place] Level (Upper Body Dressing) upper body dressing skills;set up assistance  -AF     Position (Upper Body Dressing) supported sitting  -AF     Row Name 05/22/23 1536          Lower Body Dressing    [place] Level (Lower Body Dressing) doff;don;pants/bottoms;shoes/slippers;socks;underwear;minimum assist (75% patient effort);verbal cues  -AF     Position (Lower Body Dressing) supported standing;supported sitting  -AF     Set-up Assistance (Lower Body Dressing) obtain clothing  -AF     Row Name 05/22/23 1536          Grooming    [place] Level (Grooming) grooming skills;contact guard assist  -AF     Position (Grooming) supported standing  -AF     Comment (Grooming) pt stood at sink to shave task took 15 mins to complete and pt did not require a rest break  -AF     Row Name 05/22/23 1536          Functional Mobility    Functional Mobility- Comment in room RWX to and from bathroom CGA with vc's for turning  -AF     Row Name 05/22/23 1536          Sit-Stand  Transfer    Sit-Stand [place] (Transfers) contact guard;standby assist  -AF     Row Name 05/22/23 1536          Stand-Sit Transfer    Stand-Sit [place] (Transfers) standby assist;contact guard  -AF     Row Name 05/22/23 1536          Shower Transfer    Type (Shower Transfer) sit-stand;stand-sit  -AF     [place] Level (Shower Transfer) contact guard;verbal cues  -AF     Assistive Device (Shower Transfer) grab bar, tub/shower;tub bench;walker, front-wheeled  -AF     Row Name 05/22/23 1536          Balance    Static Sitting Balance independent  -AF     Dynamic Sitting Balance supervision  -AF     Static Standing Balance contact guard  with ADL tasks  -AF     Row Name 05/22/23 1536          Positioning and Restraints    Pre-Treatment Position sitting in chair/recliner  -AF     Post Treatment Position chair  -AF     In Chair sitting;call light within reach;encouraged to call for assist;exit alarm on  -AF           User Key  (r) = Recorded By, (t) = Taken By, (c) = Cosigned By    Initials Name Effective Dates    AF Bernarda Ricketts, OTR 06/16/21 -                  Occupational Therapy Education     Title: PT OT SLP Therapies (Done)     Topic: Occupational Therapy (Done)     Point: ADL training (Done)     Description:   Instruct learner(s) on proper safety adaptation and remediation techniques during self care or transfers.   Instruct in proper use of assistive devices.              Learning Progress Summary           Patient Acceptance, E, VU by WN at 5/20/2023 2214    Acceptance, E, VU by WN at 5/19/2023 2232    Acceptance, E, VU by KN at 5/17/2023 1524                   Point: Home exercise program (Done)     Description:   Instruct learner(s) on appropriate technique for monitoring, assisting and/or progressing therapeutic exercises/activities.              Learning Progress Summary           Patient Acceptance, E, VU by WN at 5/20/2023 2214    Acceptance, E, VU by WN at 5/19/2023 2232    Acceptance, E, VU  by KN at 5/17/2023 1524                   Point: Precautions (Done)     Description:   Instruct learner(s) on prescribed precautions during self-care and functional transfers.              Learning Progress Summary           Patient Acceptance, E, VU by WN at 5/20/2023 2214    Acceptance, E, VU by WN at 5/19/2023 2232    Acceptance, E, VU by KN at 5/17/2023 1524                   Point: Body mechanics (Done)     Description:   Instruct learner(s) on proper positioning and spine alignment during self-care, functional mobility activities and/or exercises.              Learning Progress Summary           Patient Acceptance, E, VU by WN at 5/20/2023 2214    Acceptance, E, VU by WN at 5/19/2023 2232    Acceptance, E, VU by KN at 5/17/2023 1524                               User Key     Initials Effective Dates Name Provider Type Discipline     08/23/22 -  Kaamljit Elias, RN Registered Nurse Nurse    DARVIN 08/02/22 -  Main De La Garza OT Occupational Therapist OT                    OT Recommendation and Plan                         Time Calculation:      Time Calculation- OT     Row Name 05/22/23 1540             Time Calculation- OT    OT Start Time 1000  -AF      OT Stop Time 1100  -AF      OT Time Calculation (min) 60 min  -AF            User Key  (r) = Recorded By, (t) = Taken By, (c) = Cosigned By    Initials Name Provider Type    AF Bernarda Ricketts OTR Occupational Therapist              Therapy Charges for Today     Code Description Service Date Service Provider Modifiers Qty    [id number] HC OT SELF CARE/MGMT/TRAIN EA 15 MIN 5/22/2023 Bernarda Ricketts OTR GO 4                   LUH Burgos  5/22/2023

## 2023-05-22 NOTE — PLAN OF CARE
Goal Outcome Evaluation:  Plan of Care Reviewed With: patient        Progress: improving  Outcome Evaluation: A&Ox4, pleasant and cooperative. Meds whole with water. See MAR for prn meds. C/o itching, especially to feet and legs. Hydroguard lotion applied, note left for MD review. Cont B&B. Ambulates to BR with wx and assist x1. No unsafe behavior. Appears to be sleeping well this shift.

## 2023-05-23 PROCEDURE — 97130 THER IVNTJ EA ADDL 15 MIN: CPT

## 2023-05-23 PROCEDURE — 25010000002 HEPARIN (PORCINE) PER 1000 UNITS: Performed by: PHYSICAL MEDICINE & REHABILITATION

## 2023-05-23 PROCEDURE — 97112 NEUROMUSCULAR REEDUCATION: CPT

## 2023-05-23 PROCEDURE — 97530 THERAPEUTIC ACTIVITIES: CPT

## 2023-05-23 PROCEDURE — 97110 THERAPEUTIC EXERCISES: CPT

## 2023-05-23 PROCEDURE — 97129 THER IVNTJ 1ST 15 MIN: CPT

## 2023-05-23 PROCEDURE — 97535 SELF CARE MNGMENT TRAINING: CPT

## 2023-05-23 PROCEDURE — 63710000001 DIPHENHYDRAMINE PER 50 MG: Performed by: PHYSICAL MEDICINE & REHABILITATION

## 2023-05-23 RX ORDER — DIPHENHYDRAMINE HCL 25 MG
25 CAPSULE ORAL 2 TIMES DAILY PRN
Status: DISCONTINUED | OUTPATIENT
Start: 2023-05-23 | End: 2023-06-01 | Stop reason: HOSPADM

## 2023-05-23 RX ORDER — PREDNISONE 5 MG/1
5 TABLET ORAL 3 TIMES DAILY
COMMUNITY
End: 2023-06-01 | Stop reason: HOSPADM

## 2023-05-23 RX ORDER — ROSUVASTATIN CALCIUM 20 MG/1
20 TABLET, COATED ORAL DAILY
Status: ON HOLD | COMMUNITY
End: 2023-05-23

## 2023-05-23 RX ORDER — DULOXETIN HYDROCHLORIDE 30 MG/1
1 CAPSULE, DELAYED RELEASE ORAL 2 TIMES DAILY
COMMUNITY

## 2023-05-23 RX ORDER — LABETALOL 300 MG/1
300 TABLET, FILM COATED ORAL DAILY
Status: ON HOLD | COMMUNITY
End: 2023-05-23

## 2023-05-23 RX ORDER — TIZANIDINE 2 MG/1
2 TABLET ORAL 2 TIMES DAILY
COMMUNITY
End: 2023-06-01 | Stop reason: HOSPADM

## 2023-05-23 RX ORDER — LATANOPROST 50 UG/ML
1 SOLUTION/ DROPS OPHTHALMIC NIGHTLY
COMMUNITY
End: 2023-06-01 | Stop reason: HOSPADM

## 2023-05-23 RX ORDER — BLOOD-GLUCOSE METER
EACH MISCELLANEOUS
COMMUNITY
End: 2023-06-01

## 2023-05-23 RX ADMIN — OXYCODONE HYDROCHLORIDE 5 MG: 5 TABLET ORAL at 20:04

## 2023-05-23 RX ADMIN — DULOXETINE HYDROCHLORIDE 30 MG: 30 CAPSULE, DELAYED RELEASE ORAL at 08:01

## 2023-05-23 RX ADMIN — DICLOFENAC SODIUM 2 G: 10 GEL TOPICAL at 11:45

## 2023-05-23 RX ADMIN — AMLODIPINE BESYLATE 10 MG: 10 TABLET ORAL at 08:01

## 2023-05-23 RX ADMIN — DIPHENHYDRAMINE HYDROCHLORIDE 25 MG: 25 CAPSULE ORAL at 19:24

## 2023-05-23 RX ADMIN — LEVOTHYROXINE SODIUM 25 MCG: 0.03 TABLET ORAL at 05:29

## 2023-05-23 RX ADMIN — DULOXETINE HYDROCHLORIDE 30 MG: 30 CAPSULE, DELAYED RELEASE ORAL at 20:04

## 2023-05-23 RX ADMIN — QUETIAPINE FUMARATE 12.5 MG: 25 TABLET ORAL at 20:04

## 2023-05-23 RX ADMIN — HEPARIN SODIUM 5000 UNITS: 5000 INJECTION INTRAVENOUS; SUBCUTANEOUS at 20:04

## 2023-05-23 RX ADMIN — PANTOPRAZOLE SODIUM 40 MG: 40 TABLET, DELAYED RELEASE ORAL at 08:01

## 2023-05-23 RX ADMIN — HEPARIN SODIUM 5000 UNITS: 5000 INJECTION INTRAVENOUS; SUBCUTANEOUS at 08:01

## 2023-05-23 NOTE — THERAPY TREATMENT NOTE
Inpatient Rehabilitation - Speech Language Pathology Treatment Note    [facility]     Patient Name: Radha Azevedo  : 1935  MRN: [id number]    Today's Date: 2023                   Admit Date: 2023       Visit Dx:      ICD-10-CM ICD-9-CM   1. Impaired gait and mobility  R26.89 781.2       Patient Active Problem List   Diagnosis   • Absolute anemia   • Benign essential HTN   • Benign localized hyperplasia of prostate without urinary obstruction   • Type 2 diabetes mellitus with peripheral angiopathy   • Hypertension   • Hyperlipidemia   • Type 2 diabetes mellitus   • Diabetes mellitus type 2, noninsulin dependent   • Avitaminosis D   • Dyslipidemia   • Decrease in the ability to hear   • Personal history of colonic polyps   • Subdural hematoma       Past Medical History:   Diagnosis Date   • Colon polyps     Followed by Dr. Leo Jaeger at [facility].   • Coronary artery disease    • Diabetes mellitus    • Hyperlipidemia    • Hypertension        Past Surgical History:   Procedure Laterality Date   • CARDIAC CATHETERIZATION     • CAROTID STENT      patient denies, but family states he did have a stent placed   • COLONOSCOPY N/A 2014    Dr. Leo Jaeger at [facility].   • COLONOSCOPY N/A 2016    Dr. Leo Jaeger at [facility].   • COLONOSCOPY N/A 03/15/2023    2 TUBULAR ADENOMA POLYPS IN SIGMOID, BARIUM ENEMA ORDERED, DR. ELO JAEGER AT [facility]   • CORONARY ANGIOPLASTY     • FRACTURE SURGERY Left 2023    IM nailing for L femur fracture       SLP Recommendation and Plan                                                            SLP EVALUATION (last 72 hours)     SLP SLC Evaluation     Row Name 23 1300 23 0930 23 1230 23 0800          Communication Assessment/Intervention    Document Type therapy note (daily note)  -SR therapy note (daily note)  -SR therapy note (daily note)  -SR therapy note (daily note)  -SR     Subjective Information no complaints  -SR no complaints   -SR no complaints  -SR no complaints  -SR     Patient Observations alert;cooperative;agree to therapy  -SR alert;cooperative;agree to therapy  -SR alert;cooperative;agree to therapy  -SR alert;cooperative;agree to therapy  -SR     Patient Effort good  -SR good  -SR good  -SR good  -SR     Symptoms Noted During/After Treatment none  -SR none  -SR none  -SR none  -SR        Pain    Additional Documentation -- -- -- Pain Scale: Numbers Pre/Post-Treatment (Group)  -SR        Pain Scale: Numbers Pre/Post-Treatment    Pretreatment Pain Rating 0/10 - no pain  -SR 5/10  -SR 7/10  -SR 8/10  -SR     Posttreatment Pain Rating 0/10 - no pain  -SR 5/10  -SR 7/10  -SR 8/10  -SR     Pain Location - Side/Orientation -- Left  -SR Left  -SR Left  -SR     Pain Location -- proximal  -SR proximal  -SR --     Pain Location - -- hip  -SR hip  -SR hip  -SR     Pre/Posttreatment Pain Comment -- -- -- RN notified  -SR           User Key  (r) = Recorded By, (t) = Taken By, (c) = Cosigned By    Initials Name Effective Dates    SR Hope Mary CCC-SLP 11/10/22 -                    EDUCATION    The patient has been educated in the following areas:       Cognitive Impairment.             SLP GOALS     Row Name 05/23/23 1300 05/23/23 0900 05/22/23 1230       Attention Goal 1 (SLP)    Improve Attention by Goal 1 (SLP) -- 80%;complete selective attention task;complete sustained attention task;with minimal cues (75-90%)  -SR --    Time Frame (Attention Goal 1, SLP) -- by discharge  -SR --    Progress/Outcomes (Attention Goal 1, SLP) -- goal ongoing  -SR --       Memory Skills Goal 1 (SLP)    Improve Memory Skills Through Goal 1 (SLP) -- use written schedule;use memory strategies;recall details of the day;80%;with minimal cues (75-90%)  -SR use written schedule;use memory strategies;recall details of the day;80%;with minimal cues (75-90%)  -SR    Time Frame (Memory Skills Goal 1, SLP) -- by discharge  -SR by discharge  -SR    Progress/Outcomes  (Memory Skills Goal 1, SLP) -- goal ongoing  -SR goal ongoing  -SR    Comment (Memory Skills Goal 1, SLP) -- Memory and mental manipulation; patient recalled 3 words in sequential order with 80% acc given MIN cues. Accuracy increased with repetition of word list.  -SR --       Reasoning Goal 1 (SLP)    Improve Reasoning Through Goal 1 (SLP) complete deductive reasoning task;complete mental flexibility task;80%;independently (over 90% accuracy)  -SR complete deductive reasoning task;complete mental flexibility task;80%;independently (over 90% accuracy)  -SR --    Time Frame (Reasoning Goal 1, SLP) by discharge  -SR by discharge  -SR --    Progress/Outcomes (Reasoning Goal 1, SLP) goal ongoing  -SR goal ongoing  -SR --    Comment (Reasoning Goal 1, SLP) Calendar deduction; patient followed directions and used process of elimination to determine the final date on the calendar with 60% acc given MOD cues; 80% acc given MAX cues.  -SR Simple deductive reasoning/planning activity completed during AM therapy session. Patient used clues from written paragraph and labeled daily events in order of occurance with 40% acc given MIN cues; 80% acc given MOD cues. Demonstrated increased difficulty with attention, working memory, and organization during task. Read paragraph multiple times prior to carrying out the task. During a following activity, patient answered questions regarding simple math concepts related to paragraph with 80% acc given MIN-MOD cues.  -SR --       Executive Functional Skills Goal 1 (SLP)    Improve Executive Function Skills Goal 1 (SLP) home management activity;time management activity;80%;independently (over 90% accuracy)  -SR -- home management activity;time management activity;80%;independently (over 90% accuracy)  -SR    Time Frame (Executive Function Skills Goal 1, SLP) by discharge  -SR -- by discharge  -SR    Progress/Outcomes (Executive Function Skills Goal 1, SLP) goal ongoing  -SR -- goal ongoing   -SR    Comment (Executive Function Skills Goal 1, SLP) Restaurant menu prices; patient answered functional addition/subtraction based questions with 40% acc given MIN cues; 80% acc given MOD cues. Verbal/visual cues provided for locating target item/price. Extended time provided to complete task. No calculator assist.  -SR -- Simple medication management task completed during PM therapy session. Patient sorted x10 mock medication by day/time given written directions. Medications were  into sets of 5. Patient completed activity with 50% acc given NO cues; 70% acc given MIN cues; 90% acc given MOD cues. Cues provided for attention to detail. Patient continuing progress toward goal. Anticipate supervision of medications following discharge.  -SR    Row Name 05/22/23 0800             Attention Goal 1 (SLP)    Improve Attention by Goal 1 (SLP) 80%;complete selective attention task;complete sustained attention task;with minimal cues (75-90%)  -SR      Time Frame (Attention Goal 1, SLP) by discharge  -SR      Progress/Outcomes (Attention Goal 1, SLP) goal ongoing  -SR      Comment (Attention Goal 1, SLP) Participated in divergent naming activity during AM session. Given concrete category, patient named 8 items given NO cues; 12 items given MIN cues. With abstract category, patient named 5 items given NO cues; 10 items given MIN cues.  -SR         Reasoning Goal 1 (SLP)    Improve Reasoning Through Goal 1 (SLP) complete deductive reasoning task;complete mental flexibility task;80%;independently (over 90% accuracy)  -SR      Time Frame (Reasoning Goal 1, SLP) by discharge  -SR      Progress/Outcomes (Reasoning Goal 1, SLP) goal ongoing  -SR      Comment (Reasoning Goal 1, SLP) Patient labeled 5 written steps (activities related to ADLs) in order occurance with 80% acc given MIN cues. Cues provided for attn to detail.  -SR            User Key  (r) = Recorded By, (t) = Taken By, (c) = Cosigned By    Initials Name  Provider Type    Hope West CCC-SLP Speech and Language Pathologist                            Time Calculation:        Time Calculation- SLP     Row Name 05/23/23 1337 05/23/23 1151          Time Calculation- SLP    SLP Start Time 1300  -SR 0930  -SR     SLP Stop Time 1330  -SR 1000  -SR     SLP Time Calculation (min) 30 min  -SR 30 min  -SR           User Key  (r) = Recorded By, (t) = Taken By, (c) = Cosigned By    Initials Name Provider Type    Hope West CCC-SLP Speech and Language Pathologist                  Therapy Charges for Today     Code Description Service Date Service Provider Modifiers Qty    01423587959 HC ST DEV OF COGN SKILLS INITIAL 15 MIN 5/22/2023 Hope Mary CCC-SLP  1    92104176858 HC ST DEV OF COGN SKILLS EACH ADDT'L 15 MIN 5/22/2023 Hope Mary CCC-SLP  3    [id number] HC ST DEV OF COGN SKILLS INITIAL 15 MIN 5/23/2023 Hope Mary CCC-SLP  1    [id number] HC ST DEV OF COGN SKILLS EACH ADDT'L 15 MIN 5/23/2023 Hope Mary CCC-SLP  3                           KATIE Victoria  5/23/2023

## 2023-05-23 NOTE — PROGRESS NOTES
Inpatient Rehabilitation Plan of Care Note    Plan of Care  Care Plan Reviewed - No updates at this time.    Psychosocial    Performed Intervention(s)  Medication.  Group therapy.      Safety    Performed Intervention(s)  Items within reach.  Safety rounds.  Wheelchair, bed, and chair alarms.      Sphincter Control    Performed Intervention(s)  Offer restroom.  Bladder training program.  Use incontinence products.  Encourage appropriate diet.  Encourage fluid intake.    Signed by: Rafaela Adam RN

## 2023-05-23 NOTE — PROGRESS NOTES
LOS: 7 days   Patient Care Team:  Amina Jiang MD as PCP - General (Endocrinology)      [facility]  1935    Diagnoses    1. IMPAIRED GAIT AND MOBILITY       ADMITTING DIAGNOSIS:  SDH  L femur fracture    Subjective     Patient denies any significant headache.  Does have some baseline soreness to left hip/thigh.  Tolerating therapies.  Working on his balance and feels he is making some progress.    Objective     Vitals:    05/23/23 1410   BP: 118/66   Pulse: 69   Resp: 18   Temp: 97.5 °F (36.4 °C)   SpO2: 98%       PHYSICAL EXAM:   General: The patient is well-developed, well-nourished and in no apparent distress.   HEENT: EOMI, hearing intact b/l. MMM. Sutures intact in occipital region   Respiratory: Lungs CTAB, no wheezes or rhonchi     Cardiac: RRR, no m/r/g, no pedal edema     Abdomen: soft, NT abdomen     Skin: L femoral IM placement incisions with Aquacell surgical dressing  Ext: Tenderness on L anterior thigh  Psych: Appropriate affect and behavior         Neuro:  Mental Status:  AOx4   Speech: fluent without dysarthria   Good strength bilaterally      MEDICATIONS  Scheduled Meds:amLODIPine, 10 mg, Oral, Q24H  Diclofenac Sodium, 2 g, Topical, 4x Daily  DULoxetine, 30 mg, Oral, BID  heparin (porcine), 5,000 Units, Subcutaneous, Q12H  levothyroxine, 25 mcg, Oral, Q AM  pantoprazole, 40 mg, Oral, Daily  QUEtiapine, 12.5 mg, Oral, Nightly      Continuous Infusions:   PRN Meds:.•  acetaminophen  •  aluminum-magnesium hydroxide-simethicone  •  hydrALAZINE  •  oxyCODONE      RESULTS  No results found for: POCGLU  Results from last 7 days   Lab Units 05/22/23  0726 05/19/23  0600 05/16/23 2021   WBC 10*3/mm3 5.18 5.04 6.12   HEMOGLOBIN g/dL 10.6* 9.6* 9.8*   HEMATOCRIT % 33.5* 29.4* 30.0*   PLATELETS 10*3/mm3 312 286 282     Results from last 7 days   Lab Units 05/22/23  0727 05/19/23  0600 05/16/23 2021   SODIUM mmol/L 129* 133* 128*   POTASSIUM mmol/L 4.0 4.3 4.2   CHLORIDE mmol/L 93* 97* 92*    CO2 mmol/L 23.6 27.0 27.5   BUN mg/dL 24* 23 15   CREATININE mg/dL 1.12 1.07 1.07   CALCIUM mg/dL 9.3 9.1 9.1   BILIRUBIN mg/dL  --   --  0.4   ALK PHOS U/L  --   --  134*   ALT (SGPT) U/L  --   --  16   AST (SGOT) U/L  --   --  30   GLUCOSE mg/dL 97 106* 101*     CT of the head May 22, 2023  IMPRESSION:     There is an acute to subacute subdural hematoma noted lateral to the  right frontal, parietal, and to lesser extent occipital lobes which  measures maximally 6 mm in diameter lateral to the right frontal  parietal junction. An age-indeterminate subdural hygroma is seen lateral  to left frontal and parietal lobes and the subdural hygroma measures up  to 9 mm in maximal diameter lateral to the left frontal lobe. There is  bilateral mass effect with sulcal effacement. However, no significant  shift of the midline structures is seen.    ASSESSMENT and PLAN    Subdural hematoma    Impairments: ADLs, endurance, gait, balance, transfers, cognition, comprehension     Now admit for comprehensive acute inpatient rehabilitation .  This would be an interdisciplinary program with physical therapy 1 hour,  occupational therapy 1 hour, and speech therapy 1 hour, 5 days a week.  Rehabilitation nursing for carryover, monitoring of neurologic status, bowel and bladder, and skin  Ongoing physician follow-up.  Weekly team conferences.  Goals are to achieve a level of supervision with  mobility and self-care and improved ADLs.   Rehabilitation prognosis good.  Medical prognosis good.  Estimated length of stay is approximately 1-2 weeks , but is only an estimation.         #Functional Impairment 2/2 SDH and L femur fracture   -Initiate comprehensive rehab program consisting of PT/OT/SLP 3 hours/day, 5 days/week with medical management of above disorder, comorbidities, pain, bowels, and bladder.       -WB/activity status:?no restrictions, WBAT        #SDH  -No gross neurologic deficits on exam  -Will order CT Head for baseline or  transfer imaging from [facility]  May 22- Order CT Head for follow-up SDH evaluation. Will send [facility] to [facility] for a follow-up CT of the head today per message from [facility].  To have study done with results sent to Dr. Hollins - [facility]  May 41-[id number] CT-report reviewed.  Right subdural hematoma and left subdural hygroma, no shift.  neurosurgery obtaining copy of CT images on CD to review.  Unable to compare to outside films at [facility] due to issues with their PACS system.     #L displaced femur fracture  -S/p L IM Nail placement in L femur  -WBAT in LLE      #Pain Control     -Prn Norco 5 q4h     May 22- Will DC Norco and change to Oxycodone 5 BID PRN. Patient was only getting Norco 3 times a day and is the same MME as Oxycodone 5 BID PRN. Will also add Tylenol 650 q6h PRN for intermittent pain relief in between Oxycodone doses.    #Hypothyroidism  - on admission  -Significant weight loss and depression over the past year  -Continue Synthroid 25 daily  -Will follow-up on TSH and T4 values tomorrow        #Hyponatremia  -Na 128 on admission, 128-130 a [facility]. -Fluid restriction to 1500ml daily  -Possibly related to hypothyroidism  May 20- increase synthroid to 50mcg daily and monitor TSH and Na+  -Will continue to monitor  May 22-sodium unchanged 129     #HTN  -Continue Norvasc 5 daily  May 22- Increased Norvasc to 10 daily over the weekend. BP improved and is more controlled, will continue to monitor.    #Mood disorder  -Continue Cymbalta 30 BID and Seroquel 12.5 at bedtime   -PRN Xanax held because cause of his falls     #FEN     -nutrition: Cardiac diet, Regular texture, Liquid Consistency: Thin Liquid. Fluid restriction 1500cc daily   -admit labs reviewed      #PPx    -DVT: Heparin 5000U q8h        TEAM CONF - MAY 18 - Bed mob CG / Min x 1, CG / Min transfers, amb 80' CG rwx,  completed 4 steps x 2 rails CGA / Min A.  CGA toilet and shower transfer.  Mod A  toileting.   Mod A LBD.  Mild cognitive impairment.  Cont bowel and bladder.  BP  running high, increased Amlodipine.       Luis Llanos MD      During rounds, used appropriate personal protective equipment including mask and gloves.  Additional gown if indicated.  Mask used was standard procedure mask. Appropriate PPE was worn during the entire visit.  Hand hygiene was completed before and after.

## 2023-05-23 NOTE — PLAN OF CARE
Goal Outcome Evaluation:  Plan of Care Reviewed With: patient        Progress: improving     Patient is calm and cooperative. Alert and oriented x 4. Scheduled Voltaren gel to bilateral hand and wrist applied. He is using the call light for assistance. Ambulating with walker and 1 person assist. Continent of B/B. Will continue to monitor.

## 2023-05-23 NOTE — PLAN OF CARE
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  PRN pain meds given x1.  All meds whole w/thins.  Up assist x1 w/walker.  no c/o itching this shift.  cont of bladder.  uses urinal in bed.

## 2023-05-23 NOTE — PROGRESS NOTES
"Nutrition Services    Patient Name:  Radha Azevedo  YOB: 1935  MRN: [id number]  Admit Date:  5/16/2023      Assessment Date:  05/23/23    FOLLOW UP NOTE - CLINICAL NUTRITION    Comments:  Visited pt in room for follow-up + introduction. Pt reported fair appetite at most meals. Per EMR review, PO % at recent documented meals. When asked about fluid restriction, pt was unaware of his fluid restriction and visually, with multiple fluids (water bottle, pitcher and cup of water, etc) on table in room. Advised pt of restriction. Pt reported UBW of 122#. Denies N/V and with no nutrition questions at this time.     Recommendations:    1. Continue fluid restriction, as appropriate. May also consider a salt tablet or sodium replacement given persistent hyponatremia.   2. Continue to encourage adequate PO intakes.     RD will continue to follow-up, per protocol.    Encounter Information        Reason for Encounter Follow-up   Current Issues Subdural hematoma s/p fall causing L femur fracture     Current Nutrition Orders & Evaluation of Intake       Oral Nutrition     Current PO Diet Diet: Regular/House Diet, Fluid Restriction (240 mL/tray) Diets; 1500 mL/day; Texture: Regular Texture (IDDSI 7); Fluid Consistency: Thin (IDDSI 0)   Supplement    PO Evaluation    % PO Intake/# of days %   Factors Affecting Intake  decreased appetite     Anthropometrics         Height   Weight Height: 152.5 cm (60.02\")  Weight: 57.6 kg (126 lb 15.8 oz) (05/17/23 1254)   BMI kg/m2 Body mass index is 24.78 kg/m².  Normal/Healthy (18.4 - 24.9)   Weight trend Stable, Unknown; stable since March, and according to pt's reported UBW. Per wt hx, wt is down 19# (13%) x 18mo.      Physical Findings          Physical Appearance alert, oriented   Oral/Mouth Cavity tooth or teeth missing   Edema  1+ (trace)   Gastrointestinal non-distended , normoactive, last bowel movement:5/21   Skin  surgical incision L hip "   Tubes/Drains/Lines none     Labs       Pertinent Labs Reviewed, listed below     Results from last 7 days   Lab Units 05/22/23  0727 05/19/23  0600 05/16/23 2021   SODIUM mmol/L 129* 133* 128*   POTASSIUM mmol/L 4.0 4.3 4.2   CHLORIDE mmol/L 93* 97* 92*   CO2 mmol/L 23.6 27.0 27.5   BUN mg/dL 24* 23 15   CREATININE mg/dL 1.12 1.07 1.07   CALCIUM mg/dL 9.3 9.1 9.1   BILIRUBIN mg/dL  --   --  0.4   ALK PHOS U/L  --   --  134*   ALT (SGPT) U/L  --   --  16   AST (SGOT) U/L  --   --  30   GLUCOSE mg/dL 97 106* 101*     Results from last 7 days   Lab Units 05/22/23  0726   HEMOGLOBIN g/dL 10.6*   HEMATOCRIT % 33.5*   WBC 10*3/mm3 5.18     Results from last 7 days   Lab Units 05/22/23  0726 05/19/23  0600 05/16/23 2021   PLATELETS 10*3/mm3 312 286 282     SARS-CoV-2, JEET   Date Value Ref Range Status   05/22/2022 Negative Negative Final     Comment:     SARS-CoV-2, JEET (COVID-19) EUA    This is a molecular, nucleic acid amplification (NAAT) test performed by RT-PCR, (also known as PCR).      Negative (Not-Detected) results do not preclude COVID-19 infection and should not be used as the sole basis for treatment or other patient management decisions. Consider retesting of negative patients if clinical features are suspicious for COVID-19 infection, and other common causative agents have been ruled out.    This test has not been FDA cleared or approved. This test has been authorized by the FDA under an EUA for use by authorized laboratories. This test is only authorized for the duration of time the declaration that circumstances exist justifying the authorization of the emergency use of in vitro diagnostic tests for detection of SARS-CoV-2 virus and/or diagnosis of COVID-19 infection under section 564(b)(1) of the Act, 21 U.S.C. 360bbb-3(b)(1), unless the authorization is terminated or revoked sooner.    This laboratory is certified under the Clinical Laboratory Improvement Amendments (CLIA) Act, to perform, at minimum,  Waived Testing.  This test's performance characteristics have been verified. These results are not intended to be used as the sole means for clinical diagnosis or patient management decisions.    Testing performed using the Cepheid Xpert Xpress SARS-CoV-2/Flu/RSV or the Cepheid Xpert Xpress SARS-CoV-2/Flu/RSVplus, PCR Testing    Patients-Please access the Cepheid Xpert Xpress SARS-CoV-2/Flu/RSV and the Cepheid Xpert Xpress SARS-CoV-2/Flu/RSVplus FDA FACT sheet at the following  address: [email or web address]   Providers-Please access the Cepheid Xpert Xpress SARS-CoV-2/Flu/RSV and the Cepheid Xpert Xpress SARS-CoV-2/Flu/RSV plus FDA FACT Sheet at the following web  address: [email or web address]     Lab Results   Component Value Date    HGBA1C 6.00 (H) 06/03/2020          Medications           Scheduled Medications amLODIPine, 10 mg, Oral, Q24H  Diclofenac Sodium, 2 g, Topical, 4x Daily  DULoxetine, 30 mg, Oral, BID  heparin (porcine), 5,000 Units, Subcutaneous, Q12H  levothyroxine, 25 mcg, Oral, Q AM  pantoprazole, 40 mg, Oral, Daily  QUEtiapine, 12.5 mg, Oral, Nightly       Infusions     PRN Medications •  acetaminophen  •  aluminum-magnesium hydroxide-simethicone  •  hydrALAZINE  •  oxyCODONE     PLAN OF CARE  Intervention Goal        Intervention Goal(s) Maintain nutrition status, Maintain intake, Maintain weight and No significant weight loss     Nutrition Intervention        RD Action Advise available snack, Encourage intake, Follow Tx Progress, Care plan reviewed and Other (comment):Made pt aware of fluid restriction (1500ml/d)     Prescription        Diet Prescription    Supplement Prescription    EN/PN Prescription    Prescription Ordered Continue same per protocol   --  Monitor/Evaluation        Monitor Per protocol, PO intake, Pertinent labs, Weight, Skin status, GI status, Symptoms   Discharge Plan Pending clinical course   Education Will educate as needed       Electronically signed  by:  Alyssa Bullard RD  05/23/23 12:30 EDT

## 2023-05-23 NOTE — THERAPY TREATMENT NOTE
Inpatient Rehabilitation - Occupational Therapy Treatment Note    [facility]     Patient Name: Radha Azevedo  : 1935  MRN: [id number]    Today's Date: 2023                 Admit Date: 2023         ICD-10-CM ICD-9-CM   1. Impaired gait and mobility  R26.89 781.2       Patient Active Problem List   Diagnosis   • Absolute anemia   • Benign essential HTN   • Benign localized hyperplasia of prostate without urinary obstruction   • Type 2 diabetes mellitus with peripheral angiopathy   • Hypertension   • Hyperlipidemia   • Type 2 diabetes mellitus   • Diabetes mellitus type 2, noninsulin dependent   • Avitaminosis D   • Dyslipidemia   • Decrease in the ability to hear   • Personal history of colonic polyps   • Subdural hematoma       Past Medical History:   Diagnosis Date   • Colon polyps     Followed by Dr. Leo Jaeger at [facility].   • Coronary artery disease    • Diabetes mellitus    • Hyperlipidemia    • Hypertension        Past Surgical History:   Procedure Laterality Date   • CARDIAC CATHETERIZATION     • CAROTID STENT      patient denies, but family states he did have a stent placed   • COLONOSCOPY N/A 2014    Dr. Leo Jaeger at [facility].   • COLONOSCOPY N/A 2016    Dr. Leo Jaeger at [facility].   • COLONOSCOPY N/A 03/15/2023    2 TUBULAR ADENOMA POLYPS IN SIGMOID, BARIUM ENEMA ORDERED, DR. LEO JAEGER AT [facility]   • CORONARY ANGIOPLASTY     • FRACTURE SURGERY Left 2023    IM nailing for L femur fracture             IRF OT ASSESSMENT FLOWSHEET (last 12 hours)     IRF OT Evaluation and Treatment     Row Name 23 1510          OT Time and Intention    Document Type daily treatment  -AF     Mode of Treatment occupational therapy  -AF     Patient Effort good  -AF     Row Name 23 1517          General Information    Patient/Family/Caregiver Comments/Observations pt sitting up in w/c in AM and recliner chair in PM  -AF     Existing Precautions/Restrictions fall  WBAT LLE  -AF      Row Name 05/23/23 1510          Pain Assessment    Pretreatment Pain Rating 0/10 - no pain  -AF     Posttreatment Pain Rating 0/10 - no pain  -AF     Row Name 05/23/23 1510          Cognition/Psychosocial    Affect/Mental Status (Cognition) WFL  -AF     Orientation Status (Cognition) oriented x 3  -AF     Follows Commands (Cognition) follows two-step commands;repetition of directions required  -AF     Personal Safety Interventions fall prevention program maintained;gait belt;nonskid shoes/slippers when out of bed  -AF     Comment, Cognition difficulty with follow visual perceptual small peg board design  -AF     Row Name 05/23/23 1510          Bathing    Comment (Bathing) deferred  -AF     Row Name 05/23/23 1510          Upper Body Dressing    Comment (Upper Body Dressing) deferred  -AF     Row Name 05/23/23 1510          Grooming    [place] Level (Grooming) grooming skills;standby assist;contact guard assist  -AF     Position (Grooming) supported standing  -AF     Comment (Grooming) RWX level  -AF     Row Name 05/23/23 1510          Toileting    [place] Level (Toileting) contact guard assist  -AF     Assistive Device Use (Toileting) grab bar/safety frame;raised toilet seat  -AF     Position (Toileting) supported standing;supported sitting  -AF     Comment (Toileting) RWX level, in AM and PM sessions  -AF     Row Name 05/23/23 1510          Functional Mobility    Functional Mobility- Comment RWX level in room CGA, walked to and from therapy gym in PM session with RWX CGA  -AF     Row Name 05/23/23 1510          Sit-Stand Transfer    Sit-Stand [place] (Transfers) standby assist;contact guard  -AF     Assistive Device (Sit-Stand Transfers) walker, front-wheeled  -AF     Row Name 05/23/23 1510          Stand-Sit Transfer    Stand-Sit [place] (Transfers) contact guard;standby assist  -AF     Assistive Device (Stand-Sit Transfers) walker, front-wheeled  -AF     Row Name 05/23/23 1510           Toilet Transfer    Type (Toilet Transfer) stand-sit;sit-stand  -AF     [place] Level (Toilet Transfer) contact guard;standby assist  -AF     Assistive Device (Toilet Transfer) commode;grab bars/safety frame;walker, front-wheeled  -AF     Comment, (Toilet Transfer) in AM and PM sessions  -AF     Row Name 05/23/23 1510          Shoulder (Therapeutic Exercise)    Shoulder Strengthening (Therapeutic Exercise) bilateral;flexion;extension;internal rotation;external rotation;scapular stabilization;sitting;2 lb free weight;10 repetitions;2 sets  #2.5 dowel zhao  -AF     Row Name 05/23/23 1510          Elbow/Forearm (Therapeutic Exercise)    Elbow/Forearm (Therapeutic Exercise) strengthening exercise  -AF     Elbow/Forearm Strengthening (Therapeutic Exercise) bilateral;flexion;supination;extension;pronation;2 lb free weight;10 repetitions;2 sets  -AF     Row Name 05/23/23 1510          Hand (Therapeutic Exercise)    Hand (Therapeutic Exercise) strengthening exercise  -AF     Hand Strengthening (Therapeutic Exercise)  strengthening;hand gripper;10 repetitions;2 sets  -AF     Row Name 05/23/23 1510          Balance    Balance Assessment standing dynamic balance  -AF     Dynamic Standing Balance contact guard;standby assist  -AF     Comment, Balance mulitple standing balance tasks RW[place] level with reaching, slight bending and moving objects to challenge his standing balance with CGA provided from therapist required 2 seated rest breaks  -AF     Row Name 05/23/23 1510          Positioning and Restraints    Pre-Treatment Position sitting in chair/recliner  -AF     Post Treatment Position chair  -AF     In Chair sitting;call light within reach;exit alarm on;encouraged to call for assist  in PM  -AF     In Wheelchair sitting;exit alarm on;with SLP  in AM  -AF           User Key  (r) = Recorded By, (t) = Taken By, (c) = Cosigned By    Initials Name Effective Dates    AF Bernarda Ricketts, OTR 06/16/21 -                   Occupational Therapy Education     Title: PT OT SLP Therapies (Done)     Topic: Occupational Therapy (Done)     Point: ADL training (Done)     Description:   Instruct learner(s) on proper safety adaptation and remediation techniques during self care or transfers.   Instruct in proper use of assistive devices.              Learning Progress Summary           Patient Acceptance, E, VU by WN at 5/20/2023 2214    Acceptance, E, VU by WN at 5/19/2023 2232    Acceptance, E, VU by KN at 5/17/2023 1524                   Point: Home exercise program (Done)     Description:   Instruct learner(s) on appropriate technique for monitoring, assisting and/or progressing therapeutic exercises/activities.              Learning Progress Summary           Patient Acceptance, E, VU by WN at 5/20/2023 2214    Acceptance, E, VU by WN at 5/19/2023 2232    Acceptance, E, VU by KN at 5/17/2023 1524                   Point: Precautions (Done)     Description:   Instruct learner(s) on prescribed precautions during self-care and functional transfers.              Learning Progress Summary           Patient Acceptance, E, VU by WN at 5/20/2023 2214    Acceptance, E, VU by WN at 5/19/2023 2232    Acceptance, E, VU by KN at 5/17/2023 1524                   Point: Body mechanics (Done)     Description:   Instruct learner(s) on proper positioning and spine alignment during self-care, functional mobility activities and/or exercises.              Learning Progress Summary           Patient Acceptance, E, VU by WN at 5/20/2023 2214    Acceptance, E, VU by WN at 5/19/2023 2232    Acceptance, E, VU by KN at 5/17/2023 1524                               User Key     Initials Effective Dates Name Provider Type Discipline    RICKI 08/23/22 -  Kamaljit Elias, RN Registered Nurse Nurse    DARVIN 08/02/22 -  Main De La Garza OT Occupational Therapist OT                    OT Recommendation and Plan                         Time Calculation:      Time Calculation- OT      Row Name 05/23/23 1517 05/23/23 1515          Time Calculation- OT    OT Start Time 1400  -AF 0900  -AF     OT Stop Time 1430  -AF 0930  -AF     OT Time Calculation (min) 30 min  -AF 30 min  -AF           User Key  (r) = Recorded By, (t) = Taken By, (c) = Cosigned By    Initials Name Provider Type    AF Bernarda Ricketts, OTR Occupational Therapist              Therapy Charges for Today     Code Description Service Date Service Provider Modifiers Qty    09922494321 HC OT SELF CARE/MGMT/TRAIN EA 15 MIN 5/22/2023 Bernarda Ricketts, OTR GO 4    72104290428 HC OT SELF CARE/MGMT/TRAIN EA 15 MIN 5/23/2023 Bernarda Ricketts, OTR GO 1    64505057573 HC OT THERAPEUTIC ACT EA 15 MIN 5/23/2023 Bernarda Ricketts, OTR GO 1    85487917391 HC OT THER PROC EA 15 MIN 5/23/2023 Bernarda Ricketts, OTR GO 1    19914363313 HC OT NEUROMUSC RE EDUCATION EA 15 MIN 5/23/2023 Bernarda Ricketts, OTR GO 1                   LUH Burgos  5/23/2023

## 2023-05-23 NOTE — THERAPY PROGRESS REPORT/RE-CERT
Inpatient Rehabilitation - Physical Therapy Progress Note and Treatment Note       [facility]     Patient Name: Radha Azevedo  : 1935  MRN: [id number]    Today's Date: 2023                    Admit Date: 2023      Visit Dx:     ICD-10-CM ICD-9-CM   1. Impaired gait and mobility  R26.89 781.2       Patient Active Problem List   Diagnosis   • Absolute anemia   • Benign essential HTN   • Benign localized hyperplasia of prostate without urinary obstruction   • Type 2 diabetes mellitus with peripheral angiopathy   • Hypertension   • Hyperlipidemia   • Type 2 diabetes mellitus   • Diabetes mellitus type 2, noninsulin dependent   • Avitaminosis D   • Dyslipidemia   • Decrease in the ability to hear   • Personal history of colonic polyps   • Subdural hematoma       Past Medical History:   Diagnosis Date   • Colon polyps     Followed by Dr. Leo Jaeger at [facility].   • Coronary artery disease    • Diabetes mellitus    • Hyperlipidemia    • Hypertension        Past Surgical History:   Procedure Laterality Date   • CARDIAC CATHETERIZATION     • CAROTID STENT      patient denies, but family states he did have a stent placed   • COLONOSCOPY N/A 2014    Dr. Leo Jaeger at [facility].   • COLONOSCOPY N/A 2016    Dr. Leo Jaeger at [facility].   • COLONOSCOPY N/A 03/15/2023    2 TUBULAR ADENOMA POLYPS IN SIGMOID, BARIUM ENEMA ORDERED, DR. LEO JAEGER AT [facility]   • CORONARY ANGIOPLASTY     • FRACTURE SURGERY Left 2023    IM nailing for L femur fracture       PT ASSESSMENT (last 12 hours)     IRF PT Evaluation and Treatment     Row Name 23 0739          PT Time and Intention    Document Type daily treatment;progress note  -DP     Mode of Treatment individual therapy;physical therapy  -DP     Patient/Family/Caregiver Comments/Observations pt sitting EOB upon PT arrival  -DP     Row Name 23 0751          General Information    Patient Profile Reviewed yes  -DP     Existing  Precautions/Restrictions fall  LLE WBAT  -DP     Row Name 05/23/23 0739          Pain Assessment    Pretreatment Pain Rating 7/10  -DP     Posttreatment Pain Rating 7/10  -DP     Pain Location - Side/Orientation Left  -DP     Pain Location proximal  -DP     Pain Location - hip  -DP     Row Name 05/23/23 0739          Cognition/Psychosocial    Affect/Mental Status (Cognition) WFL  -DP     Orientation Status (Cognition) oriented x 3  -DP     Follows Commands (Cognition) follows two-step commands;repetition of directions required  -DP     Personal Safety Interventions safety round/check completed;elopement precautions initiated;gait belt;fall prevention program maintained;muscle strengthening facilitated;nonskid shoes/slippers when out of bed;supervised activity  -DP     Row Name 05/23/23 0739          Bed Mobility    Supine-Sit [place] (Bed Mobility) standby assist  -DP     Sit-Supine [place] (Bed Mobility) standby assist  -DP     Comment, (Bed Mobility) mat mobility  -DP     Row Name 05/23/23 0739          Bed-Chair Transfer    Bed-Chair [place] (Transfers) contact guard;minimum assist (75% patient effort)  -DP     Assistive Device (Bed-Chair Transfers) walker, front-wheeled  -DP     Row Name 05/23/23 0739          Sit-Stand Transfer    Sit-Stand [place] (Transfers) contact guard;standby assist  -DP     Assistive Device (Sit-Stand Transfers) walker, front-wheeled  -DP     Row Name 05/23/23 0739          Stand-Sit Transfer    Stand-Sit [place] (Transfers) standby assist;contact guard  -DP     Assistive Device (Stand-Sit Transfers) wheelchair;walker, front-wheeled  -DP     Row Name 05/23/23 0739          Gait/Stairs (Locomotion)    [place] Level (Gait) contact guard;verbal cues  -DP     Assistive Device (Gait) walker, front-wheeled  -DP     Distance in Feet (Gait) 160'x1 with HHA, 160'x1 with FWW  -DP     Pattern (Gait) step-through  -DP     Deviations/Abnormal Patterns (Gait) bilateral  deviations;base of support, narrow;gait speed decreased;stride length decreased;weight shifting decreased  -DP     Bilateral Gait Deviations forward flexed posture;heel strike decreased  -DP     Left Sided Gait Deviations weight shift ability decreased  -DP     Handrail Location (Stairs) both sides  -DP     Number of Steps (Stairs) 4x2  -DP     Ascending Technique (Stairs) step-to-step;step-over-step  -DP     Descending Technique (Stairs) step-to-step  -DP     Stairs, Safety Issues balance decreased during turns  leans posterior and heels tocuhing when descending  -DP     Comment, (Gait/Stairs) with ambulation the pt occsionally catches LLE toe on the darlin. Pt VC to pain attention to objects whe ambulating  -DP     Row Name 05/23/23 0739          Safety Issues, Functional Mobility    Impairments Affecting Function (Mobility) balance;pain;visual/perceptual  safety awareness decreased  -DP     Row Name 05/23/23 0739          Balance    Comment, Balance performed feet together and apart on blue foam mat 2x15 seconds each with EO/EC. Alt tapping with max VC on 2 inch surface. half foam for 5-10 seconds 3 sets with max VC to perform. 5 mins CGA when performing BR duties and donning pants. Pt navigated hurdles with 1UE for support 16 feet and on intial step the pt hit the first hitesh and reach down to readjust it twice but the went through hurdles with 100% clearance with CGA.  -DP     Row Name 05/23/23 0739          Knee (Therapeutic Exercise)    Knee Strengthening (Therapeutic Exercise) bilateral;SLR (straight leg raise);supine;10 repetitions  -DP     Row Name 05/23/23 0739          Core Strength (Therapeutic Exercise)    Core Strength (Therapeutic Exercise) bridging, bilateral lower extremities  -DP     Comment (Core Strength Therapeutic Exercise) 10x  -DP     Row Name 05/23/23 0739          Advanced Stepping/Walking Interventions    Backward Walking (Stepping/Walking Interventions) 8'x6 with no UE using CGA/Silver from  PT.  cuing to get hips closer to vini bar, and to increase step width and length  -DP     Row Name 05/23/23 0769          Positioning and Restraints    Pre-Treatment Position sitting in chair/recliner  -DP     Post Treatment Position wheelchair  -DP     In Wheelchair sitting;exit alarm on;with SLP  -DP     Row Name 05/23/23 0717          Weekly Progress Summary (PT)    Weekly Progress Summary (PT) Pt improving from his first week in rehab as he is CGA/SBA at large with bed mobility, transfers, ambulating with FWW for up to 160 feet and navigating 4 steps with BHR's with CGA. Pt does demonstrated very close step width when navigating stairs and at time with ambulation. Pt displays impairments with safety awarenes and following through with verbal cues as pt seems to respond better with TC and demonstration. Overall pt's main deficits are decreased balance and functional independence and will benefit from skilled PT services in order to progress toward PLOF and become as safe as possible before returning home.  -DP     Row Name 05/23/23 0788          Bed Mobility Goal 1 (PT-IRF)    Activity/Assistive Device (Bed Mobility Goal 1, PT-IRF) bed mobility activities, all  -DP     [place] Level (Bed Mobility Goal 1, PT-IRF) modified independence  -DP     Time Frame (Bed Mobility Goal 1, PT-IRF) 14 days or less  -DP     Progress/Outcomes (Bed Mobility Goal 1, PT-IRF) goal ongoing  -DP     Row Name 05/23/23 0781          Transfer Goal 1 (PT-IRF)    Activity/Assistive Device (Transfer Goal 1, PT-IRF) all transfers  -DP     [place] Level (Transfer Goal 1, PT-IRF) supervision required;modified independence  -DP     Time Frame (Transfer Goal 1, PT-IRF) 14 days or less  -DP     Progress/Outcomes (Transfer Goal 1, PT-IRF) goal ongoing  -DP     Row Name 05/23/23 0725          Gait/Walking Locomotion Goal 1 (PT-IRF)    Activity/Assistive Device (Gait/Walking Locomotion Goal 1, PT-IRF) gait (walking locomotion);walker,  rolling  -DP     Gait/Walking Locomotion Distance Goal 1 (PT-IRF) 150  -DP     [place] Level (Gait/Walking Locomotion Goal 1, PT-IRF) standby assist;modified independence  -DP     Time Frame (Gait/Walking Locomotion Goal 1, PT-IRF) 14 days or less  -DP     Progress/Outcomes (Gait/Walking Locomotion Goal 1, PT-IRF) goal ongoing  -DP     Row Name 05/23/23 0739          Stairs Goal 1 (PT-IRF)    Activity/Assistive Device (Stairs Goal 1, PT-IRF) stairs, all skills;using handrail, left;using handrail, right  -DP     Number of Stairs (Stairs Goal 1, PT-IRF) 4  -DP     [place] Level (Stairs Goal 1, PT-IRF) contact guard required;standby assist  -DP     Time Frame (Stairs Goal 1, PT-IRF) 14 days or less  -DP     Progress/Outcomes (Stairs Goal 1, PT-IRF) goal ongoing  -DP           User Key  (r) = Recorded By, (t) = Taken By, (c) = Cosigned By    Initials Name Provider Type    DP George Lehman, PT Physical Therapist              Wound 05/16/23 1831 Left lateral hip Incision (Active)   Dressing Appearance dry;intact 05/23/23 0754   Closure GUME 05/23/23 0754   Base dressing in place, unable to visualize 05/23/23 0754   Drainage Amount none 05/22/23 2000     Physical Therapy Education     Title: PT OT SLP Therapies (Done)     Topic: Physical Therapy (Done)     Point: Mobility training (Done)     Learning Progress Summary           Patient Acceptance, E,D, VU,DU by RAVI at 5/23/2023 1110    Acceptance, E,D, VU,DU by RAVI at 5/22/2023 1148    Acceptance, E, VU by RICKI at 5/20/2023 2214    Acceptance, E, NR by ABDIEL at 5/20/2023 0927    Acceptance, E, VU by WN at 5/19/2023 2232    Acceptance, E,TB, VU,NR by MAURILIO at 5/18/2023 1206    Acceptance, E, VU by DARVIN at 5/17/2023 1524    Acceptance, E,TB, VU,NR by MAURILIO at 5/17/2023 1209                   Point: Home exercise program (Done)     Learning Progress Summary           Patient Acceptance, E,D, VU,DU by RAVI at 5/23/2023 1110    Acceptance, RYAN HUMPHREY VU, DU by RAVI at 5/22/2023 114     Acceptance, E, VU by WN at 5/20/2023 2214    Acceptance, E, NR by  at 5/20/2023 0927    Acceptance, E, VU by WN at 5/19/2023 2232    Acceptance, E, VU by KN at 5/17/2023 1524                   Point: Body mechanics (Done)     Learning Progress Summary           Patient Acceptance, E,D, VU,DU by DP at 5/23/2023 1110    Acceptance, E,D, VU,DU by DP at 5/22/2023 1148    Acceptance, E, VU by WN at 5/20/2023 2214    Acceptance, E, NR by  at 5/20/2023 0927    Acceptance, E, VU by WN at 5/19/2023 2232    Acceptance, E, VU by KN at 5/17/2023 1524                   Point: Precautions (Done)     Learning Progress Summary           Patient Acceptance, E,D, VU,DU by DP at 5/23/2023 1110    Acceptance, E,D, VU,DU by DP at 5/22/2023 1148    Acceptance, E, VU by WN at 5/20/2023 2214    Acceptance, E, NR by  at 5/20/2023 0927    Acceptance, E, VU by WN at 5/19/2023 2232    Acceptance, E, VU by KN at 5/17/2023 1524                               User Key     Initials Effective Dates Name Provider Type Discipline     06/16/21 -  Ashlie Arnold, PT Physical Therapist PT     06/16/21 -  Aicha Payne, PT Physical Therapist PT     08/23/22 -  Kamaljit Elias, RN Registered Nurse Nurse    DP 08/24/21 -  George Lehman, PT Physical Therapist PT    KN 08/02/22 -  Main De La Garza OT Occupational Therapist OT                PT Recommendation and Plan                          Time Calculation:      PT Charges     Row Name 05/23/23 1450 05/23/23 1110          Time Calculation    Start Time 1230  -DP 0815  -DP     Stop Time 1300  -DP 0900  -DP     Time Calculation (min) 30 min  -DP 45 min  -DP     PT Received On -- 05/23/23  -DP     PT - Next Appointment -- 05/24/23  -DP        Time Calculation- PT    Total Timed Code Minutes- PT 30 minute(s)  -DP 45 minute(s)  -DP           User Key  (r) = Recorded By, (t) = Taken By, (c) = Cosigned By    Initials Name Provider Type    George Recio, PT Physical Therapist                 Therapy Charges for Today     Code Description Service Date Service Provider Modifiers Qty    09449344908  PT THERAPEUTIC ACT EA 15 MIN 5/22/2023 George Lehman, PT GP 1    59482546844 HC PT NEUROMUSC RE EDUCATION EA 15 MIN 5/22/2023 George Lehman, PT GP 1    34863450592 HC PT NEUROMUSC RE EDUCATION EA 15 MIN 5/22/2023 George Lehman, PT GP 2    11127548312 HC PT THERAPEUTIC ACT EA 15 MIN 5/23/2023 George Lehman, PT GP 1    [id number]  PT NEUROMUSC RE EDUCATION EA 15 MIN 5/23/2023 George Lehman, PT GP 2    [id number]  PT THER PROC EA 15 MIN 5/23/2023 George Lehamn, PT GP 1    [id number]  PT THERAPEUTIC ACT EA 15 MIN 5/23/2023 George Lehman, PT GP 1                   George Lehman, PT  5/23/2023

## 2023-05-24 ENCOUNTER — APPOINTMENT (OUTPATIENT)
Dept: GENERAL RADIOLOGY | Facility: HOSPITAL | Age: 88
End: 2023-05-24

## 2023-05-24 PROCEDURE — 25010000002 HEPARIN (PORCINE) PER 1000 UNITS: Performed by: PHYSICAL MEDICINE & REHABILITATION

## 2023-05-24 PROCEDURE — 97530 THERAPEUTIC ACTIVITIES: CPT

## 2023-05-24 PROCEDURE — 97129 THER IVNTJ 1ST 15 MIN: CPT

## 2023-05-24 PROCEDURE — 97110 THERAPEUTIC EXERCISES: CPT

## 2023-05-24 PROCEDURE — 73552 X-RAY EXAM OF FEMUR 2/>: CPT

## 2023-05-24 PROCEDURE — 97130 THER IVNTJ EA ADDL 15 MIN: CPT

## 2023-05-24 PROCEDURE — 63710000001 DIPHENHYDRAMINE PER 50 MG: Performed by: PHYSICAL MEDICINE & REHABILITATION

## 2023-05-24 PROCEDURE — 97535 SELF CARE MNGMENT TRAINING: CPT

## 2023-05-24 RX ORDER — AMOXICILLIN 250 MG
2 CAPSULE ORAL NIGHTLY
Status: DISCONTINUED | OUTPATIENT
Start: 2023-05-24 | End: 2023-05-26

## 2023-05-24 RX ADMIN — PANTOPRAZOLE SODIUM 40 MG: 40 TABLET, DELAYED RELEASE ORAL at 07:28

## 2023-05-24 RX ADMIN — LEVOTHYROXINE SODIUM 25 MCG: 0.03 TABLET ORAL at 06:17

## 2023-05-24 RX ADMIN — OXYCODONE HYDROCHLORIDE 5 MG: 5 TABLET ORAL at 20:09

## 2023-05-24 RX ADMIN — AMLODIPINE BESYLATE 10 MG: 10 TABLET ORAL at 07:29

## 2023-05-24 RX ADMIN — DOCUSATE SODIUM 50MG AND SENNOSIDES 8.6MG 2 TABLET: 8.6; 5 TABLET, FILM COATED ORAL at 21:27

## 2023-05-24 RX ADMIN — QUETIAPINE FUMARATE 12.5 MG: 25 TABLET ORAL at 20:09

## 2023-05-24 RX ADMIN — DULOXETINE HYDROCHLORIDE 30 MG: 30 CAPSULE, DELAYED RELEASE ORAL at 20:09

## 2023-05-24 RX ADMIN — DICLOFENAC SODIUM 2 G: 10 GEL TOPICAL at 07:30

## 2023-05-24 RX ADMIN — DIPHENHYDRAMINE HYDROCHLORIDE 25 MG: 25 CAPSULE ORAL at 20:33

## 2023-05-24 RX ADMIN — HEPARIN SODIUM 5000 UNITS: 5000 INJECTION INTRAVENOUS; SUBCUTANEOUS at 20:09

## 2023-05-24 RX ADMIN — DULOXETINE HYDROCHLORIDE 30 MG: 30 CAPSULE, DELAYED RELEASE ORAL at 07:29

## 2023-05-24 RX ADMIN — HEPARIN SODIUM 5000 UNITS: 5000 INJECTION INTRAVENOUS; SUBCUTANEOUS at 07:31

## 2023-05-24 RX ADMIN — OXYCODONE HYDROCHLORIDE 5 MG: 5 TABLET ORAL at 07:29

## 2023-05-24 NOTE — PROGRESS NOTES
LOS: 8 days   Patient Care Team:  Amina Jiang MD as PCP - General (Endocrinology)      [facility]  1935    Diagnoses    1. IMPAIRED GAIT AND MOBILITY       ADMITTING DIAGNOSIS:  SDH  L femur fracture    Subjective     Complains of soreness in his left hip and thigh today which is worse with walking. Working on balance training with therapy    Objective     Vitals:    05/24/23 0522   BP: 127/62   Pulse: 61   Resp: 18   Temp: 97.8 °F (36.6 °C)   SpO2: 96%       PHYSICAL EXAM:   General: The patient is well-developed, well-nourished and in no apparent distress.   HEENT: EOMI, hearing intact b/l. MMM. Sutures intact in occipital region   Respiratory: Lungs CTAB, no wheezes or rhonchi     Cardiac: RRR, no m/r/g, no pedal edema     Abdomen: soft, NT abdomen     Skin: L femoral IM placement incisions, dressing removed, open to air  Ext: Tenderness on L anterior thigh  Psych: Appropriate affect and behavior         Neuro:  Mental Status:  AOx4   Speech: fluent without dysarthria   Good strength bilaterally      MEDICATIONS  Scheduled Meds:amLODIPine, 10 mg, Oral, Q24H  Diclofenac Sodium, 2 g, Topical, 4x Daily  DULoxetine, 30 mg, Oral, BID  heparin (porcine), 5,000 Units, Subcutaneous, Q12H  levothyroxine, 25 mcg, Oral, Q AM  pantoprazole, 40 mg, Oral, Daily  QUEtiapine, 12.5 mg, Oral, Nightly      Continuous Infusions:   PRN Meds:.•  acetaminophen  •  aluminum-magnesium hydroxide-simethicone  •  diphenhydrAMINE  •  hydrALAZINE  •  oxyCODONE      RESULTS  No results found for: POCGLU  Results from last 7 days   Lab Units 05/22/23  0726 05/19/23  0600   WBC 10*3/mm3 5.18 5.04   HEMOGLOBIN g/dL 10.6* 9.6*   HEMATOCRIT % 33.5* 29.4*   PLATELETS 10*3/mm3 312 286     Results from last 7 days   Lab Units 05/22/23  0727 05/19/23  0600   SODIUM mmol/L 129* 133*   POTASSIUM mmol/L 4.0 4.3   CHLORIDE mmol/L 93* 97*   CO2 mmol/L 23.6 27.0   BUN mg/dL 24* 23   CREATININE mg/dL 1.12 1.07   CALCIUM mg/dL 9.3 9.1    GLUCOSE mg/dL 97 106*     CT of the head May 22, 2023  IMPRESSION:     There is an acute to subacute subdural hematoma noted lateral to the  right frontal, parietal, and to lesser extent occipital lobes which  measures maximally 6 mm in diameter lateral to the right frontal  parietal junction. An age-indeterminate subdural hygroma is seen lateral  to left frontal and parietal lobes and the subdural hygroma measures up  to 9 mm in maximal diameter lateral to the left frontal lobe. There is  bilateral mass effect with sulcal effacement. However, no significant  shift of the midline structures is seen.    ASSESSMENT and PLAN    Subdural hematoma    Impairments: ADLs, endurance, gait, balance, transfers, cognition, comprehension     Now admit for comprehensive acute inpatient rehabilitation .  This would be an interdisciplinary program with physical therapy 1 hour,  occupational therapy 1 hour, and speech therapy 1 hour, 5 days a week.  Rehabilitation nursing for carryover, monitoring of neurologic status, bowel and bladder, and skin  Ongoing physician follow-up.  Weekly team conferences.  Goals are to achieve a level of supervision with  mobility and self-care and improved ADLs.   Rehabilitation prognosis good.  Medical prognosis good.  Estimated length of stay is approximately 1-2 weeks , but is only an estimation.         #Functional Impairment 2/2 SDH and L femur fracture   -Initiate comprehensive rehab program consisting of PT/OT/SLP 3 hours/day, 5 days/week with medical management of above disorder, comorbidities, pain, bowels, and bladder.       -WB/activity status:?no restrictions, WBAT        #SDH  -No gross neurologic deficits on exam  -Will order CT Head for baseline or transfer imaging from [facility]  May 22- Order CT Head for follow-up SDH evaluation. Will send imaging to [facility] for a follow-up CT of the head today per message from [facility].  To have study done with results sent to Dr. Gurvinder Arroyo  [facility]  May [id number] CT-report reviewed.  Right subdural hematoma and left subdural hygroma, no shift.  neurosurgery obtaining copy of CT images on CD to review.  Unable to compare to outside films at [facility] due to issues with their PACS system.     May 24- Will discuss with [facility] today about results of CT scan    #L displaced femur fracture  -S/p L IM Nail placement in L femur  -WBAT in LLE  May 24- Order L Femur Xray for post-op f/u of hardware      #Pain Control     -Prn Norco 5 q4h     May 22- Will DC Norco and change to Oxycodone 5 BID PRN. Patient was only getting Norco 3 times a day and is the same MME as Oxycodone 5 BID PRN. Will also add Tylenol 650 q6h PRN for intermittent pain relief in between Oxycodone doses.    #Hypothyroidism  - on admission  -Significant weight loss and depression over the past year  -Continue Synthroid 25 daily  -Will follow-up on TSH and T4 values tomorrow  May 24- Order TSH and T4 next week and change medication accordingly        #Hyponatremia  -Na 128 on admission, 128-130 a NBH. -Fluid restriction to 1500ml daily  -Possibly related to hypothyroidism  May 20- increase synthroid to 50mcg daily and monitor TSH and Na+  -Will continue to monitor  May 22-sodium unchanged 129     #HTN  -Continue Norvasc 5 daily  May 22- Increased Norvasc to 10 daily over the weekend. BP improved and is more controlled, will continue to monitor.    #Mood disorder  -Continue Cymbalta 30 BID and Seroquel 12.5 at bedtime   -PRN Xanax held because cause of his falls     #FEN     -nutrition: Cardiac diet, Regular texture, Liquid Consistency: Thin Liquid. Fluid restriction 1500cc daily   -admit labs reviewed      #PPx    -DVT: Heparin 5000U q8h        TEAM CONF - MAY 18 - Bed mob CG / Min x 1, CG / Min transfers, amb 80' CG rwx,  completed 4 steps x 2 rails CGA / Min A.  CGA toilet and shower transfer.  Mod A  toileting.  Mod A LBD.  Mild cognitive impairment.  Cont  bowel and bladder.  BP  running high, increased Amlodipine.       Artemio Price,       During rounds, used appropriate personal protective equipment including mask and gloves.  Additional gown if indicated.  Mask used was standard procedure mask. Appropriate PPE was worn during the entire visit.  Hand hygiene was completed before and after.

## 2023-05-24 NOTE — PLAN OF CARE
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 Subdural hematoma after a fall. Femur break. Hx. glaucoma, CAD, HLD, HTN, DM2, kyphoplasty. Lost 40 lbs. Health issues more controlled since weight-loss. L. hip border surgical dressing removed, staple line assessed, and new border dressing applied. New diagnosis of hypothyroidism. Is on synthroid. Sodium is low. Meds whole with water. Fluid restriction to 1500 ml/daily. 240ml/ per tray. Continent B&B. Last BM 5/21. Participated fully in therapy. Has voltaren gel at bedside. Assistx1 walker or wheelchair. Code status; no intubation. Follow up X-ray of femur completed today.

## 2023-05-24 NOTE — PROGRESS NOTES
Inpatient Rehabilitation Plan of Care Note    Plan of Care  Updated Problems/Interventions  Cognition    [ST] Executive Functions(Active)  Current Status(05/24/2023): Mild cognitive impairment characterized by  difficulty with memory, planning/organization, and executive functioning.  Completes medication/finance-based activities with 80% acc given MOD cues.  Increased difficulty noted with working memory.  Weekly Goal(05/31/2023): Patient will participate in functional therapy  activities given MIN cues  Discharge Goal: Functional cognition for home    Signed by: Hope Mary, SLP

## 2023-05-24 NOTE — PLAN OF CARE
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  C/o pain, prn pain meds administered x1.  All meds whole w/thins.  Benadryl ordered and administered for pruritus, with relief.  Uses urinal in bed.

## 2023-05-24 NOTE — THERAPY TREATMENT NOTE
Inpatient Rehabilitation - Occupational Therapy Treatment Note    [facility]     Patient Name: Radha Azevedo  : 1935  MRN: [id number]    Today's Date: 2023                 Admit Date: 2023         ICD-10-CM ICD-9-CM   1. Impaired gait and mobility  R26.89 781.2       Patient Active Problem List   Diagnosis   • Absolute anemia   • Benign essential HTN   • Benign localized hyperplasia of prostate without urinary obstruction   • Type 2 diabetes mellitus with peripheral angiopathy   • Hypertension   • Hyperlipidemia   • Type 2 diabetes mellitus   • Diabetes mellitus type 2, noninsulin dependent   • Avitaminosis D   • Dyslipidemia   • Decrease in the ability to hear   • Personal history of colonic polyps   • Subdural hematoma       Past Medical History:   Diagnosis Date   • Colon polyps     Followed by Dr. Leo Jaeger at [facility].   • Coronary artery disease    • Diabetes mellitus    • Hyperlipidemia    • Hypertension        Past Surgical History:   Procedure Laterality Date   • CARDIAC CATHETERIZATION     • CAROTID STENT      patient denies, but family states he did have a stent placed   • COLONOSCOPY N/A 2014    Dr. Leo Jaeger at [facility].   • COLONOSCOPY N/A 2016    Dr. Leo Jaeger at [facility].   • COLONOSCOPY N/A 03/15/2023    2 TUBULAR ADENOMA POLYPS IN SIGMOID, BARIUM ENEMA ORDERED, DR. LEO JAEGER AT [facility]   • CORONARY ANGIOPLASTY     • FRACTURE SURGERY Left 2023    IM nailing for L femur fracture             IRF OT ASSESSMENT FLOWSHEET (last 12 hours)     IRF OT Evaluation and Treatment     Row Name 23 1459          OT Time and Intention    Document Type daily treatment  -AF     Mode of Treatment occupational therapy  -AF     Patient Effort good  -AF     Row Name 23 1459          General Information    Patient/Family/Caregiver Comments/Observations pt sitting up in w/c in AM and supine in bed in PM  -AF     Existing Precautions/Restrictions fall;weight bearing   WBAT LLE  -AF     Row Name 05/24/23 1459          Pain Assessment    Pretreatment Pain Rating 6/10  -AF     Posttreatment Pain Rating 6/10  -AF     Pain Location - Side/Orientation Left  -AF     Pre/Posttreatment Pain Comment femur  -AF     Row Name 05/24/23 1459          Cognition/Psychosocial    Affect/Mental Status (Cognition) WFL  -AF     Orientation Status (Cognition) oriented x 3  -AF     Follows Commands (Cognition) follows two-step commands;repetition of directions required  -AF     Personal Safety Interventions gait belt;nonskid shoes/slippers when out of bed  -AF     Row Name 05/24/23 1459          Bathing    [place] Level (Bathing) bathing skills;lower body;upper body;standby assist  -AF     Assistive Device (Bathing) grab bar/tub rail;hand held shower spray hose;tub bench  -AF     Position (Bathing) supported sitting;supported standing  -AF     Row Name 05/24/23 1459          Upper Body Dressing    [place] Level (Upper Body Dressing) upper body dressing skills;set up assistance  -AF     Position (Upper Body Dressing) supported sitting  -AF     Set-up Assistance (Upper Body Dressing) obtain clothing  -AF     Row Name 05/24/23 1459          Lower Body Dressing    [place] Level (Lower Body Dressing) don;doff;pants/bottoms;socks;shoes/slippers;underwear;contact guard assist  -AF     Position (Lower Body Dressing) supported sitting;supported standing  -AF     Set-up Assistance (Lower Body Dressing) obtain clothing  -AF     Row Name 05/24/23 1459          Grooming    [place] Level (Grooming) grooming skills;standby assist;contact guard assist  -AF     Position (Grooming) supported sitting;supported standing  -AF     Set-up Assistance (Grooming) obtain supplies  -AF     Comment (Grooming) RWX level  -AF     Row Name 05/24/23 1459          Bed Mobility    Supine-Sit [place] (Bed Mobility) supervision  -AF     Sit-Supine [place] (Bed Mobility) supervision  -AF     Assistive Device  (Bed Mobility) bed rails  -AF     Row Name 05/24/23 1459          Transfer Assessment/Treatment    Comment, (Transfers) sit to stand at counter top SBA/CGA  -AF     Row Name 05/24/23 1459          Bed-Chair Transfer    Bed-Chair Love (Transfers) contact guard  -AF     Assistive Device (Bed-Chair Transfers) walker, front-wheeled  -AF     Row Name 05/24/23 1459          Chair-Bed Transfer    Chair-Bed Love (Transfers) contact guard  -AF     Assistive Device (Chair-Bed Transfers) walker, front-wheeled  -AF     Row Name 05/24/23 1459          Sit-Stand Transfer    Sit-Stand Love (Transfers) contact guard  -AF     Assistive Device (Sit-Stand Transfers) walker, front-wheeled  -AF     Row Name 05/24/23 1459          Stand-Sit Transfer    Stand-Sit Love (Transfers) contact guard  -AF     Assistive Device (Stand-Sit Transfers) walker, front-wheeled  -AF     Row Name 05/24/23 1459          Shower Transfer    Type (Shower Transfer) stand-sit;sit-stand  -AF     [place] Level (Shower Transfer) contact guard;verbal cues  -AF     Assistive Device (Shower Transfer) grab bar, tub/shower;shower chair;walker, front-wheeled  -AF     Row Name 05/24/23 1459          Shoulder (Therapeutic Exercise)    Shoulder Strengthening (Therapeutic Exercise) bilateral;flexion;extension;scapular stabilization;external rotation;internal rotation;sitting;2 lb free weight;10 repetitions;3 sets  #2 dowel zhao  -AF     Row Name 05/24/23 1459          Elbow/Forearm (Therapeutic Exercise)    Elbow/Forearm Strengthening (Therapeutic Exercise) bilateral;flexion;extension;supination;pronation;sitting;2 lb free weight;2 sets;10 repetitions  -AF     Row Name 05/24/23 1459          Hand (Therapeutic Exercise)    Hand Strengthening (Therapeutic Exercise) bilateral; strengthening;hand gripper;3 sets;10 repetitions  -AF     Row Name 05/24/23 1459          Balance    Static Sitting Balance independent  -AF     Static Standing  Balance contact guard  -AF     Dynamic Standing Balance contact guard;standby assist  -AF     Row Name 05/24/23 1459          Positioning and Restraints    Pre-Treatment Position sitting in chair/recliner  -AF     Post Treatment Position bed  -AF     In Bed supine;call light within reach;encouraged to call for assist;exit alarm on  in PM  -AF     In Wheelchair sitting;exit alarm on;with SLP  in AM  -AF           User Key  (r) = Recorded By, (t) = Taken By, (c) = Cosigned By    Initials Name Effective Dates    AF Bernarda Ricketts, OTR 06/16/21 -                  Occupational Therapy Education     Title: PT OT SLP Therapies (In Progress)     Topic: Occupational Therapy (In Progress)     Point: ADL training (In Progress)     Description:   Instruct learner(s) on proper safety adaptation and remediation techniques during self care or transfers.   Instruct in proper use of assistive devices.              Learning Progress Summary           Patient Eager, E,D, NR by BHUPENDRA at 5/24/2023 1506    Acceptance, E, VU by WN at 5/20/2023 2214    Acceptance, E, VU by WN at 5/19/2023 2232    Acceptance, E, VU by KN at 5/17/2023 1524                   Point: Home exercise program (In Progress)     Description:   Instruct learner(s) on appropriate technique for monitoring, assisting and/or progressing therapeutic exercises/activities.              Learning Progress Summary           Patient Eager, E,D, NR by BHUPENDRA at 5/24/2023 1506    Acceptance, E, VU by WN at 5/20/2023 2214    Acceptance, E, VU by WN at 5/19/2023 2232    Acceptance, E, VU by KN at 5/17/2023 1524                   Point: Precautions (In Progress)     Description:   Instruct learner(s) on prescribed precautions during self-care and functional transfers.              Learning Progress Summary           Patient Eager, E,D, NR by BHUPENDRA at 5/24/2023 1506    Acceptance, E, VU by WN at 5/20/2023 2214    Acceptance, E, VU by WN at 5/19/2023 2232    Acceptance, E, VU by KN at 5/17/2023  1524                   Point: Body mechanics (In Progress)     Description:   Instruct learner(s) on proper positioning and spine alignment during self-care, functional mobility activities and/or exercises.              Learning Progress Summary           Patient Eager, E,D, NR by KP at 5/24/2023 1506    Acceptance, E, VU by WN at 5/20/2023 2214    Acceptance, E, VU by WN at 5/19/2023 2232    Acceptance, E, VU by KN at 5/17/2023 1524                               User Key     Initials Effective Dates Name Provider Type Discipline    BHUPENDRA 06/16/21 -  Keena London, PT Physical Therapist PT    RICKI 08/23/22 -  Kamaljit Elias, RN Registered Nurse Nurse    DARVIN 08/02/22 -  Main De La Garza OT Occupational Therapist OT                    OT Recommendation and Plan                         Time Calculation:      Time Calculation- OT     Row Name 05/24/23 1505 05/24/23 1504          Time Calculation- OT    OT Start Time 1400  -AF 0900  -AF     OT Stop Time 1430  -AF 0930  -AF     OT Time Calculation (min) 30 min  -AF 30 min  -AF           User Key  (r) = Recorded By, (t) = Taken By, (c) = Cosigned By    Initials Name Provider Type    AF Bernarda Ricketts, OTR Occupational Therapist              Therapy Charges for Today     Code Description Service Date Service Provider Modifiers Qty    17690139393 HC OT SELF CARE/MGMT/TRAIN EA 15 MIN 5/23/2023 Bernarda Ricketts, OTR GO 1    25878762532 HC OT THERAPEUTIC ACT EA 15 MIN 5/23/2023 Bernarda Ricketts, OTR GO 1    08049550423 HC OT THER PROC EA 15 MIN 5/23/2023 Bernarda Ricketts, OTR GO 1    28023674952 HC OT NEUROMUSC RE EDUCATION EA 15 MIN 5/23/2023 Bernarda Ricketts, OTR GO 1    32753718924 HC OT SELF CARE/MGMT/TRAIN EA 15 MIN 5/24/2023 Bernarda Ricketts, OTR GO 2    24269735351 HC OT THERAPEUTIC ACT EA 15 MIN 5/24/2023 Bernarda Ricketts, OTR GO 1    15078062670 HC OT THER PROC EA 15 MIN 5/24/2023 Bernarda Ricketts, OTR GO 1                   LUH Burgos  5/24/2023

## 2023-05-24 NOTE — PROGRESS NOTES
Inpatient Rehabilitation Functional Measures Assessment and Plan of Care    Plan of Care  Updated Problems/Interventions  Self Care    [OT] Bathing(Active)  Current Status(05/24/2023): CGA/SBA  Weekly Goal(05/31/2023): MOD I /Supervision  Discharge Goal: MOD I / supervision    [OT] Dressing (Lower)(Active)  Current Status(05/24/2023): MIN/CGA  Weekly Goal(05/31/2023): MOD I/ Supervision  Discharge Goal: MOD I / supervision    [OT] Dressing (Upper)(Active)  Current Status(05/24/2023): set up  Weekly Goal(05/31/2023): Supervision  Discharge Goal: Supervision    [OT] Grooming(Active)  Current Status(05/24/2023): Indep  Weekly Goal(05/31/2023): Indep  Discharge Goal: Indep    [OT] Toileting(Active)  Current Status(05/24/2023): CGA/SBA  Weekly Goal(05/31/2023): SUperivision/MOD I  Discharge Goal: MOD I/Supervision        Mobility    [OT] Toilet Transfers(Active)  Current Status(05/24/2023): SBA RWX level  Weekly Goal(05/31/2023): MOD I with AD  Discharge Goal: MOD I with AD    [OT] Tub/Shower Transfers(Active)  Current Status(05/24/2023): CGA/SBA  Weekly Goal(05/31/2023): SBA  Discharge Goal: SUpervision    Functional Measures  CELINE Eating:  Branch  CELINE Grooming: Branch  CELINE Bathing:  Branch  CELINE Upper Body Dressing:  Branch  CELINE Lower Body Dressing:  Branch  CELINE Toileting:  Branch    CELINE Bladder Management  Level of Assistance:  Branch  Frequency/Number of Accidents this Shift:  Branch    CELINE Bowel Management  Level of Assistance: Branch  Frequency/Number of Accidents this Shift: Branch    CELINE Bed/Chair/Wheelchair Transfer:  Branch  CELINE Toilet Transfer:  Branch  CELINE Tub/Shower Transfer:  Branch    Previously Documented Mode of Locomotion at Discharge: Field  CELINE Expected Mode of Locomotion at Discharge: Branch  CELINE Walk/Wheelchair:  Branch  CELINE Stairs:  Branch    CELINE Comprehension:  Branch  CELINE Expression:  Branch  CELINE Social Interaction:  Branch  CELINE Problem Solving:  Branch  CELINE Memory:  Branch    Therapy Mode  Minutes  Occupational Therapy: Branch  Physical Therapy: Branch  Speech Language Pathology:  Branch    Signed by: Bernarda Ricketts OT

## 2023-05-24 NOTE — THERAPY TREATMENT NOTE
Inpatient Rehabilitation - Speech Language Pathology Treatment Note    [facility]     Patient Name: Radha Azevedo  : 1935  MRN: [id number]    Today's Date: 2023                   Admit Date: 2023       Visit Dx:      ICD-10-CM ICD-9-CM   1. Impaired gait and mobility  R26.89 781.2       Patient Active Problem List   Diagnosis   • Absolute anemia   • Benign essential HTN   • Benign localized hyperplasia of prostate without urinary obstruction   • Type 2 diabetes mellitus with peripheral angiopathy   • Hypertension   • Hyperlipidemia   • Type 2 diabetes mellitus   • Diabetes mellitus type 2, noninsulin dependent   • Avitaminosis D   • Dyslipidemia   • Decrease in the ability to hear   • Personal history of colonic polyps   • Subdural hematoma       Past Medical History:   Diagnosis Date   • Colon polyps     Followed by Dr. Leo Jaeger at [facility].   • Coronary artery disease    • Diabetes mellitus    • Hyperlipidemia    • Hypertension        Past Surgical History:   Procedure Laterality Date   • CARDIAC CATHETERIZATION     • CAROTID STENT      patient denies, but family states he did have a stent placed   • COLONOSCOPY N/A 2014    Dr. Leo Jaeger at [facility].   • COLONOSCOPY N/A 2016    Dr. Leo Jaeger at [facility].   • COLONOSCOPY N/A 03/15/2023    2 TUBULAR ADENOMA POLYPS IN SIGMOID, BARIUM ENEMA ORDERED, DR. LEO JAEGER AT [facility]   • CORONARY ANGIOPLASTY     • FRACTURE SURGERY Left 2023    IM nailing for L femur fracture       SLP Recommendation and Plan                                                            SLP EVALUATION (last 72 hours)     SLP SLC Evaluation     Row Name 23 1300 23 0930 23 1300 23 0930 23 1230       Communication Assessment/Intervention    Document Type therapy note (daily note)  -SR therapy note (daily note)  -SR therapy note (daily note)  -SR therapy note (daily note)  -SR therapy note (daily note)  -SR    Subjective  Information no complaints  -SR no complaints  -SR no complaints  -SR no complaints  -SR no complaints  -SR    Patient Observations alert;cooperative;agree to therapy  -SR alert;cooperative;agree to therapy  -SR alert;cooperative;agree to therapy  -SR alert;cooperative;agree to therapy  -SR alert;cooperative;agree to therapy  -SR    Patient Effort good  -SR good  -SR good  -SR good  -SR good  -SR    Symptoms Noted During/After Treatment none  -SR none  -SR none  -SR none  -SR none  -SR       Pain Scale: Numbers Pre/Post-Treatment    Pretreatment Pain Rating 6/10  -SR 0/10 - no pain  -SR 0/10 - no pain  -SR 5/10  -SR 7/10  -SR    Posttreatment Pain Rating 6/10  -SR 0/10 - no pain  -SR 0/10 - no pain  -SR 5/10  -SR 7/10  -SR    Pain Location - Side/Orientation Left  -SR -- -- Left  -SR Left  -SR    Pain Location proximal  -SR -- -- proximal  -SR proximal  -SR    Pain Location - hip  -SR -- -- hip  -SR hip  -SR    Row Name 05/22/23 0800                   Communication Assessment/Intervention    Document Type therapy note (daily note)  -SR        Subjective Information no complaints  -SR        Patient Observations alert;cooperative;agree to therapy  -SR        Patient Effort good  -SR        Symptoms Noted During/After Treatment none  -SR           Pain    Additional Documentation Pain Scale: Numbers Pre/Post-Treatment (Group)  -SR           Pain Scale: Numbers Pre/Post-Treatment    Pretreatment Pain Rating 8/10  -SR        Posttreatment Pain Rating 8/10  -SR        Pain Location - Side/Orientation Left  -SR        Pain Location - hip  -SR        Pre/Posttreatment Pain Comment RN notified  -SR              User Key  (r) = Recorded By, (t) = Taken By, (c) = Cosigned By    Initials Name Effective Dates    SR Hope Mary CCC-SLP 11/10/22 -                    EDUCATION    The patient has been educated in the following areas:       Cognitive Impairment.             SLP GOALS     Row Name 05/24/23 1300 05/24/23 0900  05/23/23 [facility]       Attention Goal 1 (SLP)    Improve Attention by Goal 1 (SLP) 80%;complete selective attention task;complete sustained attention task;with minimal cues (75-90%)  -SR 80%;complete selective attention task;complete sustained attention task;with minimal cues (75-90%)  -SR --    Time Frame (Attention Goal 1, SLP) by discharge  -SR by discharge  -SR --    Progress/Outcomes (Attention Goal 1, SLP) goal ongoing  -SR goal ongoing  -SR --       Reasoning Goal 1 (SLP)    Improve Reasoning Through Goal 1 (SLP) -- -- complete deductive reasoning task;complete mental flexibility task;80%;independently (over 90% accuracy)  -SR    Time Frame (Reasoning Goal 1, SLP) -- -- by discharge  -SR    Progress/Outcomes (Reasoning Goal 1, SLP) -- -- goal ongoing  -SR    Comment (Reasoning Goal 1, SLP) -- -- Calendar deduction; patient followed directions and used process of elimination to determine the final date on the calendar with 60% acc given MOD cues; 80% acc given MAX cues.  -SR       Executive Functional Skills Goal 1 (SLP)    Improve Executive Function Skills Goal 1 (SLP) home management activity;time management activity;80%;independently (over 90% accuracy)  -SR home management activity;time management activity;80%;independently (over 90% accuracy)  -SR home management activity;time management activity;80%;independently (over 90% accuracy)  -SR    Time Frame (Executive Function Skills Goal 1, SLP) by discharge  -SR by discharge  -SR by discharge  -SR    Progress/Outcomes (Executive Function Skills Goal 1, SLP) goal ongoing  -SR goal ongoing  -SR goal ongoing  -SR    Comment (Executive Function Skills Goal 1, SLP) Money management task completed with checkbook balance activity. Given transaction/deposit amounts, patient calculated final balance with 30% acc given MIN cues; 80% acc given MOD cues. No calculator assist. Verbal cues provided for attn to detail. Increased difficulty noted with working memory and  organization during task. Extended time provided to complete activity.  -SR Medication management task completed during AM therapy session. Patient followed written directions and sorted mock medications by date/time with 40% acc given MIN cues; 80% acc given MOD cues. Verbal cues provided for attention to detail. Patient added too many tablets to a section for x2 medications. Recognized mistake following verbal cue. Demonstrated increased difficulty with working memory, attn, and organization. Spoke with son following session. Agreeable to recommendation for supervision of finances/medications following discharge.  -SR Restaurant menu prices; patient answered functional addition/subtraction based questions with 40% acc given MIN cues; 80% acc given MOD cues. Verbal/visual cues provided for locating target item/price. Extended time provided to complete task. No calculator assist.  -SR    Row Name 05/23/23 0900 05/22/23 1230 05/22/23 0800       Attention Goal 1 (SLP)    Improve Attention by Goal 1 (SLP) 80%;complete selective attention task;complete sustained attention task;with minimal cues (75-90%)  -SR -- 80%;complete selective attention task;complete sustained attention task;with minimal cues (75-90%)  -SR    Time Frame (Attention Goal 1, SLP) by discharge  -SR -- by discharge  -SR    Progress/Outcomes (Attention Goal 1, SLP) goal ongoing  -SR -- goal ongoing  -SR    Comment (Attention Goal 1, SLP) -- -- Participated in divergent naming activity during AM session. Given concrete category, patient named 8 items given NO cues; 12 items given MIN cues. With abstract category, patient named 5 items given NO cues; 10 items given MIN cues.  -SR       Memory Skills Goal 1 (SLP)    Improve Memory Skills Through Goal 1 (SLP) use written schedule;use memory strategies;recall details of the day;80%;with minimal cues (75-90%)  -SR use written schedule;use memory strategies;recall details of the day;80%;with minimal cues  (75-90%)  -SR --    Time Frame (Memory Skills Goal 1, SLP) by discharge  -SR by discharge  -SR --    Progress/Outcomes (Memory Skills Goal 1, SLP) goal ongoing  -SR goal ongoing  -SR --    Comment (Memory Skills Goal 1, SLP) Memory and mental manipulation; patient recalled 3 words in sequential order with 80% acc given MIN cues. Accuracy increased with repetition of word list.  -SR -- --       Reasoning Goal 1 (SLP)    Improve Reasoning Through Goal 1 (SLP) complete deductive reasoning task;complete mental flexibility task;80%;independently (over 90% accuracy)  -SR -- complete deductive reasoning task;complete mental flexibility task;80%;independently (over 90% accuracy)  -SR    Time Frame (Reasoning Goal 1, SLP) by discharge  -SR -- by discharge  -SR    Progress/Outcomes (Reasoning Goal 1, SLP) goal ongoing  -SR -- goal ongoing  -SR    Comment (Reasoning Goal 1, SLP) Simple deductive reasoning/planning activity completed during AM therapy session. Patient used clues from written paragraph and labeled daily events in order of occurance with 40% acc given MIN cues; 80% acc given MOD cues. Demonstrated increased difficulty with attention, working memory, and organization during task. Read paragraph multiple times prior to carrying out the task. During a following activity, patient answered questions regarding simple math concepts related to paragraph with 80% acc given MIN-MOD cues.  -SR -- Patient labeled 5 written steps (activities related to ADLs) in order occurance with 80% acc given MIN cues. Cues provided for attn to detail.  -SR       Executive Functional Skills Goal 1 (SLP)    Improve Executive Function Skills Goal 1 (SLP) -- home management activity;time management activity;80%;independently (over 90% accuracy)  -SR --    Time Frame (Executive Function Skills Goal 1, SLP) -- by discharge  -SR --    Progress/Outcomes (Executive Function Skills Goal 1, SLP) -- goal ongoing  -SR --    Comment (Executive Function  Skills Goal 1, SLP) -- Simple medication management task completed during PM therapy session. Patient sorted x10 mock medication by day/time given written directions. Medications were  into sets of 5. Patient completed activity with 50% acc given NO cues; 70% acc given MIN cues; 90% acc given MOD cues. Cues provided for attention to detail. Patient continuing progress toward goal. Anticipate supervision of medications following discharge.  -SR --          User Key  (r) = Recorded By, (t) = Taken By, (c) = Cosigned By    Initials Name Provider Type    Hope West CCC-SLP Speech and Language Pathologist                            Time Calculation:        Time Calculation- SLP     Row Name 05/24/23 1345 05/24/23 1142          Time Calculation- SLP    SLP Start Time 1300  -SR 0930  -SR     SLP Stop Time 1330  -SR 1000  -SR     SLP Time Calculation (min) 30 min  -SR 30 min  -SR           User Key  (r) = Recorded By, (t) = Taken By, (c) = Cosigned By    Initials Name Provider Type    Hope West CCC-SLP Speech and Language Pathologist                  Therapy Charges for Today     Code Description Service Date Service Provider Modifiers Qty    73334477489 HC ST DEV OF COGN SKILLS INITIAL 15 MIN 5/23/2023 Hope Mary CCC-SLP  1    90220551035 HC ST DEV OF COGN SKILLS EACH ADDT'L 15 MIN 5/23/2023 Hope Mary CCC-SLP  3    32695751007 HC ST DEV OF COGN SKILLS INITIAL 15 MIN 5/24/2023 Hope Mary CCC-SLP  1    46154497178 HC ST DEV OF COGN SKILLS EACH ADDT'L 15 MIN 5/24/2023 Hope Mary CCC-SLP  3                           KATIE Victoria  5/24/2023

## 2023-05-24 NOTE — THERAPY TREATMENT NOTE
Inpatient Rehabilitation - Physical Therapy Treatment Note       [facility]     Patient Name: Radha Azevedo  : 1935  MRN: [id number]    Today's Date: 2023                    Admit Date: 2023      Visit Dx:     ICD-10-CM ICD-9-CM   1. Impaired gait and mobility  R26.89 781.2       Patient Active Problem List   Diagnosis   • Absolute anemia   • Benign essential HTN   • Benign localized hyperplasia of prostate without urinary obstruction   • Type 2 diabetes mellitus with peripheral angiopathy   • Hypertension   • Hyperlipidemia   • Type 2 diabetes mellitus   • Diabetes mellitus type 2, noninsulin dependent   • Avitaminosis D   • Dyslipidemia   • Decrease in the ability to hear   • Personal history of colonic polyps   • Subdural hematoma       Past Medical History:   Diagnosis Date   • Colon polyps     Followed by Dr. Loe Jaeger at [facility].   • Coronary artery disease    • Diabetes mellitus    • Hyperlipidemia    • Hypertension        Past Surgical History:   Procedure Laterality Date   • CARDIAC CATHETERIZATION     • CAROTID STENT      patient denies, but family states he did have a stent placed   • COLONOSCOPY N/A 2014    Dr. Leo Jaeger at [facility].   • COLONOSCOPY N/A 2016    Dr. Leo Jaeger at [facility].   • COLONOSCOPY N/A 03/15/2023    2 TUBULAR ADENOMA POLYPS IN SIGMOID, BARIUM ENEMA ORDERED, DR. LEO JAEGER AT [facility]   • CORONARY ANGIOPLASTY     • FRACTURE SURGERY Left 2023    IM nailing for L femur fracture       PT ASSESSMENT (last 12 hours)     IRF PT Evaluation and Treatment     Row Name 23 0800          PT Time and Intention    Document Type daily treatment  -     Mode of Treatment physical therapy  -     Patient/Family/Caregiver Comments/Observations Pt up in armchair in corner of room.  Agreeable to PT.  -     Row Name 23 0800          General Information    Patient Profile Reviewed yes  -     General Observations of Patient Inattention R.  -      Existing Precautions/Restrictions fall;weight bearing  WBAT L  -     Row Name 05/24/23 0800          Pain Assessment    Pretreatment Pain Rating 5/10  -KP     Posttreatment Pain Rating 5/10  -KP     Pain Location - Side/Orientation Left  -     Pain Location proximal  -     Pain Location - hip  -     Row Name 05/24/23 0800          Cognition/Psychosocial    Affect/Mental Status (Cognition) WFL  -     Orientation Status (Cognition) oriented x 3  -KP     Follows Commands (Cognition) follows two-step commands;repetition of directions required  -     Personal Safety Interventions fall prevention program maintained;gait belt;nonskid shoes/slippers when out of bed;supervised activity  -     Comment, Cognition occ running into objects on L,  visual deficits noted when approaching objects  -     Row Name 05/24/23 0800          Bed Mobility    Supine-Sit [place] (Bed Mobility) supervision  -     Assistive Device (Bed Mobility) bed rails  -[facility] Name 05/24/23 0800          Transfer Assessment/Treatment    Comment, (Transfers) VC for postioning and and aligning to chair  -[facility] Name 05/24/23 0800          Bed-Chair Transfer    Bed-Chair [place] (Transfers) contact guard;minimum assist (75% patient effort)  -     Assistive Device (Bed-Chair Transfers) walker, front-wheeled  -     Comment, (Bed-Chair Transfer) VC to scoot to edge and for positioning  -[facility] Name 05/24/23 0800          Sit-Stand Transfer    Sit-Stand [place] (Transfers) contact guard;verbal cues  -     Assistive Device (Sit-Stand Transfers) walker, front-wheeled;walker, 4-wheeled  -[facility] Name 05/24/23 0800          Stand-Sit Transfer    Stand-Sit [place] (Transfers) contact guard;verbal cues  -     Assistive Device (Stand-Sit Transfers) walker, front-wheeled;walker, 4-wheeled  -     Row Name 05/24/23 0800          Gait/Stairs (Locomotion)    [place] Level (Gait) contact guard;verbal cues   -     Assistive Device (Gait) walker, front-wheeled;walker, 4-wheeled  -     Distance in Feet (Gait) 325, 225 x 2, 200, 180  -     Pattern (Gait) step-through  -     Deviations/Abnormal Patterns (Gait) bilateral deviations;base of support, narrow;gait speed decreased;stride length decreased;weight shifting decreased  -     Bilateral Gait Deviations forward flexed posture;heel strike decreased  -     Left Sided Gait Deviations weight shift ability decreased  -     Gait Assessment/Intervention Pt noted that he felt that the rollator was easier to use and he felt more secure.  Discussed the fact that medicare does not cover rollator.  -     Row Name 05/24/23 0800          Curb Negotiation (Mobility)    Export, Curb Negotiation minimal assist, 75% or more patient effort;verbal cues  -     Assistive Device (Curb Negotiation) walker, 4-wheeled  -     Comment, Curb Negotiation (Mobility) Constant VC for sequence.  -     Row Name 05/24/23 0800          Hip (Therapeutic Exercise)    Hip Strengthening (Therapeutic Exercise) sitting;marching while seated;aBduction;15 repititions  -     Row Name 05/24/23 0800          Knee (Therapeutic Exercise)    Knee Strengthening (Therapeutic Exercise) sitting;bilateral;LAQ (long arc quad);20 repititions  -     Row Name 05/24/23 0800          Ankle (Therapeutic Exercise)    Ankle AROM (Therapeutic Exercise) sitting;bilateral;dorsiflexion;plantarflexion;20 repititions  -     Row Name 05/24/23 0800          Advanced Stepping/Walking Interventions    Stepping/Walking Interventions other (see comments)  amb around bars with R UE support - noted inc L LE pain  -     Row Name 05/24/23 0800          Positioning and Restraints    Pre-Treatment Position sitting in chair/recliner  -     In Chair sitting;call light within reach;encouraged to call for assist;notified [facility]  -           User Key  (r) = Recorded By, (t) = Taken By, (c) = Cosigned By    Initials Name  Provider Type    Keena Cuevas, PT Physical Therapist              Wound 05/16/23 1831 Left lateral hip Incision (Active)   Dressing Appearance dressing loose 05/24/23 0729   Closure Staples 05/24/23 0729   Base dry;clean 05/24/23 0729   Drainage Amount none 05/24/23 0729   Dressing Care dressing changed 05/24/23 0729     Physical Therapy Education     Title: PT OT SLP Therapies (Done)     Topic: Physical Therapy (Done)     Point: Mobility training (Done)     Learning Progress Summary           Patient Acceptance, E,D, VU,DU by DP at 5/23/2023 1110    Acceptance, E,D, VU,DU by DP at 5/22/2023 1148    Acceptance, E, VU by WN at 5/20/2023 2214    Acceptance, E, NR by  at 5/20/2023 0927    Acceptance, E, VU by WN at 5/19/2023 2232    Acceptance, E,TB, VU,NR by MAURILIO at 5/18/2023 1206    Acceptance, E, VU by KN at 5/17/2023 1524    Acceptance, E,TB, VU,NR by  at 5/17/2023 1209                   Point: Home exercise program (Done)     Learning Progress Summary           Patient Acceptance, E,D, VU,DU by DP at 5/23/2023 1110    Acceptance, E,D, VU,DU by DP at 5/22/2023 1148    Acceptance, E, VU by WN at 5/20/2023 2214    Acceptance, E, NR by  at 5/20/2023 0927    Acceptance, E, VU by WN at 5/19/2023 2232    Acceptance, E, VU by KN at 5/17/2023 1524                   Point: Body mechanics (Done)     Learning Progress Summary           Patient Acceptance, E,D, VU,DU by DP at 5/23/2023 1110    Acceptance, E,D, VU,DU by DP at 5/22/2023 1148    Acceptance, E, VU by WN at 5/20/2023 2214    Acceptance, E, NR by  at 5/20/2023 0927    Acceptance, E, VU by WN at 5/19/2023 2232    Acceptance, E, VU by KN at 5/17/2023 1524                   Point: Precautions (Done)     Learning Progress Summary           Patient Acceptance, E,D, VU,DU by DP at 5/23/2023 1110    Acceptance, E,D, VU,DU by DP at 5/22/2023 1148    Acceptance, E, VU by WN at 5/20/2023 2214    Acceptance, E, NR by  at 5/20/2023 0927    Acceptance, E, VU by WN  at 5/19/2023 2232    Acceptance, E, VU by KN at 5/17/2023 1524                               User Key     Initials Effective Dates Name Provider Type Discipline    MAURILIO 06/16/21 -  Ashlie Arnold, PT Physical Therapist PT    LH 06/16/21 -  Aicha Payne, PT Physical Therapist PT    WN 08/23/22 -  Kamaljit Elias, RN Registered Nurse Nurse    DP 08/24/21 -  George Lehman, PT Physical Therapist PT    KN 08/02/22 -  Main De La Garza, OT Occupational Therapist OT                PT Recommendation and Plan                          Time Calculation:      PT Charges     Row Name 05/24/23 1422 05/24/23 0843          Time Calculation    Start Time 1230  - 0830  -     Stop Time 1300  -KP 0900  -     Time Calculation (min) 30 min  - 30 min  -     PT Received On -- 05/24/23  -     PT - Next Appointment -- 05/25/23  -     PT Goal Re-Cert Due Date -- 05/30/23  -           User Key  (r) = Recorded By, (t) = Taken By, (c) = Cosigned By    Initials Name Provider Type     Keena London, PT Physical Therapist                Therapy Charges for Today     Code Description Service Date Service Provider Modifiers Qty    15143958281 HC PT THERAPEUTIC ACT EA 15 MIN 5/24/2023 Keena London, PT GP 2    06624240402 HC PT THER PROC EA 15 MIN 5/24/2023 Keena London, PT GP 2                   Keena London PT  5/24/2023

## 2023-05-25 PROCEDURE — 97535 SELF CARE MNGMENT TRAINING: CPT

## 2023-05-25 PROCEDURE — 25010000002 HEPARIN (PORCINE) PER 1000 UNITS: Performed by: PHYSICAL MEDICINE & REHABILITATION

## 2023-05-25 PROCEDURE — 97130 THER IVNTJ EA ADDL 15 MIN: CPT

## 2023-05-25 PROCEDURE — 63710000001 DIPHENHYDRAMINE PER 50 MG: Performed by: PHYSICAL MEDICINE & REHABILITATION

## 2023-05-25 PROCEDURE — 97129 THER IVNTJ 1ST 15 MIN: CPT

## 2023-05-25 PROCEDURE — 97110 THERAPEUTIC EXERCISES: CPT

## 2023-05-25 PROCEDURE — 97112 NEUROMUSCULAR REEDUCATION: CPT

## 2023-05-25 PROCEDURE — 97530 THERAPEUTIC ACTIVITIES: CPT

## 2023-05-25 RX ADMIN — DICLOFENAC SODIUM 2 G: 10 GEL TOPICAL at 22:22

## 2023-05-25 RX ADMIN — HEPARIN SODIUM 5000 UNITS: 5000 INJECTION INTRAVENOUS; SUBCUTANEOUS at 07:56

## 2023-05-25 RX ADMIN — DULOXETINE HYDROCHLORIDE 30 MG: 30 CAPSULE, DELAYED RELEASE ORAL at 22:19

## 2023-05-25 RX ADMIN — DICLOFENAC SODIUM 2 G: 10 GEL TOPICAL at 17:12

## 2023-05-25 RX ADMIN — HEPARIN SODIUM 5000 UNITS: 5000 INJECTION INTRAVENOUS; SUBCUTANEOUS at 22:19

## 2023-05-25 RX ADMIN — PANTOPRAZOLE SODIUM 40 MG: 40 TABLET, DELAYED RELEASE ORAL at 07:56

## 2023-05-25 RX ADMIN — DOCUSATE SODIUM 50MG AND SENNOSIDES 8.6MG 2 TABLET: 8.6; 5 TABLET, FILM COATED ORAL at 22:26

## 2023-05-25 RX ADMIN — OXYCODONE HYDROCHLORIDE 5 MG: 5 TABLET ORAL at 17:12

## 2023-05-25 RX ADMIN — LEVOTHYROXINE SODIUM 25 MCG: 0.03 TABLET ORAL at 06:27

## 2023-05-25 RX ADMIN — OXYCODONE HYDROCHLORIDE 5 MG: 5 TABLET ORAL at 07:55

## 2023-05-25 RX ADMIN — DULOXETINE HYDROCHLORIDE 30 MG: 30 CAPSULE, DELAYED RELEASE ORAL at 07:56

## 2023-05-25 RX ADMIN — DICLOFENAC SODIUM 2 G: 10 GEL TOPICAL at 07:57

## 2023-05-25 RX ADMIN — ALUMINUM HYDROXIDE, MAGNESIUM HYDROXIDE, AND DIMETHICONE 15 ML: 400; 400; 40 SUSPENSION ORAL at 17:16

## 2023-05-25 RX ADMIN — DIPHENHYDRAMINE HYDROCHLORIDE 25 MG: 25 CAPSULE ORAL at 22:19

## 2023-05-25 RX ADMIN — AMLODIPINE BESYLATE 10 MG: 10 TABLET ORAL at 07:56

## 2023-05-25 RX ADMIN — QUETIAPINE FUMARATE 12.5 MG: 25 TABLET ORAL at 22:19

## 2023-05-25 NOTE — THERAPY TREATMENT NOTE
Inpatient Rehabilitation - Speech Language Pathology Treatment Note    [facility]     Patient Name: Radha Azevedo  : 1935  MRN: [id number]    Today's Date: 2023                   Admit Date: 2023       Visit Dx:      ICD-10-CM ICD-9-CM   1. Impaired gait and mobility  R26.89 781.2       Patient Active Problem List   Diagnosis   • Absolute anemia   • Benign essential HTN   • Benign localized hyperplasia of prostate without urinary obstruction   • Type 2 diabetes mellitus with peripheral angiopathy   • Hypertension   • Hyperlipidemia   • Type 2 diabetes mellitus   • Diabetes mellitus type 2, noninsulin dependent   • Avitaminosis D   • Dyslipidemia   • Decrease in the ability to hear   • Personal history of colonic polyps   • Subdural hematoma       Past Medical History:   Diagnosis Date   • Colon polyps     Followed by Dr. Leo Jaeger at [facility].   • Coronary artery disease    • Diabetes mellitus    • Hyperlipidemia    • Hypertension        Past Surgical History:   Procedure Laterality Date   • CARDIAC CATHETERIZATION     • CAROTID STENT      patient denies, but family states he did have a stent placed   • COLONOSCOPY N/A 2014    Dr. Leo Jaeger at [facility].   • COLONOSCOPY N/A 2016    Dr. Leo Jaeger at [facility].   • COLONOSCOPY N/A 03/15/2023    2 TUBULAR ADENOMA POLYPS IN SIGMOID, BARIUM ENEMA ORDERED, DR. LEO JAEGER AT [facility]   • CORONARY ANGIOPLASTY     • FRACTURE SURGERY Left 2023    IM nailing for L femur fracture       SLP Recommendation and Plan                                                            SLP EVALUATION (last 72 hours)     SLP SLC Evaluation     Row Name 23 1300 23 0930 23 1300 23 0930 23 1300       Communication Assessment/Intervention    Document Type therapy note (daily note)  -SR therapy note (daily note)  -SR therapy note (daily note)  -SR therapy note (daily note)  -SR therapy note (daily note)  -SR    Subjective  Information no complaints  -SR no complaints  -SR no complaints  -SR no complaints  -SR no complaints  -SR    Patient Observations alert;cooperative;agree to therapy  -SR alert;agree to therapy;cooperative  -SR alert;cooperative;agree to therapy  -SR alert;cooperative;agree to therapy  -SR alert;cooperative;agree to therapy  -SR    Patient Effort good  -SR good  -SR good  -SR good  -SR good  -SR    Symptoms Noted During/After Treatment none  -SR none  -SR none  -SR none  -SR none  -SR       Pain Scale: Numbers Pre/Post-Treatment    Pretreatment Pain Rating 4/10  -SR 5/10  -SR 6/10  -SR 0/10 - no pain  -SR 0/10 - no pain  -SR    Posttreatment Pain Rating 4/10  -SR 5/10  -SR 6/10  -SR 0/10 - no pain  -SR 0/10 - no pain  -SR    Pain Location - Side/Orientation -- Left  -SR Left  -SR -- --    Pain Location -- -- proximal  -SR -- --    Pain Location - -- shoulder  -SR hip  -SR -- --    Row Name 05/23/23 0930                   Communication Assessment/Intervention    Document Type therapy note (daily note)  -SR        Subjective Information no complaints  -SR        Patient Observations alert;cooperative;agree to therapy  -SR        Patient Effort good  -SR        Symptoms Noted During/After Treatment none  -SR           Pain Scale: Numbers Pre/Post-Treatment    Pretreatment Pain Rating 5/10  -SR        Posttreatment Pain Rating 5/10  -SR        Pain Location - Side/Orientation Left  -SR        Pain Location proximal  -SR        Pain Location - hip  -SR              User Key  (r) = Recorded By, (t) = Taken By, (c) = Cosigned By    Initials Name Effective Dates    SR Hope Mary CCC-SLP 11/10/22 -                    EDUCATION    The patient has been educated in the following areas:       Cognitive Impairment.             SLP GOALS     Row Name 05/25/23 1300 05/25/23 0930 05/24/23 1300       Attention Goal 1 (SLP)    Improve Attention by Goal 1 (SLP) -- 80%;complete selective attention task;complete sustained attention  task;with minimal cues (75-90%)  -SR 80%;complete selective attention task;complete sustained attention task;with minimal cues (75-90%)  -SR    Time Frame (Attention Goal 1, SLP) -- by discharge  -SR by discharge  -SR    Progress/Outcomes (Attention Goal 1, SLP) -- goal ongoing  -SR goal ongoing  -SR       Memory Skills Goal 1 (SLP)    Improve Memory Skills Through Goal 1 (SLP) use written schedule;use memory strategies;recall details of the day;80%;with minimal cues (75-90%)  -SR -- --    Time Frame (Memory Skills Goal 1, SLP) by discharge  -SR -- --    Progress/Outcomes (Memory Skills Goal 1, SLP) goal ongoing  -SR -- --    Comment (Memory Skills Goal 1, SLP) Patient participated in immediate recall task during PM therapy session. Patient listened to voicemail message and answered inference-based questions regarding the content of the message with 60% acc given MIN cues; 80% acc given MOD cues. Accuracy increased with repetition of message.  -SR -- --       Reasoning Goal 1 (SLP)    Improve Reasoning Through Goal 1 (SLP) complete deductive reasoning task;complete mental flexibility task;80%;independently (over 90% accuracy)  -SR -- --    Time Frame (Reasoning Goal 1, SLP) by discharge  -SR -- --    Progress/Outcomes (Reasoning Goal 1, SLP) goal ongoing  -SR -- --    Comment (Reasoning Goal 1, SLP) Restaurant menu; patient answered subtraction/addition based scenerios regarding prices on a restaurant menu with 40% acc given MOD cues. Extended time provided to complete task.  -SR -- --       Executive Functional Skills Goal 1 (SLP)    Improve Executive Function Skills Goal 1 (SLP) -- home management activity;time management activity;80%;independently (over 90% accuracy)  -SR home management activity;time management activity;80%;independently (over 90% accuracy)  -SR    Time Frame (Executive Function Skills Goal 1, SLP) -- by discharge  -SR by discharge  -SR    Progress/Outcomes (Executive Function Skills Goal 1,  SLP) -- goal ongoing  -SR goal ongoing  -SR    Comment (Executive Function Skills Goal 1, SLP) -- Patient read analog clock and label the correct time with 80% acc given NO cues. Verbal cue x2 for attn to detail. During a following activity, patient used a visual street map to answer direction/location-based questions with 40% acc given MIN cuesl 80% acc given MOD cues. Extended time provided to complete task.  -SR Money management task completed with checkbook balance activity. Given transaction/deposit amounts, patient calculated final balance with 30% acc given MIN cues; 80% acc given MOD cues. No calculator assist. Verbal cues provided for attn to detail. Increased difficulty noted with working memory and organization during task. Extended time provided to complete activity.  -SR    Row Name 05/24/23 0900 05/23/23 1300 05/23/23 0900       Attention Goal 1 (SLP)    Improve Attention by Goal 1 (SLP) 80%;complete selective attention task;complete sustained attention task;with minimal cues (75-90%)  -SR -- 80%;complete selective attention task;complete sustained attention task;with minimal cues (75-90%)  -SR    Time Frame (Attention Goal 1, SLP) by discharge  -SR -- by discharge  -SR    Progress/Outcomes (Attention Goal 1, SLP) goal ongoing  -SR -- goal ongoing  -SR       Memory Skills Goal 1 (SLP)    Improve Memory Skills Through Goal 1 (SLP) -- -- use written schedule;use memory strategies;recall details of the day;80%;with minimal cues (75-90%)  -SR    Time Frame (Memory Skills Goal 1, SLP) -- -- by discharge  -SR    Progress/Outcomes (Memory Skills Goal 1, SLP) -- -- goal ongoing  -SR    Comment (Memory Skills Goal 1, SLP) -- -- Memory and mental manipulation; patient recalled 3 words in sequential order with 80% acc given MIN cues. Accuracy increased with repetition of word list.  -SR       Reasoning Goal 1 (SLP)    Improve Reasoning Through Goal 1 (SLP) -- complete deductive reasoning task;complete mental  flexibility task;80%;independently (over 90% accuracy)  -SR complete deductive reasoning task;complete mental flexibility task;80%;independently (over 90% accuracy)  -SR    Time Frame (Reasoning Goal 1, SLP) -- by discharge  -SR by discharge  -SR    Progress/Outcomes (Reasoning Goal 1, SLP) -- goal ongoing  -SR goal ongoing  -SR    Comment (Reasoning Goal 1, SLP) -- Calendar deduction; patient followed directions and used process of elimination to determine the final date on the calendar with 60% acc given MOD cues; 80% acc given MAX cues.  -SR Simple deductive reasoning/planning activity completed during AM therapy session. Patient used clues from written paragraph and labeled daily events in order of occurance with 40% acc given MIN cues; 80% acc given MOD cues. Demonstrated increased difficulty with attention, working memory, and organization during task. Read paragraph multiple times prior to carrying out the task. During a following activity, patient answered questions regarding simple math concepts related to paragraph with 80% acc given MIN-MOD cues.  -SR       Executive Functional Skills Goal 1 (SLP)    Improve Executive Function Skills Goal 1 (SLP) home management activity;time management activity;80%;independently (over 90% accuracy)  -SR home management activity;time management activity;80%;independently (over 90% accuracy)  -SR --    Time Frame (Executive Function Skills Goal 1, SLP) by discharge  -SR by discharge  -SR --    Progress/Outcomes (Executive Function Skills Goal 1, SLP) goal ongoing  -SR goal ongoing  -SR --    Comment (Executive Function Skills Goal 1, SLP) Medication management task completed during AM therapy session. Patient followed written directions and sorted mock medications by date/time with 40% acc given MIN cues; 80% acc given MOD cues. Verbal cues provided for attention to detail. Patient added too many tablets to a section for x2 medications. Recognized mistake following verbal  cue. Demonstrated increased difficulty with working memory, attn, and organization. Spoke with son following session. Agreeable to recommendation for supervision of finances/medications following discharge.  -SR Restaurant menu prices; patient answered functional addition/subtraction based questions with 40% acc given MIN cues; 80% acc given MOD cues. Verbal/visual cues provided for locating target item/price. Extended time provided to complete task. No calculator assist.  -SR --          User Key  (r) = Recorded By, (t) = Taken By, (c) = Cosigned By    Initials Name Provider Type    Hope West CCC-SLP Speech and Language Pathologist                            Time Calculation:        Time Calculation- SLP     Row Name 05/25/23 1422 05/25/23 1202          Time Calculation- SLP    SLP Start Time 1300  -SR 0930  -SR     SLP Stop Time 1330  -SR 1000  -SR     SLP Time Calculation (min) 30 min  -SR 30 min  -SR           User Key  (r) = Recorded By, (t) = Taken By, (c) = Cosigned By    Initials Name Provider Type    Hope West CCC-SLP Speech and Language Pathologist                  Therapy Charges for Today     Code Description Service Date Service Provider Modifiers Qty    80345332370 HC ST DEV OF COGN SKILLS INITIAL 15 MIN 5/24/2023 Hope Mary CCC-SLP  1    97665852074 HC ST DEV OF COGN SKILLS EACH ADDT'L 15 MIN 5/24/2023 Hope Mary CCC-SLP  3    41235329454 HC ST DEV OF COGN SKILLS INITIAL 15 MIN 5/25/2023 Hope Mary CCC-SLP  1    [id number] HC ST DEV OF COGN SKILLS EACH ADDT'L 15 MIN 5/25/2023 Hope Mary CCC-SLP  3                           KATIE Victoria  5/25/2023

## 2023-05-25 NOTE — PROGRESS NOTES
LOS: 9 days   Patient Care Team:  Amina Jiang MD as PCP - General (Endocrinology)      [facility]  1935    Diagnoses    1. IMPAIRED GAIT AND MOBILITY       ADMITTING DIAGNOSIS:  SDH  L femur fracture    Subjective       Patient complains of general soreness from the exercises.  Denies any significant headache.  His son is a video of a tremor in his head and the hospital.  Has some muscle twitching but not to the amplitude or velocity that typically would see with myoclonus.  Demonstrates that in the right hand more than the left in the video.    Objective     Vitals:    05/25/23 1200   BP: 145/69   Pulse: 65   Resp: 18   Temp: 97.6 °F (36.4 °C)   SpO2: 97%       PHYSICAL EXAM:   General: The patient is well-developed, well-nourished and in no apparent distress.   HEENT: EOMI, hearing intact b/l. MMM.     Respiratory: Lungs CTAB, no wheezes or rhonchi     Cardiac: RRR, no m/r/g, no pedal edema     Abdomen: soft, NT abdomen     Skin: L femoral IM placement incisions-healing  Ext: Tenderness on L anterior thigh  Psych: Appropriate affect and behavior         Neuro:  Mental Status:  AOx4   Speech: fluent without dysarthria   Good strength bilaterally  No tremor seen during evaluation      MEDICATIONS  Scheduled Meds:amLODIPine, 10 mg, Oral, Q24H  Diclofenac Sodium, 2 g, Topical, 4x Daily  DULoxetine, 30 mg, Oral, BID  heparin (porcine), 5,000 Units, Subcutaneous, Q12H  levothyroxine, 25 mcg, Oral, Q AM  pantoprazole, 40 mg, Oral, Daily  QUEtiapine, 12.5 mg, Oral, Nightly  senna-docusate sodium, 2 tablet, Oral, Nightly      Continuous Infusions:   PRN Meds:.•  acetaminophen  •  aluminum-magnesium hydroxide-simethicone  •  diphenhydrAMINE  •  hydrALAZINE  •  oxyCODONE      RESULTS  No results found for: POCGLU  Results from last 7 days   Lab Units 05/22/23  0726 05/19/23  0600   WBC 10*3/mm3 5.18 5.04   HEMOGLOBIN g/dL 10.6* 9.6*   HEMATOCRIT % 33.5* 29.4*   PLATELETS 10*3/mm3 312 286     Results from  last 7 days   Lab Units 05/22/23  0727 05/19/23  0600   SODIUM mmol/L 129* 133*   POTASSIUM mmol/L 4.0 4.3   CHLORIDE mmol/L 93* 97*   CO2 mmol/L 23.6 27.0   BUN mg/dL 24* 23   CREATININE mg/dL 1.12 1.07   CALCIUM mg/dL 9.3 9.1   GLUCOSE mg/dL 97 106*     CT of the head May 22, 2023  IMPRESSION:     There is an acute to subacute subdural hematoma noted lateral to the  right frontal, parietal, and to lesser extent occipital lobes which  measures maximally 6 mm in diameter lateral to the right frontal  parietal junction. An age-indeterminate subdural hygroma is seen lateral  to left frontal and parietal lobes and the subdural hygroma measures up  to 9 mm in maximal diameter lateral to the left frontal lobe. There is  bilateral mass effect with sulcal effacement. However, no significant  shift of the midline structures is seen.    ASSESSMENT and PLAN    Subdural hematoma    Impairments: ADLs, endurance, gait, balance, transfers, cognition, comprehension     Now admit for comprehensive acute inpatient rehabilitation .  This would be an interdisciplinary program with physical therapy 1 hour,  occupational therapy 1 hour, and speech therapy 1 hour, 5 days a week.  Rehabilitation nursing for carryover, monitoring of neurologic status, bowel and bladder, and skin  Ongoing physician follow-up.  Weekly team conferences.  Goals are to achieve a level of supervision with  mobility and self-care and improved ADLs.   Rehabilitation prognosis good.  Medical prognosis good.  Estimated length of stay is approximately 1-2 weeks , but is only an estimation.         #Functional Impairment 2/2 SDH and L femur fracture   -Initiate comprehensive rehab program consisting of PT/OT/SLP 3 hours/day, 5 days/week with medical management of above disorder, comorbidities, pain, bowels, and bladder.       -WB/activity status:?no restrictions, WBAT        #SDH  -No gross neurologic deficits on exam  -Will order CT Head for baseline or transfer  imaging from [facility]  May 22- Order CT Head for follow-up SDH evaluation. Will send imaging to [facility] for a follow-up CT of the head today per message from [facility].  To have study done with results sent to Dr. Hollins - [facility]  May 99-txclom-tu CT-report reviewed.  Right subdural hematoma and left subdural hygroma, no shift.  neurosurgery obtaining copy of CT images on CD to review.  Unable to compare to outside films at [facility] due to issues with their PACS system.     May 24- Will discuss with [facility] today about results of CT scan  May 25-patient's son received a call from [place] [facility] yesterday who were pleased with the imaging of the follow-up CT of the head.  To have a repeat CT in 2 weeks when follows up in the office    #L displaced femur fracture  -S/p L IM Nail placement in L femur  -WBAT in LLE  May 24- Order L Femur Xray for post-op f/u of hardware      #Pain Control     -Prn Norco 5 q4h     May 22- Will DC Norco and change to Oxycodone 5 BID PRN. Patient was only getting Norco 3 times a day and is the same MME as Oxycodone 5 BID PRN. Will also add Tylenol 650 q6h PRN for intermittent pain relief in between Oxycodone doses.    #Hypothyroidism  - on admission  -Significant weight loss and depression over the past year  -Continue Synthroid 25 daily  -Will follow-up on TSH and T4 values tomorrow  May 24- Order TSH and T4 next week and change medication accordingly        #Hyponatremia  -Na 128 on admission, 128-130 a [facility]. -Fluid restriction to 1500ml daily  -Possibly related to hypothyroidism  May 20- increase synthroid to 50mcg daily and monitor TSH and Na+  -Will continue to monitor  May 22-sodium unchanged 129     #HTN  -Continue Norvasc 5 daily  May 22- Increased Norvasc to 10 daily over the weekend. BP improved and is more controlled, will continue to monitor.    #Mood disorder  -Continue Cymbalta 30 BID and Seroquel 12.5 at bedtime   -PRN Xanax held  because cause of his falls     #FEN     -nutrition: Cardiac diet, Regular texture, Liquid Consistency: Thin Liquid. 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  Has Had It at the Other Hospital As Well.  Not Noted during Any of Our Visits on Rounds.  On a video of the son has had has frequency they can see with a myoclonic jerk but not the amplitude or velocity. Medications Reviewed.  On Cymbalta 30 Mg Twice a Day.  Low-Dose Seroquel 12.5 Mg Daily.  Medications Unremarkable.  Will Monitor.      TEAM CONF - MAY 18 - Bed mob CG / Min x 1, CG / Min transfers, amb 80' CG rwx,  completed 4 steps x 2 rails CGA / Min A.  CGA toilet and shower transfer.  Mod A  toileting.  Mod A LBD.  Mild cognitive impairment.  Cont bowel and bladder.  BP  running high, increased Amlodipine.    TEAM CONF - MAY 25 - NO CALL BACK FROM [facility] REGARDING FOLLOW CT OF HEAD. LEFT FEMUR FRACTURE EXPECTED POST-OP FINDINGS, RESULTS TO [facility]. BED SBA. TRANSFERS CTG MIN. 4 STAIRS CTG MIN WITH 2 RAILINGS. GAIT 320 FEET CTG RW. TOILET TRANSFERS SBA RW. SHOWER TRANSFERS CTG SBA. BATH CTG SBA. LBD MIN CTG. UBD SET UP. TOILETING CTG SBA. WORKING ON WORKING MEMORY. CONTINENT BOWEL AND BLADDER. INCISION LEFT HIP HEALING.  ELOS - Thursday June 1       Luis Llanos MD      During rounds, used appropriate personal protective equipment including mask and gloves.  Additional gown if indicated.  Mask used was standard procedure mask. Appropriate PPE was worn during the entire visit.  Hand hygiene was completed before and after.

## 2023-05-25 NOTE — PLAN OF CARE
Goal Outcome Evaluation:     Pt is A/Ox4, calm/cooperative, OOB x 1. Meds taken whole w thin liquids. Prn Oxycodone given x1. Prn Benadryl given x1 for generalized itching. Pt is continent using urinal. Pt c/o constipation. Notified Dr. Huston; Pericolace ordered nightly and dose given.

## 2023-05-25 NOTE — PROGRESS NOTES
TEAM CONF - MAY 25 - NO CALL BACK FROM [facility] REGARDING FOLLOW CT OF HEAD. LEFT FEMUR FRACTURE EXPECTED POST-OP FINDINGS, RESULTS TO [facility]. BED SBA. TRANSFERS CTG MIN. 4 STAIRS CTG MIN WITH 2 RAILINGS. GAIT 320 FEET CTG RW. TOILET TRANSFERS SBA RW. SHOWER TRANSFERS CTG SBA. BATH CTG SBA. LBD MIN CTG. UBD SET UP. TOILETING CTG SBA. WORKING ON WORKING MEMORY. CONTINENT BOWEL AND BLADDER. INCISION LEFT HIP HEALING.  ELOS - Thursday June 1

## 2023-05-25 NOTE — PLAN OF CARE
Goal Outcome Evaluation:  Plan of Care Reviewed With: patient        Progress: improving  Outcome Evaluation: Pt is A&OX4, calm and cooperative. Meds whole with water. Con of B&B. CGA to wc. Incision to L hip, dsg intact. Small laceration to posterior head, MIQUEL. 1500CC fluid restriction. PRN pain meds given for pain in BL shoulders and L hip. No safety concerns noted. Planned DC for next Thrusday 6/01.

## 2023-05-25 NOTE — THERAPY TREATMENT NOTE
Inpatient Rehabilitation - Physical Therapy Treatment Note       [facility]     Patient Name: Radha Azevedo  : 1935  MRN: [id number]    Today's Date: 2023                    Admit Date: 2023      Visit Dx:     ICD-10-CM ICD-9-CM   1. Impaired gait and mobility  R26.89 781.2       Patient Active Problem List   Diagnosis   • Absolute anemia   • Benign essential HTN   • Benign localized hyperplasia of prostate without urinary obstruction   • Type 2 diabetes mellitus with peripheral angiopathy   • Hypertension   • Hyperlipidemia   • Type 2 diabetes mellitus   • Diabetes mellitus type 2, noninsulin dependent   • Avitaminosis D   • Dyslipidemia   • Decrease in the ability to hear   • Personal history of colonic polyps   • Subdural hematoma       Past Medical History:   Diagnosis Date   • Colon polyps     Followed by Dr. Leo Jaeger at [facility].   • Coronary artery disease    • Diabetes mellitus    • Hyperlipidemia    • Hypertension        Past Surgical History:   Procedure Laterality Date   • CARDIAC CATHETERIZATION     • CAROTID STENT      patient denies, but family states he did have a stent placed   • COLONOSCOPY N/A 2014    Dr. Leo Jaeger at [facility].   • COLONOSCOPY N/A 2016    Dr. Leo Jaeger at [facility].   • COLONOSCOPY N/A 03/15/2023    2 TUBULAR ADENOMA POLYPS IN SIGMOID, BARIUM ENEMA ORDERED, DR. LEO JAEGER AT [facility]   • CORONARY ANGIOPLASTY     • FRACTURE SURGERY Left 2023    IM nailing for L femur fracture       PT ASSESSMENT (last 12 hours)     IRF PT Evaluation and Treatment     Row Name 23 0854          PT Time and Intention    Document Type daily treatment  -DP     Mode of Treatment individual therapy;physical therapy  -DP     Patient/Family/Caregiver Comments/Observations Pt sitting up in w/c upon PT arrival  -DP     Row Name 23 0854          General Information    Patient Profile Reviewed yes  -DP     Existing Precautions/Restrictions fall;weight bearing   -DP     Row Name 05/25/23 0854          Pain Assessment    Pretreatment Pain Rating 6/10  -DP     Posttreatment Pain Rating 6/10  -DP     Pain Location - Side/Orientation Left  -DP     Pre/Posttreatment Pain Comment femur  -DP     Row Name 05/25/23 0854          Cognition/Psychosocial    Affect/Mental Status (Cognition) WFL  -DP     Orientation Status (Cognition) oriented x 3  -DP     Follows Commands (Cognition) follows two-step commands;repetition of directions required  -DP     Personal Safety Interventions safety round/check completed;elopement precautions initiated;fall prevention program maintained;gait belt;muscle strengthening facilitated;supervised activity;nonskid shoes/slippers when out of bed  -DP     Row Name 05/25/23 0854          Sit-Stand Transfer    Sit-Stand [place] (Transfers) contact guard  -DP     Assistive Device (Sit-Stand Transfers) walker, front-wheeled  -DP     Row Name 05/25/23 0854          Stand-Sit Transfer    Stand-Sit [place] (Transfers) contact guard  -DP     Assistive Device (Stand-Sit Transfers) walker, front-wheeled  -DP     Row Name 05/25/23 0854          Gait/Stairs (Locomotion)    [place] Level (Gait) contact guard;verbal cues  -DP     Assistive Device (Gait) walker, front-wheeled;walker, 4-wheeled  -DP     Distance in Feet (Gait) 160'x2, 40'x2  -DP     Pattern (Gait) step-through  -DP     Deviations/Abnormal Patterns (Gait) bilateral deviations;base of support, narrow;gait speed decreased;stride length decreased;weight shifting decreased  -DP     Bilateral Gait Deviations forward flexed posture;heel strike decreased  -DP     Left Sided Gait Deviations weight shift ability decreased  -DP     Handrail Location (Stairs) both sides  -DP     Number of Steps (Stairs) 4x2  -DP     Ascending Technique (Stairs) step-to-step;step-over-step  -DP     Descending Technique (Stairs) step-to-step  -DP     Stairs, Safety Issues balance decreased during turns  -DP     Stairs,  Impairments impaired balance;strength decreased;pain  -DP     Row Name 05/25/23 0854          Safety Issues, Functional Mobility    Impairments Affecting Function (Mobility) balance;pain;visual/perceptual  -DP     Row Name 05/25/23 0854          Curb Negotiation (Mobility)    Ethel, Curb Negotiation minimal assist, 75% or more patient effort;verbal cues  -DP     Assistive Device (Curb Negotiation) walker, 4-wheeled  -DP     Comment, Curb Negotiation (Mobility) constant VC for sequencing  -DP     Row Name 05/25/23 0854          Balance    Comment, Balance weaving in and out of cones using 4WW with 100% avoidance of cones but at times getting LLE outside of wheels. 40'x2. Standing blue foam pad EC for 30 sec, MIP 15x no UE Silver, standing on gray sujata discs with EO 30 secondsx2, and then with EC for 1-2 secs then requiring min/mod  -DP     Row Name 05/25/23 0854          Positioning and Restraints    Pre-Treatment Position sitting in chair/recliner  -DP     Post Treatment Position wheelchair  -DP     In Wheelchair sitting;with SLP  -DP           User Key  (r) = Recorded By, (t) = Taken By, (c) = Cosigned By    Initials Name Provider Type    DP George Lehman, JACK Physical Therapist              Wound 05/16/23 1831 Left lateral hip Incision (Active)   Dressing Appearance dry;intact 05/25/23 0725   Closure GUME 05/25/23 0725   Base dressing in place, unable to visualize 05/25/23 0725   Drainage Amount none 05/25/23 0725     Physical Therapy Education     Title: PT OT SLP Therapies (In Progress)     Topic: Physical Therapy (In Progress)     Point: Mobility training (In Progress)     Learning Progress Summary           Patient Acceptance, E,D, NR by DP at 5/25/2023 1451    Eager, E,D, NR by KP at 5/24/2023 1506    Acceptance, E,D, VU,DU by DP at 5/23/2023 1110    Acceptance, E,D, VU,DU by DP at 5/22/2023 1148    Acceptance, E, VU by WN at 5/20/2023 2214    Acceptance, E, NR by  at 5/20/2023 0927    Acceptance, E, VU  by WN at 5/19/2023 2232    Acceptance, E,TB, VU,NR by MAURILIO at 5/18/2023 1206    Acceptance, E, VU by KN at 5/17/2023 1524    Acceptance, E,TB, VU,NR by MAURILIO at 5/17/2023 1209                   Point: Home exercise program (In Progress)     Learning Progress Summary           Patient Acceptance, E,D, NR by DP at 5/25/2023 1451    Eager, E,D, NR by KP at 5/24/2023 1506    Acceptance, E,D, VU,DU by DP at 5/23/2023 1110    Acceptance, E,D, VU,DU by DP at 5/22/2023 1148    Acceptance, E, VU by WN at 5/20/2023 2214    Acceptance, E, NR by LH at 5/20/2023 0927    Acceptance, E, VU by WN at 5/19/2023 2232    Acceptance, E, VU by KN at 5/17/2023 1524                   Point: Body mechanics (In Progress)     Learning Progress Summary           Patient Acceptance, E,D, NR by DP at 5/25/2023 1451    Eager, E,D, NR by BHUPENDRA at 5/24/2023 1506    Acceptance, E,D, VU,DU by DP at 5/23/2023 1110    Acceptance, E,D, VU,DU by DP at 5/22/2023 1148    Acceptance, E, VU by WN at 5/20/2023 2214    Acceptance, E, NR by  at 5/20/2023 0927    Acceptance, E, VU by WN at 5/19/2023 2232    Acceptance, E, VU by KN at 5/17/2023 1524                   Point: Precautions (In Progress)     Learning Progress Summary           Patient Acceptance, E,D, NR by DP at 5/25/2023 1451    Eager, E,D, NR by BHUPENDRA at 5/24/2023 1506    Acceptance, E,D, VU,DU by DP at 5/23/2023 1110    Acceptance, E,D, VU,DU by DP at 5/22/2023 1148    Acceptance, E, VU by WN at 5/20/2023 2214    Acceptance, E, NR by  at 5/20/2023 0927    Acceptance, E, VU by WN at 5/19/2023 2232    Acceptance, E, VU by KN at 5/17/2023 1524                               User Key     Initials Effective Dates Name Provider Type Discipline    MAURILIO 06/16/21 -  Ashlie Arnold, PT Physical Therapist PT    LH 06/16/21 -  Aicha Payne, PT Physical Therapist PT    KP 06/16/21 -  Keena London, PT Physical Therapist PT    WN 08/23/22 -  Kamaljit Elias, RN Registered Nurse Nurse    DP 08/24/21 -  [place]  JACK Vazquez Physical Therapist PT    KN 08/02/22 -  Main De La Garza OT Occupational Therapist OT                PT Recommendation and Plan                          Time Calculation:      PT Charges     Row Name 05/25/23 1453 05/25/23 1451          Time Calculation    Start Time 1230  -DP 0830  -DP     Stop Time 1300  -DP 0900  -DP     Time Calculation (min) 30 min  -DP 30 min  -DP     PT Received On -- 05/25/23  -DP     PT - Next Appointment -- 05/26/23  -DP        Time Calculation- PT    Total Timed Code Minutes- PT 30 minute(s)  -DP 30 minute(s)  -DP           User Key  (r) = Recorded By, (t) = Taken By, (c) = Cosigned By    Initials Name Provider Type    DP George Lehman, PT Physical Therapist                Therapy Charges for Today     Code Description Service Date Service Provider Modifiers Qty    31347890505 HC PT NEUROMUSC RE EDUCATION EA 15 MIN 5/25/2023 George Lehman, PT GP 2    04002221428 HC PT THERAPEUTIC ACT EA 15 MIN 5/25/2023 George Lehman PT GP 2                   George Lehman PT  5/25/2023

## 2023-05-25 NOTE — PROGRESS NOTES
Inpatient Rehabilitation Plan of Care Note    Plan of Care  Care Plan Reviewed - No updates at this time.    Psychosocial    Performed Intervention(s)  Medication.  Group therapy.      Safety    Performed Intervention(s)  Items within reach.  Safety rounds.  Wheelchair, bed, and chair alarms.      Sphincter Control    Performed Intervention(s)  Offer restroom.  Bladder training program.  Use incontinence products.  Encourage appropriate diet.  Encourage fluid intake.      Body Systems    Performed Intervention(s)  Skin assessment q shift and prn.  Wound care as ordered.    Signed by: Charito Luis RN

## 2023-05-25 NOTE — PROGRESS NOTES
Reviewed goals and progress with patient after team conference. Patient d/c date is scheduled for 6/1. Patient very motivated and works hard in therapies. Patient can be impulsive at times. Patient's sons out of town right now but SW called to update on the d/c date. Patient's sons will be coordinating relatives and private caregivers to be with patient after d/c.

## 2023-05-25 NOTE — PROGRESS NOTES
Inpatient Rehabilitation Plan of Care Note    Plan of Care  Care Plan Reviewed - Updates as Follows    Psychosocial    [RN] Coping/Adjustment(Active)  Current Status(05/25/2023): At risk for poor coping due to recent medical  issues.  Weekly Goal(06/01/2023): Identify progress in functional status.  Discharge Goal: Demonstrates healthy coping skills.    Performed Intervention(s)  Medication.  Group therapy.      Safety    [RN] Potential for Injury(Active)  Current Status(05/25/2023): Hx falls. At risk for falling in the hospital.  Weekly Goal(05/31/2023): Cue to use the call bell.  Discharge Goal: Patient/ family will be aware of the risk of falling in the home  setting.    Performed Intervention(s)  Items within reach.  Safety rounds.  Wheelchair, bed, and chair alarms.      Sphincter Control    [RN] Bladder Management(Active)  Current Status(05/25/2023): 100% continent of bladder.  Weekly Goal(06/01/2023): Remains 100% continent of bladder.  Discharge Goal: Goes home continent of bladder.    [RN] Bowel Management(Active)  Current Status(05/25/2023): 100% continent of bowel.  Weekly Goal(05/31/2023): Remains continent of bowel.  Discharge Goal: goes home continent of bowel.    Performed Intervention(s)  Offer restroom.  Bladder training program.  Use incontinence products.  Encourage appropriate diet.  Encourage fluid intake.      Body Systems    [RN] Integumentary(Active)  Current Status(05/25/2023): Pt has laceration to scalp and incision to L hip.  Weekly Goal(06/01/2023): Skin will remain free from s/s infection .  Discharge Goal: Scalp will be healed and L hip will have no s/s infection.    Performed Intervention(s)  Skin assessment q shift and prn.  Wound care as ordered.    Signed by: Monica Potter RN

## 2023-05-26 LAB
ANION GAP SERPL CALCULATED.3IONS-SCNC: 9.1 MMOL/L (ref 5–15)
BASOPHILS # BLD AUTO: 0.09 10*3/MM3 (ref 0–0.2)
BASOPHILS NFR BLD AUTO: 1.7 % (ref 0–1.5)
BUN SERPL-MCNC: 26 MG/DL (ref 8–23)
BUN/CREAT SERPL: 26 (ref 7–25)
CALCIUM SPEC-SCNC: 9.5 MG/DL (ref 8.6–10.5)
CHLORIDE SERPL-SCNC: 95 MMOL/L (ref 98–107)
CO2 SERPL-SCNC: 25.9 MMOL/L (ref 22–29)
CREAT SERPL-MCNC: 1 MG/DL (ref 0.76–1.27)
DEPRECATED RDW RBC AUTO: 52.3 FL (ref 37–54)
EGFRCR SERPLBLD CKD-EPI 2021: 72.8 ML/MIN/1.73
EOSINOPHIL # BLD AUTO: 0.23 10*3/MM3 (ref 0–0.4)
EOSINOPHIL NFR BLD AUTO: 4.4 % (ref 0.3–6.2)
ERYTHROCYTE [DISTWIDTH] IN BLOOD BY AUTOMATED COUNT: 17 % (ref 12.3–15.4)
GLUCOSE SERPL-MCNC: 128 MG/DL (ref 65–99)
HCT VFR BLD AUTO: 33.4 % (ref 37.5–51)
HGB BLD-MCNC: 10.8 G/DL (ref 13–17.7)
IMM GRANULOCYTES # BLD AUTO: 0.01 10*3/MM3 (ref 0–0.05)
IMM GRANULOCYTES NFR BLD AUTO: 0.2 % (ref 0–0.5)
LYMPHOCYTES # BLD AUTO: 2.17 10*3/MM3 (ref 0.7–3.1)
LYMPHOCYTES NFR BLD AUTO: 41.4 % (ref 19.6–45.3)
MCH RBC QN AUTO: 27 PG (ref 26.6–33)
MCHC RBC AUTO-ENTMCNC: 32.3 G/DL (ref 31.5–35.7)
MCV RBC AUTO: 83.5 FL (ref 79–97)
MONOCYTES # BLD AUTO: 0.47 10*3/MM3 (ref 0.1–0.9)
MONOCYTES NFR BLD AUTO: 9 % (ref 5–12)
NEUTROPHILS NFR BLD AUTO: 2.27 10*3/MM3 (ref 1.7–7)
NEUTROPHILS NFR BLD AUTO: 43.3 % (ref 42.7–76)
NRBC BLD AUTO-RTO: 0 /100 WBC (ref 0–0.2)
PLATELET # BLD AUTO: 286 10*3/MM3 (ref 140–450)
PMV BLD AUTO: 9.6 FL (ref 6–12)
POTASSIUM SERPL-SCNC: 4.2 MMOL/L (ref 3.5–5.2)
RBC # BLD AUTO: 4 10*6/MM3 (ref 4.14–5.8)
SODIUM SERPL-SCNC: 130 MMOL/L (ref 136–145)
WBC NRBC COR # BLD: 5.24 10*3/MM3 (ref 3.4–10.8)

## 2023-05-26 PROCEDURE — 97535 SELF CARE MNGMENT TRAINING: CPT

## 2023-05-26 PROCEDURE — 63710000001 DIPHENHYDRAMINE PER 50 MG: Performed by: PHYSICAL MEDICINE & REHABILITATION

## 2023-05-26 PROCEDURE — 97530 THERAPEUTIC ACTIVITIES: CPT

## 2023-05-26 PROCEDURE — 85025 COMPLETE CBC W/AUTO DIFF WBC: CPT | Performed by: PHYSICAL MEDICINE & REHABILITATION

## 2023-05-26 PROCEDURE — 25010000002 HEPARIN (PORCINE) PER 1000 UNITS: Performed by: PHYSICAL MEDICINE & REHABILITATION

## 2023-05-26 PROCEDURE — 97130 THER IVNTJ EA ADDL 15 MIN: CPT

## 2023-05-26 PROCEDURE — 97129 THER IVNTJ 1ST 15 MIN: CPT

## 2023-05-26 PROCEDURE — 97112 NEUROMUSCULAR REEDUCATION: CPT

## 2023-05-26 PROCEDURE — 80048 BASIC METABOLIC PNL TOTAL CA: CPT | Performed by: PHYSICAL MEDICINE & REHABILITATION

## 2023-05-26 PROCEDURE — 97110 THERAPEUTIC EXERCISES: CPT

## 2023-05-26 RX ORDER — AMOXICILLIN 250 MG
2 CAPSULE ORAL 2 TIMES DAILY
Status: DISCONTINUED | OUTPATIENT
Start: 2023-05-26 | End: 2023-06-01 | Stop reason: HOSPADM

## 2023-05-26 RX ADMIN — PANTOPRAZOLE SODIUM 40 MG: 40 TABLET, DELAYED RELEASE ORAL at 07:35

## 2023-05-26 RX ADMIN — HEPARIN SODIUM 5000 UNITS: 5000 INJECTION INTRAVENOUS; SUBCUTANEOUS at 07:34

## 2023-05-26 RX ADMIN — DULOXETINE HYDROCHLORIDE 30 MG: 30 CAPSULE, DELAYED RELEASE ORAL at 20:05

## 2023-05-26 RX ADMIN — DOCUSATE SODIUM 50MG AND SENNOSIDES 8.6MG 2 TABLET: 8.6; 5 TABLET, FILM COATED ORAL at 20:05

## 2023-05-26 RX ADMIN — HEPARIN SODIUM 5000 UNITS: 5000 INJECTION INTRAVENOUS; SUBCUTANEOUS at 21:30

## 2023-05-26 RX ADMIN — DICLOFENAC SODIUM 2 G: 10 GEL TOPICAL at 07:35

## 2023-05-26 RX ADMIN — LEVOTHYROXINE SODIUM 25 MCG: 0.03 TABLET ORAL at 05:23

## 2023-05-26 RX ADMIN — OXYCODONE HYDROCHLORIDE 5 MG: 5 TABLET ORAL at 07:34

## 2023-05-26 RX ADMIN — QUETIAPINE FUMARATE 12.5 MG: 25 TABLET ORAL at 20:05

## 2023-05-26 RX ADMIN — DULOXETINE HYDROCHLORIDE 30 MG: 30 CAPSULE, DELAYED RELEASE ORAL at 07:34

## 2023-05-26 RX ADMIN — MAGNESIUM HYDROXIDE 10 ML: 2400 SUSPENSION ORAL at 21:30

## 2023-05-26 RX ADMIN — DIPHENHYDRAMINE HYDROCHLORIDE 25 MG: 25 CAPSULE ORAL at 20:50

## 2023-05-26 RX ADMIN — OXYCODONE HYDROCHLORIDE 5 MG: 5 TABLET ORAL at 20:17

## 2023-05-26 RX ADMIN — AMLODIPINE BESYLATE 10 MG: 10 TABLET ORAL at 07:35

## 2023-05-26 RX ADMIN — DOCUSATE SODIUM 50MG AND SENNOSIDES 8.6MG 2 TABLET: 8.6; 5 TABLET, FILM COATED ORAL at 10:05

## 2023-05-26 NOTE — PROGRESS NOTES
LOS: 10 days   Patient Care Team:  Amina Jiang MD as PCP - General (Endocrinology)      [facility]  1935    Diagnoses    1. IMPAIRED GAIT AND MOBILITY       ADMITTING DIAGNOSIS:  SDH  L femur fracture    Subjective   Denies pain or soreness this morning. Per nursing, has not had a bowel movement since 5/21. Denies abdominal pain or n/v.    Objective     Vitals:    05/26/23 0500   BP: 126/66   Pulse: 59   Resp: 18   Temp: 96.8 °F (36 °C)   SpO2: 96%       PHYSICAL EXAM:   General: The patient is well-developed, well-nourished and in no apparent distress.   HEENT: EOMI, hearing intact b/l. MMM.     Respiratory: Lungs CTAB, no wheezes or rhonchi     Cardiac: RRR, no m/r/g, no pedal edema     Abdomen: soft, NT abdomen     Skin: L femoral IM placement incisions-healing  Ext: Nontender anterior thigh  Psych: Appropriate affect and behavior         Neuro:  Mental Status:  AOx4   Speech: fluent without dysarthria   Good strength bilaterally  No tremor seen during evaluation      MEDICATIONS  Scheduled Meds:amLODIPine, 10 mg, Oral, Q24H  Diclofenac Sodium, 2 g, Topical, 4x Daily  DULoxetine, 30 mg, Oral, BID  heparin (porcine), 5,000 Units, Subcutaneous, Q12H  levothyroxine, 25 mcg, Oral, Q AM  magnesium hydroxide, 10 mL, Oral, Once  pantoprazole, 40 mg, Oral, Daily  QUEtiapine, 12.5 mg, Oral, Nightly  senna-docusate sodium, 2 tablet, Oral, BID      Continuous Infusions:   PRN Meds:.•  acetaminophen  •  aluminum-magnesium hydroxide-simethicone  •  diphenhydrAMINE  •  hydrALAZINE  •  oxyCODONE      RESULTS  No results found for: POCGLU  Results from last 7 days   Lab Units 05/26/23  0733 05/22/23  0726   WBC 10*3/mm3 5.24 5.18   HEMOGLOBIN g/dL 10.8* 10.6*   HEMATOCRIT % 33.4* 33.5*   PLATELETS 10*3/mm3 286 312     Results from last 7 days   Lab Units 05/26/23  0733 05/22/23  0727   SODIUM mmol/L 130* 129*   POTASSIUM mmol/L 4.2 4.0   CHLORIDE mmol/L 95* 93*   CO2 mmol/L 25.9 23.6   BUN mg/dL 26* 24*    CREATININE mg/dL 1.00 1.12   CALCIUM mg/dL 9.5 9.3   GLUCOSE mg/dL 128* 97     CT of the head May 22, 2023  IMPRESSION:     There is an acute to subacute subdural hematoma noted lateral to the  right frontal, parietal, and to lesser extent occipital lobes which  measures maximally 6 mm in diameter lateral to the right frontal  parietal junction. An age-indeterminate subdural hygroma is seen lateral  to left frontal and parietal lobes and the subdural hygroma measures up  to 9 mm in maximal diameter lateral to the left frontal lobe. There is  bilateral mass effect with sulcal effacement. However, no significant  shift of the midline structures is seen.    ASSESSMENT and PLAN    Subdural hematoma    Impairments: ADLs, endurance, gait, balance, transfers, cognition, comprehension     Now admit for comprehensive acute inpatient rehabilitation .  This would be an interdisciplinary program with physical therapy 1 hour,  occupational therapy 1 hour, and speech therapy 1 hour, 5 days a week.  Rehabilitation nursing for carryover, monitoring of neurologic status, bowel and bladder, and skin  Ongoing physician follow-up.  Weekly team conferences.  Goals are to achieve a level of supervision with  mobility and self-care and improved ADLs.   Rehabilitation prognosis good.  Medical prognosis good.  Estimated length of stay is approximately 1-2 weeks , but is only an estimation.         #Functional Impairment 2/2 SDH and L femur fracture   -Initiate comprehensive rehab program consisting of PT/OT/SLP 3 hours/day, 5 days/week with medical management of above disorder, comorbidities, pain, bowels, and bladder.       -WB/activity status:?no restrictions, WBAT        #SDH  -No gross neurologic deficits on exam  -Will order CT Head for baseline or transfer imaging from [facility]  May 22- Order CT Head for follow-up SDH evaluation. Will send imaging to [facility] for a follow-up CT of the head today per message from [facility].  To have study done with results sent to Dr. Hollins - [facility]  May 31-[id number] CT-report reviewed.  Right subdural hematoma and left subdural hygroma, no shift.  neurosurgery obtaining copy of CT images on CD to review.  Unable to compare to outside films at [facility] due to issues with their PACS system.     May 24- Will discuss with [facility] today about results of CT scan  May 25-patient's son received a call from [place] [facility] yesterday who were pleased with the imaging of the follow-up CT of the head.  To have a repeat CT in 2 weeks when follows up in the office    #L displaced femur fracture  -S/p L IM Nail placement in L femur  -WBAT in LLE  May 24- Order L Femur Xray for post-op f/u of hardware  May 26- Stable Xray of L femur IM nail, will follow-up with Ortho as an outpatient      #Pain Control     -Prn Norco 5 q4h     May 22- Will DC Norco and change to Oxycodone 5 BID PRN. Patient was only getting Norco 3 times a day and is the same MME as Oxycodone 5 BID PRN. Will also add Tylenol 650 q6h PRN for intermittent pain relief in between Oxycodone doses.    #Hypothyroidism  - on admission  -Significant weight loss and depression over the past year  -Continue Synthroid 25 daily  -Will follow-up on TSH and T4 values tomorrow  May 24- Order TSH and T4 next week and change medication accordingly        #Hyponatremia  -Na 128 on admission, 128-130 a NBH. -Fluid restriction to 1500ml daily  -Possibly related to hypothyroidism  May 20- increase synthroid to 50mcg daily and monitor TSH and Na+  -Will continue to monitor  May 22-sodium unchanged 129  May 26- Labs reviewed, stable Na level at 130, no significant S&S     #HTN  -Continue Norvasc 5 daily  May 22- Increased Norvasc to 10 daily over the weekend. BP improved and is more controlled, will continue to monitor.  May 26- BP stable, continue current regimen    #Mood disorder  -Continue Cymbalta 30 BID and Seroquel 12.5  at bedtime   -PRN Xanax held because cause of his falls     #Constipation  May 26- Increase Doc Senna 2 BID and order MoM once today, will continue to monitor BM    #FEN     -nutrition: Cardiac diet, Regular texture, Liquid Consistency: Thin Liquid. 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  Has Had It at the Other Hospital As Well.  Not Noted during Any of Our Visits on Rounds.  On a video of the son has had has frequency they can see with a myoclonic jerk but not the amplitude or velocity. Medications Reviewed.  On Cymbalta 30 Mg Twice a Day.  Low-Dose Seroquel 12.5 Mg Daily.  Medications Unremarkable.  Will Monitor.      TEAM CONF - MAY 18 - Bed mob CG / Min x 1, CG / Min transfers, amb 80' CG rwx,  completed 4 steps x 2 rails CGA / Min A.  CGA toilet and shower transfer.  Mod A  toileting.  Mod A LBD.  Mild cognitive impairment.  Cont bowel and bladder.  BP  running high, increased Amlodipine.    TEAM CONF - MAY 25 - NO CALL BACK FROM [facility] REGARDING FOLLOW CT OF HEAD. LEFT FEMUR FRACTURE EXPECTED POST-OP FINDINGS, RESULTS TO [facility]. BED SBA. TRANSFERS CTG MIN. 4 STAIRS CTG MIN WITH 2 RAILINGS. GAIT 320 FEET CTG RW. TOILET TRANSFERS SBA RW. SHOWER TRANSFERS CTG SBA. BATH CTG SBA. LBD MIN CTG. UBD SET UP. TOILETING CTG SBA. WORKING ON WORKING MEMORY. CONTINENT BOWEL AND BLADDER. INCISION LEFT HIP HEALING.  [facility] - Thursday June 1       Artemio Price DO      During rounds, used appropriate personal protective equipment including mask and gloves.  Additional gown if indicated.  Mask used was standard procedure mask. Appropriate PPE was worn during the entire visit.  Hand hygiene was completed before and after.

## 2023-05-26 NOTE — PLAN OF CARE
Problem: Skin Injury Risk Increased  Goal: Skin Health and Integrity  Outcome: Ongoing, Progressing  Intervention: Promote and Optimize Oral Intake  Recent Flowsheet Documentation  Taken 5/25/2023 2148 by Kamaljit Elias RN  Oral Nutrition Promotion: physical activity promoted  Intervention: Optimize Skin Protection  Recent Flowsheet Documentation  Taken 5/25/2023 2148 by Kamaljit Elias RN  Pressure Reduction Techniques:   frequent weight shift encouraged   weight shift assistance provided  Head of Bed (HOB) Positioning: HOB lowered  Pressure Reduction Devices: positioning supports utilized  Skin Protection: adhesive use limited     Problem: Fall Injury Risk  Goal: Absence of Fall and Fall-Related Injury  Outcome: Ongoing, Progressing  Intervention: Identify and Manage Contributors  Recent Flowsheet Documentation  Taken 5/25/2023 2148 by Kamaljit Elias RN  Medication Review/Management: medications reviewed  Self-Care Promotion: independence encouraged  Intervention: Promote Injury-Free Environment  Recent Flowsheet Documentation  Taken 5/25/2023 2148 by Kamaljit Elias RN  Safety Promotion/Fall Prevention:   activity supervised   clutter free environment maintained   fall prevention program maintained   nonskid shoes/slippers when out of bed   room organization consistent   safety round/check completed     Problem: Rehabilitation (IRF) Plan of Care  Goal: Plan of Care Review  Outcome: Ongoing, Progressing  Flowsheets (Taken 5/26/2023 0216)  Plan of Care Reviewed With: patient  Goal: Patient-Specific Goal (Individualized)  Outcome: Ongoing, Progressing  Goal: Absence of New-Onset Illness or Injury  Outcome: Ongoing, Progressing  Intervention: Prevent Fall and Fall Injury  Recent Flowsheet Documentation  Taken 5/25/2023 2148 by Kamaljit Elias RN  Safety Promotion/Fall Prevention:   activity supervised   clutter free environment maintained   fall prevention program maintained   nonskid shoes/slippers  when out of bed   room organization consistent   safety round/check completed  Intervention: Prevent Infection  Recent Flowsheet Documentation  Taken 5/25/2023 2148 by Kamaljit Elias RN  Infection Prevention:   environmental surveillance performed   hand hygiene promoted   single patient room provided  Goal: Optimal Comfort and Wellbeing  Outcome: Ongoing, Progressing  Goal: Home and Community Transition Plan Established  Outcome: Ongoing, Progressing     Problem: Hypertension Comorbidity  Goal: Blood Pressure in Desired Range  Outcome: Ongoing, Progressing  Intervention: Maintain Blood Pressure Management  Recent Flowsheet Documentation  Taken 5/25/2023 2148 by Kamaljit Elias RN  Medication Review/Management: medications reviewed   Goal Outcome Evaluation:  Plan of Care Reviewed With: patient        Progress: improving  Outcome Evaluation: left hip surgical incision appears with dressing in place, pt with forgetifullness, safety education completed, NAD noted

## 2023-05-26 NOTE — THERAPY TREATMENT NOTE
Inpatient Rehabilitation - Physical Therapy Treatment Note       [facility]     Patient Name: Radha Azevedo  : 1935  MRN: [id number]    Today's Date: 2023                    Admit Date: 2023      Visit Dx:     ICD-10-CM ICD-9-CM   1. Impaired gait and mobility  R26.89 781.2       Patient Active Problem List   Diagnosis   • Absolute anemia   • Benign essential HTN   • Benign localized hyperplasia of prostate without urinary obstruction   • Type 2 diabetes mellitus with peripheral angiopathy   • Hypertension   • Hyperlipidemia   • Type 2 diabetes mellitus   • Diabetes mellitus type 2, noninsulin dependent   • Avitaminosis D   • Dyslipidemia   • Decrease in the ability to hear   • Personal history of colonic polyps   • Subdural hematoma       Past Medical History:   Diagnosis Date   • Colon polyps     Followed by Dr. Leo Jaeger at [facility].   • Coronary artery disease    • Diabetes mellitus    • Hyperlipidemia    • Hypertension        Past Surgical History:   Procedure Laterality Date   • CARDIAC CATHETERIZATION     • CAROTID STENT      patient denies, but family states he did have a stent placed   • COLONOSCOPY N/A 2014    Dr. Leo Jaeger at [facility].   • COLONOSCOPY N/A 2016    Dr. Leo Jaeger at [facility].   • COLONOSCOPY N/A 03/15/2023    2 TUBULAR ADENOMA POLYPS IN SIGMOID, BARIUM ENEMA ORDERED, DR. LEO JAEGER AT [facility]   • CORONARY ANGIOPLASTY     • FRACTURE SURGERY Left 2023    IM nailing for L femur fracture       PT ASSESSMENT (last 12 hours)     IRF PT Evaluation and Treatment     Row Name 23 0752          PT Time and Intention    Document Type daily treatment  -DP     Mode of Treatment individual therapy;physical therapy  -DP     Patient/Family/Caregiver Comments/Observations Pt standing with nsg upon PT arrival  -DP     Row Name 23 0752          General Information    Patient Profile Reviewed yes  -DP     Existing Precautions/Restrictions fall;weight bearing   -DP     Row Name 05/26/23 0752          Pain Assessment    Pretreatment Pain Rating 3/10  -DP     Posttreatment Pain Rating 3/10  -DP     Pain Location - Side/Orientation Left  -DP     Pre/Posttreatment Pain Comment femur  -DP     Row Name 05/26/23 0752          Cognition/Psychosocial    Affect/Mental Status (Cognition) WFL  -DP     Orientation Status (Cognition) oriented x 3  -DP     Follows Commands (Cognition) follows two-step commands;repetition of directions required  -DP     Personal Safety Interventions safety round/check completed;elopement precautions initiated;fall prevention program maintained;gait belt;muscle strengthening facilitated;nonskid shoes/slippers when out of bed;supervised activity  -DP     Row Name 05/26/23 0752          Mobility    Advanced Gait Activity rough/uneven surfaces  -DP     Row Name 05/26/23 0752          Sit-Stand Transfer    Sit-Stand [place] (Transfers) standby assist;contact guard  -DP     Assistive Device (Sit-Stand Transfers) walker, front-wheeled  -DP     Comment, (Sit-Stand Transfer) poor carry over as pt reaches for w/c before turning and sits too early  -DP     Row Name 05/26/23 0752          Stand-Sit Transfer    Stand-Sit [place] (Transfers) standby assist;contact guard  -DP     Assistive Device (Stand-Sit Transfers) walker, front-wheeled  -DP     Row Name 05/26/23 0752          Gait/Stairs (Locomotion)    [place] Level (Gait) contact guard;verbal cues  -DP     Assistive Device (Gait) walker, front-wheeled;walker, 4-wheeled  -DP     Distance in Feet (Gait) 160'x2  -DP     Pattern (Gait) step-through  -DP     Deviations/Abnormal Patterns (Gait) bilateral deviations;base of support, narrow;gait speed decreased;stride length decreased;weight shifting decreased  -DP     Bilateral Gait Deviations forward flexed posture;heel strike decreased  -DP     Left Sided Gait Deviations weight shift ability decreased  -DP     [place] Level (Stairs) contact  guard;verbal cues  -DP     Handrail Location (Stairs) both sides;left side (ascending);right side (descending)  -DP     Number of Steps (Stairs) 4x3  -DP     Ascending Technique (Stairs) step-to-step;step-over-step  -DP     Descending Technique (Stairs) step-to-step  -DP     Stairs, Safety Issues balance decreased during turns;sequencing ability decreased;loses balance backward  -DP     Stairs, Impairments impaired balance;strength decreased;pain  -DP     Negotiation (Ramp) ramp independence;ramp assistive device  -DP     [place] Level (Ramp) minimum assist (75% patient effort)  even with max VC pt still let front wheels come off of peak of ramp when turning  -DP     Assistive Device (Ramp) walker, 4-wheeled  -DP     Comment, (Gait/Stairs) with stairs pt does lean back when descending. trialed 1HR only today and pt was Silver  -DP     Row Name 05/26/23 0752          Safety Issues, Functional Mobility    Impairments Affecting Function (Mobility) balance;pain;visual/perceptual  -DP     Row Name 05/26/23 0752          Curb Negotiation (Mobility)    [place], Curb Negotiation minimal assist, 75% or more patient effort;verbal cues  -DP     Assistive Device (Curb Negotiation) walker, 4-wheeled  -DP     Comment, Curb Negotiation (Mobility) pt required VC constantly for sequencing/brakes. Silver to get 4WW onto curb  -DP     Row Name 05/26/23 0752          Rough/Uneven Surface Gait Skills (Mobility)    [place], Gait on Rough/Uneven Surface (Mobility) minimal assist, 75% or more patient effort;verbal cues  -DP     Assistive Device (Rough/Uneven Surface Gait) walker, 4-wheeled  -DP     Distance in Feet (Rough/Uneven Surface Gait) 10  -DP     Comment, Gait Rough/Uneven Surface (Mobility) VC for safety  -DP     Row Name 05/26/23 0752          Balance    Comment, Balance Pt stood at sink to perform oral hygiene and was SBA for 4 mins. Pt stood on foam beam for 30 seconds, then on gray sujata discs with many attempts but  on final attempt pt maintain 15 seconds before losing balance withj Silver for recovery, then MIP for 15x each with no UE and CGA/Silver. Standing on foam pad with dowel zhao in hand and hitting beach ball thrown for pt to hit back 15x using CGA most of the time but did have 1 posterior LB requiring Silver; pt did require modA to step off of foam mat  -DP     Row Name 05/26/23 0752          Advanced Stepping/Walking Interventions    Backward Walking (Stepping/Walking Interventions) 15 feet with no UE but very small step length and width  -DP     Row Name 05/26/23 0752          Positioning and Restraints    Pre-Treatment Position standing in room  -DP     Post Treatment Position wheelchair  -DP     In Wheelchair sitting;call light within reach;encouraged to call for assist;exit alarm on  -DP           User Key  (r) = Recorded By, (t) = Taken By, (c) = Cosigned By    Initials Name Provider Type    George Recio, JACK Physical Therapist              Wound 05/16/23 1831 Left lateral hip Incision (Active)   Dressing Appearance dressing loose 05/26/23 0735   Closure Staples 05/26/23 0735   Base dry;scab;clean 05/26/23 0735   Periwound dry;intact;swelling 05/26/23 0735   Periwound Temperature warm 05/26/23 0735   Periwound Skin Turgor soft 05/26/23 0735   Drainage Amount none 05/26/23 0735   Care, Wound cleansed with;sterile normal saline 05/26/23 0735   Dressing Care dressing changed 05/26/23 0735     Physical Therapy Education     Title: PT OT SLP Therapies (In Progress)     Topic: Physical Therapy (In Progress)     Point: Mobility training (In Progress)     Learning Progress Summary           Patient Acceptance, E,D, NR by DP at 5/26/2023 0855    Comment: transfers and hand palcemenmt    Acceptance, E, VU by WN at 5/26/2023 0215    Acceptance, E,D, NR by DP at 5/25/2023 1451    Eager, E,D, NR by KP at 5/24/2023 1506    Acceptance, E,D, VU,DU by DP at 5/23/2023 1110    Acceptance, E,D, VU,DU by DP at 5/22/2023 1148     Acceptance, E, VU by WN at 5/20/2023 2214    Acceptance, E, NR by LH at 5/20/2023 0927    Acceptance, E, VU by WN at 5/19/2023 2232    Acceptance, E,TB, VU,NR by MAURILIO at 5/18/2023 1206    Acceptance, E, VU by KN at 5/17/2023 1524    Acceptance, E,TB, VU,NR by MAURILIO at 5/17/2023 1209                   Point: Home exercise program (In Progress)     Learning Progress Summary           Patient Acceptance, E,D, NR by DP at 5/26/2023 0855    Comment: transfers and hand palcemenmt    Acceptance, E, VU by WN at 5/26/2023 0215    Acceptance, E,D, NR by DP at 5/25/2023 1451    Eager, E,D, NR by KP at 5/24/2023 1506    Acceptance, E,D, VU,DU by DP at 5/23/2023 1110    Acceptance, E,D, VU,DU by DP at 5/22/2023 1148    Acceptance, E, VU by WN at 5/20/2023 2214    Acceptance, E, NR by LH at 5/20/2023 0927    Acceptance, E, VU by WN at 5/19/2023 2232    Acceptance, E, VU by DARVIN at 5/17/2023 1524                   Point: Body mechanics (In Progress)     Learning Progress Summary           Patient Acceptance, E,D, NR by DP at 5/26/2023 0855    Comment: transfers and hand palcemenmt    Acceptance, E, VU by WN at 5/26/2023 0215    Acceptance, E,D, NR by DP at 5/25/2023 1451    Eager, E,D, NR by KP at 5/24/2023 1506    Acceptance, E,D, VU,DU by DP at 5/23/2023 1110    Acceptance, E,D, VU,DU by DP at 5/22/2023 1148    Acceptance, E, VU by WN at 5/20/2023 2214    Acceptance, E, NR by LH at 5/20/2023 0927    Acceptance, E, VU by WN at 5/19/2023 2232    Acceptance, E, VU by DARVIN at 5/17/2023 1524                   Point: Precautions (In Progress)     Learning Progress Summary           Patient Acceptance, E,D, NR by DP at 5/26/2023 0855    Comment: transfers and hand palcemenmt    Acceptance, E, VU by WN at 5/26/2023 0215    Acceptance, E,D, NR by DP at 5/25/2023 1451    Eager, E,D, NR by KP at 5/24/2023 1506    Acceptance, E,D, VU,DU by DP at 5/23/2023 1110    Acceptance, E,D, VU,DU by DP at 5/22/2023 1148    Acceptance, E, VU by WN at 5/20/2023  2214    Acceptance, E, NR by  at 5/20/2023 0927    Acceptance, E, VU by WN at 5/19/2023 2232    Acceptance, E, VU by KN at 5/17/2023 1524                               User Key     Initials Effective Dates Name Provider Type Discipline    MAURILIO 06/16/21 -  Ashlie Arnold, PT Physical Therapist PT     06/16/21 -  Aicha Payne, PT Physical Therapist PT     06/16/21 -  Keena London, PT Physical Therapist PT    WN 08/23/22 -  Kamaljit Elias, RN Registered Nurse Nurse    DP 08/24/21 -  George Lehman, PT Physical Therapist PT    KN 08/02/22 -  Main De La Garza OT Occupational Therapist OT                PT Recommendation and Plan                          Time Calculation:      PT Charges     Row Name 05/26/23 0856             Time Calculation    Start Time 0800  -DP      Stop Time 0900  -DP      Time Calculation (min) 60 min  -DP      PT Received On 05/26/23  -DP      PT - Next Appointment 05/27/23  -DP         Time Calculation- PT    Total Timed Code Minutes- PT 60 minute(s)  -DP            User Key  (r) = Recorded By, (t) = Taken By, (c) = Cosigned By    Initials Name Provider Type    DP George Lehman, PT Physical Therapist                Therapy Charges for Today     Code Description Service Date Service Provider Modifiers Qty    36521712206 HC PT NEUROMUSC RE EDUCATION EA 15 MIN 5/25/2023 FallonStephen holcombn, PT GP 2    52927745178 HC PT THERAPEUTIC ACT EA 15 MIN 5/25/2023 FallonStephen holcombn, PT GP 2    14902341846 HC PT THERAPEUTIC ACT EA 15 MIN 5/26/2023 Fallon, George, PT GP 2    09627704221 HC PT NEUROMUSC RE EDUCATION EA 15 MIN 5/26/2023 FallonStephen holcombn, PT GP 2    32154927549 HC PT THER SUPP EA 15 MIN 5/26/2023 AfllonStephen holcombn, PT GP 1                   George Beaulieuuett, PT  5/26/2023

## 2023-05-26 NOTE — PLAN OF CARE
Goal Outcome Evaluation:  Plan of Care Reviewed With: patient        Progress: improving  Outcome Evaluation: Pt is A&OX4, calm and cooperative. Meds whole with water. Con of B&B. CGA to wc. Incision to L hip, dsg change, waterproof dsg applied. Small laceration to posterior head, MIQUEL, scabbed. 1500CC fluid restriction. PRN pain meds given for pain in L hip. No safety concerns noted. Planned DC for next Thrusday 6/01.

## 2023-05-26 NOTE — THERAPY TREATMENT NOTE
Inpatient Rehabilitation - Speech Language Pathology Treatment Note    [facility]     Patient Name: Radha Azevedo  : 1935  MRN: [id number]    Today's Date: 2023                   Admit Date: 2023       Visit Dx:      ICD-10-CM ICD-9-CM   1. Impaired gait and mobility  R26.89 781.2       Patient Active Problem List   Diagnosis   • Absolute anemia   • Benign essential HTN   • Benign localized hyperplasia of prostate without urinary obstruction   • Type 2 diabetes mellitus with peripheral angiopathy   • Hypertension   • Hyperlipidemia   • Type 2 diabetes mellitus   • Diabetes mellitus type 2, noninsulin dependent   • Avitaminosis D   • Dyslipidemia   • Decrease in the ability to hear   • Personal history of colonic polyps   • Subdural hematoma       Past Medical History:   Diagnosis Date   • Colon polyps     [facility]   • Coronary artery disease    • Diabetes mellitus    • Hyperlipidemia    • Hypertension        Past Surgical History:   Procedure Laterality Date   • CARDIAC CATHETERIZATION     • CAROTID STENT      patient denies, but family states he did have a stent placed   • COLONOSCOPY N/A 2014    Dr. Leo [facility].   • COLONOSCOPY N/A 2016    Dr. Leo Jaeger [facility].   • COLONOSCOPY N/A 03/15/2023    2 TUBULAR ADENOMA POLYPS IN SIGMOID, BARIUM ENEMA ORDERED, DR. LEO JAEGER AT [facility]   • CORONARY ANGIOPLASTY     • FRACTURE SURGERY Left 2023    IM nailing for L femur fracture       SLP Recommendation and Plan                                                            SLP EVALUATION (last 72 hours)     SLP SLC Evaluation     Row Name 23 0930 23 1300 23 0930 23 1300 23 0930       Communication Assessment/Intervention    Document Type therapy note (daily note)  -SR therapy note (daily note)  -SR therapy note (daily note)  -SR therapy note (daily note)  -SR therapy note (daily note)  -SR    Subjective  Information no complaints  -SR no complaints  -SR no complaints  -SR no complaints  -SR no complaints  -SR    Patient Observations alert;cooperative;agree to therapy  -SR alert;cooperative;agree to therapy  -SR alert;agree to therapy;cooperative  -SR alert;cooperative;agree to therapy  -SR alert;cooperative;agree to therapy  -SR    Patient Effort good  -SR good  -SR good  -SR good  -SR good  -SR    Symptoms Noted During/After Treatment none  -SR none  -SR none  -SR none  -SR none  -SR       Pain Scale: Numbers Pre/Post-Treatment    Pretreatment Pain Rating 3/10  -SR 4/10  -SR 5/10  -SR 6/10  -SR 0/10 - no pain  -SR    Posttreatment Pain Rating 3/10  -SR 4/10  -SR 5/10  -SR 6/10  -SR 0/10 - no pain  -SR    Pain Location - Side/Orientation Left  -SR -- Left  -SR Left  -SR --    Pain Location -- -- -- proximal  -SR --    Pain Location - -- -- shoulder  -SR hip  -SR --    Pre/Posttreatment Pain Comment femur  -SR -- -- -- --    Row Name 05/23/23 [facility]                   Communication Assessment/Intervention    Document Type therapy note (daily note)  -SR        Subjective Information no complaints  -SR        Patient Observations alert;cooperative;agree to therapy  -SR        Patient Effort good  -SR        Symptoms Noted During/After Treatment none  -SR           Pain Scale: Numbers Pre/Post-Treatment    Pretreatment Pain Rating 0/10 - no pain  -SR        Posttreatment Pain Rating 0/10 - no pain  -SR              User Key  (r) = Recorded By, (t) = Taken By, (c) = Cosigned By    Initials Name Effective Dates    SR Hope Mary CCC-SLP 11/10/22 -                    EDUCATION    The patient has been educated in the following areas:       Cognitive Impairment.             SLP GOALS     Row Name 05/26/23 0930 05/25/23 1300 05/25/23 0930       Attention Goal 1 (SLP)    Improve Attention by Goal 1 (SLP) 80%;complete selective attention task;complete sustained attention task;with minimal cues (75-90%)  -SR -- 80%;complete  selective attention task;complete sustained attention task;with minimal cues (75-90%)  -SR    Time Frame (Attention Goal 1, SLP) by discharge  -SR -- by discharge  -SR    Progress/Outcomes (Attention Goal 1, SLP) goal ongoing  -SR -- goal ongoing  -SR    Comment (Attention Goal 1, SLP) Monthly calendar; Patient organized events on a monthly calendar given date/time/event with 70% acc given NO cues; 100% acc given MIN cues.  -SR -- --       Memory Skills Goal 1 (SLP)    Improve Memory Skills Through Goal 1 (SLP) use written schedule;use memory strategies;recall details of the day;80%;with minimal cues (75-90%)  -SR use written schedule;use memory strategies;recall details of the day;80%;with minimal cues (75-90%)  -SR --    Time Frame (Memory Skills Goal 1, SLP) by discharge  -SR by discharge  -SR --    Progress/Outcomes (Memory Skills Goal 1, SLP) goal ongoing  -SR goal ongoing  -SR --    Comment (Memory Skills Goal 1, SLP) -- Patient participated in immediate recall task during PM therapy session. Patient listened to voicemail message and answered inference-based questions regarding the content of the message with 60% acc given MIN cues; 80% acc given MOD cues. Accuracy increased with repetition of message.  -SR --       Reasoning Goal 1 (SLP)    Improve Reasoning Through Goal 1 (SLP) complete deductive reasoning task;complete mental flexibility task;80%;independently (over 90% accuracy)  -SR complete deductive reasoning task;complete mental flexibility task;80%;independently (over 90% accuracy)  -SR --    Time Frame (Reasoning Goal 1, SLP) by discharge  -SR by discharge  -SR --    Progress/Outcomes (Reasoning Goal 1, SLP) goal ongoing  -SR goal ongoing  -SR --    Comment (Reasoning Goal 1, SLP) -- Restaurant menu; patient answered subtraction/addition based scenerios regarding prices on a restaurant menu with 40% acc given MOD cues. Extended time provided to complete task.  -SR --       Executive Functional Skills  Goal 1 (SLP)    Improve Executive Function Skills Goal 1 (SLP) home management activity;time management activity;80%;independently (over 90% accuracy)  -SR -- home management activity;time management activity;80%;independently (over 90% accuracy)  -SR    Time Frame (Executive Function Skills Goal 1, SLP) by discharge  -SR -- by discharge  -SR    Progress/Outcomes (Executive Function Skills Goal 1, SLP) goal ongoing  -SR -- goal ongoing  -SR    Comment (Executive Function Skills Goal 1, SLP) Medication management; Given written directions, patient sorted x10 mock medications by day/time with 60% acc given MIN cues; 70% acc given MOD cues; 90% acc given MAX cues. Verbal cues required for attention to detail. Patient utilized pill organizer with AM and PM sections x7 days/week. Reviewed self-monitoring/self-correction strategies. Patient crossed off completed items to track progress given MIN verbal cues. Extended time provided to complete task.  -SR -- Patient read analog clock and label the correct time with 80% acc given NO cues. Verbal cue x2 for attn to detail. During a following activity, patient used a visual street map to answer direction/location-based questions with 40% acc given MIN cuesl 80% acc given MOD cues. Extended time provided to complete task.  -SR    Row Name 05/24/23 1300 05/24/23 0900 05/23/23 1300       Attention Goal 1 (SLP)    Improve Attention by Goal 1 (SLP) 80%;complete selective attention task;complete sustained attention task;with minimal cues (75-90%)  -SR 80%;complete selective attention task;complete sustained attention task;with minimal cues (75-90%)  -SR --    Time Frame (Attention Goal 1, SLP) by discharge  -SR by discharge  -SR --    Progress/Outcomes (Attention Goal 1, SLP) goal ongoing  -SR goal ongoing  -SR --       Reasoning Goal 1 (SLP)    Improve Reasoning Through Goal 1 (SLP) -- -- complete deductive reasoning task;complete mental flexibility task;80%;independently (over 90%  accuracy)  -SR    Time Frame (Reasoning Goal 1, SLP) -- -- by discharge  -SR    Progress/Outcomes (Reasoning Goal 1, SLP) -- -- goal ongoing  -SR    Comment (Reasoning Goal 1, SLP) -- -- Calendar deduction; patient followed directions and used process of elimination to determine the final date on the calendar with 60% acc given MOD cues; 80% acc given MAX cues.  -SR       Executive Functional Skills Goal 1 (SLP)    Improve Executive Function Skills Goal 1 (SLP) home management activity;time management activity;80%;independently (over 90% accuracy)  -SR home management activity;time management activity;80%;independently (over 90% accuracy)  -SR home management activity;time management activity;80%;independently (over 90% accuracy)  -SR    Time Frame (Executive Function Skills Goal 1, SLP) by discharge  -SR by discharge  -SR by discharge  -SR    Progress/Outcomes (Executive Function Skills Goal 1, SLP) goal ongoing  -SR goal ongoing  -SR goal ongoing  -SR    Comment (Executive Function Skills Goal 1, SLP) Money management task completed with checkbook balance activity. Given transaction/deposit amounts, patient calculated final balance with 30% acc given MIN cues; 80% acc given MOD cues. No calculator assist. Verbal cues provided for attn to detail. Increased difficulty noted with working memory and organization during task. Extended time provided to complete activity.  -SR Medication management task completed during AM therapy session. Patient followed written directions and sorted mock medications by date/time with 40% acc given MIN cues; 80% acc given MOD cues. Verbal cues provided for attention to detail. Patient added too many tablets to a section for x2 medications. Recognized mistake following verbal cue. Demonstrated increased difficulty with working memory, attn, and organization. Spoke with son following session. Agreeable to recommendation for supervision of finances/medications following discharge.  -SR  Restaurant menu prices; patient answered functional addition/subtraction based questions with 40% acc given MIN cues; 80% acc given MOD cues. Verbal/visual cues provided for locating target item/price. Extended time provided to complete task. No calculator assist.  -SR          User Key  (r) = Recorded By, (t) = Taken By, (c) = Cosigned By    Initials Name Provider Type    Hope West CCC-SLP Speech and Language Pathologist                            Time Calculation:        Time Calculation- SLP     Row Name 05/26/23 1107             Time Calculation- SLP    SLP Start Time 0930  -      SLP Stop Time 1030  -      SLP Time Calculation (min) 60 min  -SR            User Key  (r) = Recorded By, (t) = Taken By, (c) = Cosigned By    Initials Name Provider Type    Hope West CCC-SLP Speech and Language Pathologist                  Therapy Charges for Today     Code Description Service Date Service Provider Modifiers Qty    85516110832 HC ST DEV OF COGN SKILLS INITIAL 15 MIN 5/25/2023 Hope Mary CCC-SLP  1    42979661402 HC ST DEV OF COGN SKILLS EACH ADDT'L 15 MIN 5/25/2023 Hope Mary CCC-SLP  3    [id number] HC ST DEV OF COGN SKILLS INITIAL 15 MIN 5/26/2023 Hope Mary CCC-SLP  1    94894313575 HC ST DEV OF COGN SKILLS EACH ADDT'L 15 MIN 5/26/2023 Hope Mary CCC-SLP  3                           KATIE Victoria  5/26/2023

## 2023-05-26 NOTE — THERAPY TREATMENT NOTE
Inpatient Rehabilitation - Occupational Therapy Treatment Note    [facility]     Patient Name: Radha Azevedo  : 1935  MRN: [id number]    Today's Date: 2023                 Admit Date: 2023         ICD-10-CM ICD-9-CM   1. Impaired gait and mobility  R26.89 781.2       Patient Active Problem List   Diagnosis   • Absolute anemia   • Benign essential HTN   • Benign localized hyperplasia of prostate without urinary obstruction   • Type 2 diabetes mellitus with peripheral angiopathy   • Hypertension   • Hyperlipidemia   • Type 2 diabetes mellitus   • Diabetes mellitus type 2, noninsulin dependent   • Avitaminosis D   • Dyslipidemia   • Decrease in the ability to hear   • Personal history of colonic polyps   • Subdural hematoma       Past Medical History:   Diagnosis Date   • Colon polyps     Followed by Dr. Leo Jaeger at [facility].   • Coronary artery disease    • Diabetes mellitus    • Hyperlipidemia    • Hypertension        Past Surgical History:   Procedure Laterality Date   • CARDIAC CATHETERIZATION     • CAROTID STENT      patient denies, but family states he did have a stent placed   • COLONOSCOPY N/A 2014    Dr. Leo Jaeger at [facility].   • COLONOSCOPY N/A 2016    Dr. Leo Jaeger at [facility].   • COLONOSCOPY N/A 03/15/2023    2 TUBULAR ADENOMA POLYPS IN SIGMOID, BARIUM ENEMA ORDERED, DR. LEO JAEGER AT [facility]   • CORONARY ANGIOPLASTY     • FRACTURE SURGERY Left 2023    IM nailing for L femur fracture             IRF OT ASSESSMENT FLOWSHEET (last 12 hours)     IRF OT Evaluation and Treatment     Row Name 23 1509          OT Time and Intention    Document Type daily treatment  -AF     Mode of Treatment occupational therapy  -AF     Patient Effort good  -AF     Row Name 23 1509          General Information    Patient/Family/Caregiver Comments/Observations pt sitting up in room in AM and PM sessions  -AF     Existing Precautions/Restrictions fall;weight bearing  -AF      Row Name 05/26/23 1509          Cognition/Psychosocial    Affect/Mental Status (Cognition) WFL  -AF     Orientation Status (Cognition) oriented x 3  -AF     Follows Commands (Cognition) follows two-step commands;repetition of directions required  -AF     Personal Safety Interventions fall prevention program maintained;gait belt;nonskid shoes/slippers when out of bed  -AF     Row Name 05/26/23 1509          Bathing    [place] Level (Bathing) bathing skills;lower body;upper body;standby assist  -AF     Assistive Device (Bathing) grab bar/tub rail;hand held shower spray hose;tub bench  -AF     Position (Bathing) supported sitting;supported standing  -AF     Row Name 05/26/23 1509          Upper Body Dressing    [place] Level (Upper Body Dressing) upper body dressing skills;set up assistance  -AF     Position (Upper Body Dressing) supported sitting  -AF     Set-up Assistance (Upper Body Dressing) obtain clothing  -AF     Row Name 05/26/23 1509          Lower Body Dressing    [place] Level (Lower Body Dressing) doff;pants/bottoms;don;shoes/slippers;socks;underwear;standby assist  -AF     Position (Lower Body Dressing) supported sitting;supported standing  -AF     Comment (Lower Body Dressing) able to don L sock and shoe without physcial assistance  -AF     Row Name 05/26/23 1509          Grooming    [place] Level (Grooming) grooming skills;standby assist;contact guard assist  -AF     Position (Grooming) supported standing  -AF     Comment (Grooming) RWX level in AM and PM sessions  -AF     Row Name 05/26/23 1509          Functional Mobility    Functional Mobility- Comment pt walked to and from therapy gym with RWX CGA/SBA with verbal cues for directions  -AF     Row Name 05/26/23 1509          Sit-Stand Transfer    Sit-Stand [place] (Transfers) contact guard;standby assist  -AF     Assistive Device (Sit-Stand Transfers) walker, front-wheeled  -AF     Row Name 05/26/23 1509          Stand-Sit  Transfer    Stand-Sit [place] (Transfers) standby assist;contact guard  -AF     Assistive Device (Stand-Sit Transfers) walker, front-wheeled  -AF     Row Name 05/26/23 1509          Shower Transfer    Type (Shower Transfer) sit-stand;stand-sit  -AF     [place] Level (Shower Transfer) stand by assist;contact guard  -AF     Assistive Device (Shower Transfer) grab bar, tub/shower;tub bench;walker, front-wheeled  -AF     Row Name 05/26/23 1509          Shoulder (Therapeutic Exercise)    Shoulder AAROM (Therapeutic Exercise) bilateral;table slides, flexion;table slides, aBduction;sitting;5 repetitions  x 2 sets  -AF     Shoulder Strengthening (Therapeutic Exercise) bilateral;scapular stabilization;sitting;resistance band;yellow;5 repetitions;2 sets  -AF     Row Name 05/26/23 1509          Elbow/Forearm (Therapeutic Exercise)    Elbow/Forearm Strengthening (Therapeutic Exercise) bilateral;flexion;extension;supination;pronation;sitting;2 lb free weight;10 repetitions;2 sets  -AF     Row Name 05/26/23 1509          Wrist (Therapeutic Exercise)    Wrist Strengthening (Therapeutic Exercise) bilateral;flexion;extension;2 lb free weight;10 repetitions;2 sets  -AF     Row Name 05/26/23 1509          Hand (Therapeutic Exercise)    Hand Strengthening (Therapeutic Exercise) bilateral; strengthening;hand gripper;10 repetitions;2 sets  -AF     Row Name 05/26/23 1509          Balance    Static Sitting Balance independent  -AF     Dynamic Sitting Balance supervision  -AF     Dynamic Standing Balance contact guard;standby assist  -AF     Row Name 05/26/23 1509          Positioning and Restraints    Pre-Treatment Position sitting in chair/recliner  -AF     Post Treatment Position chair  -AF     In Chair sitting;call light within reach;encouraged to call for assist;exit alarm on  in PM  -AF     In Wheelchair sitting;exit alarm on;with SLP  in AM  -AF           User Key  (r) = Recorded By, (t) = Taken By, (c) = Cosigned By     Initials Name Effective Dates    AF Bernarda Ricketts, OTR 06/16/21 -                  Occupational Therapy Education     Title: PT OT SLP Therapies (In Progress)     Topic: Occupational Therapy (Done)     Point: ADL training (Done)     Description:   Instruct learner(s) on proper safety adaptation and remediation techniques during self care or transfers.   Instruct in proper use of assistive devices.              Learning Progress Summary           Patient Acceptance, E, VU by WN at 5/26/2023 0215    Eager, E,D, NR by BHUPENDRA at 5/24/2023 1506    Acceptance, E, VU by WN at 5/20/2023 2214    Acceptance, E, VU by WN at 5/19/2023 2232    Acceptance, E, VU by KN at 5/17/2023 1524                   Point: Home exercise program (Done)     Description:   Instruct learner(s) on appropriate technique for monitoring, assisting and/or progressing therapeutic exercises/activities.              Learning Progress Summary           Patient Acceptance, E, VU by WN at 5/26/2023 0215    Acceptance, E, VU by MARYAM at 5/25/2023 1510    Comment: educated on and discussed exs for home    Eager, E,D, NR by BHUPENDRA at 5/24/2023 1506    Acceptance, E, VU by WN at 5/20/2023 2214    Acceptance, E, VU by WN at 5/19/2023 2232    Acceptance, E, VU by KN at 5/17/2023 1524   Family Acceptance, E, VU by AF at 5/25/2023 1510    Comment: educated on and discussed exs for home                   Point: Precautions (Done)     Description:   Instruct learner(s) on prescribed precautions during self-care and functional transfers.              Learning Progress Summary           Patient Acceptance, E, VU by WN at 5/26/2023 0215    Acceptance, E, VU by MARYAM at 5/25/2023 1510    Comment: educated on and discussed exs for home    Eager, E,D, NR by BHUPENDRA at 5/24/2023 1506    Acceptance, E, VU by WN at 5/20/2023 2214    Acceptance, E, VU by WN at 5/19/2023 2232    Acceptance, E, VU by KN at 5/17/2023 1524   Family Acceptance, E, VU by AF at 5/25/2023 1510    Comment: educated on  and discussed exs for home                   Point: Body mechanics (Done)     Description:   Instruct learner(s) on proper positioning and spine alignment during self-care, functional mobility activities and/or exercises.              Learning Progress Summary           Patient Acceptance, E, VU by WN at 5/26/2023 0215    Eager, MILAGRO,D, NR by  at 5/24/2023 1506    Acceptance, E, VU by WN at 5/20/2023 2214    Acceptance, E, VU by WN at 5/19/2023 2232    Acceptance, E, VU by KN at 5/17/2023 1524                               User Key     Initials Effective Dates Name Provider Type Discipline    AF 06/16/21 -  Bernarda Ricketts, OTR Occupational Therapist OT     06/16/21 -  Keena London PT Physical Therapist PT    WN 08/23/22 -  Kamaljit Elias RN Registered Nurse Nurse     08/02/22 -  Main De La Garza OT Occupational Therapist OT                    OT Recommendation and Plan                         Time Calculation:      Time Calculation- OT     Row Name 05/26/23 1517 05/26/23 1515          Time Calculation- OT    OT Start Time 1400  -AF 0900  -AF     OT Stop Time 1430  -AF 0930  -AF     OT Time Calculation (min) 30 min  -AF 30 min  -AF           User Key  (r) = Recorded By, (t) = Taken By, (c) = Cosigned By    Initials Name Provider Type    AF Bernarda Ricketts, OTR Occupational Therapist              Therapy Charges for Today     Code Description Service Date Service Provider Modifiers Qty    41957094565 HC OT SELF CARE/MGMT/TRAIN EA 15 MIN 5/25/2023 Bernarda Ricketts, OTR GO 1    09761499922 HC OT THER PROC EA 15 MIN 5/25/2023 Bernarda Ricketts, OTR GO 3    68821552642 HC OT SELF CARE/MGMT/TRAIN EA 15 MIN 5/26/2023 Bernarda Ricketts, OTR GO 2    36253278413 HC OT THER PROC EA 15 MIN 5/26/2023 Bernarda Ricketts, OTR GO 1    28641909538 HC OT THERAPEUTIC ACT EA 15 MIN 5/26/2023 Bernarda Ricketts, OTR GO 1                   LUH Burgos  5/26/2023

## 2023-05-27 PROCEDURE — 25010000002 HEPARIN (PORCINE) PER 1000 UNITS: Performed by: PHYSICAL MEDICINE & REHABILITATION

## 2023-05-27 PROCEDURE — 97530 THERAPEUTIC ACTIVITIES: CPT

## 2023-05-27 RX ORDER — BISACODYL 10 MG
10 SUPPOSITORY, RECTAL RECTAL DAILY PRN
Status: DISCONTINUED | OUTPATIENT
Start: 2023-05-27 | End: 2023-06-01 | Stop reason: HOSPADM

## 2023-05-27 RX ADMIN — HEPARIN SODIUM 5000 UNITS: 5000 INJECTION INTRAVENOUS; SUBCUTANEOUS at 20:19

## 2023-05-27 RX ADMIN — DULOXETINE HYDROCHLORIDE 30 MG: 30 CAPSULE, DELAYED RELEASE ORAL at 09:07

## 2023-05-27 RX ADMIN — OXYCODONE HYDROCHLORIDE 5 MG: 5 TABLET ORAL at 20:33

## 2023-05-27 RX ADMIN — AMLODIPINE BESYLATE 10 MG: 10 TABLET ORAL at 09:07

## 2023-05-27 RX ADMIN — DOCUSATE SODIUM 50MG AND SENNOSIDES 8.6MG 2 TABLET: 8.6; 5 TABLET, FILM COATED ORAL at 09:07

## 2023-05-27 RX ADMIN — DICLOFENAC SODIUM 2 G: 10 GEL TOPICAL at 11:34

## 2023-05-27 RX ADMIN — BISACODYL 10 MG: 10 SUPPOSITORY RECTAL at 07:38

## 2023-05-27 RX ADMIN — PANTOPRAZOLE SODIUM 40 MG: 40 TABLET, DELAYED RELEASE ORAL at 09:07

## 2023-05-27 RX ADMIN — LEVOTHYROXINE SODIUM 25 MCG: 0.03 TABLET ORAL at 06:24

## 2023-05-27 RX ADMIN — DICLOFENAC SODIUM 2 G: 10 GEL TOPICAL at 09:07

## 2023-05-27 RX ADMIN — QUETIAPINE FUMARATE 12.5 MG: 25 TABLET ORAL at 20:19

## 2023-05-27 RX ADMIN — DULOXETINE HYDROCHLORIDE 30 MG: 30 CAPSULE, DELAYED RELEASE ORAL at 20:19

## 2023-05-27 RX ADMIN — DOCUSATE SODIUM 50MG AND SENNOSIDES 8.6MG 2 TABLET: 8.6; 5 TABLET, FILM COATED ORAL at 20:19

## 2023-05-27 RX ADMIN — HEPARIN SODIUM 5000 UNITS: 5000 INJECTION INTRAVENOUS; SUBCUTANEOUS at 09:07

## 2023-05-27 NOTE — PLAN OF CARE
Goal Outcome Evaluation:  Plan of Care Reviewed With: patient        Progress: improving  Outcome Evaluation: AAOx4; forgetful. Cooperative with all care. Spend majority of day in recliner; supportive family and friends at bedside throughout afternoon. Transfers assist of 1 with walker, does tend to get himself up before staff reaches room. Reeducated on falls/ using call light. VS stable. Dressing to hip incision with staples; minor scab remains to posterior scalp. No new skin concerns. No c/o pain.

## 2023-05-27 NOTE — THERAPY TREATMENT NOTE
Inpatient Rehabilitation - Physical Therapy Treatment Note       [facility]     Patient Name: Radha Azevedo  : 1935  MRN: [id number]    Today's Date: 2023                    Admit Date: 2023      Visit Dx:     ICD-10-CM ICD-9-CM   1. Impaired gait and mobility  R26.89 781.2       Patient Active Problem List   Diagnosis   • Absolute anemia   • Benign essential HTN   • Benign localized hyperplasia of prostate without urinary obstruction   • Type 2 diabetes mellitus with peripheral angiopathy   • Hypertension   • Hyperlipidemia   • Type 2 diabetes mellitus   • Diabetes mellitus type 2, noninsulin dependent   • Avitaminosis D   • Dyslipidemia   • Decrease in the ability to hear   • Personal history of colonic polyps   • Subdural hematoma       Past Medical History:   Diagnosis Date   • Colon polyps     Followed by Dr. Leo Jaeger at [facility].   • Coronary artery disease    • Diabetes mellitus    • Hyperlipidemia    • Hypertension        Past Surgical History:   Procedure Laterality Date   • CARDIAC CATHETERIZATION     • CAROTID STENT      patient denies, but family states he did have a stent placed   • COLONOSCOPY N/A 2014    Dr. Leo Jaeger at [facility].   • COLONOSCOPY N/A 2016    Dr. Leo Jaeger at [facility].   • COLONOSCOPY N/A 03/15/2023    2 TUBULAR ADENOMA POLYPS IN SIGMOID, BARIUM ENEMA ORDERED, DR. LEO JAEGER AT [facility]   • CORONARY ANGIOPLASTY     • FRACTURE SURGERY Left 2023    IM nailing for L femur fracture       PT ASSESSMENT (last 12 hours)     IRF PT Evaluation and Treatment     Row Name 23 1248          PT Time and Intention    Document Type daily treatment  -EE     Mode of Treatment physical therapy;individual therapy  -EE     Patient/Family/Caregiver Comments/Observations Pt sitting up in WC, agreeable to PT.  -EE     Row Name 23 6298          General Information    General Observations of Patient inattention to the R  -EE     Existing  Precautions/Restrictions fall  -EE     Row Name 05/27/23 1248          Pain Assessment    Pretreatment Pain Rating 0/10 - no pain  -EE     Posttreatment Pain Rating 0/10 - no pain  -EE     Row Name 05/27/23 1248          Cognition/Psychosocial    Affect/Mental Status (Cognition) WFL  -EE     Orientation Status (Cognition) oriented x 3  -EE     Follows Commands (Cognition) follows two-step commands;verbal cues/prompting required  -EE     Personal Safety Interventions fall prevention program maintained;gait belt;muscle strengthening facilitated;nonskid shoes/slippers when out of bed;supervised activity  -EE     Row Name 05/27/23 1248          Sit-Stand Transfer    Sit-Stand [place] (Transfers) contact guard;standby assist  -EE     Assistive Device (Sit-Stand Transfers) walker, front-wheeled  -EE     Comment, (Sit-Stand Transfer) cues for hand placement  -EE     Row Name 05/27/23 1248          Stand-Sit Transfer    Stand-Sit [place] (Transfers) contact guard;standby assist;verbal cues  -EE     Assistive Device (Stand-Sit Transfers) walker, front-wheeled  -EE     Comment, (Stand-Sit Transfer) cues for hand placement  -EE     Row Name 05/27/23 1248          Gait/Stairs (Locomotion)    [place] Level (Gait) contact guard;standby assist;verbal cues  -EE     Assistive Device (Gait) walker, front-wheeled  -EE     Distance in Feet (Gait) 160' x 1  -EE     Pattern (Gait) step-through  -EE     Deviations/Abnormal Patterns (Gait) crystal decreased;stride length decreased  -EE     Bilateral Gait Deviations forward flexed posture;heel strike decreased  -EE     Left Sided Gait Deviations weight shift ability decreased  -EE     Gait Assessment/Intervention Ambulated 160' x 1 min/CGA with no AD, 1 small LOB but able to recover with min A. Cues for upright posture and heel strike.  -EE     [place] Level (Stairs) contact guard;verbal cues  -EE     Handrail Location (Stairs) both sides  -EE     Number of Steps  (Stairs) 12  -EE     Ascending Technique (Stairs) step-over-step  -EE     Descending Technique (Stairs) step-to-step  -EE     Stairs, Safety Issues balance decreased during turns  -EE     Stairs, Impairments impaired balance;pain;strength decreased  -EE     Stairs Assessment/Intervention Cues for foot placement on stairs  -EE     Row Name 05/27/23 1248          Safety Issues, Functional Mobility    Impairments Affecting Function (Mobility) balance;visual/perceptual  -EE     Row Name 05/27/23 1248          Hip (Therapeutic Exercise)    Hip Strengthening (Therapeutic Exercise) bilateral;marching while seated;10 repetitions  -EE     Row Name 05/27/23 1248          Knee (Therapeutic Exercise)    Knee Strengthening (Therapeutic Exercise) bilateral;LAQ (long arc quad);10 repetitions  -EE     Row Name 05/27/23 1248          Advanced Stepping/Walking Interventions    Stepping/Walking Interventions side stepping  -EE     Backward Walking (Stepping/Walking Interventions) 3 x 8' with no UE support, min A  -EE     Side Stepping (Stepping/Walking Interventions) 2 x 8' each direction with no UE support, CGA and cues for foot placement  -EE     Row Name 05/27/23 1248          Positioning and Restraints    Pre-Treatment Position sitting in chair/recliner  -EE     Post Treatment Position wheelchair  -EE     In Wheelchair sitting;call light within reach;encouraged to call for assist;exit alarm on  -EE           User Key  (r) = Recorded By, (t) = Taken By, (c) = Cosigned By    Initials Name Provider Type     Angelika Echeverria, PT Physical Therapist              Wound 05/16/23 1831 Left lateral hip Incision (Active)   Dressing Appearance dry;intact 05/26/23 2017   Closure GUME 05/26/23 2017   Base dressing in place, unable to visualize 05/26/23 2017   Periwound dry;intact;swelling 05/26/23 2017   Periwound Temperature warm 05/26/23 2017   Periwound Skin Turgor soft 05/26/23 2017   Drainage Amount none 05/26/23 2017     Physical Therapy  Education     Title: PT OT SLP Therapies (In Progress)     Topic: Physical Therapy (In Progress)     Point: Mobility training (In Progress)     Learning Progress Summary           Patient Acceptance, D,E, NR by EE at 5/27/2023 1255    Acceptance, E,D, NR by DP at 5/26/2023 0855    Comment: transfers and hand palcemenmt    Acceptance, E, VU by WN at 5/26/2023 0215    Acceptance, E,D, NR by DP at 5/25/2023 1451    Eager, E,D, NR by KP at 5/24/2023 1506    Acceptance, E,D, VU,DU by DP at 5/23/2023 1110    Acceptance, E,D, VU,DU by DP at 5/22/2023 1148    Acceptance, E, VU by WN at 5/20/2023 2214    Acceptance, E, NR by ABDIEL at 5/20/2023 0927    Acceptance, E, VU by RICKI at 5/19/2023 2232    Acceptance, E,TB, VU,NR by MAURILIO at 5/18/2023 1206    Acceptance, E, VU by KN at 5/17/2023 1524    Acceptance, E,TB, VU,NR by MAURILIO at 5/17/2023 1209                   Point: Home exercise program (In Progress)     Learning Progress Summary           Patient Acceptance, D,E, NR by EE at 5/27/2023 1255    Acceptance, E,D, NR by DP at 5/26/2023 0855    Comment: transfers and hand palcemenmt    Acceptance, E, VU by WN at 5/26/2023 0215    Acceptance, E,D, NR by DP at 5/25/2023 1451    Eager, E,D, NR by BHUPENDRA at 5/24/2023 1506    Acceptance, E,D, VU,DU by DP at 5/23/2023 1110    Acceptance, E,D, VU,DU by DP at 5/22/2023 1148    Acceptance, E, VU by WN at 5/20/2023 2214    Acceptance, E, NR by ABDIEL at 5/20/2023 0927    Acceptance, E, VU by WN at 5/19/2023 2232    Acceptance, E, VU by KN at 5/17/2023 1524                   Point: Body mechanics (In Progress)     Learning Progress Summary           Patient Acceptance, D,E, NR by EE at 5/27/2023 1255    Acceptance, E,D, NR by DP at 5/26/2023 0855    Comment: transfers and hand palcemenmt    Acceptance, E, VU by WN at 5/26/2023 0215    Acceptance, E,D, NR by DP at 5/25/2023 1451    Eager, E,D, NR by KP at 5/24/2023 1506    Acceptance, E,D, VU,DU by DP at 5/23/2023 1110    Acceptance, E,D, VU,DU by DP at  5/22/2023 1148    Acceptance, E, VU by WN at 5/20/2023 2214    Acceptance, E, NR by  at 5/20/2023 0927    Acceptance, E, VU by WN at 5/19/2023 2232    Acceptance, E, VU by KN at 5/17/2023 1524                   Point: Precautions (In Progress)     Learning Progress Summary           Patient Acceptance, D,E, NR by EE at 5/27/2023 1255    Acceptance, E,D, NR by DP at 5/26/2023 0855    Comment: transfers and hand palcemenmt    Acceptance, E, VU by WN at 5/26/2023 0215    Acceptance, E,D, NR by DP at 5/25/2023 1451    Eager, E,D, NR by KP at 5/24/2023 1506    Acceptance, E,D, VU,DU by DP at 5/23/2023 1110    Acceptance, E,D, VU,DU by DP at 5/22/2023 1148    Acceptance, E, VU by WN at 5/20/2023 2214    Acceptance, E, NR by  at 5/20/2023 0927    Acceptance, E, VU by WN at 5/19/2023 2232    Acceptance, E, VU by KN at 5/17/2023 1524                               User Key     Initials Effective Dates Name Provider Type Discipline     06/16/21 -  Ashlie Arnold, PT Physical Therapist PT     06/16/21 -  Aicha Payne, PT Physical Therapist PT    EE 06/16/21 -  Angelika Echeverria, PT Physical Therapist PT     06/16/21 -  Keena London, PT Physical Therapist PT    WN 08/23/22 -  Kamaljit Elias, RN Registered Nurse Nurse    DP 08/24/21 -  George Lehman, PT Physical Therapist PT    KN 08/02/22 -  Main De La Garza OT Occupational Therapist OT                PT Recommendation and Plan                          Time Calculation:      PT Charges     Row Name 05/27/23 1300             Time Calculation    Start Time 1230  -EE      Stop Time 1300  -EE      Time Calculation (min) 30 min  -EE      PT Received On 05/27/23  -EE      PT - Next Appointment 05/29/23  -EE         Time Calculation- PT    Total Timed Code Minutes- PT 30 minute(s)  -EE            User Key  (r) = Recorded By, (t) = Taken By, (c) = Cosigned By    Initials Name Provider Type    Angelika Valdez PT Physical Therapist                Therapy Charges for Today      Code Description Service Date Service Provider Modifiers Qty    81936564007  PT THERAPEUTIC ACT EA 15 MIN 5/27/2023 Angelika Echeverria, PT GP 2    [id number]  PT THER SUPP EA 15 MIN 5/27/2023 Angelika Echeverria, PT GP 1                   Angelika Echeverria, PT  5/27/2023

## 2023-05-27 NOTE — PROGRESS NOTES
[facility]     Progress Note    Patient Name: Radha Azevedo  : 1935  MRN: [id number]  Primary Care Physician:  Amina Jiang MD  Date of admission: 2023    Subjective   Subjective     Chief Complaint:   SDH  L femur fracture    History of Present Illness  Patient Reports no BM as of this morning but was getting a suppository to see if any response. No f/c/n/v/soa/cp    Review of Systems    Objective   Objective     Vitals:   Temp:  [97.4 °F (36.3 °C)-97.6 °F (36.4 °C)] 97.4 °F (36.3 °C)  Heart Rate:  [63-75] 75  Resp:  [18] 18  BP: (109-139)/(64-77) 126/77    Physical Exam   General: The patient is well-developed, well-nourished and in no apparent distress. Lying on left side due to just received a suppository  HEENT: EOMI, hearing intact b/l. MMM.     Respiratory: Lungs CTAB, no wheezes or rhonchi     Cardiac: RRR, no m/r/g, no pedal edema     Abdomen: soft, NT abdomen , active BS   Psych: Appropriate affect and behavior         Neuro:  Mental Status:  AOx4   Speech: fluent without dysarthria   Good strength bilaterally       Result Review    Result Review:  I have personally reviewed the results from the time of this admission to 2023 15:12 EDT and agree with these findings:  []  Laboratory list / accordion  []  Microbiology  []  Radiology  []  EKG/Telemetry   []  Cardiology/Vascular   []  Pathology  []  Old records  []  Other:  Most notable findings include:   No new labs    Scheduled Meds:amLODIPine, 10 mg, Oral, Q24H  Diclofenac Sodium, 2 g, Topical, 4x Daily  DULoxetine, 30 mg, Oral, BID  heparin (porcine), 5,000 Units, Subcutaneous, Q12H  levothyroxine, 25 mcg, Oral, Q AM  pantoprazole, 40 mg, Oral, Daily  QUEtiapine, 12.5 mg, Oral, Nightly  senna-docusate sodium, 2 tablet, Oral, BID      Continuous Infusions:   PRN Meds:.•  acetaminophen  •  aluminum-magnesium hydroxide-simethicone  •  bisacodyl  •  diphenhydrAMINE  •  hydrALAZINE  •  oxyCODONE       Assessment & Plan    Assessment / Plan     Brief Patient Summary:  Radha Azevedo is a 87 y.o. male    Active Hospital Problems:  Active Hospital Problems    Diagnosis    • **Subdural hematoma      Plan:   Impairments: ADLs, endurance, gait, balance, transfers, cognition, comprehension     Now admit for comprehensive acute inpatient rehabilitation .  This would be an interdisciplinary program with physical therapy 1 hour,  occupational therapy 1 hour, and speech therapy 1 hour, 5 days a week.  Rehabilitation nursing for carryover, monitoring of neurologic status, bowel and bladder, and skin  Ongoing physician follow-up.  Weekly team conferences.  Goals are to achieve a level of supervision with  mobility and self-care and improved ADLs.   Rehabilitation prognosis good.  Medical prognosis good.  Estimated length of stay is approximately 1-2 weeks , but is only an estimation.         #Functional Impairment 2/2 SDH and L femur fracture   -Initiate comprehensive rehab program consisting of PT/OT/SLP 3 hours/day, 5 days/week with medical management of above disorder, comorbidities, pain, bowels, and bladder.       -WB/activity status:?no restrictions, WBAT        #SDH  -No gross neurologic deficits on exam  -Will order CT Head for baseline or transfer imaging from [facility]  May 22- Order CT Head for follow-up SDH evaluation. Will send imaging to [facility] for a follow-up CT of the head today per message from [place] joselin.  To have study done with results sent to Dr. Hollins - [facility]  May 04-[id number] CT-report reviewed.  Right subdural hematoma and left subdural hygroma, no shift.  neurosurgery obtaining copy of CT images on CD to review.  Unable to compare to outside films at [facility] due to issues with their PACS system.     May 24- Will discuss with [facility] today about results of CT scan  May 25-patient's son received a call from [place] neurosurgery yesterday who were pleased with the imaging of the  follow-up CT of the head.  To have a repeat CT in 2 weeks when follows up in the office     #L displaced femur fracture  -S/p L IM Nail placement in L femur  -WBAT in LLE  May 24- Order L Femur Xray for post-op f/u of hardware  May 26- Stable Xray of L femur IM nail, will follow-up with Ortho as an outpatient      #Pain Control     -Prn Norco 5 q4h     May 22- Will DC Norco and change to Oxycodone 5 BID PRN. Patient was only getting Norco 3 times a day and is the same MME as Oxycodone 5 BID PRN. Will also add Tylenol 650 q6h PRN for intermittent pain relief in between Oxycodone doses.     #Hypothyroidism  - on admission  -Significant weight loss and depression over the past year  -Continue Synthroid 25 daily  -Will follow-up on TSH and T4 values tomorrow  May 24- Order TSH and T4 next week and change medication accordingly        #Hyponatremia  -Na 128 on admission, 128-130 a NBH. -Fluid restriction to 1500ml daily  -Possibly related to hypothyroidism  May 20- increase synthroid to 50mcg daily and monitor TSH and Na+  -Will continue to monitor  May 22-sodium unchanged 129  May 26- Labs reviewed, stable Na level at 130, no significant S&S     #HTN  -Continue Norvasc 5 daily  May 22- Increased Norvasc to 10 daily over the weekend. BP improved and is more controlled, will continue to monitor.  May 26- BP stable, continue current regimen     #Mood disorder  -Continue Cymbalta 30 BID and Seroquel 12.5 at bedtime   -PRN Xanax held because cause of his falls     #Constipation  May 26- Increase Doc Senna 2 BID and order MoM once today, will continue to monitor BM  May 27 - trial suppository today due to ongoing constipation, daily prn suppository ordered     #FEN     -nutrition: Cardiac diet, Regular texture, Liquid Consistency: Thin Liquid. 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  Has  [facility].  Not Noted during Any of Our Visits on Rounds.  On a video of the son has had has frequency they can see with a myoclonic jerk but not the amplitude or velocity. Medications Reviewed.  On Cymbalta 30 Mg Twice a Day.  Low-Dose Seroquel 12.5 Mg Daily.  Medications Unremarkable.  Will Monitor.    DVT prophylaxis:  Medical and mechanical DVT prophylaxis orders are present.    CODE STATUS:    Medical Intervention Limits: NO intubation (DNI)  Level Of Support Discussed With: Patient; Next of Kin (If No Surrogate)  Code Status (Patient has no pulse and is not breathing): CPR (Attempt to Resuscitate)  Medical Interventions (Patient has pulse or is breathing): Limited Support  Comments: Discussed with patient and son  Release to patient: Routine Release      Janet Mack MD

## 2023-05-27 NOTE — PLAN OF CARE
Goal Outcome Evaluation:      Pt is A/Ox4, calm/cooperative, not OOB overnight. Meds taken whole w thin liquids. Pt c/o pain to L hip and shoulders; prn Oxycodone given x1. Prn Benadryl given x1 for generalized itching. Pt is continent using urinal. Pt c/o constipation; scheduled bowel meds given, including MOM, but no BM. Patient requested suppository this morning. Notified Dr. Mack; daily prn Dulcolax suppository ordered and given.

## 2023-05-27 NOTE — PROGRESS NOTES
Inpatient Rehabilitation Plan of Care Note    Plan of Care  Care Plan Reviewed - No updates at this time.    Psychosocial    Performed Intervention(s)  Medication.      Safety    Performed Intervention(s)  Items within reach.  Safety rounds.  Wheelchair, bed, and chair alarms.      Sphincter Control    Performed Intervention(s)  Offer restroom.  Bladder training program.  Use incontinence products.  Encourage appropriate diet.  Encourage fluid intake.      Body Systems    Performed Intervention(s)  Skin assessment q shift and prn.  Wound care as ordered.    Signed by: Skylar Evangelista RN

## 2023-05-28 PROCEDURE — 63710000001 DIPHENHYDRAMINE PER 50 MG: Performed by: PHYSICAL MEDICINE & REHABILITATION

## 2023-05-28 PROCEDURE — 25010000002 HEPARIN (PORCINE) PER 1000 UNITS: Performed by: PHYSICAL MEDICINE & REHABILITATION

## 2023-05-28 RX ADMIN — DICLOFENAC SODIUM 2 G: 10 GEL TOPICAL at 08:51

## 2023-05-28 RX ADMIN — AMLODIPINE BESYLATE 10 MG: 10 TABLET ORAL at 08:51

## 2023-05-28 RX ADMIN — PANTOPRAZOLE SODIUM 40 MG: 40 TABLET, DELAYED RELEASE ORAL at 08:51

## 2023-05-28 RX ADMIN — DULOXETINE HYDROCHLORIDE 30 MG: 30 CAPSULE, DELAYED RELEASE ORAL at 08:51

## 2023-05-28 RX ADMIN — DULOXETINE HYDROCHLORIDE 30 MG: 30 CAPSULE, DELAYED RELEASE ORAL at 21:34

## 2023-05-28 RX ADMIN — DIPHENHYDRAMINE HYDROCHLORIDE 25 MG: 25 CAPSULE ORAL at 21:35

## 2023-05-28 RX ADMIN — OXYCODONE HYDROCHLORIDE 5 MG: 5 TABLET ORAL at 21:35

## 2023-05-28 RX ADMIN — DOCUSATE SODIUM 50MG AND SENNOSIDES 8.6MG 2 TABLET: 8.6; 5 TABLET, FILM COATED ORAL at 08:51

## 2023-05-28 RX ADMIN — DOCUSATE SODIUM 50MG AND SENNOSIDES 8.6MG 2 TABLET: 8.6; 5 TABLET, FILM COATED ORAL at 21:34

## 2023-05-28 RX ADMIN — HEPARIN SODIUM 5000 UNITS: 5000 INJECTION INTRAVENOUS; SUBCUTANEOUS at 21:36

## 2023-05-28 RX ADMIN — LEVOTHYROXINE SODIUM 25 MCG: 0.03 TABLET ORAL at 06:03

## 2023-05-28 RX ADMIN — QUETIAPINE FUMARATE 12.5 MG: 25 TABLET ORAL at 21:34

## 2023-05-28 RX ADMIN — HEPARIN SODIUM 5000 UNITS: 5000 INJECTION INTRAVENOUS; SUBCUTANEOUS at 08:51

## 2023-05-28 RX ADMIN — DICLOFENAC SODIUM 2 G: 10 GEL TOPICAL at 21:37

## 2023-05-28 NOTE — PROGRESS NOTES
Inpatient Rehabilitation Plan of Care Note    Plan of Care  Care Plan Reviewed - Updates as Follows    Psychosocial    Performed Intervention(s)  Medication.  therapeutic environmental set-up      Safety    Performed Intervention(s)  Items within reach.  Safety rounds.  Wheelchair, bed, and chair alarms.      Sphincter Control    Performed Intervention(s)  Offer restroom.  Bladder training program.  Use incontinence products.  Encourage appropriate diet.  Encourage fluid intake.      Body Systems    Performed Intervention(s)  Skin assessment q shift and prn.  Wound care as ordered.    Signed by: Skylar Evangelista RN

## 2023-05-28 NOTE — PLAN OF CARE
Goal Outcome Evaluation:      Slept fair off & on. Meds with water. Continent of bladder tonight, using urinal. Oxycodone given for Lt. Hip pain, with relief. Lt. Hip dressing dry & intact. Declined SCD's, & Voltaren gel. Turns self.

## 2023-05-28 NOTE — PROGRESS NOTES
[facility]     Progress Note    Patient Name: Radha Azevedo  : 1935  MRN: [id number]  Primary Care Physician:  Amina Jiang MD  Date of admission: 2023    Subjective   Subjective     Chief Complaint:   SDH  L femur fracture    History of Present Illness  Patient Reports sleeping well. Had BM yesterday after suppository. No f/c/n/v/soa/cp    Review of Systems    Objective   Objective     Vitals:   Temp:  [97.1 °F (36.2 °C)-98.2 °F (36.8 °C)] 97.2 °F (36.2 °C)  Heart Rate:  [60-72] 72  Resp:  [18-20] 18  BP: (115-141)/(65-69) 141/69    Physical Exam   General: The patient is well-developed, well-nourished and in no apparent distress. Easily awakened  HEENT: EOMI, hearing intact b/l. MMM.     Respiratory: Lungs CTAB, no wheezes or rhonchi     Cardiac: RRR, no m/r/g, no pedal edema     Abdomen: soft, NT abdomen , active BS   Psych: Appropriate affect and behavior         Neuro:  Mental Status:  AOx4   Speech: fluent without dysarthria   Good strength bilaterally     Result Review    Result Review:  I have personally reviewed the results from the time of this admission to 2023 14:27 EDT and agree with these findings:  []  Laboratory list / accordion  []  Microbiology  []  Radiology  []  EKG/Telemetry   []  Cardiology/Vascular   []  Pathology  []  Old records  []  Other:  Most notable findings include:   No new labs    Scheduled Meds:amLODIPine, 10 mg, Oral, Q24H  Diclofenac Sodium, 2 g, Topical, 4x Daily  DULoxetine, 30 mg, Oral, BID  heparin (porcine), 5,000 Units, Subcutaneous, Q12H  levothyroxine, 25 mcg, Oral, Q AM  pantoprazole, 40 mg, Oral, Daily  QUEtiapine, 12.5 mg, Oral, Nightly  senna-docusate sodium, 2 tablet, Oral, BID      Continuous Infusions:   PRN Meds:.•  acetaminophen  •  aluminum-magnesium hydroxide-simethicone  •  bisacodyl  •  diphenhydrAMINE  •  hydrALAZINE  •  oxyCODONE       Assessment & Plan   Assessment / Plan     Brief Patient Summary:  Radha Azevedo is a 87  y.o. male     Active Hospital Problems:  Active Hospital Problems    Diagnosis    • **Subdural hematoma      Plan:   Impairments: ADLs, endurance, gait, balance, transfers, cognition, comprehension     Now admit for comprehensive acute inpatient rehabilitation .  This would be an interdisciplinary program with physical therapy 1 hour,  occupational therapy 1 hour, and speech therapy 1 hour, 5 days a week.  Rehabilitation nursing for carryover, monitoring of neurologic status, bowel and bladder, and skin  Ongoing physician follow-up.  Weekly team conferences.  Goals are to achieve a level of supervision with  mobility and self-care and improved ADLs.   Rehabilitation prognosis good.  Medical prognosis good.  Estimated length of stay is approximately 1-2 weeks , but is only an estimation.         #Functional Impairment 2/2 SDH and L femur fracture   -Initiate comprehensive rehab program consisting of PT/OT/SLP 3 hours/day, 5 days/week with medical management of above disorder, comorbidities, pain, bowels, and bladder.       -WB/activity status:?no restrictions, WBAT        #SDH  -No gross neurologic deficits on exam  -Will order CT Head for baseline or transfer imaging from [facility]  May 22- Order CT Head for follow-up SDH evaluation. Will send imaging to [facility] for a follow-up CT of the head today per message from [place] joselin.  To have study done with results sent to Dr. Hollins - [facility]  May 43-[id number] CT-report reviewed.  Right subdural hematoma and left subdural hygroma, no shift.  neurosurgery obtaining copy of CT images on CD to review.  Unable to compare to outside films at [facility] due to issues with their PACS system.     May 24- Will discuss with [facility] today about results of CT scan  May 25-patient's son received a call from [place] neurosurgery yesterday who were pleased with the imaging of the follow-up CT of the head.  To have a repeat CT in 2 weeks when follows up in  the office     #L displaced femur fracture  -S/p L IM Nail placement in L femur  -WBAT in LLE  May 24- Order L Femur Xray for post-op f/u of hardware  May 26- Stable Xray of L femur IM nail, will follow-up with Ortho as an outpatient      #Pain Control     -Prn Norco 5 q4h     May 22- Will DC Norco and change to Oxycodone 5 BID PRN. Patient was only getting Norco 3 times a day and is the same MME as Oxycodone 5 BID PRN. Will also add Tylenol 650 q6h PRN for intermittent pain relief in between Oxycodone doses.     #Hypothyroidism  - on admission  -Significant weight loss and depression over the past year  -Continue Synthroid 25 daily  -Will follow-up on TSH and T4 values tomorrow  May 24- Order TSH and T4 next week and change medication accordingly        #Hyponatremia  -Na 128 on admission, 128-130 a NBH. -Fluid restriction to 1500ml daily  -Possibly related to hypothyroidism  May 20- increase synthroid to 50mcg daily and monitor TSH and Na+  -Will continue to monitor  May 22-sodium unchanged 129  May 26- Labs reviewed, stable Na level at 130, no significant S&S     #HTN  -Continue Norvasc 5 daily  May 22- Increased Norvasc to 10 daily over the weekend. BP improved and is more controlled, will continue to monitor.  May 26- BP stable, continue current regimen     #Mood disorder  -Continue Cymbalta 30 BID and Seroquel 12.5 at bedtime   -PRN Xanax held because cause of his falls     #Constipation  May 26- Increase Doc Senna 2 BID and order MoM once today, will continue to monitor BM  May 27 - trial suppository today due to ongoing constipation, daily prn suppository ordered     #FEN     -nutrition: Cardiac diet, Regular texture, Liquid Consistency: Thin Liquid. 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  Has Had It at [facility] Other [facility].  Not Noted during Any of Our Visits on  Rounds.  On a video of the son has had has frequency they can see with a myoclonic jerk but not the amplitude or velocity. Medications Reviewed.  On Cymbalta 30 Mg Twice a Day.  Low-Dose Seroquel 12.5 Mg Daily.  Medications Unremarkable.  Will Monitor.    5/28 - no changes to current plans    DVT prophylaxis:  Medical and mechanical DVT prophylaxis orders are present.    CODE STATUS:    Medical Intervention Limits: NO intubation (DNI)  Level Of Support Discussed With: Patient; Next of Kin (If No Surrogate)  Code Status (Patient has no pulse and is not breathing): CPR (Attempt to Resuscitate)  Medical Interventions (Patient has pulse or is breathing): Limited Support  Comments: Discussed with patient and son  Release to patient: Routine Release      Janet Mack MD

## 2023-05-28 NOTE — PROGRESS NOTES
Inpatient Rehabilitation Plan of Care Note    Plan of Care  Care Plan Reviewed - No updates at this time.    Psychosocial    Performed Intervention(s)  Medication.  Group therapy.      Safety    Performed Intervention(s)  Items within reach.  Safety rounds.  Wheelchair, bed, and chair alarms.      Sphincter Control    Performed Intervention(s)  Offer restroom.  Bladder training program.  Use incontinence products.  Encourage appropriate diet.  Encourage fluid intake.      Body Systems    Performed Intervention(s)  Skin assessment q shift and prn.  Wound care as ordered.    Signed by: Monique Pruett RN

## 2023-05-28 NOTE — PLAN OF CARE
Goal Outcome Evaluation:  Plan of Care Reviewed With: patient        Progress: improving  Outcome Evaluation: AAOx4; forgetful. Transfers assist of 1; walker. No safety issues noted today; patient used call light appropriately. Continent of B&B. Skin intact. VS stable. supportive friends and family at bedside throughout afternoon.

## 2023-05-29 LAB
ANION GAP SERPL CALCULATED.3IONS-SCNC: 10.2 MMOL/L (ref 5–15)
BASOPHILS # BLD AUTO: 0.09 10*3/MM3 (ref 0–0.2)
BASOPHILS NFR BLD AUTO: 2 % (ref 0–1.5)
BUN SERPL-MCNC: 25 MG/DL (ref 8–23)
BUN/CREAT SERPL: 22.5 (ref 7–25)
CALCIUM SPEC-SCNC: 8.9 MG/DL (ref 8.6–10.5)
CHLORIDE SERPL-SCNC: 94 MMOL/L (ref 98–107)
CO2 SERPL-SCNC: 24.8 MMOL/L (ref 22–29)
CREAT SERPL-MCNC: 1.11 MG/DL (ref 0.76–1.27)
DEPRECATED RDW RBC AUTO: 51.6 FL (ref 37–54)
EGFRCR SERPLBLD CKD-EPI 2021: 64.3 ML/MIN/1.73
EOSINOPHIL # BLD AUTO: 0.26 10*3/MM3 (ref 0–0.4)
EOSINOPHIL NFR BLD AUTO: 5.9 % (ref 0.3–6.2)
ERYTHROCYTE [DISTWIDTH] IN BLOOD BY AUTOMATED COUNT: 17.2 % (ref 12.3–15.4)
GLUCOSE SERPL-MCNC: 104 MG/DL (ref 65–99)
HCT VFR BLD AUTO: 30 % (ref 37.5–51)
HGB BLD-MCNC: 9.9 G/DL (ref 13–17.7)
IMM GRANULOCYTES # BLD AUTO: 0.01 10*3/MM3 (ref 0–0.05)
IMM GRANULOCYTES NFR BLD AUTO: 0.2 % (ref 0–0.5)
LYMPHOCYTES # BLD AUTO: 1.96 10*3/MM3 (ref 0.7–3.1)
LYMPHOCYTES NFR BLD AUTO: 44.5 % (ref 19.6–45.3)
MCH RBC QN AUTO: 27.3 PG (ref 26.6–33)
MCHC RBC AUTO-ENTMCNC: 33 G/DL (ref 31.5–35.7)
MCV RBC AUTO: 82.9 FL (ref 79–97)
MONOCYTES # BLD AUTO: 0.47 10*3/MM3 (ref 0.1–0.9)
MONOCYTES NFR BLD AUTO: 10.7 % (ref 5–12)
NEUTROPHILS NFR BLD AUTO: 1.61 10*3/MM3 (ref 1.7–7)
NEUTROPHILS NFR BLD AUTO: 36.7 % (ref 42.7–76)
NRBC BLD AUTO-RTO: 0 /100 WBC (ref 0–0.2)
PLATELET # BLD AUTO: 270 10*3/MM3 (ref 140–450)
PMV BLD AUTO: 10 FL (ref 6–12)
POTASSIUM SERPL-SCNC: 4.2 MMOL/L (ref 3.5–5.2)
RBC # BLD AUTO: 3.62 10*6/MM3 (ref 4.14–5.8)
SODIUM SERPL-SCNC: 129 MMOL/L (ref 136–145)
T4 FREE SERPL-MCNC: 0.2 NG/DL (ref 0.93–1.7)
TSH SERPL DL<=0.05 MIU/L-ACNC: 167 UIU/ML (ref 0.27–4.2)
WBC NRBC COR # BLD: 4.4 10*3/MM3 (ref 3.4–10.8)

## 2023-05-29 PROCEDURE — 97110 THERAPEUTIC EXERCISES: CPT

## 2023-05-29 PROCEDURE — 84443 ASSAY THYROID STIM HORMONE: CPT | Performed by: PHYSICAL MEDICINE & REHABILITATION

## 2023-05-29 PROCEDURE — 97130 THER IVNTJ EA ADDL 15 MIN: CPT

## 2023-05-29 PROCEDURE — 85025 COMPLETE CBC W/AUTO DIFF WBC: CPT | Performed by: PHYSICAL MEDICINE & REHABILITATION

## 2023-05-29 PROCEDURE — 25010000002 HEPARIN (PORCINE) PER 1000 UNITS: Performed by: PHYSICAL MEDICINE & REHABILITATION

## 2023-05-29 PROCEDURE — 97530 THERAPEUTIC ACTIVITIES: CPT

## 2023-05-29 PROCEDURE — 80048 BASIC METABOLIC PNL TOTAL CA: CPT | Performed by: PHYSICAL MEDICINE & REHABILITATION

## 2023-05-29 PROCEDURE — 97129 THER IVNTJ 1ST 15 MIN: CPT

## 2023-05-29 PROCEDURE — 84439 ASSAY OF FREE THYROXINE: CPT | Performed by: PHYSICAL MEDICINE & REHABILITATION

## 2023-05-29 PROCEDURE — 63710000001 DIPHENHYDRAMINE PER 50 MG: Performed by: PHYSICAL MEDICINE & REHABILITATION

## 2023-05-29 RX ORDER — LEVOTHYROXINE SODIUM 0.07 MG/1
75 TABLET ORAL
Status: DISCONTINUED | OUTPATIENT
Start: 2023-05-30 | End: 2023-06-01 | Stop reason: HOSPADM

## 2023-05-29 RX ORDER — OXYCODONE HYDROCHLORIDE 5 MG/1
5 TABLET ORAL 2 TIMES DAILY PRN
Status: DISCONTINUED | OUTPATIENT
Start: 2023-05-29 | End: 2023-06-01 | Stop reason: HOSPADM

## 2023-05-29 RX ADMIN — HEPARIN SODIUM 5000 UNITS: 5000 INJECTION INTRAVENOUS; SUBCUTANEOUS at 08:38

## 2023-05-29 RX ADMIN — DULOXETINE HYDROCHLORIDE 30 MG: 30 CAPSULE, DELAYED RELEASE ORAL at 21:04

## 2023-05-29 RX ADMIN — AMLODIPINE BESYLATE 10 MG: 10 TABLET ORAL at 08:38

## 2023-05-29 RX ADMIN — DIPHENHYDRAMINE HYDROCHLORIDE 25 MG: 25 CAPSULE ORAL at 21:03

## 2023-05-29 RX ADMIN — DOCUSATE SODIUM 50MG AND SENNOSIDES 8.6MG 2 TABLET: 8.6; 5 TABLET, FILM COATED ORAL at 08:38

## 2023-05-29 RX ADMIN — LEVOTHYROXINE SODIUM 25 MCG: 0.03 TABLET ORAL at 06:27

## 2023-05-29 RX ADMIN — OXYCODONE HYDROCHLORIDE 5 MG: 5 TABLET ORAL at 21:04

## 2023-05-29 RX ADMIN — PANTOPRAZOLE SODIUM 40 MG: 40 TABLET, DELAYED RELEASE ORAL at 08:38

## 2023-05-29 RX ADMIN — DULOXETINE HYDROCHLORIDE 30 MG: 30 CAPSULE, DELAYED RELEASE ORAL at 08:38

## 2023-05-29 RX ADMIN — DOCUSATE SODIUM 50MG AND SENNOSIDES 8.6MG 2 TABLET: 8.6; 5 TABLET, FILM COATED ORAL at 21:04

## 2023-05-29 RX ADMIN — QUETIAPINE FUMARATE 12.5 MG: 25 TABLET ORAL at 21:03

## 2023-05-29 RX ADMIN — HEPARIN SODIUM 5000 UNITS: 5000 INJECTION INTRAVENOUS; SUBCUTANEOUS at 21:04

## 2023-05-29 RX ADMIN — DICLOFENAC SODIUM 2 G: 10 GEL TOPICAL at 21:06

## 2023-05-29 NOTE — PROGRESS NOTES
Inpatient Rehabilitation Plan of Care Note    Plan of Care  Care Plan Reviewed - No updates at this time.    Psychosocial    Performed Intervention(s)  Medication.  Group therapy.  therapeutic environmental set-up      Safety    Performed Intervention(s)  Items within reach.  Safety rounds.  Wheelchair, bed, and chair alarms.      Sphincter Control    Performed Intervention(s)  Offer restroom.  Bladder training program.  Use incontinence products.  Encourage appropriate diet.  Encourage fluid intake.      Body Systems    Performed Intervention(s)  Skin assessment q shift and prn.  Wound care as ordered.    Signed by: Irlanda Foreman RN

## 2023-05-29 NOTE — PLAN OF CARE
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 Subdural hematoma after a fall. Femur break. Hx. glaucoma, CAD, HLD, HTN, DM2, kyphoplasty. Lost 40 lbs. Health issues more controlled since weight-loss. L. hip border dressing. New diagnosis of hypothyroidism. Is on synthroid. Sodium is low. Meds whole with water. Fluid restriction to 1500 ml/daily. 240ml/ per tray. Continent B&B. Last BM 5/28. Participated fully in therapy. Has voltaren gel at bedside. Assistx1 walker or wheelchair. Code status; no intubation. Labs today: CBC/BMP. Taking Oxy at HS.

## 2023-05-29 NOTE — PLAN OF CARE
Goal Outcome Evaluation:      Pt is A/Ox4, calm/cooperative, OOB x 1 w CGA w rwx. Meds taken whole w thin liquids. Pt c/o pain to L hip and shoulders; prn Oxycodone given x1. Prn Benadryl given x1 for generalized itching. Pt is continent using urinal.

## 2023-05-29 NOTE — THERAPY TREATMENT NOTE
Inpatient Rehabilitation - Speech Language Pathology Treatment Note  [facility]     Patient Name: Radha Azevedo  : 1935  MRN: [id number]  Today's Date: 2023               Admit Date: 2023     Visit Dx:    ICD-10-CM ICD-9-CM   1. Impaired gait and mobility  R26.89 781.2     Patient Active Problem List   Diagnosis   • Absolute anemia   • Benign essential HTN   • Benign localized hyperplasia of prostate without urinary obstruction   • Type 2 diabetes mellitus with peripheral angiopathy   • Hypertension   • Hyperlipidemia   • Type 2 diabetes mellitus   • Diabetes mellitus type 2, noninsulin dependent   • Avitaminosis D   • Dyslipidemia   • Decrease in the ability to hear   • Personal history of colonic polyps   • Subdural hematoma     Past Medical History:   Diagnosis Date   • Colon polyps     Followed by Dr. Leo Jaeger at [facility].   • Coronary artery disease    • Diabetes mellitus    • Hyperlipidemia    • Hypertension      Past Surgical History:   Procedure Laterality Date   • CARDIAC CATHETERIZATION     • CAROTID STENT      patient denies, but family states he did have a stent placed   • COLONOSCOPY N/A 2014    Dr. Leo Jaeger at [facility].   • COLONOSCOPY N/A 2016    Dr. Leo Jaeger at [facility].   • COLONOSCOPY N/A 03/15/2023    2 TUBULAR ADENOMA POLYPS IN SIGMOID, BARIUM ENEMA ORDERED, DR. LEO JAEGER AT [facility]   • CORONARY ANGIOPLASTY     • FRACTURE SURGERY Left 2023    IM nailing for L femur fracture       SLP Recommendation and Plan                                                            SLP EVALUATION (last 72 hours)     SLP SLC Evaluation     Row Name 23 1027                   Communication Assessment/Intervention    Document Type therapy note (daily note)  -JT        Subjective Information complains of;pain  all over  -JT        Patient Observations alert;cooperative;agree to therapy;lethargic  -JT        Patient/Family/Caregiver Comments/Observations pt sitting  in recliner in room  -JT        Patient Effort good  -JT        Symptoms Noted During/After Treatment none  -JT           Pain Scale: Numbers Pre/Post-Treatment    Pretreatment Pain Rating 4/10  -JT        Posttreatment Pain Rating 1/10  -JT              User Key  (r) = Recorded By, (t) = Taken By, (c) = Cosigned By    Initials Name Effective Dates    Azul Reed 06/16/21 -                    EDUCATION  The patient has been educated in the following areas:     Cognitive Impairment.           SLP GOALS     Row Name 05/29/23 1000             Attention Goal 1 (SLP)    Progress/Outcomes (Attention Goal 1, SLP) goal ongoing  -JT      Comment (Attention Goal 1, SLP) Connect the dots alphabetical 90% w/mod-max cues  -JT         Reasoning Goal 1 (SLP)    Progress/Outcomes (Reasoning Goal 1, SLP) goal ongoing  -JT      Comment (Reasoning Goal 1, SLP) recall daily activity independently  -JT         Executive Functional Skills Goal 1 (SLP)    Progress/Outcomes (Executive Function Skills Goal 1, SLP) goal ongoing  -JT      Comment (Executive Function Skills Goal 1, SLP) naming 8 fruits w/min cues  -JT            User Key  (r) = Recorded By, (t) = Taken By, (c) = Cosigned By    Initials Name Provider Type    Azul Reed Speech and Language Pathologist                        Time Calculation:      Time Calculation- SLP     Row Name 05/29/23 1030             Time Calculation- SLP    SLP Start Time 1000  -JT      SLP Stop Time 1030  -JT      SLP Time Calculation (min) 30 min  -JT            User Key  (r) = Recorded By, (t) = Taken By, (c) = Cosigned By    Initials Name Provider Type    Azul Reed Speech and Language Pathologist                Therapy Charges for Today     Code Description Service Date Service Provider Modifiers Qty    89488638518 HC ST DEV OF COGN SKILLS EACH ADDT'L 15 MIN 5/29/2023 Azul Fowler  1    09268053499 HC ST DEV OF COGN SKILLS INITIAL 15 MIN 5/29/2023  Malcolm, Azul Bee  1                     Azul Fowler  5/29/2023

## 2023-05-29 NOTE — THERAPY TREATMENT NOTE
Inpatient Rehabilitation - Physical Therapy Treatment Note       [facility]     Patient Name: Radha Azevedo  : 1935  MRN: [id number]    Today's Date: 2023                    Admit Date: 2023      Visit Dx:     ICD-10-CM ICD-9-CM   1. Impaired gait and mobility  R26.89 781.2       Patient Active Problem List   Diagnosis   • Absolute anemia   • Benign essential HTN   • Benign localized hyperplasia of prostate without urinary obstruction   • Type 2 diabetes mellitus with peripheral angiopathy   • Hypertension   • Hyperlipidemia   • Type 2 diabetes mellitus   • Diabetes mellitus type 2, noninsulin dependent   • Avitaminosis D   • Dyslipidemia   • Decrease in the ability to hear   • Personal history of colonic polyps   • Subdural hematoma       Past Medical History:   Diagnosis Date   • Colon polyps     Followed by Dr. Leo Jaeger at [facility].   • Coronary artery disease    • Diabetes mellitus    • Hyperlipidemia    • Hypertension        Past Surgical History:   Procedure Laterality Date   • CARDIAC CATHETERIZATION     • CAROTID STENT      patient denies, but family states he did have a stent placed   • COLONOSCOPY N/A 2014    Dr. Leo Jaeger at [facility].   • COLONOSCOPY N/A 2016    Dr. Leo Jaeger at [facility].   • COLONOSCOPY N/A 03/15/2023    2 TUBULAR ADENOMA POLYPS IN SIGMOID, BARIUM ENEMA ORDERED, DR. LEO JAEGER AT [facility]   • CORONARY ANGIOPLASTY     • FRACTURE SURGERY Left 2023    IM nailing for L femur fracture       PT ASSESSMENT (last 12 hours)     IRF PT Evaluation and Treatment     Row Name 23 8626          PT Time and Intention    Document Type daily treatment  -KP     Mode of Treatment physical therapy  -KP     Patient/Family/Caregiver Comments/Observations Pt arived to PT from OT, agreeable to rx  -KP     Row Name 23 2162          General Information    Patient Profile Reviewed yes  -KP     General Observations of Patient R inattention  -KP     Existing  Precautions/Restrictions fall  -     Row Name 05/29/23 0940          Pain Assessment    Pretreatment Pain Rating 5/10  -     Posttreatment Pain Rating --  5 in legs, 10 in fingers  -     Pain Location - Side/Orientation Left  -     Pain Location - --  thigh and generalized B LE  -     Pre/Posttreatment Pain Comment distal extremities  -KP     Row Name 05/29/23 0940          Cognition/Psychosocial    Affect/Mental Status (Cognition) WFL  -     Orientation Status (Cognition) oriented x 3  -KP     Follows Commands (Cognition) follows two-step commands;verbal cues/prompting required  -     Personal Safety Interventions fall prevention program maintained;gait belt;nonskid shoes/slippers when out of bed;supervised activity  -     Comment, Cognition VC fro attention R  -KP     Row Name 05/29/23 0940          Transfer Assessment/Treatment    Comment, (Transfers) VC for hand placement for all transfers.  -     Row Name 05/29/23 0940          Sit-Stand Transfer    Sit-Stand Tompkins (Transfers) contact guard;verbal cues  -     Assistive Device (Sit-Stand Transfers) walker, front-wheeled  -[facility] Name 05/29/23 0940          Stand-Sit Transfer    Stand-Sit Tompkins (Transfers) contact guard;standby assist;verbal cues  -     Assistive Device (Stand-Sit Transfers) walker, front-wheeled  -[facility] Name 05/29/23 0940          Gait/Stairs (Locomotion)    Tompkins Level (Gait) contact guard;standby assist;verbal cues  -     Assistive Device (Gait) walker, front-wheeled  -     Distance in Feet (Gait) 200, 160, 40  -     Pattern (Gait) step-through  -     Deviations/Abnormal Patterns (Gait) crystal decreased;stride length decreased  -     Bilateral Gait Deviations forward flexed posture;heel strike decreased  -     Left Sided Gait Deviations weight shift ability decreased  -     Gait Assessment/Intervention amb 120 ft with QQTC, Min/CG.  DId well, reported no inc of pain.  Did note  min dec foot clearance L with cane.  -     Assistive Device (Stairs) cane, straight  -     Handrail Location (Stairs) right side (ascending)  -     Number of Steps (Stairs) 4  -KP     Ascending Technique (Stairs) step-to-step  -KP     Descending Technique (Stairs) step-to-step  -KP     Stairs, Safety Issues balance decreased during turns;sequencing ability decreased;weight-shifting ability decreased  -     Stairs, Impairments impaired balance;pain;strength decreased  -     Row Name 05/29/23 0940          Motor Skills    Additional Documentation Advanced Stepping/Walking Interventions (Group)  -     Row Name 05/29/23 0940          Hip (Therapeutic Exercise)    Hip Strengthening (Therapeutic Exercise) sitting;bilateral;aBduction;aDduction;marching while seated;15 repititions;red  -[facility] Name 05/29/23 0940          Knee (Therapeutic Exercise)    Knee Strengthening (Therapeutic Exercise) sitting;bilateral;flexion;LAQ (long arc quad);red;15 repititions  -     Row Name 05/29/23 0940          Ankle (Therapeutic Exercise)    Ankle AROM (Therapeutic Exercise) sitting;bilateral;dorsiflexion;plantarflexion  -     Row Name 05/29/23 0940          Advanced Stepping/Walking Interventions    Backward Walking (Stepping/Walking Interventions) 8 x 3  used vini bar d/t pain, CG  -     Side Stepping (Stepping/Walking Interventions) 8 x 3  used vini bar d/t pain, CG  -     Row Name 05/29/23 0940          Positioning and Restraints    Pre-Treatment Position sitting in chair/recliner  -     Post Treatment Position wheelchair  -     In Wheelchair sitting;call light within reach;encouraged to call for assist;exit alarm on  -           User Key  (r) = Recorded By, (t) = Taken By, (c) = Cosigned By    Initials Name Provider Type     Keena London, PT Physical Therapist              Wound 05/16/23 1831 Left lateral hip Incision (Active)   Dressing Appearance dry;intact 05/29/23 0838   Closure Cori 05/29/23  0838   Base dry;clean 05/29/23 0838   Periwound swelling 05/28/23 2135   Periwound Temperature warm 05/28/23 2135   Drainage Amount none 05/29/23 0838   Care, Wound cleansed with;sterile normal saline 05/29/23 0838   Dressing Care dressing changed 05/29/23 0838     Physical Therapy Education     Title: PT OT SLP Therapies (In Progress)     Topic: Physical Therapy (In Progress)     Point: Mobility training (In Progress)     Learning Progress Summary           Patient Eager, E,TB,D, NR by KP at 5/29/2023 0946    Acceptance, D,E, NR by EE at 5/27/2023 1255    Acceptance, E,D, NR by DP at 5/26/2023 0855    Comment: transfers and hand palcemenmt    Acceptance, E, VU by WN at 5/26/2023 0215    Acceptance, E,D, NR by DP at 5/25/2023 1451    Eager, E,D, NR by KP at 5/24/2023 1506    Acceptance, E,D, VU,DU by DP at 5/23/2023 1110    Acceptance, E,D, VU,DU by DP at 5/22/2023 1148    Acceptance, E, VU by WN at 5/20/2023 2214    Acceptance, E, NR by  at 5/20/2023 0927    Acceptance, E, VU by WN at 5/19/2023 2232    Acceptance, E,TB, VU,NR by MAURILIO at 5/18/2023 1206    Acceptance, E, VU by DARVIN at 5/17/2023 1524    Acceptance, E,TB, VU,NR by MAURILIO at 5/17/2023 1209                   Point: Home exercise program (In Progress)     Learning Progress Summary           Patient Eager, E,TB,D, NR by KP at 5/29/2023 0946    Acceptance, D,E, NR by EE at 5/27/2023 1255    Acceptance, E,D, NR by DP at 5/26/2023 0855    Comment: transfers and hand palcemenmt    Acceptance, E, VU by WN at 5/26/2023 0215    Acceptance, E,D, NR by DP at 5/25/2023 1451    Eager, E,D, NR by KP at 5/24/2023 1506    Acceptance, E,D, VU,DU by DP at 5/23/2023 1110    Acceptance, E,D, VU,DU by DP at 5/22/2023 1148    Acceptance, E, VU by WN at 5/20/2023 2214    Acceptance, E, NR by LH at 5/20/2023 0927    Acceptance, E, VU by WN at 5/19/2023 2232    Acceptance, E, VU by KN at 5/17/2023 1524                   Point: Body mechanics (In Progress)     Learning Progress Summary            Patient Acceptance, D,E, NR by EE at 5/27/2023 1255    Acceptance, E,D, NR by DP at 5/26/2023 0855    Comment: transfers and hand palcemenmt    Acceptance, E, VU by WN at 5/26/2023 0215    Acceptance, E,D, NR by DP at 5/25/2023 1451    Eager, E,D, NR by KP at 5/24/2023 1506    Acceptance, E,D, VU,DU by DP at 5/23/2023 1110    Acceptance, E,D, VU,DU by DP at 5/22/2023 1148    Acceptance, E, VU by WN at 5/20/2023 2214    Acceptance, E, NR by LH at 5/20/2023 0927    Acceptance, E, VU by WN at 5/19/2023 2232    Acceptance, E, VU by KN at 5/17/2023 1524                   Point: Precautions (In Progress)     Learning Progress Summary           Patient Acceptance, D,E, NR by EE at 5/27/2023 1255    Acceptance, E,D, NR by DP at 5/26/2023 0855    Comment: transfers and hand palcemenmt    Acceptance, E, VU by WN at 5/26/2023 0215    Acceptance, E,D, NR by DP at 5/25/2023 1451    Eager, E,D, NR by KP at 5/24/2023 1506    Acceptance, E,D, VU,DU by DP at 5/23/2023 1110    Acceptance, E,D, VU,DU by DP at 5/22/2023 1148    Acceptance, E, VU by WN at 5/20/2023 2214    Acceptance, E, NR by  at 5/20/2023 0927    Acceptance, E, VU by WN at 5/19/2023 2232    Acceptance, E, VU by KN at 5/17/2023 1524                               User Key     Initials Effective Dates Name Provider Type Discipline     06/16/21 -  Ashlie Arnold, PT Physical Therapist PT     06/16/21 -  Aicha Payne, PT Physical Therapist PT    EE 06/16/21 -  Angelika Echeverria, PT Physical Therapist PT    KP 06/16/21 -  Keena London, PT Physical Therapist PT    WN 08/23/22 -  Kamaljit Elias, RN Registered Nurse Nurse    DP 08/24/21 -  George Lehman, PT Physical Therapist PT    KN 08/02/22 -  Main De La Garza OT Occupational Therapist OT                PT Recommendation and Plan                          Time Calculation:      PT Charges     Row Name 05/29/23 0947             Time Calculation    Start Time 0930  -      Stop Time 1000  -      Time  Calculation (min) 30 min  -      PT Received On 05/29/23  -      PT - Next Appointment 05/30/23  -            User Key  (r) = Recorded By, (t) = Taken By, (c) = Cosigned By    Initials Name Provider Type    Keena Cuevas, PT Physical Therapist                Therapy Charges for Today     Code Description Service Date Service Provider Modifiers Qty    [id number]  PT THERAPEUTIC ACT EA 15 MIN 5/29/2023 Keena London, PT GP 1    [id number]  PT THER PROC EA 15 MIN 5/29/2023 Keena London, PT GP 1                   Keena London PT  5/29/2023

## 2023-05-29 NOTE — PROGRESS NOTES
[facility]     Progress Note    Patient Name: Radha Azevedo  : 1935  MRN: [id number]  Primary Care Physician:  Amina Jiang MD  Date of admission: 2023    Subjective   Subjective     Chief Complaint:   SDH  L femur fracture    History of Present Illness  Patient Reports not having BM yesterday but denies abd pain/n/v. Denies feeling urge to have BM today.  Denies f/c/soa/cp    Review of Systems    Objective   Objective     Vitals:   Temp:  [97.5 °F (36.4 °C)-98.1 °F (36.7 °C)] 97.8 °F (36.6 °C)  Heart Rate:  [65-68] 68  Resp:  [18-20] 18  BP: (114-163)/(62-79) 116/63    Physical Exam   General: The patient is well-developed, well-nourished and in no apparent distress. Easily awakened  HEENT: EOMI, hearing intact b/l. MMM.     Respiratory: Lungs CTAB, no wheezes or rhonchi     Cardiac: RRR, no m/r/g, no pedal edema     Abdomen: soft, NT abdomen , few BS   Psych: Appropriate affect and behavior         Neuro:  Mental Status:  AOx4   Speech: fluent without dysarthria   Good strength bilaterally     Result Review    Result Review:  I have personally reviewed the results from the time of this admission to 2023 15:08 EDT and agree with these findings:  []  Laboratory list / accordion  []  Microbiology  []  Radiology  []  EKG/Telemetry   []  Cardiology/Vascular   []  Pathology  []  Old records  []  Other:  Most notable findings include:   Results from last 7 days   Lab Units 23  0659 23  0733   WBC 10*3/mm3 4.40 5.24   HEMOGLOBIN g/dL 9.9* 10.8*   HEMATOCRIT % 30.0* 33.4*   PLATELETS 10*3/mm3 270 286     Results from last 7 days   Lab Units 23  0659 23  0733   SODIUM mmol/L 129* 130*   POTASSIUM mmol/L 4.2 4.2   CHLORIDE mmol/L 94* 95*   CO2 mmol/L 24.8 25.9   BUN mg/dL 25* 26*   CREATININE mg/dL 1.11 1.00   GLUCOSE mg/dL 104* 128*   CALCIUM mg/dL 8.9 9.5     Results from last 7 days   Lab Units 23  0659   TSH uIU/mL 167.000*   FREE T4 ng/dL 0.20*     Scheduled  Meds:amLODIPine, 10 mg, Oral, Q24H  Diclofenac Sodium, 2 g, Topical, 4x Daily  DULoxetine, 30 mg, Oral, BID  heparin (porcine), 5,000 Units, Subcutaneous, Q12H  levothyroxine, 25 mcg, Oral, Q AM  pantoprazole, 40 mg, Oral, Daily  QUEtiapine, 12.5 mg, Oral, Nightly  senna-docusate sodium, 2 tablet, Oral, BID      Continuous Infusions:   PRN Meds:.•  acetaminophen  •  aluminum-magnesium hydroxide-simethicone  •  bisacodyl  •  diphenhydrAMINE  •  hydrALAZINE        Assessment & Plan   Assessment / Plan     Brief Patient Summary:  Radha Azevedo is a 87 y.o. male    Active Hospital Problems:  Active Hospital Problems    Diagnosis    • **Subdural hematoma      Plan:   Impairments: ADLs, endurance, gait, balance, transfers, cognition, comprehension     Now admit for comprehensive acute inpatient rehabilitation .  This would be an interdisciplinary program with physical therapy 1 hour,  occupational therapy 1 hour, and speech therapy 1 hour, 5 days a week.  Rehabilitation nursing for carryover, monitoring of neurologic status, bowel and bladder, and skin  Ongoing physician follow-up.  Weekly team conferences.  Goals are to achieve a level of supervision with  mobility and self-care and improved ADLs.   Rehabilitation prognosis good.  Medical prognosis good.  Estimated length of stay is approximately 1-2 weeks , but is only an estimation.         #Functional Impairment 2/2 SDH and L femur fracture   -Initiate comprehensive rehab program consisting of PT/OT/SLP 3 hours/day, 5 days/week with medical management of above disorder, comorbidities, pain, bowels, and bladder.       -WB/activity status:?no restrictions, WBAT        #SDH  -No gross neurologic deficits on exam  -Will order CT Head for baseline or transfer imaging from [facility]  May 22- Order CT Head for follow-up SDH evaluation. Will send imaging to [facility] for a follow-up CT of the head today per message from [place] [facility].  To have study done with results  sent to Dr. Hollins - [facility]  May 99-[id number] CT-report reviewed.  Right subdural hematoma and left subdural hygroma, no shift.  neurosurgery obtaining copy of CT images on CD to review.  Unable to compare to outside films at [facility] due to issues with their PACS system.     May 24- Will discuss with [facility] today about results of CT scan  May 25-patient's son received a call from [place] [facility] yesterday who were pleased with the imaging of the follow-up CT of the head.  To have a repeat CT in 2 weeks when follows up in the office     #L displaced femur fracture  -S/p L IM Nail placement in L femur  -WBAT in LLE  May 24- Order L Femur Xray for post-op f/u of hardware  May 26- Stable Xray of L femur IM nail, will follow-up with Ortho as an outpatient      #Pain Control     -Prn Norco 5 q4h     May 22- Will DC Norco and change to Oxycodone 5 BID PRN. Patient was only getting Norco 3 times a day and is the same MME as Oxycodone 5 BID PRN. Will also add Tylenol 650 q6h PRN for intermittent pain relief in between Oxycodone doses.     #Hypothyroidism  - on admission  -Significant weight loss and depression over the past year  -Continue Synthroid 25 daily  -Will follow-up on TSH and T4 values tomorrow  May 24- Order TSH and T4 next week and change medication accordingly  May 29 - TSH significantly elevated at 167. Will increase from 25mcg to 75mcg and suspect he may need much higher dosage but monitor tolerance of med.        #Hyponatremia  -Na 128 on admission, 128-130 a NBH. -Fluid restriction to 1500ml daily  -Possibly related to hypothyroidism  May 20- increase synthroid to 50mcg daily and monitor TSH and Na+  -Will continue to monitor  May 22-sodium unchanged 129  May 26- Labs reviewed, stable Na level at 130, no significant S&S  May 29 - reviewed labs, low Na fairly stable     #HTN  -Continue Norvasc 5 daily  May 22- Increased Norvasc to 10 daily over the weekend. BP improved and  is more controlled, will continue to monitor.  May 26- BP stable, continue current regimen  May 29 - fluctuating BP's, continue current regimen     #Mood disorder  -Continue Cymbalta 30 BID and Seroquel 12.5 at bedtime   -PRN Xanax held because cause of his falls     #Constipation  May 26- Increase Doc Senna 2 BID and order MoM once today, will continue to monitor BM  May 27 - trial suppository today due to ongoing constipation, daily prn suppository ordered     #FEN     -nutrition: Cardiac diet, Regular texture, Liquid Consistency: Thin Liquid. 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  Has Had It at the Other Hospital As Well.  Not Noted during Any of Our Visits on Rounds.  On a video of the son has had has frequency they can see with a myoclonic jerk but not the amplitude or velocity. Medications Reviewed.  On Cymbalta 30 Mg Twice a Day.  Low-Dose Seroquel 12.5 Mg Daily.  Medications Unremarkable.  Will Monitor.       DVT prophylaxis:  Medical and mechanical DVT prophylaxis orders are present.    CODE STATUS:    Medical Intervention Limits: NO intubation (DNI)  Level Of Support Discussed With: Patient; Next of Kin (If No Surrogate)  Code Status (Patient has no pulse and is not breathing): CPR (Attempt to Resuscitate)  Medical Interventions (Patient has pulse or is breathing): Limited Support  Comments: Discussed with patient and son  Release to patient: Routine Release      Janet Mack MD

## 2023-05-29 NOTE — THERAPY TREATMENT NOTE
Inpatient Rehabilitation - Occupational Therapy Treatment Note    [facility]     Patient Name: Radha Azevedo  : 1935  MRN: [id number]    Today's Date: 2023                 Admit Date: 2023         ICD-10-CM ICD-9-CM   1. Impaired gait and mobility  R26.89 781.2       Patient Active Problem List   Diagnosis   • Absolute anemia   • Benign essential HTN   • Benign localized hyperplasia of prostate without urinary obstruction   • Type 2 diabetes mellitus with peripheral angiopathy   • Hypertension   • Hyperlipidemia   • Type 2 diabetes mellitus   • Diabetes mellitus type 2, noninsulin dependent   • Avitaminosis D   • Dyslipidemia   • Decrease in the ability to hear   • Personal history of colonic polyps   • Subdural hematoma       Past Medical History:   Diagnosis Date   • Colon polyps     Followed by Dr. Leo Jaeger at [facility].   • Coronary artery disease    • Diabetes mellitus    • Hyperlipidemia    • Hypertension        Past Surgical History:   Procedure Laterality Date   • CARDIAC CATHETERIZATION     • CAROTID STENT      patient denies, but family states he did have a stent placed   • COLONOSCOPY N/A 2014    Dr. Leo Jaeger at [facility].   • COLONOSCOPY N/A 2016    Dr. Leo Jaeger at [facility].   • COLONOSCOPY N/A 03/15/2023    2 TUBULAR ADENOMA POLYPS IN SIGMOID, BARIUM ENEMA ORDERED, DR. LEO JAEGER AT [facility]   • CORONARY ANGIOPLASTY     • FRACTURE SURGERY Left 2023    IM nailing for L femur fracture             IRF OT ASSESSMENT FLOWSHEET (last 12 hours)     IRF OT Evaluation and Treatment     Row Name 23 1057          OT Time and Intention    Document Type daily treatment  -KA     Mode of Treatment individual therapy;occupational therapy  -KA     Patient Effort good  -KA     Row Name 23 1051          General Information    Patient Profile Reviewed yes  -TRANG     Patient/Family/Caregiver Comments/Observations Pt seated in recliner in room upon arrival  -TRANG      Existing Precautions/Restrictions fall  -     Row Name 05/29/23 1053          Pain Assessment    Pretreatment Pain Rating 9/10  -KA     Posttreatment Pain Rating 9/10  -KA     Pain Location - Side/Orientation Bilateral  -     Pain Location - hand  -     Row Name 05/29/23 1053          Cognition/Psychosocial    Affect/Mental Status (Cognition) WFL  -     Orientation Status (Cognition) oriented x 3  -KA     Follows Commands (Cognition) follows two-step commands;verbal cues/prompting required  -     Personal Safety Interventions fall prevention program maintained;gait belt;nonskid shoes/slippers when out of bed  -     Row Name 05/29/23 1053          Bathing    Comment (Bathing) Deferred. Pt completly dressed upon arrival and requested to go to gym for ther ex  -[facility] Name 05/29/23 1053          Upper Body Dressing    Comment (Upper Body Dressing) ADL tasks completed prior to OT today. Pt [facility]IC dressed in clean clothes upon arrival  -     Row Name 05/29/23 1053          Functional Mobility    Functional Mobility- Comment Walked to  in room from recliner with CGA/SBA and use of rwx  -[facility] Name 05/29/23 1053          Sit-Stand Transfer    Sit-Stand [place] (Transfers) contact guard;verbal cues  -     Assistive Device (Sit-Stand Transfers) walker, front-wheeled  -[facility] Name 05/29/23 1053          Stand-Sit Transfer    Stand-Sit [place] (Transfers) contact guard;standby assist;verbal cues  -     Assistive Device (Stand-Sit Transfers) walker, front-wheeled  -     Row Name 05/29/23 1053          Motor Skills    Motor Skills motor control/coordination interventions  -     Motor Control/Coordination Interventions fine motor manipulation/dexterity activities  Performed tiny peg activity. Able to complete with cues for attention to detail when attempting to replicate visual. Also cues for in manipulation as pt attempted to use table to assist with turning pegs  -[facility] Name  05/29/23 1053          Shoulder (Therapeutic Exercise)    Shoulder Strengthening (Therapeutic Exercise) bilateral;flexion;extension;sitting  2.5# dowel zhao- perforemd multiple reps of hitting beach ball back to therapist while seated EOM. Rest break required  -     Row Name 05/29/23 1053          Elbow/Forearm (Therapeutic Exercise)    Elbow/Forearm Strengthening (Therapeutic Exercise) bilateral;flexion;extension;2 lb free weight;15 repititions;3 sets;supination;pronation  -     Row Name 05/29/23 1053          Hand (Therapeutic Exercise)    Hand Strengthening (Therapeutic Exercise) bilateral; strengthening  Worked on squeezing tennis ball to collect marbles. Initially pt demo gross grasp attempting to squeeze tennis ball. With cues and increased time he was able to squeeze with 3 point grasp and collect marbles with increased time  -     Row Name 05/29/23 1053          Balance    Static Sitting Balance independent  -     Dynamic Sitting Balance supervision  -     Static Standing Balance standby assist;supervision  -     Balance Interventions sitting  Pt seated on EOB and worked on core/UB strenghening using a dowel zhao to hit beach ball back to therapist in various directions. Requird SBA/superision for dynamic sitting balance  -     Row Name 05/29/23 1053          Positioning and Restraints    Pre-Treatment Position sitting in chair/recliner  -     Post Treatment Position wheelchair  -     In Wheelchair sitting;exit alarm on;with PT  -           User Key  (r) = Recorded By, (t) = Taken By, (c) = Cosigned By    Initials Name Effective Dates    Alecia Harp, OTONIEL 09/22/22 -                  Occupational Therapy Education     Title: PT OT SLP Therapies (In Progress)     Topic: Occupational Therapy (Done)     Point: ADL training (Done)     Description:   Instruct learner(s) on proper safety adaptation and remediation techniques during self care or transfers.   Instruct in proper use of  assistive devices.              Learning Progress Summary           Patient Acceptance, E, VU by WN at 5/26/2023 0215    Eager, E,D, NR by KP at 5/24/2023 1506    Acceptance, E, VU by WN at 5/20/2023 2214    Acceptance, E, VU by WN at 5/19/2023 2232    Acceptance, E, VU by KN at 5/17/2023 1524                   Point: Home exercise program (Done)     Description:   Instruct learner(s) on appropriate technique for monitoring, assisting and/or progressing therapeutic exercises/activities.              Learning Progress Summary           Patient Acceptance, E, VU by WN at 5/26/2023 0215    Acceptance, E, VU by AF at 5/25/2023 1510    Comment: educated on and discussed exs for home    Eager, E,D, NR by BHUPENDRA at 5/24/2023 1506    Acceptance, E, VU by WN at 5/20/2023 2214    Acceptance, E, VU by WN at 5/19/2023 2232    Acceptance, E, VU by KN at 5/17/2023 1524   Family Acceptance, E, VU by AF at 5/25/2023 1510    Comment: educated on and discussed exs for home                   Point: Precautions (Done)     Description:   Instruct learner(s) on prescribed precautions during self-care and functional transfers.              Learning Progress Summary           Patient Acceptance, E, VU by WN at 5/26/2023 0215    Acceptance, E, VU by AF at 5/25/2023 1510    Comment: educated on and discussed exs for home    Eager, E,D, NR by KP at 5/24/2023 1506    Acceptance, E, VU by WN at 5/20/2023 2214    Acceptance, E, VU by WN at 5/19/2023 2232    Acceptance, E, VU by KN at 5/17/2023 1524   Family Acceptance, E, VU by AF at 5/25/2023 1510    Comment: educated on and discussed exs for home                   Point: Body mechanics (Done)     Description:   Instruct learner(s) on proper positioning and spine alignment during self-care, functional mobility activities and/or exercises.              Learning Progress Summary           Patient Acceptance, E, VU by WN at 5/26/2023 0215    Eager, E,D, NR by BHUPENDRA at 5/24/2023 1506    Acceptance, E, VU  by WN at 5/20/2023 2214    Acceptance, E, VU by WN at 5/19/2023 2232    Acceptance, E, VU by KN at 5/17/2023 1524                               User Key     Initials Effective Dates Name Provider Type Discipline    AF 06/16/21 -  Bernarda Ricketts, OTR Occupational Therapist OT    KP 06/16/21 -  Keena London, PT Physical Therapist PT    WN 08/23/22 -  Kamaljit Elias, RN Registered Nurse Nurse    DARVIN 08/02/22 -  Main De La Garza OT Occupational Therapist OT                    OT Recommendation and Plan                         Time Calculation:      Time Calculation- OT     Row Name 05/29/23 1105             Time Calculation- OT    OT Start Time 0900  -KA      OT Stop Time 0930  -KA      OT Time Calculation (min) 30 min  -KA            User Key  (r) = Recorded By, (t) = Taken By, (c) = Cosigned By    Initials Name Provider Type    KA Alecia Dumont OT Occupational Therapist              Therapy Charges for Today     Code Description Service Date Service Provider Modifiers Qty    31024887218 HC OT THERAPEUTIC ACT EA 15 MIN 5/29/2023 Alecia Dumont, OT GO 1    91603211532 HC OT THER PROC EA 15 MIN 5/29/2023 Alecia Dumont, OT GO 1                   Alecia Dumont OT  5/29/2023

## 2023-05-30 PROCEDURE — 97530 THERAPEUTIC ACTIVITIES: CPT

## 2023-05-30 PROCEDURE — 63710000001 DIPHENHYDRAMINE PER 50 MG: Performed by: PHYSICAL MEDICINE & REHABILITATION

## 2023-05-30 PROCEDURE — 97116 GAIT TRAINING THERAPY: CPT

## 2023-05-30 PROCEDURE — 97110 THERAPEUTIC EXERCISES: CPT

## 2023-05-30 PROCEDURE — 97129 THER IVNTJ 1ST 15 MIN: CPT

## 2023-05-30 PROCEDURE — 97112 NEUROMUSCULAR REEDUCATION: CPT

## 2023-05-30 PROCEDURE — 97535 SELF CARE MNGMENT TRAINING: CPT

## 2023-05-30 PROCEDURE — 25010000002 HEPARIN (PORCINE) PER 1000 UNITS: Performed by: PHYSICAL MEDICINE & REHABILITATION

## 2023-05-30 PROCEDURE — 97130 THER IVNTJ EA ADDL 15 MIN: CPT

## 2023-05-30 RX ADMIN — DOCUSATE SODIUM 50MG AND SENNOSIDES 8.6MG 2 TABLET: 8.6; 5 TABLET, FILM COATED ORAL at 20:41

## 2023-05-30 RX ADMIN — LEVOTHYROXINE SODIUM 75 MCG: 0.07 TABLET ORAL at 05:44

## 2023-05-30 RX ADMIN — OXYCODONE HYDROCHLORIDE 5 MG: 5 TABLET ORAL at 20:40

## 2023-05-30 RX ADMIN — HEPARIN SODIUM 5000 UNITS: 5000 INJECTION INTRAVENOUS; SUBCUTANEOUS at 06:58

## 2023-05-30 RX ADMIN — ACETAMINOPHEN 650 MG: 325 TABLET ORAL at 07:00

## 2023-05-30 RX ADMIN — PANTOPRAZOLE SODIUM 40 MG: 40 TABLET, DELAYED RELEASE ORAL at 06:59

## 2023-05-30 RX ADMIN — AMLODIPINE BESYLATE 10 MG: 10 TABLET ORAL at 06:59

## 2023-05-30 RX ADMIN — DULOXETINE HYDROCHLORIDE 30 MG: 30 CAPSULE, DELAYED RELEASE ORAL at 06:59

## 2023-05-30 RX ADMIN — DICLOFENAC SODIUM 2 G: 10 GEL TOPICAL at 20:44

## 2023-05-30 RX ADMIN — DULOXETINE HYDROCHLORIDE 30 MG: 30 CAPSULE, DELAYED RELEASE ORAL at 20:41

## 2023-05-30 RX ADMIN — HEPARIN SODIUM 5000 UNITS: 5000 INJECTION INTRAVENOUS; SUBCUTANEOUS at 20:41

## 2023-05-30 RX ADMIN — DOCUSATE SODIUM 50MG AND SENNOSIDES 8.6MG 2 TABLET: 8.6; 5 TABLET, FILM COATED ORAL at 06:58

## 2023-05-30 RX ADMIN — DIPHENHYDRAMINE HYDROCHLORIDE 25 MG: 25 CAPSULE ORAL at 20:41

## 2023-05-30 RX ADMIN — QUETIAPINE FUMARATE 12.5 MG: 25 TABLET ORAL at 20:41

## 2023-05-30 RX ADMIN — DICLOFENAC SODIUM 2 G: 10 GEL TOPICAL at 07:01

## 2023-05-30 NOTE — THERAPY TREATMENT NOTE
Inpatient Rehabilitation - Speech Language Pathology Treatment Note    [facility]     Patient Name: Radha Azevedo  : 1935  MRN: [id number]    Today's Date: 2023                   Admit Date: 2023       Visit Dx:      ICD-10-CM ICD-9-CM   1. Impaired gait and mobility  R26.89 781.2       Patient Active Problem List   Diagnosis   • Absolute anemia   • Benign essential HTN   • Benign localized hyperplasia of prostate without urinary obstruction   • Type 2 diabetes mellitus with peripheral angiopathy   • Hypertension   • Hyperlipidemia   • Type 2 diabetes mellitus   • Diabetes mellitus type 2, noninsulin dependent   • Avitaminosis D   • Dyslipidemia   • Decrease in the ability to hear   • Personal history of colonic polyps   • Subdural hematoma       Past Medical History:   Diagnosis Date   • Colon polyps     Followed by Dr. Leo Jaeger at [facility].   • Coronary artery disease    • Diabetes mellitus    • Hyperlipidemia    • Hypertension        Past Surgical History:   Procedure Laterality Date   • CARDIAC CATHETERIZATION     • CAROTID STENT      patient denies, but family states he did have a stent placed   • COLONOSCOPY N/A 2014    Dr. Leo Jaeger at [facility].   • COLONOSCOPY N/A 2016    Dr. Leo Jaeger at [facility].   • COLONOSCOPY N/A 03/15/2023    2 TUBULAR ADENOMA POLYPS IN SIGMOID, BARIUM ENEMA ORDERED, DR. LEO JAEGER AT [facility]   • CORONARY ANGIOPLASTY     • FRACTURE SURGERY Left 2023    IM nailing for L femur fracture       SLP Recommendation and Plan                                                            SLP EVALUATION (last 72 hours)     SLP SLC Evaluation     Row Name 23 0930 23 1027                Communication Assessment/Intervention    Document Type therapy note (daily note)  -SR therapy note (daily note)  -JT       Subjective Information no complaints  -SR complains of;pain  all over  -JT       Patient Observations alert;cooperative;agree to therapy  -SR  alert;cooperative;agree to therapy;lethargic  -JT       Patient/Family/Caregiver Comments/Observations -- pt sitting in recliner in room  -JT       Patient Effort good  -SR good  -JT       Symptoms Noted During/After Treatment none  -SR none  -JT          Pain Scale: Numbers Pre/Post-Treatment    Pretreatment Pain Rating 0/10 - no pain  -SR 4/10  -JT       Posttreatment Pain Rating 0/10 - no pain  -SR 1/10  -JT             User Key  (r) = Recorded By, (t) = Taken By, (c) = Cosigned By    Initials Name Effective Dates    JT Azul Fowler Cristal 06/16/21 -     SR Hope Mary CCC-SLP 11/10/22 -                    EDUCATION    The patient has been educated in the following areas:       Cognitive Impairment.             SLP GOALS     Row Name 05/30/23 0930 05/29/23 1000          Attention Goal 1 (SLP)    Improve Attention by Goal 1 (SLP) 80%;complete selective attention task;complete sustained attention task;with minimal cues (75-90%)  -SR --     Time Frame (Attention Goal 1, SLP) by discharge  -SR --     Progress (Attention Goal 1, SLP) 80%;with minimal cues (75-90%)  -SR --     Progress/Outcomes (Attention Goal 1, SLP) goal ongoing  -SR goal ongoing  -JT     Comment (Attention Goal 1, SLP) -- Connect the dots alphabetical 90% w/mod-max cues  -JT        Memory Skills Goal 1 (SLP)    Improve Memory Skills Through Goal 1 (SLP) use written schedule;use memory strategies;recall details of the day;80%;with minimal cues (75-90%)  -SR --     Time Frame (Memory Skills Goal 1, SLP) by discharge  -SR --     Progress/Outcomes (Memory Skills Goal 1, SLP) goal ongoing  -SR --     Comment (Memory Skills Goal 1, SLP) Patient followed 2 step-4 component written directions with 80% acc given MOD-MAX cues. Demonstrated increased difficulty with working memory/attention. Accuracy increased with verbal directions.  -SR --        Reasoning Goal 1 (SLP)    Improve Reasoning Through Goal 1 (SLP) complete deductive reasoning  task;complete mental flexibility task;80%;independently (over 90% accuracy)  -SR --     Time Frame (Reasoning Goal 1, SLP) by discharge  -SR --     Progress/Outcomes (Reasoning Goal 1, SLP) goal ongoing  -SR goal ongoing  -JT     Comment (Reasoning Goal 1, SLP) -- recall daily activity independently  -JT        Executive Functional Skills Goal 1 (SLP)    Improve Executive Function Skills Goal 1 (SLP) home management activity;time management activity;80%;independently (over 90% accuracy)  -SR --     Time Frame (Executive Function Skills Goal 1, SLP) by discharge  -SR --     Progress/Outcomes (Executive Function Skills Goal 1, SLP) continuing progress toward goal  -SR goal ongoing  -JT     Comment (Executive Function Skills Goal 1, SLP) Medication management; Given written directions, patient sorted 10 mock medications by day/time with 40% acc given NO cues; 80% acc given MIN cues. Activity targeted attention, working memory, and organization. Patient required verbal cues for attn to detail. Extended time provided to complete task.  -SR naming 8 fruits w/min cues  -JT           User Key  (r) = Recorded By, (t) = Taken By, (c) = Cosigned By    Initials Name Provider Type    JT Azul Fowler Speech and Language Pathologist     Hope Mary CCC-SLP Speech and Language Pathologist                            Time Calculation:        Time Calculation- SLP     Row Name 05/30/23 1107             Time Calculation- SLP    SLP Start Time 0930  -SR      SLP Stop Time 1030  -SR      SLP Time Calculation (min) 60 min  -SR            User Key  (r) = Recorded By, (t) = Taken By, (c) = Cosigned By    Initials Name Provider Type     Hope Mary CCC-SLP Speech and Language Pathologist                  Therapy Charges for Today     Code Description Service Date Service Provider Modifiers Qty    81909647869 HC ST DEV OF COGN SKILLS INITIAL 15 MIN 5/30/2023 Hope Mary CCC-SLP  1    37389583366 HC ST DEV OF COGN  SKILLS EACH ADDT'L 15 MIN 5/30/2023 Hope Mary, ZACH-SLP  3                           KATIE Victoria  5/30/2023

## 2023-05-30 NOTE — PLAN OF CARE
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  But finally able to rest.  All meds whole w/thins.  Roxicodone and Benadryl given at bedtime for pain and itching.  Cont of bb. Up to bathroom assist x1 w/walker

## 2023-05-30 NOTE — PROGRESS NOTES
"Nutrition Services    Patient Name:  Radha Azevedo  YOB: 1935  MRN: [id number]  Admit Date:  5/16/2023      Assessment Date:  05/30/23    FOLLOW UP NOTE - CLINICAL NUTRITION    Comments:  Since last review, pt was given trial suppository 5/27 with success. Last BM 5/29. Visited pt in room who reported continued good intakes, c/w EMR review of % PO at recent meals. Pt again was unaware of fluid restriction at this visit (advised him of restriction at visit last week), with water bottle, water cup and water pitcher on table. Hyponatremia persists: Na 129, stable from last review. S/w RN of pt's potential low compliance; RN to address/limit fluids offered throughout day. Expected D/c this Thursday 6/1.     Recommendations:    1. Continue fluid restriction, as appropriate. May also consider a salt tablet or sodium replacement given persistent hyponatremia.   2. Continue to encourage adequate PO intakes.     RD will continue to follow-up, per protocol.    Encounter Information        Reason for Encounter Follow-up   Current Issues Subdural hematoma s/p fall causing L femur fracture     Current Nutrition Orders & Evaluation of Intake       Oral Nutrition     Current PO Diet Diet: Regular/House Diet, Fluid Restriction (240 mL/tray) Diets; 1500 mL/day; Texture: Regular Texture (IDDSI 7); Fluid Consistency: Thin (IDDSI 0)   Supplement    PO Evaluation    % PO Intake/# of days %   Factors Affecting Intake  decreased appetite     Anthropometrics         Height   Weight Height: 152.5 cm (60.02\")  Weight: 57.6 kg (126 lb 15.8 oz) (05/17/23 1254)   BMI kg/m2 Body mass index is 24.78 kg/m².  Normal/Healthy (18.4 - 24.9)   Weight trend No new weight available     Physical Findings          Physical Appearance alert, oriented   Oral/Mouth Cavity tooth or teeth missing   Edema  1+ (trace)   Gastrointestinal non-distended , normoactive, last bowel movement:5/29   Skin  surgical incision L hip "   Tubes/Drains/Lines none     Labs       Pertinent Labs Reviewed, listed below     Results from last 7 days   Lab Units 05/29/23  0659 05/26/23  0733   SODIUM mmol/L 129* 130*   POTASSIUM mmol/L 4.2 4.2   CHLORIDE mmol/L 94* 95*   CO2 mmol/L 24.8 25.9   BUN mg/dL 25* 26*   CREATININE mg/dL 1.11 1.00   CALCIUM mg/dL 8.9 9.5   GLUCOSE mg/dL 104* 128*     Results from last 7 days   Lab Units 05/29/23  0659   HEMOGLOBIN g/dL 9.9*   HEMATOCRIT % 30.0*   WBC 10*3/mm3 4.40     Results from last 7 days   Lab Units 05/29/23  0659 05/26/23  0733   PLATELETS 10*3/mm3 270 286     SARS-CoV-2, JEET   Date Value Ref Range Status   05/22/2022 Negative Negative Final     Comment:     SARS-CoV-2, JEET (COVID-19) EUA    This is a molecular, nucleic acid amplification (NAAT) test performed by RT-PCR, (also known as PCR).      Negative (Not-Detected) results do not preclude COVID-19 infection and should not be used as the sole basis for treatment or other patient management decisions. Consider retesting of negative patients if clinical features are suspicious for COVID-19 infection, and other common causative agents have been ruled out.    This test has not been FDA cleared or approved. This test has been authorized by the FDA under an EUA for use by authorized laboratories. This test is only authorized for the duration of time the declaration that circumstances exist justifying the authorization of the emergency use of in vitro diagnostic tests for detection of SARS-CoV-2 virus and/or diagnosis of COVID-19 infection under section 564(b)(1) of the Act, 21 U.S.C. 360bbb-3(b)(1), unless the authorization is terminated or revoked sooner.    This laboratory is certified under the Clinical Laboratory Improvement Amendments (CLIA) Act, to perform, at minimum, Waived Testing.  This test's performance characteristics have been verified. These results are not intended to be used as the sole means for clinical diagnosis or patient management  decisions.    Testing performed using the Cepheid Xpert Xpress SARS-CoV-2/Flu/RSV or the Cepheid Xpert Xpress SARS-CoV-2/Flu/RSVplus, PCR Testing    Patients-Please access the Cepheid Xpert Xpress SARS-CoV-2/Flu/RSV and the Cepheid Xpert Xpress SARS-CoV-2/Flu/RSVplus FDA FACT sheet at the following  address: [email or web address]   Providers-Please access the Cepheid Xpert Xpress SARS-CoV-2/Flu/RSV and the Cepheid Xpert Xpress SARS-CoV-2/Flu/RSV plus FDA FACT Sheet at the following web  address: [email or web address]     Lab Results   Component Value Date    HGBA1C 6.00 (H) 06/03/2020          Medications           Scheduled Medications amLODIPine, 10 mg, Oral, Q24H  Diclofenac Sodium, 2 g, Topical, 4x Daily  DULoxetine, 30 mg, Oral, BID  heparin (porcine), 5,000 Units, Subcutaneous, Q12H  levothyroxine, 75 mcg, Oral, Q AM  pantoprazole, 40 mg, Oral, Daily  QUEtiapine, 12.5 mg, Oral, Nightly  senna-docusate sodium, 2 tablet, Oral, BID       Infusions     PRN Medications •  acetaminophen  •  aluminum-magnesium hydroxide-simethicone  •  bisacodyl  •  diphenhydrAMINE  •  hydrALAZINE  •  oxyCODONE     PLAN OF CARE  Intervention Goal        Intervention Goal(s) Maintain nutrition status, Maintain intake, Maintain weight and No significant weight loss     Nutrition Intervention        RD Action Advise available snack, Encourage intake, Follow Tx Progress, Care plan reviewed and Other (comment):Advised fluid restriction (1500ml/d)     Prescription        Diet Prescription    Supplement Prescription    EN/PN Prescription    Prescription Ordered Continue same per protocol   --  Monitor/Evaluation        Monitor Per protocol, PO intake, Pertinent labs, Weight, Skin status, GI status, Symptoms   Discharge Plan Pending clinical course   Education Will educate as needed       Electronically signed by:  Alyssa Bullard RD  05/30/23 15:50 EDT

## 2023-05-30 NOTE — PROGRESS NOTES
Inpatient Rehabilitation Plan of Care Note    Plan of Care  Care Plan Reviewed - No updates at this time.    Psychosocial    [RN] Coping/Adjustment(Active)  Current Status(05/30/2023): At risk for poor coping due to recent medical  issues.  Weekly Goal(06/06/2023): Identify progress in functional status.  Discharge Goal: Demonstrates healthy coping skills.    Performed Intervention(s)  Medication.  Group therapy.  therapeutic environmental set-up      Safety    [RN] Potential for Injury(Active)  Current Status(05/30/2023): Hx falls. At risk for falling in the hospital.  Weekly Goal(06/06/2023): Cue to use the call bell.  Discharge Goal: Patient/ family will be aware of the risk of falling in the home  setting.    Performed Intervention(s)  Items within reach.  Safety rounds.  Wheelchair, bed, and chair alarms.      Sphincter Control    [RN] Bladder Management(Active)  Current Status(05/30/2023): 100% continent of bladder.  Weekly Goal(06/06/2023): Remains 100% continent of bladder.  Discharge Goal: Goes home continent of bladder.    [RN] Bowel Management(Active)  Current Status(05/30/2023): 100% continent of bowel.  Weekly Goal(06/06/2023): Remains continent of bowel.  Discharge Goal: goes home continent of bowel.    Performed Intervention(s)  Offer restroom.  Bladder training program.  Use incontinence products.  Encourage appropriate diet.  Encourage fluid intake.      Body Systems    [RN] Integumentary(Active)  Current Status(05/30/2023): Pt has laceration to scalp and incision to L hip.  Weekly Goal(06/06/2023): Skin will remain free from s/s infection .  Discharge Goal: Scalp will be healed and L hip will have no s/s infection.    Performed Intervention(s)  Skin assessment q shift and prn.  Wound care as ordered.    Signed by: Yelitza Talavera RN

## 2023-05-30 NOTE — THERAPY TREATMENT NOTE
Inpatient Rehabilitation - Physical Therapy Treatment Note       [facility]     Patient Name: Radha Azevedo  : 1935  MRN: [id number]    Today's Date: 2023                    Admit Date: 2023      Visit Dx:     ICD-10-CM ICD-9-CM   1. Impaired gait and mobility  R26.89 781.2       Patient Active Problem List   Diagnosis   • Absolute anemia   • Benign essential HTN   • Benign localized hyperplasia of prostate without urinary obstruction   • Type 2 diabetes mellitus with peripheral angiopathy   • Hypertension   • Hyperlipidemia   • Type 2 diabetes mellitus   • Diabetes mellitus type 2, noninsulin dependent   • Avitaminosis D   • Dyslipidemia   • Decrease in the ability to hear   • Personal history of colonic polyps   • Subdural hematoma       Past Medical History:   Diagnosis Date   • Colon polyps     Followed by Dr. Leo Jaeger at [facility].   • Coronary artery disease    • Diabetes mellitus    • Hyperlipidemia    • Hypertension        Past Surgical History:   Procedure Laterality Date   • CARDIAC CATHETERIZATION     • CAROTID STENT      patient denies, but family states he did have a stent placed   • COLONOSCOPY N/A 2014    Dr. Leo Jaeger at [facility].   • COLONOSCOPY N/A 2016    Dr. Leo Jaeger at [facility].   • COLONOSCOPY N/A 03/15/2023    2 TUBULAR ADENOMA POLYPS IN SIGMOID, BARIUM ENEMA ORDERED, DR. LEO JAEGER AT [facility]   • CORONARY ANGIOPLASTY     • FRACTURE SURGERY Left 2023    IM nailing for L femur fracture       PT ASSESSMENT (last 12 hours)     IRF PT Evaluation and Treatment     Row Name 23 1251 23 0800       PT Time and Intention    Document Type daily treatment (P)   -NM daily treatment  -AE    Mode of Treatment physical therapy (P)   -NM individual therapy;physical therapy  -AE    Patient/Family/Caregiver Comments/Observations pt standing in front of recliner; showing no signs of acute distress (P)   -NM pt sitting up in recliner prior to AM session,  eager to DC thursday  -AE    Row Name 05/30/23 1251 05/30/23 0800       General Information    Patient Profile Reviewed yes (P)   -NM yes  -AE    General Observations of Patient R side neglect; confused (P)   -NM forgetful, frequently confused, significant R sided neglect/inattention  -AE    Existing Precautions/Restrictions fall;other (see comments) (P)   -NM fall;other (see comments)  WBAT LLE  -AE    Comment, General Information impulsive (P)   -NM impulsive  -AE    Row Name 05/30/23 1251 05/30/23 0800       Pain Assessment    Pretreatment Pain Rating 1/10 (P)   -NM 0/10 - no pain  -AE    Posttreatment Pain Rating 5/10 (P)   -NM 0/10 - no pain  -AE    Pain Location - Side/Orientation Bilateral (P)   -NM Left  -AE    Pain Location upper (P)   -NM proximal  -AE    Pain Location - shoulder;hip (P)   -NM hip  -AE    Pre/Posttreatment Pain Comment distal lower extremities (P)   -NM --    Row Name 05/30/23 1251 05/30/23 0800       Cognition/Psychosocial    Affect/Mental Status (Cognition) WFL;confused (P)   -NM WFL;confused  -AE    Orientation Status (Cognition) oriented x 3 (P)   -NM oriented x 3  -AE    Follows Commands (Cognition) follows two-step commands;verbal cues/prompting required (P)   -NM follows two-step commands;verbal cues/prompting required  -AE    Personal Safety Interventions fall prevention program maintained (P)   -NM fall prevention program maintained;gait belt;muscle strengthening facilitated;nonskid shoes/slippers when out of bed;supervised activity  -AE    Comment, Cognition VC for attention (P)   -NM --    Row Name 05/30/23 1251 05/30/23 0800       Bed-Chair Transfer    Bed-Chair Titus (Transfers) contact guard (P)   -NM contact guard;minimum assist (75% patient effort)  -AE    Assistive Device (Bed-Chair Transfers) walker, front-wheeled;wheelchair (P)   -NM walker, front-wheeled;walker, 4-wheeled  -AE    Comment, (Bed-Chair Transfer) -- needs cues for close proximity prior to sitting   -AE    Row Name 05/30/23 1251 05/30/23 0800       Chair-Bed Transfer    Chair-Bed Lyon (Transfers) contact guard (P)   -NM contact guard;minimum assist (75% patient effort)  -AE    Assistive Device (Chair-Bed Transfers) walker, front-wheeled;wheelchair (P)   -NM walker, front-wheeled;walker, 4-wheeled  -AE    Row Name 05/30/23 1251 05/30/23 0800       Sit-Stand Transfer    Sit-Stand Lyon (Transfers) standby assist;contact guard (P)   -NM standby assist  -AE    Assistive Device (Sit-Stand Transfers) walker, front-wheeled;wheelchair (P)   -NM walker, front-wheeled;walker, 4-wheeled;wheelchair  -AE    Comment, (Sit-Stand Transfer) VC for hand placement (P)   -NM --    Row Name 05/30/23 1251 05/30/23 0800       Stand-Sit Transfer    Stand-Sit Lyon (Transfers) standby assist;contact guard (P)   -NM standby assist;contact guard  -AE    Assistive Device (Stand-Sit Transfers) walker, front-wheeled;wheelchair (P)   -NM wheelchair;walker, front-wheeled  -AE    Comment, (Stand-Sit Transfer) VC for hand placement (P)   -NM --    Row Name 05/30/23 1251 05/30/23 0800       Gait/Stairs (Locomotion)    Gait/Stairs Locomotion gait/ambulation assistive device (P)   -NM --    Lyon Level (Gait) standby assist (P)   -NM contact guard;minimum assist (75% patient effort)  -AE    Assistive Device (Gait) walker, front-wheeled (P)   -NM walker, front-wheeled;walker, 4-wheeled  -AE    Distance in Feet (Gait) -- 4WW 200ft, FWW 200ft  -AE    Pattern (Gait) step-through (P)   -NM step-through  -AE    Deviations/Abnormal Patterns (Gait) crystal decreased;stride length decreased (P)   -NM crystal decreased;stride length decreased  -AE    Bilateral Gait Deviations forward flexed posture;heel strike decreased (P)   -NM forward flexed posture;heel strike decreased  -AE    Left Sided Gait Deviations weight shift ability decreased (P)   -NM weight shift ability decreased  -AE    Gait Assessment/Intervention with  front-wheeled walker, some difficulty  keeping close proximity tothe walker. Pt needs VC to keep close to the walker. Pt requires VC to scan the room and identify various obstacles along the path. Pt presents with significant R innatention. (P)   -NM with rollator, some difficulty keeping close proximity and falls into anteropulsion. significant R inattention. constant cues for visual scanning and obstacle avoidance. appeared to have less anteropulsion and toe walking with FWW  -AE    Comment, (Gait/Stairs) turning and weaving around giant cones with emphasis on keeping close proximity. Pt was able to demonstrate good  obstacle avoidance on R side with minimal cueing (P)   -NM --    Row Name 05/30/23 1251          Balance    Balance Assessment standing static balance (P)   -NM     Static Standing Balance standby assist;contact guard (P)   -NM     Comment, Balance semi tandem stance, no UE support 3x10 w CG/min. (P)   -NM     Row Name 05/30/23 1251          Ankle (Therapeutic Exercise)    Ankle (Therapeutic Exercise) strengthening exercise (P)   -NM     Ankle Strengthening (Therapeutic Exercise) bilateral;standing;plantarflexion;10 repetitions;2 sets (P)   added 10 sec isometric hold on last rep  -NM     Row Name 05/30/23 1251          Positioning and Restraints    Pre-Treatment Position standing in room (P)   -NM     Post Treatment Position wheelchair (P)   -NM     In Wheelchair sitting;call light within reach;encouraged to call for assist;exit alarm on (P)   -NM           User Key  (r) = Recorded By, (t) = Taken By, (c) = Cosigned By    Initials Name Provider Type    AE Mervat Light, PT Physical Therapist    NM Jalen Philip, JACK Student PT Student              Wound 05/16/23 1831 Left lateral hip Incision (Active)   Dressing Appearance dry;intact 05/30/23 0701   Closure Staples 05/30/23 0701   Base dry;clean 05/30/23 0701   Periwound swelling 05/29/23 2000   Periwound Temperature warm 05/29/23 2000    Drainage Amount none 05/30/23 0701   Care, Wound cleansed with;sterile normal saline 05/30/23 0701   Dressing Care dressing changed 05/30/23 0701     Physical Therapy Education     Title: PT OT SLP Therapies (In Progress)     Topic: Physical Therapy (In Progress)     Point: Mobility training (In Progress)     Learning Progress Summary           Patient Eager, E,TB,D, NR by KP at 5/29/2023 0946    Acceptance, D,E, NR by EE at 5/27/2023 1255    Acceptance, E,D, NR by DP at 5/26/2023 0855    Comment: transfers and hand palcemenmt    Acceptance, E, VU by WN at 5/26/2023 0215    Acceptance, E,D, NR by DP at 5/25/2023 1451    Eager, E,D, NR by BHUPENDRA at 5/24/2023 1506    Acceptance, E,D, VU,DU by DP at 5/23/2023 1110    Acceptance, E,D, VU,DU by DP at 5/22/2023 1148    Acceptance, E, VU by WN at 5/20/2023 2214    Acceptance, E, NR by ABDIEL at 5/20/2023 0927    Acceptance, E, VU by WN at 5/19/2023 2232    Acceptance, E,TB, VU,NR by MAURILIO at 5/18/2023 1206    Acceptance, E, VU by DARVIN at 5/17/2023 1524    Acceptance, E,TB, VU,NR by MAURILIO at 5/17/2023 1209                   Point: Home exercise program (In Progress)     Learning Progress Summary           Patient Eager, E,TB,D, NR by KP at 5/29/2023 0946    Acceptance, D,E, NR by EE at 5/27/2023 1255    Acceptance, E,D, NR by DP at 5/26/2023 0855    Comment: transfers and hand palcemenmt    Acceptance, E, VU by WN at 5/26/2023 0215    Acceptance, E,D, NR by DP at 5/25/2023 1451    Eager, E,D, NR by BHUPENDRA at 5/24/2023 1506    Acceptance, E,D, VU,DU by DP at 5/23/2023 1110    Acceptance, E,D, VU,DU by DP at 5/22/2023 1148    Acceptance, E, VU by WN at 5/20/2023 2214    Acceptance, E, NR by LH at 5/20/2023 0927    Acceptance, E, VU by WN at 5/19/2023 2232    Acceptance, E, VU by KN at 5/17/2023 1524                   Point: Body mechanics (In Progress)     Learning Progress Summary           Patient Acceptance, D,E, NR by EE at 5/27/2023 1255    Acceptance, E,D, NR by DP at 5/26/2023 0855     Comment: transfers and hand palcemenmt    Acceptance, E, VU by WN at 5/26/2023 0215    Acceptance, E,D, NR by DP at 5/25/2023 1451    Eager, E,D, NR by KP at 5/24/2023 1506    Acceptance, E,D, VU,DU by DP at 5/23/2023 1110    Acceptance, E,D, VU,DU by DP at 5/22/2023 1148    Acceptance, E, VU by WN at 5/20/2023 2214    Acceptance, E, NR by  at 5/20/2023 0927    Acceptance, E, VU by WN at 5/19/2023 2232    Acceptance, E, VU by KN at 5/17/2023 1524                   Point: Precautions (In Progress)     Learning Progress Summary           Patient Acceptance, D,E, NR by  at 5/27/2023 1255    Acceptance, E,D, NR by DP at 5/26/2023 0855    Comment: transfers and hand palcemenmt    Acceptance, E, VU by WN at 5/26/2023 0215    Acceptance, E,D, NR by DP at 5/25/2023 1451    Eager, E,D, NR by KP at 5/24/2023 1506    Acceptance, E,D, VU,DU by DP at 5/23/2023 1110    Acceptance, E,D, VU,DU by DP at 5/22/2023 1148    Acceptance, E, VU by WN at 5/20/2023 2214    Acceptance, E, NR by  at 5/20/2023 0927    Acceptance, E, VU by WN at 5/19/2023 2232    Acceptance, E, VU by KN at 5/17/2023 1524                               User Key     Initials Effective Dates Name Provider Type Discipline     06/16/21 -  Ashlie Arnold, PT Physical Therapist PT     06/16/21 -  Aicha Payne, PT Physical Therapist PT    EE 06/16/21 -  Angelika Echeverria, PT Physical Therapist PT     06/16/21 -  Keena London, PT Physical Therapist PT    WN 08/23/22 -  Kamaljit Elias, RN Registered Nurse Nurse    DP 08/24/21 -  George Lehman, PT Physical Therapist PT    KN 08/02/22 -  Main De La Garza, OT Occupational Therapist OT                PT Recommendation and Plan                          Time Calculation:      PT Charges     Row Name 05/30/23 1438             Time Calculation    Start Time 1230 (P)   -NM      Stop Time 1300 (P)   -NM      Time Calculation (min) 30 min (P)   -NM      PT Received On 05/30/23 (P)   -NM      PT - Next Appointment  05/31/23 (P)   -NM         Time Calculation- PT    Total Timed Code Minutes- PT 30 minute(s) (P)   -NM            User Key  (r) = Recorded By, (t) = Taken By, (c) = Cosigned By    Initials Name Provider Type    Jalen Quinones, PT Student PT Student                Therapy Charges for Today     Code Description Service Date Service Provider Modifiers Qty    79214884988 HC PT NEUROMUSC RE EDUCATION EA 15 MIN 5/30/2023 Jalen Philip, PT Student GP 1    21371592701 HC GAIT TRAINING EA 15 MIN 5/30/2023 Jalen Philip, PT Student GP 1                   Jalen Philip PT Student  5/30/2023

## 2023-05-30 NOTE — PROGRESS NOTES
[facility]     Progress Note    Patient Name: Radha Azevedo  : 1935  MRN: [id number]  Primary Care Physician:  Amina Jiang MD  Date of admission: 2023    Subjective   Subjective     Chief Complaint:   SDH  L femur fracture    History of Present Illness    Left leg pains improved.  Tolerating activities.  Has some occasional mild tremor that he states was present before his recent hospitalization.  Was previously told it was not Parkinson's    Review of Systems    Objective   Objective     Vitals:   Temp:  [97.8 °F (36.6 °C)-98.5 °F (36.9 °C)] 97.8 °F (36.6 °C)  Heart Rate:  [66-69] 66  Resp:  [18] 18  BP: (115-128)/(60-73) 117/60    Physical Exam   General: The patient is well-developed, well-nourished and in no apparent distress.    HEENT:    Respiratory: Lungs CTAB, no wheezes or rhonchi     Cardiac: RRR, no m/r/g, no pedal edema     Abdomen: soft, NT abdomen , few BS   Psych: Appropriate affect and behavior         Neuro: No tremor seen during visit  Mental Status:  AOx4   Speech: fluent without dysarthria   Good strength bilaterally     Result Review       Most notable findings include:   Results from last 7 days   Lab Units 23  0659 23  0733   WBC 10*3/mm3 4.40 5.24   HEMOGLOBIN g/dL 9.9* 10.8*   HEMATOCRIT % 30.0* 33.4*   PLATELETS 10*3/mm3 270 286     Results from last 7 days   Lab Units 23  0659 23  0733   SODIUM mmol/L 129* 130*   POTASSIUM mmol/L 4.2 4.2   CHLORIDE mmol/L 94* 95*   CO2 mmol/L 24.8 25.9   BUN mg/dL 25* 26*   CREATININE mg/dL 1.11 1.00   GLUCOSE mg/dL 104* 128*   CALCIUM mg/dL 8.9 9.5     Results from last 7 days   Lab Units 23  0659   TSH uIU/mL 167.000*   FREE T4 ng/dL 0.20*     Scheduled Meds:amLODIPine, 10 mg, Oral, Q24H  Diclofenac Sodium, 2 g, Topical, 4x Daily  DULoxetine, 30 mg, Oral, BID  heparin (porcine), 5,000 Units, Subcutaneous, Q12H  levothyroxine, 75 mcg, Oral, Q AM  pantoprazole, 40 mg, Oral, Daily  QUEtiapine, 12.5 mg,  Oral, Nightly  senna-docusate sodium, 2 tablet, Oral, BID      Continuous Infusions:   PRN Meds:.•  acetaminophen  •  aluminum-magnesium hydroxide-simethicone  •  bisacodyl  •  diphenhydrAMINE  •  hydrALAZINE  •  oxyCODONE        Assessment & Plan   Assessment / Plan     Brief Patient Summary:  Radha Azevedo is a 87 y.o. male    Active Hospital Problems:  Active Hospital Problems    Diagnosis    • **Subdural hematoma      Plan:   Impairments: ADLs, endurance, gait, balance, transfers, cognition, comprehension     Now admit for comprehensive acute inpatient rehabilitation .  This would be an interdisciplinary program with physical therapy 1 hour,  occupational therapy 1 hour, and speech therapy 1 hour, 5 days a week.  Rehabilitation nursing for carryover, monitoring of neurologic status, bowel and bladder, and skin  Ongoing physician follow-up.  Weekly team conferences.  Goals are to achieve a level of supervision with  mobility and self-care and improved ADLs.   Rehabilitation prognosis good.  Medical prognosis good.  Estimated length of stay is approximately 1-2 weeks , but is only an estimation.         #Functional Impairment 2/2 SDH and L femur fracture   -Initiate comprehensive rehab program consisting of PT/OT/SLP 3 hours/day, 5 days/week with medical management of above disorder, comorbidities, pain, bowels, and bladder.       -WB/activity status:?no restrictions, WBAT        #SDH  -No gross neurologic deficits on exam  -Will order CT Head for baseline or transfer imaging from [facility]  May 22- Order CT Head for follow-up SDH evaluation. Will send [facility] to [facility] for a follow-up CT of the head today per message from [facility].  To have study done with results sent to Dr. Hollins - [facility]  May 75-ydoukm-hq CT-report reviewed.  Right subdural hematoma and left subdural hygroma, no shift.  neurosurgery obtaining copy of CT images on CD to review.  Unable to compare to outside films at  [facility] due to issues with their PACS system.     May 24- Will discuss with [facility] today about results of CT scan  May 25-patient's son received a call from [facility] yesterday who were pleased with the imaging of the follow-up CT of the head.  To have a repeat CT in 2 weeks when follows up in the office     #L displaced femur fracture  -S/p L IM Nail placement in L femur  -WBAT in LLE  May 24- Order L Femur Xray for post-op f/u of hardware  May 26- Stable Xray of L femur IM nail, will follow-up with Ortho as an outpatient      #Pain Control     -Prn Norco 5 q4h     May 22- Will DC Norco and change to Oxycodone 5 BID PRN. Patient was only getting Norco 3 times a day and is the same MME as Oxycodone 5 BID PRN. Will also add Tylenol 650 q6h PRN for intermittent pain relief in between Oxycodone doses.     #Hypothyroidism  - on admission  -Significant weight loss and depression over the past year  -Continue Synthroid 25 daily  -Will follow-up on TSH and T4 values tomorrow  May 24- Order TSH and T4 next week and change medication accordingly  May 29 - TSH significantly elevated at 167. Will increase from 25mcg to 75mcg and suspect he may need much higher dosage but monitor tolerance of med.        #Hyponatremia  -Na 128 on admission, 128-130 a NBH. -Fluid restriction to 1500ml daily  -Possibly related to hypothyroidism  May 20- increase synthroid to 50mcg daily and monitor TSH and Na+  -Will continue to monitor  May 22-sodium unchanged 129  May 26- Labs reviewed, stable Na level at 130, no significant S&S  May 29 - reviewed labs, low Na fairly stable     #HTN  -Continue Norvasc 5 daily  May 22- Increased Norvasc to 10 daily over the weekend. BP improved and is more controlled, will continue to monitor.  May 26- BP stable, continue current regimen  May 29 - fluctuating BP's, continue current regimen     #Mood disorder  -Continue Cymbalta 30 BID and Seroquel 12.5 at bedtime   -PRN Xanax held  because cause of his falls     #Constipation  May 26- Increase Doc Senna 2 BID and order MoM once today, will continue to monitor BM  May 27 - trial suppository today due to ongoing constipation, daily prn suppository ordered     #FEN     -nutrition: Cardiac diet, Regular texture, Liquid Consistency: Thin Liquid. 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  Has Had It at the Other Hospital As Well.  Not Noted during Any of Our Visits on Rounds.  On a video of the son has had has frequency they can see with a myoclonic jerk but not the amplitude or velocity. Medications Reviewed.  On Cymbalta 30 Mg Twice a Day.  Low-Dose Seroquel 12.5 Mg Daily.  Medications Unremarkable.  Will Monitor.    TEAM CONF - MAY 18 - Bed mob CG / Min x 1, CG / Min transfers, amb 80' CG rwx,  completed 4 steps x 2 rails CGA / Min A.  CGA toilet and shower transfer.  Mod A  toileting.  Mod A LBD.  Mild cognitive impairment.  Cont bowel and bladder.  BP  running high, increased Amlodipine.     TEAM CONF - MAY 25 - NO CALL BACK FROM [facility] REGARDING FOLLOW CT OF HEAD. LEFT FEMUR FRACTURE EXPECTED POST-OP FINDINGS, RESULTS TO [facility]. BED SBA. TRANSFERS CTG MIN. 4 STAIRS CTG MIN WITH 2 RAILINGS. GAIT 320 FEET CTG RW. TOILET TRANSFERS SBA RW. SHOWER TRANSFERS CTG SBA. BATH CTG SBA. LBD MIN CTG. UBD SET UP. TOILETING CTG SBA. WORKING ON WORKING MEMORY. CONTINENT BOWEL AND BLADDER. INCISION LEFT HIP HEALING.  EL[facility] - Thursday June 1        DVT prophylaxis:  Medical and mechanical DVT prophylaxis orders are present.    CODE STATUS:    Medical Intervention Limits: NO intubation (DNI)  Level Of Support Discussed With: Patient; Next of Kin (If No Surrogate)  Code Status (Patient has no pulse and is not breathing): CPR (Attempt to Resuscitate)  Medical Interventions (Patient has pulse or is breathing):  Limited Support  Comments: Discussed with patient and son  Release to patient: Routine Release        Luis Llanos MD

## 2023-05-30 NOTE — PLAN OF CARE
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 Subdural hematoma after a fall. Femur break. Hx. glaucoma, CAD, HLD, HTN, DM2, kyphoplasty. Lost 40 lbs. Health issues more controlled since weight-loss. L. hip border dressing changed today. New diagnosis of hypothyroidism. Is on synthroid. Sodium is low. Meds whole with water. Fluid restriction to 1500 ml/daily. 240ml/ per tray. Continent B&B. Last BM 5/29. Participated fully in therapy. Has voltaren gel at bedside. Assistx1 walker or wheelchair. Code status; no intubation. Labs y/d: CBC/BMP. Taking Oxy at HS. D/C home Thursday, 6/1.

## 2023-05-30 NOTE — THERAPY TREATMENT NOTE
Inpatient Rehabilitation - Occupational Therapy Treatment Note    [facility]     Patient Name: Radha Azevedo  : 1935  MRN: [id number]    Today's Date: 2023                 Admit Date: 2023         ICD-10-CM ICD-9-CM   1. Impaired gait and mobility  R26.89 781.2       Patient Active Problem List   Diagnosis   • Absolute anemia   • Benign essential HTN   • Benign localized hyperplasia of prostate without urinary obstruction   • Type 2 diabetes mellitus with peripheral angiopathy   • Hypertension   • Hyperlipidemia   • Type 2 diabetes mellitus   • Diabetes mellitus type 2, noninsulin dependent   • Avitaminosis D   • Dyslipidemia   • Decrease in the ability to hear   • Personal history of colonic polyps   • Subdural hematoma       Past Medical History:   Diagnosis Date   • Colon polyps     Followed by Dr. Leo Jaeger at [facility].   • Coronary artery disease    • Diabetes mellitus    • Hyperlipidemia    • Hypertension        Past Surgical History:   Procedure Laterality Date   • CARDIAC CATHETERIZATION     • CAROTID STENT      patient denies, but family states he did have a stent placed   • COLONOSCOPY N/A 2014    Dr. Leo Jaeger at [facility].   • COLONOSCOPY N/A 2016    Dr. Leo Jaeger at [facility].   • COLONOSCOPY N/A 03/15/2023    2 TUBULAR ADENOMA POLYPS IN SIGMOID, BARIUM ENEMA ORDERED, DR. LEO JAEGER AT [facility]   • CORONARY ANGIOPLASTY     • FRACTURE SURGERY Left 2023    IM nailing for L femur fracture             IRF OT ASSESSMENT FLOWSHEET (last 12 hours)     IRF OT Evaluation and Treatment     Row Name 23 1549          OT Time and Intention    Document Type daily treatment  -AF     Mode of Treatment occupational therapy  -AF     Patient Effort good  -AF     Row Name 23 1549          General Information    Patient/Family/Caregiver Comments/Observations pt sitting up in w/c in AM and PM sessions  -AF     Existing Precautions/Restrictions fall  -AF     Row Name  05/30/23 1549          Pain Assessment    Pretreatment Pain Rating 0/10 - no pain  -AF     Posttreatment Pain Rating 0/10 - no pain  -AF     Row Name 05/30/23 1549          Cognition/Psychosocial    Affect/Mental Status (Cognition) WFL  -AF     Orientation Status (Cognition) oriented x 3  -AF     Follows Commands (Cognition) follows two-step commands;verbal cues/prompting required  -AF     Personal Safety Interventions fall prevention program maintained;gait belt;nonskid shoes/slippers when out of bed  -AF     Comment, Cognition vc's for attention to the R side during funcitonal mobility tasks, R sided inattention is not as noted duirng personal tasks  -AF     Row Name 05/30/23 1549          Bathing    [place] Level (Bathing) bathing skills;lower body;upper body;standby assist  -AF     Assistive Device (Bathing) grab bar/tub rail;hand held shower spray hose;tub bench  -AF     Position (Bathing) supported standing;supported sitting  -AF     Row Name 05/30/23 1549          Upper Body Dressing    [place] Level (Upper Body Dressing) upper body dressing skills;set up assistance  -AF     Position (Upper Body Dressing) supported sitting  -AF     Comment (Upper Body Dressing) vc's to sit to don shirt  -AF     Row Name 05/30/23 1549          Lower Body Dressing    [place] Level (Lower Body Dressing) doff;don;pants/bottoms;shoes/slippers;socks;underwear;standby assist  -AF     Position (Lower Body Dressing) supported sitting;supported standing  -AF     Set-up Assistance (Lower Body Dressing) obtain clothing  -AF     Row Name 05/30/23 1549          Grooming    [place] Level (Grooming) grooming skills;standby assist  -AF     Position (Grooming) supported standing  -AF     Comment (Grooming) RWX level  -AF     Row Name 05/30/23 1549          Toileting    [place] Level (Toileting) contact guard assist  SBA  -AF     Assistive Device Use (Toileting) grab bar/safety frame;raised toilet seat  -AF     Position  (Toileting) supported sitting;supported standing  -AF     Comment (Toileting) RWX level  -AF     Row Name 05/30/23 1549          Functional Mobility    Functional Mobility- Comment walked in room wiht RWX and to and from therapy gym, vc's for items on the R side and to keep RWX with him duirng turns  -AF     Row Name 05/30/23 1549          Sit-Stand Transfer    Sit-Stand [place] (Transfers) standby assist  -AF     Assistive Device (Sit-Stand Transfers) walker, front-wheeled  -AF     Row Name 05/30/23 1549          Stand-Sit Transfer    Stand-Sit [place] (Transfers) standby assist  -AF     Assistive Device (Stand-Sit Transfers) walker, front-wheeled  -AF     Row Name 05/30/23 1549          Toilet Transfer    Type (Toilet Transfer) sit-stand;stand-sit  -AF     [place] Level (Toilet Transfer) contact guard;standby assist  -AF     Assistive Device (Toilet Transfer) commode;grab bars/safety frame;walker, front-wheeled  -AF     Row Name 05/30/23 1549          Shower Transfer    Type (Shower Transfer) sit-stand;stand-sit  -AF     [place] Level (Shower Transfer) stand by assist;contact guard  -AF     Assistive Device (Shower Transfer) grab bar, tub/shower;shower chair;walker, front-wheeled  -AF     Row Name 05/30/23 1549          Shoulder (Therapeutic Exercise)    Shoulder AAROM (Therapeutic Exercise) bilateral;table slides, flexion;table slides, aBduction;10 repetitions  -AF     Row Name 05/30/23 1549          Elbow/Forearm (Therapeutic Exercise)    Elbow/Forearm Strengthening (Therapeutic Exercise) bilateral;flexion;extension;supination;pronation;sitting;2 lb free weight;10 repetitions;2 sets  -AF     Row Name 05/30/23 1549          Wrist (Therapeutic Exercise)    Wrist Strengthening (Therapeutic Exercise) bilateral;flexion;extension;2 lb free weight;10 repetitions;2 sets  -AF     Row Name 05/30/23 1549          Hand (Therapeutic Exercise)    Hand Strengthening (Therapeutic Exercise) bilateral;  strengthening;hand gripper;10 repetitions;3 sets  -AF     Row Name 05/30/23 1549          Balance    Static Sitting Balance independent  -AF     Dynamic Sitting Balance supervision  -AF     Static Standing Balance standby assist  -AF     Row Name 05/30/23 1549          Positioning and Restraints    Pre-Treatment Position sitting in chair/recliner  -AF     Post Treatment Position bathroom  -AF     In Wheelchair sitting;call light within reach;encouraged to call for assist;exit alarm on  in PM  -AF     Bathroom sitting;encouraged to call for assist;call light within reach;with SLP  in AM  -AF           User Key  (r) = Recorded By, (t) = Taken By, (c) = Cosigned By    Initials Name Effective Dates    AF Bernarda Ricketts, OTR 06/16/21 -                  Occupational Therapy Education     Title: PT OT SLP Therapies (In Progress)     Topic: Occupational Therapy (Done)     Point: ADL training (Done)     Description:   Instruct learner(s) on proper safety adaptation and remediation techniques during self care or transfers.   Instruct in proper use of assistive devices.              Learning Progress Summary           Patient Acceptance, E, VU by RICKI at 5/26/2023 0215    Sinaner, E,D, NR by BHUPENDRA at 5/24/2023 1506    Acceptance, E, VU by RICKI at 5/20/2023 2214    Acceptance, E, VU by RICKI at 5/19/2023 2232    Acceptance, E, VU by DARVIN at 5/17/2023 1524                   Point: Home exercise program (Done)     Description:   Instruct learner(s) on appropriate technique for monitoring, assisting and/or progressing therapeutic exercises/activities.              Learning Progress Summary           Patient Acceptance, E, VU by WN at 5/26/2023 0215    Acceptance, E, VU by MARYAM at 5/25/2023 1510    Comment: educated on and discussed exs for home    Eager, E,D, NR by BHUPENDRA at 5/24/2023 1506    Acceptance, E, VU by RICKI at 5/20/2023 2214    Acceptance, E, VU by RICKI at 5/19/2023 2232    Acceptance, E, VU by DARVIN at 5/17/2023 1524   Family Acceptance, E, VU  by AF at 5/25/2023 1510    Comment: educated on and discussed exs for home                   Point: Precautions (Done)     Description:   Instruct learner(s) on prescribed precautions during self-care and functional transfers.              Learning Progress Summary           Patient Acceptance, E, VU by WN at 5/26/2023 0215    Acceptance, E, VU by AF at 5/25/2023 1510    Comment: educated on and discussed exs for home    Eager, E,D, NR by KP at 5/24/2023 1506    Acceptance, E, VU by WN at 5/20/2023 2214    Acceptance, E, VU by WN at 5/19/2023 2232    Acceptance, E, VU by KN at 5/17/2023 1524   Family Acceptance, E, VU by AF at 5/25/2023 1510    Comment: educated on and discussed exs for home                   Point: Body mechanics (Done)     Description:   Instruct learner(s) on proper positioning and spine alignment during self-care, functional mobility activities and/or exercises.              Learning Progress Summary           Patient Acceptance, E, VU by WN at 5/26/2023 0215    Eager, E,D, NR by  at 5/24/2023 1506    Acceptance, E, VU by WN at 5/20/2023 2214    Acceptance, E, VU by WN at 5/19/2023 2232    Acceptance, E, VU by KN at 5/17/2023 1524                               User Key     Initials Effective Dates Name Provider Type Discipline     06/16/21 -  Bernarda Ricketts OTR Occupational Therapist OT     06/16/21 -  Keena London, PT Physical Therapist PT     08/23/22 -  Kamaljit Elias, RN Registered Nurse Nurse     08/02/22 -  Main De La Garza OT Occupational Therapist OT                    OT Recommendation and Plan                         Time Calculation:      Time Calculation- OT     Row Name 05/30/23 1553 05/30/23 1551          Time Calculation- OT    OT Start Time 1400  -AF 0900  -AF     OT Stop Time 1430  -AF 0930  -AF     OT Time Calculation (min) 30 min  -AF 30 min  -AF           User Key  (r) = Recorded By, (t) = Taken By, (c) = Cosigned By    Initials Name Provider Type    MARYAM Ricketts  Bernarda VELARDE, LUH Occupational Therapist              Therapy Charges for Today     Code Description Service Date Service Provider Modifiers Qty    68431664200 HC OT SELF CARE/MGMT/TRAIN EA 15 MIN 5/30/2023 Bernarda Ricketts OTR GO 2    [id number]  OT THER PROC EA 15 MIN 5/30/2023 Bernarda Ricketts OTR GO 1    [id number]  OT THERAPEUTIC ACT EA 15 MIN 5/30/2023 Bernarda Ricketts OTR GO 1                   LUH Burgos  5/30/2023

## 2023-05-31 PROCEDURE — 97110 THERAPEUTIC EXERCISES: CPT

## 2023-05-31 PROCEDURE — 97535 SELF CARE MNGMENT TRAINING: CPT

## 2023-05-31 PROCEDURE — 97130 THER IVNTJ EA ADDL 15 MIN: CPT

## 2023-05-31 PROCEDURE — 25010000002 HEPARIN (PORCINE) PER 1000 UNITS: Performed by: PHYSICAL MEDICINE & REHABILITATION

## 2023-05-31 PROCEDURE — 97112 NEUROMUSCULAR REEDUCATION: CPT

## 2023-05-31 PROCEDURE — 97530 THERAPEUTIC ACTIVITIES: CPT

## 2023-05-31 PROCEDURE — 63710000001 DIPHENHYDRAMINE PER 50 MG: Performed by: PHYSICAL MEDICINE & REHABILITATION

## 2023-05-31 PROCEDURE — 97129 THER IVNTJ 1ST 15 MIN: CPT

## 2023-05-31 PROCEDURE — 97116 GAIT TRAINING THERAPY: CPT

## 2023-05-31 RX ORDER — AMOXICILLIN 250 MG
2 CAPSULE ORAL 2 TIMES DAILY
Qty: 60 TABLET | Refills: 1 | Status: SHIPPED | OUTPATIENT
Start: 2023-05-31

## 2023-05-31 RX ORDER — ASPIRIN 81 MG/1
81 TABLET, CHEWABLE ORAL 2 TIMES DAILY
Qty: 56 TABLET | Refills: 0 | Status: SHIPPED | OUTPATIENT
Start: 2023-06-06

## 2023-05-31 RX ORDER — QUETIAPINE FUMARATE 25 MG/1
12.5 TABLET, FILM COATED ORAL NIGHTLY
Start: 2023-05-31 | End: 2023-06-01 | Stop reason: SDUPTHER

## 2023-05-31 RX ORDER — LEVOTHYROXINE SODIUM 0.07 MG/1
75 TABLET ORAL
Qty: 30 TABLET | Refills: 0 | Status: SHIPPED | OUTPATIENT
Start: 2023-06-01

## 2023-05-31 RX ORDER — PANTOPRAZOLE SODIUM 40 MG/1
40 TABLET, DELAYED RELEASE ORAL DAILY
Qty: 35 TABLET | Refills: 0 | Status: SHIPPED | OUTPATIENT
Start: 2023-06-01

## 2023-05-31 RX ORDER — DIPHENHYDRAMINE HCL 25 MG
25 CAPSULE ORAL 2 TIMES DAILY PRN
Start: 2023-05-31

## 2023-05-31 RX ORDER — ASPIRIN 81 MG/1
81 TABLET, CHEWABLE ORAL 2 TIMES DAILY
Status: DISCONTINUED | OUTPATIENT
Start: 2023-06-06 | End: 2023-06-01 | Stop reason: HOSPADM

## 2023-05-31 RX ORDER — AMLODIPINE BESYLATE 10 MG/1
10 TABLET ORAL
Qty: 30 TABLET | Refills: 1 | Status: SHIPPED | OUTPATIENT
Start: 2023-06-01

## 2023-05-31 RX ORDER — HEPARIN SODIUM 5000 [USP'U]/ML
5000 INJECTION, SOLUTION INTRAVENOUS; SUBCUTANEOUS EVERY 12 HOURS SCHEDULED
Qty: 20 ML | Refills: 0 | Status: SHIPPED | OUTPATIENT
Start: 2023-05-31 | End: 2023-06-07

## 2023-05-31 RX ORDER — ACETAMINOPHEN 325 MG/1
650 TABLET ORAL EVERY 6 HOURS PRN
Start: 2023-05-31

## 2023-05-31 RX ORDER — ALUMINA, MAGNESIA, AND SIMETHICONE 2400; 2400; 240 MG/30ML; MG/30ML; MG/30ML
15 SUSPENSION ORAL EVERY 6 HOURS PRN
Start: 2023-05-31

## 2023-05-31 RX ADMIN — HEPARIN SODIUM 5000 UNITS: 5000 INJECTION INTRAVENOUS; SUBCUTANEOUS at 21:45

## 2023-05-31 RX ADMIN — DICLOFENAC SODIUM 2 G: 10 GEL TOPICAL at 09:34

## 2023-05-31 RX ADMIN — DICLOFENAC SODIUM 2 G: 10 GEL TOPICAL at 17:51

## 2023-05-31 RX ADMIN — DIPHENHYDRAMINE HYDROCHLORIDE 25 MG: 25 CAPSULE ORAL at 21:45

## 2023-05-31 RX ADMIN — LEVOTHYROXINE SODIUM 75 MCG: 0.07 TABLET ORAL at 05:22

## 2023-05-31 RX ADMIN — DICLOFENAC SODIUM 2 G: 10 GEL TOPICAL at 12:48

## 2023-05-31 RX ADMIN — PANTOPRAZOLE SODIUM 40 MG: 40 TABLET, DELAYED RELEASE ORAL at 09:33

## 2023-05-31 RX ADMIN — DULOXETINE HYDROCHLORIDE 30 MG: 30 CAPSULE, DELAYED RELEASE ORAL at 21:45

## 2023-05-31 RX ADMIN — DULOXETINE HYDROCHLORIDE 30 MG: 30 CAPSULE, DELAYED RELEASE ORAL at 09:33

## 2023-05-31 RX ADMIN — DOCUSATE SODIUM 50MG AND SENNOSIDES 8.6MG 2 TABLET: 8.6; 5 TABLET, FILM COATED ORAL at 09:33

## 2023-05-31 RX ADMIN — AMLODIPINE BESYLATE 10 MG: 10 TABLET ORAL at 09:33

## 2023-05-31 RX ADMIN — QUETIAPINE FUMARATE 12.5 MG: 25 TABLET ORAL at 21:45

## 2023-05-31 RX ADMIN — HEPARIN SODIUM 5000 UNITS: 5000 INJECTION INTRAVENOUS; SUBCUTANEOUS at 09:32

## 2023-05-31 NOTE — PROGRESS NOTES
[facility]     Progress Note    Patient Name: Radha Azevedo  : 1935  MRN: [id number]  Primary Care Physician:  Amina Jiang MD  Date of admission: 2023    Subjective   Subjective     Chief Complaint:   SDH  L femur fracture    History of Present Illness      Patient tolerating therapies.  No significant headache.  No significant leg pain.  Happy with his progress.    Review of Systems    Objective   Objective     Vitals:   Temp:  [97.4 °F (36.3 °C)-97.8 °F (36.6 °C)] 97.8 °F (36.6 °C)  Heart Rate:  [62-70] 69  Resp:  [18] 18  BP: (109-137)/(65-75) 118/69    Physical Exam   General: The patient is well-developed, well-nourished and in no apparent distress.    HEENT:    Respiratory: Lungs CTAB, no wheezes or rhonchi     Cardiac: RRR, no m/r/g, no pedal edema     Abdomen: soft, NT abdomen , few BS   Psych: Appropriate affect and behavior         Neuro: No tremor seen during visit  Mental Status:  AOx4   Speech: fluent without dysarthria   Good strength bilaterally     Result Review       Most notable findings include:   Results from last 7 days   Lab Units 23  0659 23  0733   WBC 10*3/mm3 4.40 5.24   HEMOGLOBIN g/dL 9.9* 10.8*   HEMATOCRIT % 30.0* 33.4*   PLATELETS 10*3/mm3 270 286     Results from last 7 days   Lab Units 23  0659 23  0733   SODIUM mmol/L 129* 130*   POTASSIUM mmol/L 4.2 4.2   CHLORIDE mmol/L 94* 95*   CO2 mmol/L 24.8 25.9   BUN mg/dL 25* 26*   CREATININE mg/dL 1.11 1.00   GLUCOSE mg/dL 104* 128*   CALCIUM mg/dL 8.9 9.5     Results from last 7 days   Lab Units 23  0659   TSH uIU/mL 167.000*   FREE T4 ng/dL 0.20*     Scheduled Meds:amLODIPine, 10 mg, Oral, Q24H  Diclofenac Sodium, 2 g, Topical, 4x Daily  DULoxetine, 30 mg, Oral, BID  heparin (porcine), 5,000 Units, Subcutaneous, Q12H  levothyroxine, 75 mcg, Oral, Q AM  pantoprazole, 40 mg, Oral, Daily  QUEtiapine, 12.5 mg, Oral, Nightly  senna-docusate sodium, 2 tablet, Oral, BID      Continuous  Infusions:   PRN Meds:.•  acetaminophen  •  aluminum-magnesium hydroxide-simethicone  •  bisacodyl  •  diphenhydrAMINE  •  hydrALAZINE  •  oxyCODONE        Assessment & Plan   Assessment / Plan     Brief Patient Summary:  Radha Azevedo is a 87 y.o. male    Active Hospital Problems:  Active Hospital Problems    Diagnosis    • **Subdural hematoma      Plan:   Impairments: ADLs, endurance, gait, balance, transfers, cognition, comprehension     Now admit for comprehensive acute inpatient rehabilitation .  This would be an interdisciplinary program with physical therapy 1 hour,  occupational therapy 1 hour, and speech therapy 1 hour, 5 days a week.  Rehabilitation nursing for carryover, monitoring of neurologic status, bowel and bladder, and skin  Ongoing physician follow-up.  Weekly team conferences.  Goals are to achieve a level of supervision with  mobility and self-care and improved ADLs.   Rehabilitation prognosis good.  Medical prognosis good.  Estimated length of stay is approximately 1-2 weeks , but is only an estimation.         #Functional Impairment 2/2 SDH and L femur fracture   -Initiate comprehensive rehab program consisting of PT/OT/SLP 3 hours/day, 5 days/week with medical management of above disorder, comorbidities, pain, bowels, and bladder.       -WB/activity status:?no restrictions, WBAT        #SDH  -No gross neurologic deficits on exam  -Will order CT Head for baseline or transfer imaging from [facility]  May 22- Order CT Head for follow-up SDH evaluation. Will send imaging to [facility] for a follow-up CT of the head today per message from [facility].  To have study done with results sent to Dr. Hollins - [facility]  May 50-fnfehi-av CT-report reviewed.  Right subdural hematoma and left subdural hygroma, no shift.  neurosurgery obtaining copy of CT images on CD to review.  Unable to compare to outside films at [facility] due to issues with their PACS system.     May 24- Will  discuss with [facility] today about results of CT scan  May 25-patient's son received a call from [facility] yesterday who were pleased with the imaging of the follow-up CT of the head.  To have a repeat CT in 2 weeks when follows up in the office     #L displaced femur fracture  -S/p L IM Nail placement in L femur  -WBAT in LLE  May 24- Order L Femur Xray for post-op f/u of hardware  May 26- Stable Xray of L femur IM nail, will follow-up with Ortho as an outpatient      #Pain Control     -Prn Norco 5 q4h     May 22- Will DC Norco and change to Oxycodone 5 BID PRN. Patient was only getting Norco 3 times a day and is the same MME as Oxycodone 5 BID PRN. Will also add Tylenol 650 q6h PRN for intermittent pain relief in between Oxycodone doses.     #Hypothyroidism  - on admission  -Significant weight loss and depression over the past year  -Continue Synthroid 25 daily  -Will follow-up on TSH and T4 values tomorrow  May 24- Order TSH and T4 next week and change medication accordingly  May 29 - TSH significantly elevated at 167. Will increase from 25mcg to 75mcg and suspect he may need much higher dosage but monitor tolerance of med.  May 31-consulted endocrinology to see if he can see here.  He previously seen Dr. Jiang-not seen in past 3 years-no longer in the practice-patient to see a provider in 2 weeks in the office        #Hyponatremia  -Na 128 on admission, 128-130 a NBH. -Fluid restriction to 1500ml daily  -Possibly related to hypothyroidism  May 20- increase synthroid to 50mcg daily and monitor TSH and Na+  -Will continue to monitor  May 22-sodium unchanged 129  May 26- Labs reviewed, stable Na level at 130, no significant S&S  May 29 - reviewed labs, low Na fairly stable  May 31-recheck sodium in a.m. before discharge     #HTN  -Continue Norvasc 5 daily  May 22- Increased Norvasc to 10 daily over the weekend. BP improved and is more controlled, will continue to monitor.  May 26- BP stable,  continue current regimen  May 29 - fluctuating BP's, continue current regimen     #Mood disorder  -Continue Cymbalta 30 BID and Seroquel 12.5 at bedtime   -PRN Xanax held because cause of his falls     #Constipation  May 26- Increase Doc Senna 2 BID and order MoM once today, will continue to monitor BM  May 27 - trial suppository today due to ongoing constipation, daily prn suppository ordered     #FEN     -nutrition: Cardiac diet, Regular texture, Liquid Consistency: Thin Liquid. 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  Has Had It at the Other Hospital As Well.  Not Noted during Any of Our Visits on Rounds.  On a video of the son has had has frequency they can see with a myoclonic jerk but not the amplitude or velocity. Medications Reviewed.  On Cymbalta 30 Mg Twice a Day.  Low-Dose Seroquel 12.5 Mg Daily.  Medications Unremarkable.  Will Monitor.    TEAM CONF - MAY 18 - Bed mob CG / Min x 1, CG / Min transfers, amb 80' CG rwx,  completed 4 steps x 2 rails CGA / Min A.  CGA toilet and shower transfer.  Mod A  toileting.  Mod A LBD.  Mild cognitive impairment.  Cont bowel and bladder.  BP  running high, increased Amlodipine.     TEAM CONF - MAY 25 - NO CALL BACK FROM [facility] REGARDING FOLLOW CT OF HEAD. LEFT FEMUR FRACTURE EXPECTED POST-OP FINDINGS, RESULTS TO [facility]. BED SBA. TRANSFERS CTG MIN. 4 STAIRS CTG MIN WITH 2 RAILINGS. GAIT 320 FEET CTG RW. TOILET TRANSFERS SBA RW. SHOWER TRANSFERS CTG SBA. BATH CTG SBA. LBD MIN CTG. UBD SET UP. TOILETING CTG SBA. WORKING ON WORKING MEMORY. CONTINENT BOWEL AND BLADDER. INCISION LEFT HIP HEALING.  ELOS - Thursday June 1    Family conference-May 31  Patient is scheduled to d/c tomorrow. His son will be in [facility] and will be at the hospital in the morning for teaching with PT and OT at 9:00.   PT: Patient is walking  approx 300 ft with a FWW and his balance is improving. He walked up and down a flight of stairs today. PT recommended that someone be next to patient when he up and moving when he first goes home. He has some right side inattention which causes him to bump into things, at times. Patient does well when given verbal cues for car transfers. PT recommended home PT after d/c and she will provide home exercises also. A walker and transport w/c will be delivered to patient's room tomorrow before d/c. PT provided handouts for sons to order a basket for patients walker and skis for his walker as well.    OT: OT reported that patient has been making more progress since his pain has decreased in his leg. He does not require any physical help with his ADL's. OT recommended home OT as well and provided home exercises.    ST: ST has been working on the right side inattention. Also working on med and financial management. ST recommends someone supervise these areas for patient when first going home. ST also recommends home ST after d/c.    Nursing went over current meds and follow up appts.   [facility] will provide home PT/OT/ST after d/c. Equipment will be delivered to patient's room before d/c by [employer]. Patient's son will transport patient home. Patient will have 24 hour assist at home and it will be a combination of family and private caregivers helping patient.     DVT prophylaxis:  Medical and mechanical DVT prophylaxis orders are present.    CODE STATUS:    Medical Intervention Limits: NO intubation (DNI)  Level Of Support Discussed With: Patient; Next of Kin (If No Surrogate)  Code Status (Patient has no pulse and is not breathing): CPR (Attempt to Resuscitate)  Medical Interventions (Patient has pulse or is breathing): Limited Support  Comments: Discussed with patient and son  Release to patient: Routine Release        Luis Llanos MD      During rounds, used appropriate personal protective equipment  including mask and gloves.  Additional gown if indicated.  Mask used was standard procedure mask. Appropriate PPE was worn during the entire visit.  Hand hygiene was completed before and after.

## 2023-05-31 NOTE — PROGRESS NOTES
"Section C. BIMS  Brief Interview for Mental Status (BIMS) was conducted.  Repetition of Three Words: Three words  Able to report correct year: Correct  Able to report correct month: Accurate within 5 days  Able to report correct day of the week: Correct  Able to recall \"sock\": Yes, no cue required  Able to recall \"blue\": Yes, no cue required  Able to recall \"bed\": No, could not recall    BIMS SUMMARY SCORE: 13 Cognitively intact    Section C. Signs and Symptoms of Delirium (from CAM)  Acute Change in Mental Status:   No  Inattention:   Behavior not present  Disorganized Thinking:   Behavior not present  Altered Level of Consciousness:   Behavior not present    Signed by: Hope Mary, SLP    "

## 2023-05-31 NOTE — PROGRESS NOTES
Inpatient Rehabilitation Plan of Care Note    Plan of Care  Updated Problems/Interventions  Cognition    [ST] Executive Functions(Active)  Current Status(05/31/2023): Mild cognitive impairment characterized by  difficulty with attention, memory, planning/organization, and executive  functioning.  Weekly Goal(06/07/2023): Patient will participate in functional therapy  activities given MIN cues  Discharge Goal: Functional cognition for home    Signed by: Hope Mary, SLP

## 2023-05-31 NOTE — PROGRESS NOTES
SECTION GG      Self Care Performance Discharge:   Oral Hygiene: [place] sets up or cleans up; patient completes activity. [place]  assists only prior to or following the activity.   Toileting Hygiene: : [place] sets up or cleans up; patient completes activity.  [place] assists only prior to or following the activity.   Shower/Bathe Self: [place] sets up or cleans up; patient completes activity.  [place] assists only prior to or following the activity.   Upper Body Dressing: [place] sets up or cleans up; patient completes activity.  [place] assists only prior to or following the activity.   Lower Body Dressing: [place] sets up or cleans up; patient completes activity.  [place] assists only prior to or following the activity.   Putting On/Taking Off Footwear: [place] sets up or cleans up; patient completes  activity. [place] assists only prior to or following the activity.    Mobility Toilet Transfer Discharge: [place] sets up or cleans up; patient  completes activity. [place] assists only prior to or following the activity.    Signed by: Bernarda Ricketts, OT

## 2023-05-31 NOTE — PROGRESS NOTES
Family conference held today with patient and his two sons, who were both on zoom for the conference. PT/OT/ST and nursing were also present for the meeting. Patient is scheduled to d/c tomorrow. His son will be in [facility] and will be at the hospital in the morning for teaching with PT and OT at 9:00.    PT: Patient is walking approx 300 ft with a FWW and his balance is improving. He walked up and down a flight of stairs today. PT recommended that someone be next to patient when he up and moving when he first goes home. He has some right side inattention which causes him to bump into things, at times. Patient does well when given verbal cues for car transfers. PT recommended home PT after d/c and she will provide home exercises also. A walker and transport w/c will be delivered to patient's room tomorrow before d/c. PT provided handouts for sons to order a basket for patients walker and skis for his walker as well.     OT: OT reported that patient has been making more progress since his pain has decreased in his leg. He does not require any physical help with his ADL's. OT recommended home OT as well and provided home exercises.     ST: ST has been working on the right side inattention. Also working on med and financial management. ST recommends someone supervise these areas for patient when first going home. ST also recommends home ST after d/c.     Nursing went over current meds and follow up appts.    [facility] will provide home PT/OT/ST after d/c. Equipment will be delivered to patient's room before d/c by [employer]. Patient's son will transport patient home. Patient will have 24 hour assist at home and it will be a combination of family and private caregivers helping patient. Son will speak with  tomorrow regarding tremors with patient's hands. Sons also inquiring about results of neuropsych eval. A disc of patient's x-ray is also in his chart for patient to take to his follow up appt at  [facility].

## 2023-05-31 NOTE — PROGRESS NOTES
Inpatient Rehabilitation Plan of Care Note    Plan of Care  Updated Problems/Interventions  Mobility    [PT] Bed/Chair/Wheelchair(Active)  Current Status(05/24/2023): CG/min  Weekly Goal(06/07/2023): CG/SBA  Discharge Goal: mod Independent    [PT] Walk(Active)  Current Status(05/31/2023): 325' SBA/CGA , rwx  Weekly Goal(06/07/2023): PT only  Discharge Goal: 325' SBA/CGA, rwx    [PT] Stairs(Active)  Current Status(05/31/2023): CGA 4 steps, 2 rails  Weekly Goal(06/07/2023): CG with 4 steps and 2 rails like home  Discharge Goal: CGAand 2 rails 4 steps    [PT] Bed Mobility(Active)  Current Status(05/31/2023): [place]  Weekly Goal(06/07/2023): Mod I  Discharge Goal: mod independent    Signed by: Mervat Light, PT

## 2023-05-31 NOTE — THERAPY DISCHARGE NOTE
Inpatient Rehabilitation - IRF Occupational Therapy Treatment Note/Discharge  [facility]     Patient Name: Radha Azevedo  : 1935  MRN: [id number]  Today's Date: 2023               Admit Date: 2023       ICD-10-CM ICD-9-CM   1. Impaired gait and mobility  R26.89 781.2     Patient Active Problem List   Diagnosis   • Absolute anemia   • Benign essential HTN   • Benign localized hyperplasia of prostate without urinary obstruction   • Type 2 diabetes mellitus with peripheral angiopathy   • Hypertension   • Hyperlipidemia   • Type 2 diabetes mellitus   • Diabetes mellitus type 2, noninsulin dependent   • Avitaminosis D   • Dyslipidemia   • Decrease in the ability to hear   • Personal history of colonic polyps   • Subdural hematoma     Past Medical History:   Diagnosis Date   • Colon polyps     Followed by Dr. Leo Jaeger at [facility].   • Coronary artery disease    • Diabetes mellitus    • Hyperlipidemia    • Hypertension      Past Surgical History:   Procedure Laterality Date   • CARDIAC CATHETERIZATION     • CAROTID STENT      patient denies, but family states he did have a stent placed   • COLONOSCOPY N/A 2014    Dr. Leo Jaeger at [facility].   • COLONOSCOPY N/A 2016    Dr. Leo Jaeger at [facility].   • COLONOSCOPY N/A 03/15/2023    2 TUBULAR ADENOMA POLYPS IN SIGMOID, BARIUM ENEMA ORDERED, DR. LEO JAEGER AT [facility]   • CORONARY ANGIOPLASTY     • FRACTURE SURGERY Left 2023    IM nailing for L femur fracture       IRF OT ASSESSMENT FLOWSHEET (last 12 hours)     IRF OT Evaluation and Treatment     Row Name 23 1523          OT Time and Intention    Document Type daily treatment;discharge evaluation  -AF     Mode of Treatment occupational therapy  -AF     Patient Effort good  -AF     Row Name 23 1523          General Information    Patient/Family/Caregiver Comments/Observations pt sitting upin w/c in AM  -AF     Existing Precautions/Restrictions fall  -AF     Row Name 23  1523          Pain Assessment    Pretreatment Pain Rating 0/10 - no pain  -AF     Posttreatment Pain Rating 0/10 - no pain  -AF     Row Name 05/31/23 1523          Cognition/Psychosocial    Affect/Mental Status (Cognition) WFL  -AF     Orientation Status (Cognition) oriented x 3  -AF     Follows Commands (Cognition) follows two-step commands;verbal cues/prompting required  -AF     Personal Safety Interventions fall prevention program maintained;gait belt;nonskid shoes/slippers when out of bed  -AF     Row Name 05/31/23 1523          Range of Motion Comprehensive    Comment, General Range of Motion WFL with winging on L scapula with shoulder flexion  -AF     Row Name 05/31/23 1523          Strength (Manual Muscle Testing)    Strength (Manual Muscle Testing) bilateral upper extremities  -AF     Left Hand, Setting 2 (Dynamometer Testing) 25  -AF     Right Hand, Setting 2 (Dynamometer Testing) 27  -AF     Left Hand: Tip (Pincer) Pinch Strength (Pinch Dynamometer Testing) 6  -AF     Left Hand: Lateral (Key) Pinch Strength (Pinch Dynamometer Testing) 9  -AF     Right Hand: Tip (Pincer) Pinch Strength (Pinch Dynamometer Testing) 7  -AF     Right Hand: Lateral (Key) Pinch Strength (Pinch Dynamometer Testing) 5  -AF     Row Name 05/31/23 1523          Strength Comprehensive (MMT)    Comment, General Manual Muscle Testing (MMT) Assessment L shoulder flexion 3/5, RUE 3+/5  -AF     Row Name 05/31/23 1523          Bathing    Comment (Bathing) deferred showered yesterday  -AF     Row Name 05/31/23 1523          Upper Body Dressing    [place] Level (Upper Body Dressing) upper body dressing skills;pull over garment;supervision;set up assistance  -AF     Position (Upper Body Dressing) unsupported sitting  -AF     Row Name 05/31/23 1523          Grooming    [place] Level (Grooming) grooming skills;standby assist  -AF     Position (Grooming) supported standing  -AF     Comment (Grooming) RWX levle  -AF     Row Name 05/31/23  1523          Toileting    [place] Level (Toileting) supervision  -AF     Assistive Device Use (Toileting) grab bar/safety frame;raised toilet seat  -AF     Position (Toileting) supported sitting;supported standing  -AF     Comment (Toileting) RWX level  -AF     Row Name 05/31/23 1523          Functional Mobility    Functional Mobility- Comment RWX level in room CGA/SBA  -AF     Row Name 05/31/23 1523          Sit-Stand Transfer    Sit-Stand [place] (Transfers) standby assist  -AF     Row Name 05/31/23 1523          Stand-Sit Transfer    Stand-Sit [place] (Transfers) standby assist  -AF     Row Name 05/31/23 1523          Toilet Transfer    Type (Toilet Transfer) sit-stand;stand-sit  -AF     [place] Level (Toilet Transfer) supervision;standby assist  -AF     Assistive Device (Toilet Transfer) commode;grab bars/safety frame;walker, front-wheeled  -AF     Row Name 05/31/23 1523          Motor Skills    Results, 9 Hole Peg Test of Fine Motor Coordination RUE: 40, LUE: 44, boxs and blocks RUE: 41, LUE: 40  -AF     Motor Control/Coordination Interventions fine motor manipulation/dexterity activities  visual perceputal tasks with small peg and peg board design recreation able to self correct mistakes duirng tasks with MIN vc's  -AF     Row Name 05/31/23 1523          Hand (Therapeutic Exercise)    Hand Strengthening (Therapeutic Exercise) bilateral  yellow theraputty  -AF     Row Name 05/31/23 1523          Balance    Static Sitting Balance independent  -AF     Dynamic Sitting Balance supervision;independent  -AF     Static Standing Balance standby assist  -AF     Dynamic Standing Balance standby assist;contact guard  -AF     Row Name 05/31/23 1523          Positioning and Restraints    Pre-Treatment Position sitting in chair/recliner  -AF     Post Treatment Position wheelchair  -AF     In Wheelchair sitting;exit alarm on;with SLP  -AF           User Key  (r) = Recorded By, (t) = Taken By, (c) =  Cosigned By    Initials Name Effective Dates    AF Bernarda Ricketts, OTR 06/16/21 -               Wound 05/16/23 1831 Left lateral hip Incision (Active)   Closure GUME 05/30/23 2040   Base dressing in place, unable to visualize 05/30/23 2040       Occupational Therapy Education     Title: PT OT SLP Therapies (In Progress)     Topic: Occupational Therapy (Done)     Point: ADL training (Done)     Description:   Instruct learner(s) on proper safety adaptation and remediation techniques during self care or transfers.   Instruct in proper use of assistive devices.              Learning Progress Summary           Patient Acceptance, E, VU by AF at 5/31/2023 1528    Comment: pt and sons participated in meeting with rehab team disucssed current ADL status and transfers with AE. home health OT at d/c and 24 hour supervision. will continue to educate prior to d/c.    Acceptance, E, VU by WN at 5/26/2023 0215    Eager, E,D, NR by KP at 5/24/2023 1506    Acceptance, E, VU by WN at 5/20/2023 2214    Acceptance, E, VU by WN at 5/19/2023 2232    Acceptance, E, VU by KN at 5/17/2023 1524   Family Acceptance, E, VU by AF at 5/31/2023 1528    Comment: pt and sons participated in meeting with rehab team disucssed current ADL status and transfers with AE. home health OT at d/c and 24 hour supervision. will continue to educate prior to d/c.                   Point: Home exercise program (Done)     Description:   Instruct learner(s) on appropriate technique for monitoring, assisting and/or progressing therapeutic exercises/activities.              Learning Progress Summary           Patient Acceptance, E, VU by AF at 5/31/2023 1528    Comment: pt and sons participated in meeting with rehab team disucssed current ADL status and transfers with AE. home health OT at d/c and 24 hour supervision. will continue to educate prior to d/c.    Acceptance, E, VU by WN at 5/26/2023 0215    Acceptance, E, VU by AF at 5/25/2023 1510    Comment: educated  on and discussed exs for home    Eager, E,D, NR by BHUPENDRA at 5/24/2023 1506    Acceptance, E, VU by WN at 5/20/2023 2214    Acceptance, E, VU by WN at 5/19/2023 2232    Acceptance, E, VU by KN at 5/17/2023 1524   Family Acceptance, E, VU by AF at 5/31/2023 1528    Comment: pt and sons participated in meeting with rehab team disucssed current ADL status and transfers with AE. home health OT at d/c and 24 hour supervision. will continue to educate prior to d/c.    Acceptance, E, VU by AF at 5/25/2023 1510    Comment: educated on and discussed exs for home                   Point: Precautions (Done)     Description:   Instruct learner(s) on prescribed precautions during self-care and functional transfers.              Learning Progress Summary           Patient Acceptance, E, VU by AF at 5/31/2023 1528    Comment: pt and sons participated in meeting with rehab team disucssed current ADL status and transfers with AE. home health OT at d/c and 24 hour supervision. will continue to educate prior to d/c.    Acceptance, E, VU by WN at 5/26/2023 0215    Acceptance, E, VU by AF at 5/25/2023 1510    Comment: educated on and discussed exs for home    Eager, E,D, NR by  at 5/24/2023 1506    Acceptance, E, VU by WN at 5/20/2023 2214    Acceptance, E, VU by WN at 5/19/2023 2232    Acceptance, E, VU by KN at 5/17/2023 1524   Family Acceptance, E, VU by AF at 5/31/2023 1528    Comment: pt and sons participated in meeting with rehab team disucssed current ADL status and transfers with AE. home health OT at d/c and 24 hour supervision. will continue to educate prior to d/c.    Acceptance, E, VU by AF at 5/25/2023 1510    Comment: educated on and discussed exs for home                   Point: Body mechanics (Done)     Description:   Instruct learner(s) on proper positioning and spine alignment during self-care, functional mobility activities and/or exercises.              Learning Progress Summary           Patient Acceptance, E, VU by  AF at 5/31/2023 1528    Comment: pt and sons participated in meeting with rehab team disucssed current ADL status and transfers with AE. home health OT at d/c and 24 hour supervision. will continue to educate prior to d/c.    Acceptance, E, VU by WN at 5/26/2023 0215    Eager, E,D, NR by  at 5/24/2023 1506    Acceptance, E, VU by WN at 5/20/2023 2214    Acceptance, E, VU by WN at 5/19/2023 2232    Acceptance, E, VU by KN at 5/17/2023 1524   Family Acceptance, E, VU by AF at 5/31/2023 1528    Comment: pt and sons participated in meeting with rehab team disucssed current ADL status and transfers with AE. home health OT at d/c and 24 hour supervision. will continue to educate prior to d/c.                               User Key     Initials Effective Dates Name Provider Type Discipline    AF 06/16/21 -  Bernarda Ricketts, OTR Occupational Therapist OT     06/16/21 -  Keena London, PT Physical Therapist PT     08/23/22 -  Kamaljit Elias, RN Registered Nurse Nurse     08/02/22 -  Main De La Garza OT Occupational Therapist OT                OT Recommendation and Plan              OT IRF GOALS     Row Name 05/31/23 1529 05/25/23 1511          Transfer Goal 1 (OT-IRF)    Activity/Assistive Device (Transfer Goal 1, OT-IRF) -- shower chair;walk-in shower;toilet;walker, rolling  -AF     [place] Level (Transfer Goal 1, OT-IRF) -- supervision required  -AF     Time Frame (Transfer Goal 1, OT-IRF) -- long-term goal (LTG)  -AF     Progress/Outcomes (Transfer Goal 1, OT-IRF) goal met  -AF goal ongoing  -AF        Transfer Goal 2 (OT-IRF)    Progress/Outcomes (Transfer Goal 2, OT-IRF) -- goal no longer appropriate  -AF        Bathing Goal 1 (OT-IRF)    Activity/Device (Bathing Goal 1, OT-IRF) -- bathing skills, all;grab bar, tub/shower;hand-held shower spray hose;tub bench  -AF     [place] Level (Bathing Goal 1, OT-IRF) -- supervision required  -AF     Time Frame (Bathing Goal 1, OT-IRF) -- long-term goal (LTG)   -AF     Progress/Outcomes (Bathing Goal 1, OT-IRF) goal met  -AF goal ongoing  -AF        UB Dressing Goal 1 (OT-IRF)    Activity/Device (UB Dressing Goal 1, OT-IRF) -- upper body dressing  -AF     [place] (UB Dress Goal 1, OT-IRF) -- supervision required  -AF     Time Frame (UB Dressing Goal 1, OT-IRF) -- long-term goal (LTG)  -AF     Progress/Outcomes (UB Dressing Goal 1, OT-IRF) goal met  -AF goal ongoing  -AF        LB Dressing Goal 1 (OT-IRF)    Progress/Outcomes (LB Dressing Goal 1, OT-IRF) -- goal met  -AF        LB Dressing Goal 2 (OT-IRF)    Activity/Device (LB Dressing Goal 2, OT-IRF) -- lower body dressing  -AF     [place] (LB Dressing Goal 2, OT-IRF) -- supervision required  -AF     Time Frame (LB Dressing Goal 2, OT-IRF) -- long-term goal (LTG)  -AF     Progress/Outcomes (LB Dressing Goal 2, OT-IRF) goal met  -AF goal ongoing  -AF        Grooming Goal 1 (OT-IRF)    Activity/Device (Grooming Goal 1, OT-IRF) -- grooming skills, all  -AF     [place] (Grooming Goal 1, OT-IRF) -- supervision required  -AF     Time Frame (Grooming Goal 1, OT-IRF) -- long-term goal (LTG)  -AF     Progress/Outcomes (Grooming Goal 1, OT-IRF) goal met  -AF goal ongoing  -AF        Toileting Goal 1 (OT-IRF)    Progress/Outcomes (Toileting Goal 1, OT-IRF) -- goal met  -AF        Toileting Goal 2 (OT-IRF)    Activity/Device (Toileting Goal 2, OT-IRF) -- toileting skills, all  -AF     [place] Level (Toileting Goal 2, OT-IRF) -- supervision required  -AF     Progress/Outcomes (Toileting Goal 2, OT-IRF) goal met  -AF goal ongoing  -AF     Time Frame (Toileting Goal 2, OT-IRF) -- long-term goal (LTG)  -AF        ROM Goal 1 (OT-IRF)    ROM Goal 1 (OT-IRF) -- increased AROM in L shoulder flexion to 3/4 during ADL tasks with pain 2/10  -AF     Time Frame (ROM Goal 1, OT-IRF) -- long-term goal (LTG)  -AF     Progress/Outcomes (ROM Goal 1, OT-IRF) goal met  -AF goal ongoing  -AF        Strength Goal 1 (OT-IRF)    Strength  Goal 1 (OT-IRF) -- Pt will increase BUE strength to 4/5 demo through MMT  -AF     Time Frame (Strength Goal 1, OT-IRF) -- long-term goal (LTG);2 weeks  -AF     Progress/Outcomes (Strength Goal 1, OT-IRF) goal met  -AF goal ongoing  -AF        Balance Goal 1 (OT)    Activity/Assistive Device (Balance Goal 1, OT) -- standing, dynamic;standing, static;walker, rolling  -AF     [place] Level/Cues Needed (Balance Goal 1, OT) -- supervision required  -AF     Time Frame (Balance Goal 1, OT) -- long term goal (LTG)  -AF     Progress/Outcomes (Balance Goal 1, OT) goal met  -AF goal ongoing  -AF        Caregiver Training Goal 1 (OT-IRF)    Caregiver Training Goal 1 (OT-IRF) -- Pt and family will demo safe technqiue iwht ADLs, tranfsers, AE, HEP prior to d/c home.  -AF     Time Frame (Caregiver Training Goal 1, OT-IRF) -- long-term goal (LTG)  -AF     Progress/Outcomes (Caregiver Training Goal 1, OT-IRF) -- goal ongoing  -AF        Safety Awareness Goal 1 (OT-IRF)    Activity (Safety Awareness Goal 1, OT-IRF) -- insight into deficits/self-awareness;judgment  -AF     [place]/Cues/Accuracy (Safety Awareness Goal 1, OT-IRF) -- independent;with minimum;with 90% accuracy;verbal cues/redirection  -AF     Time Frame (Safety Awareness Goal 1, OT-IRF) -- long-term goal (LTG)  -AF     Progress/Outcomes (Safety Awareness Goal 1, OT-IRF) goal partially met  -AF goal ongoing  -AF           User Key  (r) = Recorded By, (t) = Taken By, (c) = Cosigned By    Initials Name Provider Type    Bernarda Winter, OTR Occupational Therapist                    Time Calculation:    Time Calculation- OT     Row Name 05/31/23 1530             Time Calculation- OT    OT Start Time 1000  -AF      OT Stop Time 1100  -AF      OT Time Calculation (min) 60 min  -AF            User Key  (r) = Recorded By, (t) = Taken By, (c) = Cosigned By    Initials Name Provider Type    Bernarda Winter, OTR Occupational Therapist                Therapy Charges for  Today     Code Description Service Date Service Provider Modifiers Qty    21738514425 HC OT SELF CARE/MGMT/TRAIN EA 15 MIN 5/30/2023 Bernarda Ricketts, OTR GO 2    00101820892 HC OT THER PROC EA 15 MIN 5/30/2023 Bernarda Ricketts, OTR GO 1    17298598918 HC OT THERAPEUTIC ACT EA 15 MIN 5/30/2023 Bernarda Ricketts, OTR GO 1    02441718474 HC OT SELF CARE/MGMT/TRAIN EA 15 MIN 5/31/2023 Bernarda Ricketts, OTR GO 1    58319485493 HC OT THER PROC EA 15 MIN 5/31/2023 Bernarda Ricketts, OTR GO 2    50580552666 HC OT NEUROMUSC RE EDUCATION EA 15 MIN 5/31/2023 Bernarda Ricketts, OTR GO 1               OT Discharge Summary  Reason for Discharge: Discharge from facility, Maximum functional potential achieved  Outcomes Achieved: Able to achieve all goals within established timeline  Discharge Destination: Home with home health, Home with assist    [facility]os  5/31/2023

## 2023-05-31 NOTE — PLAN OF CARE
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  Difficulty falling asleep.  Fluid restriction r/t hyponatremia.  Cont of bb.  Uses urinal at bedside.  Requested benadryl and roxi5 at bedtime.  Plan is d/c home on thrus 6/1. Ambulates assist x1 w/walker, shuffle gait.

## 2023-05-31 NOTE — THERAPY DISCHARGE NOTE
Inpatient Rehabilitation - IRF Speech Language Pathology /Discharge  [facility]     Patient Name: Radha Azevedo  : 1935  MRN: [id number]  Today's Date: 2023         Admit Date: 2023    Visit Dx:     ICD-10-CM ICD-9-CM   1. Impaired gait and mobility  R26.89 781.2     Patient Active Problem List   Diagnosis   • Absolute anemia   • Benign essential HTN   • Benign localized hyperplasia of prostate without urinary obstruction   • Type 2 diabetes mellitus with peripheral angiopathy   • Hypertension   • Hyperlipidemia   • Type 2 diabetes mellitus   • Diabetes mellitus type 2, noninsulin dependent   • Avitaminosis D   • Dyslipidemia   • Decrease in the ability to hear   • Personal history of colonic polyps   • Subdural hematoma       SLP Recommendation and Plan      Patient exhibits mild cognitive impairment characterized by difficulty with attention, memory, organization/planning, and executive functioning skills. Participates in therapy tasks targeting home management skills involving finance and medication management. Overall, patient requires MIN cues for medication management tasks and MOD cues for finance based activities. Verbal cues are provided for attention to detail. Increased difficulty noted with working memory and organization during tasks. Recommend supervision of medications and finances following discharge.     Recommend  speech therapy following discharge to target cognitive goals to increase patient's independence at home.                                               SLP GOALS     Row Name 23 1100 23 0900 23 0930       Attention Goal 1 (SLP)    Improve Attention by Goal 1 (SLP) -- 80%;complete selective attention task;complete sustained attention task;with minimal cues (75-90%)  -SR 80%;complete selective attention task;complete sustained attention task;with minimal cues (75-90%)  -SR    Time Frame (Attention Goal 1, SLP) -- by discharge  -SR by discharge  -SR  "   Progress (Attention Goal 1, SLP) -- -- 80%;with minimal cues (75-90%)  -SR    Progress/Outcomes (Attention Goal 1, SLP) -- goal met  -SR goal ongoing  -SR    Comment (Attention Goal 1, SLP) -- Monthly calendar organization; Patient labeled events on a monthly calendar given date/time/name of event with 70% acc given NO cues; 100% acc given MIN cues.  -SR --       Memory Skills Goal 1 (SLP)    Improve Memory Skills Through Goal 1 (SLP) use written schedule;use memory strategies;listen to a paragraph and answer questions;80%;with minimal cues (75-90%)  -SR -- use written schedule;use memory strategies;recall details of the day;80%;with minimal cues (75-90%)  -SR    Time Frame (Memory Skills Goal 1, SLP) by discharge  -SR -- by discharge  -SR    Progress/Outcomes (Memory Skills Goal 1, SLP) goal partially met  -SR -- goal ongoing  -SR    Comment (Memory Skills Goal 1, SLP) Patient participated in auditory memory task with pre-recorded voicemail messages. He listened to each recording 2x and answered questions about the content of the message (ie., caller name, recipient, reason for call, etc) with 50% acc given NO cues; 70% acc given MIN cues. Accuracy increased given choice of 3 options for answer to a question. Reviewed various memory strategies with patient including repetition, visualization, and assocation. Patient utilized strategies for short-term memory activity. Following 10 minute delay, patient recalled 2/3 words (\"list of errands\") presented at the begnning of the session given NO cues; 3/3 words given MIN cues.  -SR -- Patient followed 2 step-4 component written directions with 80% acc given MOD-MAX cues. Demonstrated increased difficulty with working memory/attention. Accuracy increased with verbal directions.  -SR       Reasoning Goal 1 (SLP)    Improve Reasoning Through Goal 1 (SLP) complete deductive reasoning task;complete mental flexibility task;80%;with moderate cues (50-74%)  -SR -- complete " deductive reasoning task;complete mental flexibility task;80%;independently (over 90% accuracy)  -SR    Time Frame (Reasoning Goal 1, SLP) by discharge  -SR -- by discharge  -SR    Progress/Outcomes (Reasoning Goal 1, SLP) goal not met  -SR -- goal ongoing  -SR    Comment (Reasoning Goal 1, SLP) Patient followed written directions and sorted items based on location provided with 50% acc given MIN cues; 80% acc given MOD cues. Activity targeted organization, reasoning, attention, and working memory skills.  -SR -- --       Executive Functional Skills Goal 1 (SLP)    Improve Executive Function Skills Goal 1 (SLP) -- home management activity;time management activity;80%;with minimal cues (75-90%)  -SR home management activity;time management activity;80%;independently (over 90% accuracy)  -SR    Time Frame (Executive Function Skills Goal 1, SLP) -- by discharge  -SR by discharge  -SR    Progress/Outcomes (Executive Function Skills Goal 1, SLP) -- goal not met  -SR continuing progress toward goal  -SR    Comment (Executive Function Skills Goal 1, SLP) -- Money management targeted via checkbook balance activity. Patient calculated the final balance for a monthly credit card statement given deposit/payment amounts with 80% acc given MOD cues. No calculator assist. Verbal cues provided for attn to detail. Increased difficulty noted with working memory and organization during task.  Recommend supervision of finances/medications following discharge. Patient agreeable to recommendations.  -SR Medication management; Given written directions, patient sorted 10 mock medications by day/time with 40% acc given NO cues; 80% acc given MIN cues. Activity targeted attention, working memory, and organization. Patient required verbal cues for attn to detail. Extended time provided to complete task.  -SR    Row Name 05/29/23 1000             Attention Goal 1 (SLP)    Progress/Outcomes (Attention Goal 1, SLP) goal ongoing  -JT       Comment (Attention Goal 1, SLP) Connect the dots alphabetical 90% w/mod-max cues  -JT         Reasoning Goal 1 (SLP)    Progress/Outcomes (Reasoning Goal 1, SLP) goal ongoing  -JT      Comment (Reasoning Goal 1, SLP) recall daily activity independently  -JT         Executive Functional Skills Goal 1 (SLP)    Progress/Outcomes (Executive Function Skills Goal 1, SLP) goal ongoing  -JT      Comment (Executive Function Skills Goal 1, SLP) naming 8 fruits w/min cues  -JT            User Key  (r) = Recorded By, (t) = Taken By, (c) = Cosigned By    Initials Name Provider Type    JT Azul Fowler Speech and Language Pathologist     Hope Mary CCC-SLP Speech and Language Pathologist                EDUCATION  The patient has been educated in the following areas:   Cognitive Impairment.              Time Calculation:    Time Calculation- SLP     Row Name 05/31/23 1149 05/31/23 1000          Time Calculation- SLP    SLP Start Time 1100  -SR 0930  -SR     SLP Stop Time 1130  -SR 1000  -SR     SLP Time Calculation (min) 30 min  -SR 30 min  -SR           User Key  (r) = Recorded By, (t) = Taken By, (c) = Cosigned By    Initials Name Provider Type    SR Hope Mary CCC-SLP Speech and Language Pathologist                Therapy Charges for Today     Code Description Service Date Service Provider Modifiers Qty    13835149673 HC ST DEV OF COGN SKILLS INITIAL 15 MIN 5/30/2023 Hope Mary CCC-SLP  1    26269207876 HC ST DEV OF COGN SKILLS EACH ADDT'L 15 MIN 5/30/2023 Hope Mary CCC-SLP  3    64533300747 HC ST DEV OF COGN SKILLS INITIAL 15 MIN 5/31/2023 Hope Mary CCC-SLP  1    98677605802 HC ST DEV OF COGN SKILLS EACH ADDT'L 15 MIN 5/31/2023 Hope Mary CCC-SLP  3               SLP Discharge Summary  Anticipated Discharge Disposition (SLP): home with 24/7 care    KATIE Victoria  5/31/2023

## 2023-05-31 NOTE — THERAPY TREATMENT NOTE
Inpatient Rehabilitation - Physical Therapy Treatment Note       [facility]     Patient Name: Radha Azevedo  : 1935  MRN: [id number]    Today's Date: 2023                    Admit Date: 2023      Visit Dx:     ICD-10-CM ICD-9-CM   1. Impaired gait and mobility  R26.89 781.2       Patient Active Problem List   Diagnosis   • Absolute anemia   • Benign essential HTN   • Benign localized hyperplasia of prostate without urinary obstruction   • Type 2 diabetes mellitus with peripheral angiopathy   • Hypertension   • Hyperlipidemia   • Type 2 diabetes mellitus   • Diabetes mellitus type 2, noninsulin dependent   • Avitaminosis D   • Dyslipidemia   • Decrease in the ability to hear   • Personal history of colonic polyps   • Subdural hematoma       Past Medical History:   Diagnosis Date   • Colon polyps     Followed by Dr. Leo Jaeger at [facility].   • Coronary artery disease    • Diabetes mellitus    • Hyperlipidemia    • Hypertension        Past Surgical History:   Procedure Laterality Date   • CARDIAC CATHETERIZATION     • CAROTID STENT      patient denies, but family states he did have a stent placed   • COLONOSCOPY N/A 2014    Dr. Leo Jaeger at [facility].   • COLONOSCOPY N/A 2016    Dr. Leo Jaeger at [facility].   • COLONOSCOPY N/A 03/15/2023    2 TUBULAR ADENOMA POLYPS IN SIGMOID, BARIUM ENEMA ORDERED, DR. LEO JAEGER AT [facility]   • CORONARY ANGIOPLASTY     • FRACTURE SURGERY Left 2023    IM nailing for L femur fracture       PT ASSESSMENT (last 12 hours)     IRF PT Evaluation and Treatment     Row Name 23 1248 23 1000       PT Time and Intention    Document Type daily treatment (P)   -NM discharge evaluation  -AE    Mode of Treatment physical therapy (P)   -NM individual therapy;physical therapy  -AE    Patient/Family/Caregiver Comments/Observations pt sitting in recliner, eager for PT (P)   -NM pt sitting up in wc eager for PT  -AE    Row Name 23 1248 23 1000        General Information    Patient Profile Reviewed yes (P)   -NM yes  -AE    General Observations of Patient R side inattention (P)   -NM R side inattention, frequently confused  -AE    Existing Precautions/Restrictions fall (P)   -NM fall  -AE    Comment, General Information impulsive (P)   -NM impulsive  -AE    Row Name 05/31/23 1248 05/31/23 1000       Pain Assessment    Pretreatment Pain Rating 0/10 - no pain (P)   -NM 0/10 - no pain  -AE    Posttreatment Pain Rating 0/10 - no pain (P)   -NM 0/10 - no pain  -AE    Pain Location - Side/Orientation -- Bilateral  -AE    Pain Location - -- hip;shoulder  -AE    Row Name 05/31/23 1248 05/31/23 1000       Cognition/Psychosocial    Affect/Mental Status (Cognition) WFL (P)   -NM WFL  -AE    Orientation Status (Cognition) oriented x 3 (P)   -NM oriented x 3  -AE    Follows Commands (Cognition) follows two-step commands;verbal cues/prompting required (P)   -NM follows two-step commands;verbal cues/prompting required  -AE    Personal Safety Interventions fall prevention program maintained;gait belt;muscle strengthening facilitated;supervised activity;nonskid shoes/slippers when out of bed (P)   -NM fall prevention program maintained;gait belt;muscle strengthening facilitated;nonskid shoes/slippers when out of bed;supervised activity  -AE    Comment, Cognition VC for attention to R side during functional mobility tasks (P)   -NM --    Row Name 05/31/23 1248 05/31/23 1000       Bed Mobility    Supine-Sit [place] (Bed Mobility) modified independence (P)   -NM modified independence  -AE    Sit-Supine [place] (Bed Mobility) modified independence (P)   -NM modified independence  -AE    Comment, (Bed Mobility) -- (P)   -NM --    Row Name 05/31/23 1248 05/31/23 1000       Bed-Chair Transfer    Bed-Chair [place] (Transfers) contact guard (P)   -NM contact guard  -AE    Assistive Device (Bed-Chair Transfers) walker, front-wheeled;wheelchair (P)   -NM walker,  front-wheeled;wheelchair  -AE    Comment, (Bed-Chair Transfer) stand pivot (P)   -NM stand pivot  -AE    Row Name 05/31/23 1248 05/31/23 1000       Chair-Bed Transfer    Chair-Bed [place] (Transfers) contact guard (P)   -NM contact guard  -AE    Assistive Device (Chair-Bed Transfers) walker, front-wheeled;wheelchair (P)   -NM walker, front-wheeled;wheelchair  -AE    Comment, (Chair-Bed Transfer) stand pivot (P)   -NM stand pivot  -AE    Row Name 05/31/23 1248 05/31/23 1000       Sit-Stand Transfer    Sit-Stand [place] (Transfers) standby assist (P)   -NM standby assist  -AE    Assistive Device (Sit-Stand Transfers) walker, front-wheeled (P)   -NM walker, front-wheeled  -AE    Comment, (Sit-Stand Transfer) VC for hand placement (P)   -NM --    Row Name 05/31/23 1248 05/31/23 1000       Stand-Sit Transfer    Stand-Sit [place] (Transfers) standby assist (P)   -NM standby assist  -AE    Assistive Device (Stand-Sit Transfers) walker, front-wheeled (P)   -NM walker, front-wheeled  -AE    Comment, (Stand-Sit Transfer) VC for hand placement (P)   -NM --    Row Name 05/31/23 1000          Car Transfer    Type (Car Transfer) stand pivot/stand step  -AE     [place] Level (Car Transfer) contact guard;verbal cues;nonverbal cues (demo/gesture)  -AE     Assistive Device (Car Transfer) walker, front-wheeled  -AE     Comment, (Car Transfer) needs cues for sequencing  -AE     Row Name 05/31/23 1248 05/31/23 1000       Gait/Stairs (Locomotion)    Gait/Stairs Locomotion gait/ambulation assistive device (P)   -NM gait/ambulation assistive device  -AE    [place] Level (Gait) standby assist;contact guard;verbal cues (P)   -NM standby assist;contact guard;verbal cues;nonverbal cues (demo/gesture)  -AE    Assistive Device (Gait) walker, front-wheeled (P)   -NM walker, front-wheeled  -AE    Distance in Feet (Gait) 160 (P)   -NM 300ft, 20ft x 2  -AE    Pattern (Gait) step-through (P)   -NM step-through  -AE     Deviations/Abnormal Patterns (Gait) crystal decreased;stride length decreased (P)   -NM crystal decreased;stride length decreased  -AE    Bilateral Gait Deviations forward flexed posture;heel strike decreased (P)   -NM forward flexed posture;heel strike decreased  -AE    Left Sided Gait Deviations weight shift ability decreased (P)   -NM weight shift ability decreased  -AE    Gait Assessment/Intervention -- needs constant cueing for R sided inattention and navigation of walking. min cueing for close proximity and turning  -AE    [place] Level (Stairs) -- contact guard;verbal cues;nonverbal cues (demo/gesture)  -AE    Handrail Location (Stairs) -- right side (descending);both sides  -AE    Number of Steps (Stairs) -- 12  -AE    Ascending Technique (Stairs) -- step-to-step  -AE    Descending Technique (Stairs) -- step-to-step  -AE    Stairs, Safety Issues -- balance decreased during turns  -AE    Stairs, Impairments -- impaired balance;pain;strength decreased  -AE    Stairs Assessment/Intervention -- emphasized  -AE    Row Name 05/31/23 1248 05/31/23 1000       Balance    Static Standing Balance standby assist;contact guard (P)   -NM --    Comment, Balance standing feet together, semi-tandem standing, standing with eyes closed 3 sets each hold for 15 seconds (P)   -NM picked small foam object off the floor with CGA  -AE    Row Name 05/31/23 1248          Knee (Therapeutic Exercise)    Knee (Therapeutic Exercise) strengthening exercise (P)   -NM     Knee Strengthening (Therapeutic Exercise) SLR (straight leg raise);3 sets;5 repetitions;other (see comments) (P)   when performing SLR, pt required VC to contract  quads and lift leg  -NM     Row Name 05/31/23 1248          Core Strength (Therapeutic Exercise)    Core Strength (Therapeutic Exercise) bridging, bilateral lower extremities;3 sets;5 repetitions;supine (P)   -NM     Comment (Core Strength Therapeutic Exercise) single leg raise VC to contract the quads and  hold then lift 3 sets of 5 (P)   -NM     Row Name 05/31/23 1248 05/31/23 1000       Positioning and Restraints    Pre-Treatment Position sitting in chair/recliner (P)   -NM sitting in chair/recliner  -AE    Post Treatment Position chair (P)   -NM wheelchair  -AE    In Chair sitting;call light within reach;encouraged to call for assist;exit alarm on (P)   -NM --    In Wheelchair -- sitting;call light within reach;encouraged to call for assist;exit alarm on  -AE    Row Name 05/31/23 1000          Bed Mobility Goal 1 (PT-IRF)    Activity/Assistive Device (Bed Mobility Goal 1, PT-IRF) bed mobility activities, all  -AE     [place] Level (Bed Mobility Goal 1, PT-IRF) modified independence  -AE     Progress/Outcomes (Bed Mobility Goal 1, PT-IRF) goal met  -AE     Row Name 05/31/23 1000          Transfer Goal 1 (PT-IRF)    Activity/Assistive Device (Transfer Goal 1, PT-IRF) all transfers  -AE     [place] Level (Transfer Goal 1, PT-IRF) supervision required;modified independence  -AE     Progress/Outcomes (Transfer Goal 1, PT-IRF) goal partially met  sit<>stand with [place]/supervision, CGA recommended for any dynamic transfer  -AE     Row Name 05/31/23 1000          Gait/Walking Locomotion Goal 1 (PT-IRF)    Activity/Assistive Device (Gait/Walking Locomotion Goal 1, PT-IRF) gait (walking locomotion);walker, rolling  -AE     Gait/Walking Locomotion Distance Goal 1 (PT-IRF) 150  -AE     [place] Level (Gait/Walking Locomotion Goal 1, PT-IRF) standby assist  -AE     Progress/Outcomes (Gait/Walking Locomotion Goal 1, PT-IRF) goal met  -AE     Row Name 05/31/23 1000          Stairs Goal 1 (PT-IRF)    Activity/Assistive Device (Stairs Goal 1, PT-IRF) stairs, all skills;using handrail, left;using handrail, right  -AE     Number of Stairs (Stairs Goal 1, PT-IRF) 4  -AE     [place] Level (Stairs Goal 1, PT-IRF) contact guard required  -AE     Progress/Outcomes (Stairs Goal 1, PT-IRF) goal met  -AE           User Key   (r) = Recorded By, (t) = Taken By, (c) = Cosigned By    Initials Name Provider Type    Mervat Ortega, PT Physical Therapist    Jalen Quinones, PT Student PT Student              Wound 05/16/23 1831 Left lateral hip Incision (Active)   Closure GUME 05/30/23 2040   Base dressing in place, unable to visualize 05/30/23 2040     Physical Therapy Education     Title: PT OT SLP Therapies (In Progress)     Topic: Physical Therapy (In Progress)     Point: Mobility training (In Progress)     Learning Progress Summary           Patient Eager, E,TB,D, NR by KP at 5/29/2023 0946    Acceptance, D,E, NR by EE at 5/27/2023 1255    Acceptance, E,D, NR by DP at 5/26/2023 0855    Comment: transfers and hand palcemenmt    Acceptance, E, VU by WN at 5/26/2023 0215    Acceptance, E,D, NR by DP at 5/25/2023 1451    Eager, E,D, NR by KP at 5/24/2023 1506    Acceptance, E,D, VU,DU by DP at 5/23/2023 1110    Acceptance, E,D, VU,DU by DP at 5/22/2023 1148    Acceptance, E, VU by WN at 5/20/2023 2214    Acceptance, E, NR by  at 5/20/2023 0927    Acceptance, E, VU by WN at 5/19/2023 2232    Acceptance, E,TB, VU,NR by MAURILIO at 5/18/2023 1206    Acceptance, E, VU by DARVIN at 5/17/2023 1524    Acceptance, E,TB, VU,NR by MAURILIO at 5/17/2023 1209                   Point: Home exercise program (In Progress)     Learning Progress Summary           Patient Eager, E,TB,D, NR by KP at 5/29/2023 0946    Acceptance, D,E, NR by EE at 5/27/2023 1255    Acceptance, E,D, NR by DP at 5/26/2023 0855    Comment: transfers and hand palcemenmt    Acceptance, E, VU by WN at 5/26/2023 0215    Acceptance, E,D, NR by DP at 5/25/2023 1451    Eager, E,D, NR by BHUPENDRA at 5/24/2023 1506    Acceptance, E,D, VU,DU by DP at 5/23/2023 1110    Acceptance, E,D, VU,DU by DP at 5/22/2023 1148    Acceptance, E, VU by WN at 5/20/2023 2214    Acceptance, E, NR by LH at 5/20/2023 0927    Acceptance, E, VU by WN at 5/19/2023 2232    Acceptance, E, VU by KN at 5/17/2023 1524                    Point: Body mechanics (In Progress)     Learning Progress Summary           Patient Acceptance, D,E, NR by EE at 5/27/2023 1255    Acceptance, E,D, NR by DP at 5/26/2023 0855    Comment: transfers and hand palcemenmt    Acceptance, E, VU by WN at 5/26/2023 0215    Acceptance, E,D, NR by DP at 5/25/2023 1451    Eager, E,D, NR by KP at 5/24/2023 1506    Acceptance, E,D, VU,DU by DP at 5/23/2023 1110    Acceptance, E,D, VU,DU by DP at 5/22/2023 1148    Acceptance, E, VU by WN at 5/20/2023 2214    Acceptance, E, NR by  at 5/20/2023 0927    Acceptance, E, VU by WN at 5/19/2023 2232    Acceptance, E, VU by KN at 5/17/2023 1524                   Point: Precautions (In Progress)     Learning Progress Summary           Patient Acceptance, D,E, NR by EE at 5/27/2023 1255    Acceptance, E,D, NR by DP at 5/26/2023 0855    Comment: transfers and hand palcemenmt    Acceptance, E, VU by WN at 5/26/2023 0215    Acceptance, E,D, NR by DP at 5/25/2023 1451    Eager, E,D, NR by KP at 5/24/2023 1506    Acceptance, E,D, VU,DU by DP at 5/23/2023 1110    Acceptance, E,D, VU,DU by DP at 5/22/2023 1148    Acceptance, E, VU by WN at 5/20/2023 2214    Acceptance, E, NR by  at 5/20/2023 0927    Acceptance, E, VU by WN at 5/19/2023 2232    Acceptance, E, VU by KN at 5/17/2023 1524                               User Key     Initials Effective Dates Name Provider Type Discipline     06/16/21 -  Ashlie Arnold, PT Physical Therapist PT     06/16/21 -  Aicha Payne, PT Physical Therapist PT     06/16/21 -  Angelika Echeverria, PT Physical Therapist PT     06/16/21 -  Keena London, PT Physical Therapist PT    WN 08/23/22 -  Kamaljit Elias, RN Registered Nurse Nurse    DP 08/24/21 -  George Lehman, PT Physical Therapist PT    KN 08/02/22 -  Main De La Garza OT Occupational Therapist OT                PT Recommendation and Plan                          Time Calculation:      PT Charges     Row Name 05/31/23 1354 05/31/23 7370  05/31/23 1028       Time Calculation    Start Time 1230 (P)   -NM 0830  -AE 0830  -AE    Stop Time 1300 (P)   -NM 0900  -AE 0900  -AE    Time Calculation (min) 30 min (P)   -NM 30 min  -AE 30 min  -AE    PT Received On 05/31/23 (P)   -NM 05/31/23  -AE 05/30/23  -AE    PT - Next Appointment 06/01/23 (P)   -NM 06/01/23  -AE --       Time Calculation- PT    Total Timed Code Minutes- PT 30 minute(s) (P)   -NM 30 minute(s)  -AE 30 minute(s)  -AE          User Key  (r) = Recorded By, (t) = Taken By, (c) = Cosigned By    Initials Name Provider Type    AE Mervat Light, PT Physical Therapist    Jalen Quinones, PT Student PT Student                Therapy Charges for Today     Code Description Service Date Service Provider Modifiers Qty    79601258983 HC PT NEUROMUSC RE EDUCATION EA 15 MIN 5/30/2023 Jalen Philip, PT Student GP 1    [id number] HC GAIT TRAINING EA 15 MIN 5/30/2023 Jalen Philip, PT Student GP 1    [id number] HC GAIT TRAINING EA 15 MIN 5/31/2023 Jalen Philip, PT Student GP 1    [id number] HC PT NEUROMUSC RE EDUCATION EA 15 MIN 5/31/2023 Jalen Philip PT Student GP 1                   Jalen Philip PT Student  5/31/2023

## 2023-05-31 NOTE — PROGRESS NOTES
Inpatient Rehabilitation Plan of Care Note    Plan of Care  Care Plan Reviewed - Updates as Follows    Psychosocial    [RN] Coping/Adjustment(Active)  Current Status(05/31/2023): At risk for poor coping due to recent medical  issues.  Weekly Goal(06/06/2023): Identify progress in functional status.  Discharge Goal: Demonstrates healthy coping skills.    Performed Intervention(s)  Medication.  Group therapy.  therapeutic environmental set-up      Safety    [RN] Potential for Injury(Active)  Current Status(05/31/2023): Hx falls. At risk for falling in the hospital.  Weekly Goal(06/06/2023): Cue to use the call bell.  Discharge Goal: Patient/ family will be aware of the risk of falling in the home  setting.    Performed Intervention(s)  Items within reach.  Safety rounds.  Wheelchair, bed, and chair alarms.      Sphincter Control    [RN] Bladder Management(Active)  Current Status(05/31/2023): 100% continent of bladder.  Weekly Goal(06/06/2023): Remains 100% continent of bladder.  Discharge Goal: Goes home continent of bladder.    [RN] Bowel Management(Active)  Current Status(05/31/2023): 100% continent of bowel.  Weekly Goal(06/06/2023): Remains continent of bowel.  Discharge Goal: goes home continent of bowel.    Performed Intervention(s)  Offer restroom.  Bladder training program.  Use incontinence products.  Encourage appropriate diet.  Encourage fluid intake.      Body Systems    [RN] Integumentary(Active)  Current Status(05/31/2023): Pt has laceration to scalp and incision to L hip.  Weekly Goal(06/06/2023): Skin will remain free from s/s infection .  Discharge Goal: Scalp will be healed and L hip will have no s/s infection.    Performed Intervention(s)  Skin assessment q shift and prn.  Wound care as ordered.    Signed by: Lynn Griffin RN

## 2023-05-31 NOTE — PLAN OF CARE
Goal Outcome Evaluation:  Plan of Care Reviewed With: patient           Outcome Evaluation: Alert and oriented x4- impulsive at times, continent of b/b, and takes medication with water, He had a familty conference today, discharge scheduled for tomorrow, and appts and meds discussed. No pain, tolerated all therapies, and dressing changed to l hip. Family needs teaching on admisnistration of heperin shots, asssit x1, and no other issues at this time.

## 2023-05-31 NOTE — PROGRESS NOTES
SECTION GG      Mobility Performance Discharge:     Roll Left and Right: Patient completed the activities by themself with no  assistance from a helper.   Sit to Lying: Patient completed the activities by themself with no assistance  from a helper.   Lying to Sitting on Side of Bed: Patient completed the activities by themself  with no assistance from a helper.   Sit to Stand: [place] provides verbal cues and/or touching/steadying and/or  contact guard assistance as patient completes activity. Assistance may be  provided throughout the activity or intermittently.   Chair/Bed to Chair Transfer: [place]ard provides verbal cues and/or  touching/steadying and/or contact guard assistance as patient completes  activity. Assistance may be provided throughout the activity or intermittently.   Car Transfer: Girard provides verbal cues and/or touching/steadying and/or  contact guard assistance as patient completes activity. Assistance may be  provided throughout the activity or intermittently.   Walk 10 Feet:   Girard provides verbal cues and/or touching/steadying and/or  contact guard assistance as patient completes activity. Assistance may be  provided throughout the activity or intermittently.  Walk 50 Feet with 2 Turns:   [place]ard provides verbal cues and/or  touching/steadying and/or contact guard assistance as patient completes  activity. Assistance may be provided throughout the activity or intermittently.  Walk 150 Feet:   Girard provides verbal cues and/or touching/steadying and/or  contact guard assistance as patient completes activity. Assistance may be  provided throughout the activity or intermittently.  Walking 10 Feet on Uneven Surfaces:   Girard does less than half the effort.  Girard lifts, holds or supports trunk or limbs but provides less than half the  effort.  1 Step Over Curb or Up/Down Stair:   Girard provides verbal cues and/or  touching/steadying and/or contact guard assistance as patient  completes  activity. Assistance may be provided throughout the activity or intermittently.  4 Steps Up and Down, With/Without Rail:   [place] provides verbal cues and/or  touching/steadying and/or contact guard assistance as patient completes  activity. Assistance may be provided throughout the activity or intermittently.  12 Steps Up and Down, With/Without Rail:   [place] provides verbal cues and/or  touching/steadying and/or contact guard assistance as patient completes  activity. Assistance may be provided throughout the activity or intermittently.  Picking up an Object:   [place] provides verbal cues and/or touching/steadying  and/or contact guard assistance as patient completes activity. Assistance may be  provided throughout the activity or intermittently. Uses Wheelchair and/or  Scooter: No    Section J. Health Conditions (Pain):  Pain Interference with Therapy Activities:   Rarely or not at all  Pain Interference with Day-to-Day Activities:   Rarely or not at all    Signed by: Mervat Light PT

## 2023-05-31 NOTE — PROGRESS NOTES
I will be gone until Monday June 5, 2023. Group covering.   Medication reconciliation done.  Discharge orders in place.

## 2023-05-31 NOTE — PROGRESS NOTES
Inpatient Rehabilitation Plan of Care Note    Plan of Care  Care Plan Reviewed - No updates at this time.    Psychosocial    Performed Intervention(s)  Medication.  Group therapy.  therapeutic environmental set-up      Safety    Performed Intervention(s)  Items within reach.  Safety rounds.  Wheelchair, bed, and chair alarms.      Sphincter Control    Performed Intervention(s)  Offer restroom.  Bladder training program.  Use incontinence products.  Encourage appropriate diet.  Encourage fluid intake.      Body Systems    Performed Intervention(s)  Skin assessment q shift and prn.  Wound care as ordered.    Signed by: Yelitza Talavera RN

## 2023-05-31 NOTE — THERAPY TREATMENT NOTE
Inpatient Rehabilitation - Physical Therapy Treatment Note       [facility]     Patient Name: Radha Azevedo  : 1935  MRN: [id number]    Today's Date: 2023                    Admit Date: 2023      Visit Dx:     ICD-10-CM ICD-9-CM   1. Impaired gait and mobility  R26.89 781.2       Patient Active Problem List   Diagnosis   • Absolute anemia   • Benign essential HTN   • Benign localized hyperplasia of prostate without urinary obstruction   • Type 2 diabetes mellitus with peripheral angiopathy   • Hypertension   • Hyperlipidemia   • Type 2 diabetes mellitus   • Diabetes mellitus type 2, noninsulin dependent   • Avitaminosis D   • Dyslipidemia   • Decrease in the ability to hear   • Personal history of colonic polyps   • Subdural hematoma       Past Medical History:   Diagnosis Date   • Colon polyps     Followed by Dr. Leo Jaeger at [facility].   • Coronary artery disease    • Diabetes mellitus    • Hyperlipidemia    • Hypertension        Past Surgical History:   Procedure Laterality Date   • CARDIAC CATHETERIZATION     • CAROTID STENT      patient denies, but family states he did have a stent placed   • COLONOSCOPY N/A 2014    Dr. Leo Jaeger at [facility].   • COLONOSCOPY N/A 2016    Dr. Leo Jaeger at [facility].   • COLONOSCOPY N/A 03/15/2023    2 TUBULAR ADENOMA POLYPS IN SIGMOID, BARIUM ENEMA ORDERED, DR. LEO JAEGER AT [facility]   • CORONARY ANGIOPLASTY     • FRACTURE SURGERY Left 2023    IM nailing for L femur fracture       PT ASSESSMENT (last 12 hours)     IRF PT Evaluation and Treatment     Row Name 23 1248 23 1000       PT Time and Intention    Document Type daily treatment (P)   -NM discharge evaluation  -AE    Mode of Treatment physical therapy (P)   -NM individual therapy;physical therapy  -AE    Patient/Family/Caregiver Comments/Observations pt sitting in recliner, showing no signs of acute distress (P)   -NM pt sitting up in wc eager for PT  -AE    Row Name  05/31/23 1000          General Information    Patient Profile Reviewed yes  -AE     General Observations of Patient R side inattention, frequently confused  -AE     Existing Precautions/Restrictions fall  -AE     Comment, General Information impulsive  -AE     Row Name 05/31/23 1000          Pain Assessment    Pretreatment Pain Rating 0/10 - no pain  -AE     Posttreatment Pain Rating 0/10 - no pain  -AE     Pain Location - Side/Orientation Bilateral  -AE     Pain Location - hip;shoulder  -AE     Row Name 05/31/23 1000          Cognition/Psychosocial    Affect/Mental Status (Cognition) WFL  -AE     Orientation Status (Cognition) oriented x 3  -AE     Follows Commands (Cognition) follows two-step commands;verbal cues/prompting required  -AE     Personal Safety Interventions fall prevention program maintained;gait belt;muscle strengthening facilitated;nonskid shoes/slippers when out of bed;supervised activity  -AE     Row Name 05/31/23 1248 05/31/23 1000       Bed Mobility    Supine-Sit Cow[place] (Bed Mobility) -- modified independence  -AE    Sit-Supine [place] (Bed Mobility) -- modified independence  -AE    Comment, (Bed Mobility) modified independence (P)   -NM --    Row Name 05/31/23 1248 05/31/23 1000       Bed-Chair Transfer    Bed-Chair [place] (Transfers) contact guard (P)   -NM contact guard  -AE    Assistive Device (Bed-Chair Transfers) walker, front-wheeled (P)   -NM walker, front-wheeled;wheelchair  -AE    Comment, (Bed-Chair Transfer) -- stand pivot  -AE    Row Name 05/31/23 1248 05/31/23 1000       Chair-Bed Transfer    Chair-Bed Cow[place] (Transfers) contact guard (P)   -NM contact guard  -AE    Assistive Device (Chair-Bed Transfers) walker, front-wheeled (P)   -NM walker, front-wheeled;wheelchair  -AE    Comment, (Chair-Bed Transfer) -- stand pivot  -AE    Row Name 05/31/23 1248 05/31/23 1000       Sit-Stand Transfer    Sit-Stand Cowley (Transfers) standby assist (P)   -NM standby  assist  -AE    Assistive Device (Sit-Stand Transfers) walker, front-wheeled (P)   -NM walker, front-wheeled  -AE    Row Name 05/31/23 1248 05/31/23 1000       Stand-Sit Transfer    Stand-Sit [place] (Transfers) standby assist (P)   -NM standby assist  -AE    Assistive Device (Stand-Sit Transfers) walker, front-wheeled (P)   -NM walker, front-wheeled  -AE    Row Name 05/31/23 1000          Car Transfer    Type (Car Transfer) stand pivot/stand step  -AE     [place] Level (Car Transfer) contact guard;verbal cues;nonverbal cues (demo/gesture)  -AE     Assistive Device (Car Transfer) walker, front-wheeled  -AE     Comment, (Car Transfer) needs cues for sequencing  -AE     Row Name 05/31/23 1248 05/31/23 1000       Gait/Stairs (Locomotion)    Gait/Stairs Locomotion gait/ambulation independence (P)   -NM gait/ambulation assistive device  -AE    [place] Level (Gait) standby assist;contact guard;verbal cues (P)   -NM standby assist;contact guard;verbal cues;nonverbal cues (demo/gesture)  -AE    Assistive Device (Gait) -- walker, front-wheeled  -AE    Distance in Feet (Gait) 160 (P)   -NM 300ft, 20ft x 2  -AE    Pattern (Gait) -- step-through  -AE    Deviations/Abnormal Patterns (Gait) -- crystal decreased;stride length decreased  -AE    Bilateral Gait Deviations -- forward flexed posture;heel strike decreased  -AE    Left Sided Gait Deviations -- weight shift ability decreased  -AE    Gait Assessment/Intervention -- needs constant cueing for R sided inattention and navigation of walking. min cueing for close proximity and turning  -AE    [place] Level (Stairs) -- contact guard;verbal cues;nonverbal cues (demo/gesture)  -AE    Handrail Location (Stairs) -- right side (descending);both sides  -AE    Number of Steps (Stairs) -- 12  -AE    Ascending Technique (Stairs) -- step-to-step  -AE    Descending Technique (Stairs) -- step-to-step  -AE    Stairs, Safety Issues -- balance decreased during turns  -AE     Stairs, Impairments -- impaired balance;pain;strength decreased  -AE    Stairs Assessment/Intervention -- emphasized  -AE    Row Name 05/31/23 1248 05/31/23 1000       Balance    Static Standing Balance standby assist;contact guard (P)   -NM --    Comment, Balance standing feet together, semi-tandem standing, standing with eyes closed 3 sets each hold for 15 seconds (P)   -NM picked small foam object off the floor with CGA  -AE    Row Name 05/31/23 1248          Core Strength (Therapeutic Exercise)    Core Strength (Therapeutic Exercise) bridging, bilateral lower extremities;3 sets;5 repetitions;supine (P)   -NM     Comment (Core Strength Therapeutic Exercise) single leg raise VC to contract the quads and hold then lift 3 sets of 5 (P)   -NM     Row Name 05/31/23 1000          Positioning and Restraints    Pre-Treatment Position sitting in chair/recliner  -AE     Post Treatment Position wheelchair  -AE     In Wheelchair sitting;call light within reach;encouraged to call for assist;exit alarm on  -AE     Row Name 05/31/23 1000          Bed Mobility Goal 1 (PT-IRF)    Activity/Assistive Device (Bed Mobility Goal 1, PT-IRF) bed mobility activities, all  -AE     [place] Level (Bed Mobility Goal 1, PT-IRF) modified independence  -AE     Progress/Outcomes (Bed Mobility Goal 1, PT-IRF) goal met  -AE     Row Name 05/31/23 1000          Transfer Goal 1 (PT-IRF)    Activity/Assistive Device (Transfer Goal 1, PT-IRF) all transfers  -AE     [place] Level (Transfer Goal 1, PT-IRF) supervision required;modified independence  -AE     Progress/Outcomes (Transfer Goal 1, PT-IRF) goal partially met  sit<>stand with Lester/supervision, CGA recommended for any dynamic transfer  -AE     Row Name 05/31/23 1000          Gait/Walking Locomotion Goal 1 (PT-IRF)    Activity/Assistive Device (Gait/Walking Locomotion Goal 1, PT-IRF) gait (walking locomotion);walker, rolling  -AE     Gait/Walking Locomotion Distance Goal 1 (PT-IRF) 150   -AE     [place] Level (Gait/Walking Locomotion Goal 1, PT-IRF) standby assist  -AE     Progress/Outcomes (Gait/Walking Locomotion Goal 1, PT-IRF) goal met  -AE     Row Name 05/31/23 1000          Stairs Goal 1 (PT-IRF)    Activity/Assistive Device (Stairs Goal 1, PT-IRF) stairs, all skills;using handrail, left;using handrail, right  -AE     Number of Stairs (Stairs Goal 1, PT-IRF) 4  -AE     [place] Level (Stairs Goal 1, PT-IRF) contact guard required  -AE     Progress/Outcomes (Stairs Goal 1, PT-IRF) goal met  -AE           User Key  (r) = Recorded By, (t) = Taken By, (c) = Cosigned By    Initials Name Provider Type    AE Mervat Light, PT Physical Therapist    Jalen Quinones, PT Student PT Student              Wound 05/16/23 1831 Left lateral hip Incision (Active)   Closure GUME 05/30/23 2040   Base dressing in place, unable to visualize 05/30/23 2040     Physical Therapy Education     Title: PT OT SLP Therapies (In Progress)     Topic: Physical Therapy (In Progress)     Point: Mobility training (In Progress)     Learning Progress Summary           Patient Eager, E,TB,D, NR by KP at 5/29/2023 0946    Acceptance, D,E, NR by IDALIA at 5/27/2023 1255    Acceptance, E,D, NR by DP at 5/26/2023 0855    Comment: transfers and hand palcemenmt    Acceptance, E, VU by WN at 5/26/2023 0215    Acceptance, E,D, NR by DP at 5/25/2023 1451    Eager, E,D, NR by KP at 5/24/2023 1506    Acceptance, E,D, VU,DU by DP at 5/23/2023 1110    Acceptance, E,D, VU,DU by DP at 5/22/2023 1148    Acceptance, E, VU by WN at 5/20/2023 2214    Acceptance, E, NR by ABDIEL at 5/20/2023 0927    Acceptance, E, VU by WN at 5/19/2023 2232    Acceptance, E,TB, VU,NR by MAURILIO at 5/18/2023 1206    Acceptance, E, VU by DARVIN at 5/17/2023 1524    Acceptance, E,TB, STARLA,NR by MAURILIO at 5/17/2023 1209                   Point: Home exercise program (In Progress)     Learning Progress Summary           Patient Eager, E,DARI,D, NR by BHUPENDRA at 5/29/2023 0977     Acceptance, D,E, NR by EE at 5/27/2023 1255    Acceptance, E,D, NR by DP at 5/26/2023 0855    Comment: transfers and hand palcemenmt    Acceptance, E, VU by WN at 5/26/2023 0215    Acceptance, E,D, NR by DP at 5/25/2023 1451    Eager, E,D, NR by KP at 5/24/2023 1506    Acceptance, E,D, VU,DU by DP at 5/23/2023 1110    Acceptance, E,D, VU,DU by DP at 5/22/2023 1148    Acceptance, E, VU by WN at 5/20/2023 2214    Acceptance, E, NR by LH at 5/20/2023 0927    Acceptance, E, VU by WN at 5/19/2023 2232    Acceptance, E, VU by KN at 5/17/2023 1524                   Point: Body mechanics (In Progress)     Learning Progress Summary           Patient Acceptance, D,E, NR by EE at 5/27/2023 1255    Acceptance, E,D, NR by DP at 5/26/2023 0855    Comment: transfers and hand palcemenmt    Acceptance, E, VU by WN at 5/26/2023 0215    Acceptance, E,D, NR by DP at 5/25/2023 1451    Eager, E,D, NR by KP at 5/24/2023 1506    Acceptance, E,D, VU,DU by DP at 5/23/2023 1110    Acceptance, E,D, VU,DU by DP at 5/22/2023 1148    Acceptance, E, VU by WN at 5/20/2023 2214    Acceptance, E, NR by  at 5/20/2023 0927    Acceptance, E, VU by WN at 5/19/2023 2232    Acceptance, E, VU by KN at 5/17/2023 1524                   Point: Precautions (In Progress)     Learning Progress Summary           Patient Acceptance, D,E, NR by EE at 5/27/2023 1255    Acceptance, E,D, NR by DP at 5/26/2023 0855    Comment: transfers and hand palcemenmt    Acceptance, E, VU by WN at 5/26/2023 0215    Acceptance, E,D, NR by DP at 5/25/2023 1451    Eager, E,D, NR by KP at 5/24/2023 1506    Acceptance, E,D, VU,DU by DP at 5/23/2023 1110    Acceptance, E,D, VU,DU by DP at 5/22/2023 1148    Acceptance, E, VU by WN at 5/20/2023 2214    Acceptance, E, NR by LH at 5/20/2023 0927    Acceptance, E, VU by WN at 5/19/2023 2232    Acceptance, E, VU by KN at 5/17/2023 1524                               User Key     Initials Effective Dates Name Provider Type Discipline    MAURILIO  06/16/21 -  Ashlie Arnold, PT Physical Therapist PT    LH 06/16/21 -  Aicha Payne, PT Physical Therapist PT    EE 06/16/21 -  Angelika Echeverria, PT Physical Therapist PT    KP 06/16/21 -  Keena London, PT Physical Therapist PT    WN 08/23/22 -  Kamaljit Elias, RN Registered Nurse Nurse    DP 08/24/21 -  George Lehman, PT Physical Therapist PT    KN 08/02/22 -  Main De La Garza, OT Occupational Therapist OT                PT Recommendation and Plan                          Time Calculation:      PT Charges     Row Name 05/31/23 1029 05/31/23 1028          Time Calculation    Start Time 0830  -AE 0830  -AE     Stop Time 0900  -AE 0900  -AE     Time Calculation (min) 30 min  -AE 30 min  -AE     PT Received On 05/31/23  -AE 05/30/23  -AE     PT - Next Appointment 06/01/23  -AE --        Time Calculation- PT    Total Timed Code Minutes- PT 30 minute(s)  -AE 30 minute(s)  -AE           User Key  (r) = Recorded By, (t) = Taken By, (c) = Cosigned By    Initials Name Provider Type    AE Mervat Light, PT Physical Therapist                Therapy Charges for Today     Code Description Service Date Service Provider Modifiers Qty    11250423591 HC GAIT TRAINING EA 15 MIN 5/30/2023 Mervat Light, PT GP 1    27836970392 HC PT THERAPEUTIC ACT EA 15 MIN 5/30/2023 Mervat Light, PT GP 1    43207611689 HC GAIT TRAINING EA 15 MIN 5/31/2023 Mervat Light, PT GP 1    29303929896 HC PT THERAPEUTIC ACT EA 15 MIN 5/31/2023 Mervat Light, PT GP 1                   Mervat Light PT  5/31/2023

## 2023-05-31 NOTE — PROGRESS NOTES
Inpatient Rehabilitation Functional Measures Assessment and Plan of Care    Plan of Care  Updated Problems/Interventions  Self Care    [OT] Bathing(Active)  Current Status(05/31/2023): SBA  Weekly Goal(06/01/2023): SBA  Discharge Goal: SBA    [OT] Dressing (Lower)(Active)  Current Status(05/31/2023): SBA  Weekly Goal(06/01/2023): SBA  Discharge Goal: SBA    [OT] Dressing (Upper)(Active)  Current Status(05/31/2023): supervision  Weekly Goal(06/01/2023): Supervision  Discharge Goal: Supervision    [OT] Grooming(Active)  Current Status(05/31/2023): Supervision  Weekly Goal(06/01/2023): SUpervision  Discharge Goal: Supervision    [OT] Toileting(Active)  Current Status(05/31/2023): SUPervision  Weekly Goal(06/01/2023): Supervision  Discharge Goal: Supervision        Mobility    [OT] Toilet Transfers(Active)  Current Status(05/31/2023): SBA RWX level  Weekly Goal(06/01/2023): SBA RWX  Discharge Goal: SBA RWX    [OT] Tub/Shower Transfers(Active)  Current Status(05/31/2023): SBA  Weekly Goal(06/01/2023): SBA  Discharge Goal: SBA    Functional Measures  CELINE Eating:  Branch  CELINE Grooming: Branch  CELINE Bathing:  Branch  CELINE Upper Body Dressing:  Branch  CELINE Lower Body Dressing:  Branch  CELINE Toileting:  Branch    CELINE Bladder Management  Level of Assistance:  Branch  Frequency/Number of Accidents this Shift:  Branch    CELINE Bowel Management  Level of Assistance: Branch  Frequency/Number of Accidents this Shift: Branch    CELINE Bed/Chair/Wheelchair Transfer:  Branch  CELINE Toilet Transfer:  Branch  CELINE Tub/Shower Transfer:  Branch    Previously Documented Mode of Locomotion at Discharge: Field  [facility] Expected Mode of Locomotion at Discharge: Branch  [facility] Walk/Wheelchair:  Branch  [facility] Stairs:  Branch    [facility] Comprehension:  Branch  [facility] Expression:  Branch  [facility] Social Interaction:  Branch  [facility] Problem Solving:  Branch  CELINE Memory:  Branch    Therapy Mode Minutes  Occupational Therapy: Branch  Physical Therapy: Branch  Speech Language  Pathology:  Branch    Signed by: Bernarda Ricketts, OT

## 2023-06-01 ENCOUNTER — HOME HEALTH ADMISSION (OUTPATIENT)
Dept: HOME HEALTH SERVICES | Facility: HOME HEALTHCARE | Age: 88
End: 2023-06-01
Payer: MEDICARE

## 2023-06-01 ENCOUNTER — TRANSCRIBE ORDERS (OUTPATIENT)
Dept: HOME HEALTH SERVICES | Facility: HOME HEALTHCARE | Age: 88
End: 2023-06-01

## 2023-06-01 VITALS
WEIGHT: 126.98 LBS | HEIGHT: 60 IN | BODY MASS INDEX: 24.93 KG/M2 | OXYGEN SATURATION: 95 % | DIASTOLIC BLOOD PRESSURE: 76 MMHG | SYSTOLIC BLOOD PRESSURE: 137 MMHG | TEMPERATURE: 97 F | RESPIRATION RATE: 18 BRPM | HEART RATE: 61 BPM

## 2023-06-01 DIAGNOSIS — S06.5XAA SDH (SUBDURAL HEMATOMA): ICD-10-CM

## 2023-06-01 DIAGNOSIS — S72.92XD ENCOUNTER FOR AFTERCARE FOR HEALING CLOSED TRAUMATIC FRACTURE OF LEFT FEMUR: ICD-10-CM

## 2023-06-01 DIAGNOSIS — E87.1 HYPONATREMIA: ICD-10-CM

## 2023-06-01 DIAGNOSIS — S06.9XAA TRAUMATIC BRAIN INJURY, WITH UNKNOWN LOSS OF CONSCIOUSNESS STATUS, INITIAL ENCOUNTER: Primary | ICD-10-CM

## 2023-06-01 DIAGNOSIS — I10 HYPERTENSION, UNSPECIFIED TYPE: ICD-10-CM

## 2023-06-01 LAB
ANION GAP SERPL CALCULATED.3IONS-SCNC: 9 MMOL/L (ref 5–15)
BASOPHILS # BLD AUTO: 0.08 10*3/MM3 (ref 0–0.2)
BASOPHILS NFR BLD AUTO: 1.8 % (ref 0–1.5)
BUN SERPL-MCNC: 23 MG/DL (ref 8–23)
BUN/CREAT SERPL: 22.8 (ref 7–25)
CALCIUM SPEC-SCNC: 8.7 MG/DL (ref 8.6–10.5)
CHLORIDE SERPL-SCNC: 99 MMOL/L (ref 98–107)
CO2 SERPL-SCNC: 25 MMOL/L (ref 22–29)
CREAT SERPL-MCNC: 1.01 MG/DL (ref 0.76–1.27)
DEPRECATED RDW RBC AUTO: 53.4 FL (ref 37–54)
EGFRCR SERPLBLD CKD-EPI 2021: 72 ML/MIN/1.73
EOSINOPHIL # BLD AUTO: 0.23 10*3/MM3 (ref 0–0.4)
EOSINOPHIL NFR BLD AUTO: 5.2 % (ref 0.3–6.2)
ERYTHROCYTE [DISTWIDTH] IN BLOOD BY AUTOMATED COUNT: 17.1 % (ref 12.3–15.4)
GLUCOSE SERPL-MCNC: 98 MG/DL (ref 65–99)
HCT VFR BLD AUTO: 32.4 % (ref 37.5–51)
HGB BLD-MCNC: 10.4 G/DL (ref 13–17.7)
IMM GRANULOCYTES # BLD AUTO: 0.01 10*3/MM3 (ref 0–0.05)
IMM GRANULOCYTES NFR BLD AUTO: 0.2 % (ref 0–0.5)
LYMPHOCYTES # BLD AUTO: 1.87 10*3/MM3 (ref 0.7–3.1)
LYMPHOCYTES NFR BLD AUTO: 41.9 % (ref 19.6–45.3)
MCH RBC QN AUTO: 27.2 PG (ref 26.6–33)
MCHC RBC AUTO-ENTMCNC: 32.1 G/DL (ref 31.5–35.7)
MCV RBC AUTO: 84.8 FL (ref 79–97)
MONOCYTES # BLD AUTO: 0.45 10*3/MM3 (ref 0.1–0.9)
MONOCYTES NFR BLD AUTO: 10.1 % (ref 5–12)
NEUTROPHILS NFR BLD AUTO: 1.82 10*3/MM3 (ref 1.7–7)
NEUTROPHILS NFR BLD AUTO: 40.8 % (ref 42.7–76)
NRBC BLD AUTO-RTO: 0 /100 WBC (ref 0–0.2)
PLATELET # BLD AUTO: 246 10*3/MM3 (ref 140–450)
PMV BLD AUTO: 10 FL (ref 6–12)
POTASSIUM SERPL-SCNC: 4.1 MMOL/L (ref 3.5–5.2)
RBC # BLD AUTO: 3.82 10*6/MM3 (ref 4.14–5.8)
SODIUM SERPL-SCNC: 133 MMOL/L (ref 136–145)
WBC NRBC COR # BLD: 4.46 10*3/MM3 (ref 3.4–10.8)

## 2023-06-01 PROCEDURE — 85025 COMPLETE CBC W/AUTO DIFF WBC: CPT | Performed by: PHYSICAL MEDICINE & REHABILITATION

## 2023-06-01 PROCEDURE — 25010000002 HEPARIN (PORCINE) PER 1000 UNITS: Performed by: PHYSICAL MEDICINE & REHABILITATION

## 2023-06-01 PROCEDURE — 97530 THERAPEUTIC ACTIVITIES: CPT

## 2023-06-01 PROCEDURE — 80048 BASIC METABOLIC PNL TOTAL CA: CPT | Performed by: PHYSICAL MEDICINE & REHABILITATION

## 2023-06-01 PROCEDURE — 97535 SELF CARE MNGMENT TRAINING: CPT

## 2023-06-01 PROCEDURE — 97110 THERAPEUTIC EXERCISES: CPT

## 2023-06-01 RX ORDER — QUETIAPINE FUMARATE 25 MG/1
12.5 TABLET, FILM COATED ORAL NIGHTLY
Qty: 15 TABLET | Refills: 0 | Status: SHIPPED | OUTPATIENT
Start: 2023-06-01

## 2023-06-01 RX ADMIN — DOCUSATE SODIUM 50MG AND SENNOSIDES 8.6MG 2 TABLET: 8.6; 5 TABLET, FILM COATED ORAL at 08:48

## 2023-06-01 RX ADMIN — PANTOPRAZOLE SODIUM 40 MG: 40 TABLET, DELAYED RELEASE ORAL at 08:48

## 2023-06-01 RX ADMIN — AMLODIPINE BESYLATE 10 MG: 10 TABLET ORAL at 08:48

## 2023-06-01 RX ADMIN — DULOXETINE HYDROCHLORIDE 30 MG: 30 CAPSULE, DELAYED RELEASE ORAL at 08:48

## 2023-06-01 RX ADMIN — HEPARIN SODIUM 5000 UNITS: 5000 INJECTION INTRAVENOUS; SUBCUTANEOUS at 08:48

## 2023-06-01 RX ADMIN — DICLOFENAC SODIUM 2 G: 10 GEL TOPICAL at 08:48

## 2023-06-01 RX ADMIN — LEVOTHYROXINE SODIUM 75 MCG: 0.07 TABLET ORAL at 06:35

## 2023-06-01 NOTE — PLAN OF CARE
Goal Outcome Evaluation:      Pt is A/Ox4, calm/cooperative, OOB x 1 w CGA w rwx. Meds taken whole w thin liquids. Pt denied need for prn pain medication. Prn Benadryl given x1 for generalized itching. Pt is continent.

## 2023-06-01 NOTE — PROGRESS NOTES
Inpatient Rehabilitation Discharge  Section A. Transportation  Issues Due to Lack of Transportation:  No    Section A. Medication List  Subsequent Provider:  06 - Home under care of organized home health service  organization  Medication List to Subsequent Provider at Discharge:  Yes - Current reconciled  medication list provided to the subsequent provider  Route(s) of Medication List Transmission to Subsequent Provider:  Electronic  Health Record  Medication List to Patient:  Not applicable.  Patient discharged to a subsequent  provider as defined in 44D    Signed by: Alyssa Salas, Social Work

## 2023-06-01 NOTE — PROGRESS NOTES
Case Management  Inpatient Rehabilitation Team Conference    Conference Date/Time: 6/1/2023 7:41:08 AM    Team Conference Attendees:  JOESPH Liz, PT  Bernarda Ricketts, OTONIEL Mary, SLP  Liseth Cesar, CTRS  Alicia Estrada, MATT Adam, RN   Dr. Lyssa Bullard, Dietician  Kylie Rubalcava, Psychologist    Demographics            Age: 87Y            Gender: Male    Admission Date: 5/16/2023 5:45:00 PM  Rehabilitation Diagnosis:  L femur fracture, SDH  Past Medical History: Past Medical History:  DiagnosisDate  ?Colon polyps?  ?Followed by Dr. Georgi Morales at [facility].  ?Coronary artery disease?  ?Diabetes mellitus?  ?Hyperlipidemia?  ?Hypertension?    ?  ?  Surgical History  Past Surgical History:  ProcedureLateralityDate  ?CARDIAC CATHETERIZATION??  ?CAROTID STENT??  ?patient denies, but family states he did have a stent placed  ?COLONOSCOPYN/A01/29/2014  ?Dr. Georgi Morales at [facility].  ?COLONOSCOPYN/A01/13/2016  ?Dr. Georgi Morales at [facility].  ?COLONOSCOPYN/A03/15/2023  ?2 TUBULAR ADENOMA POLYPS IN SIGMOID, BARIUM ENEMA ORDERED, DR. GEORGI MORALES AT  [facility]  ?CORONARY ANGIOPLASTY??  ?FRACTURE SURGERYLeft05/08/2023  ?IM nailing for L femur fracture      Plan of Care  Anticipated Discharge Date/Estimated Length of Stay: ELOS: 6/1  Anticipated Discharge Destination: Community discharge with assistance  Discharge Plan : Home with hired caregiver and patient has a good group of  friends in [place] that can check in on patient.  Sons both live out of  state.  Medical Necessity Expected Level Rationale: Goals are to achieve a level of  supervision with  mobility and self-care and improved ADLs.   Rehabilitation  prognosis good.  Medical prognosis good.  Intensity and Duration: an average of 3 hours/5 days per week  Medical Supervision and 24 Hour Rehab Nursing: x  Physical Therapy: x  PT Intensity/Duration: 1 hour / day, 5 days / week, for approximately 1-2 weeks.  Occupational Therapy:  x  OT Intensity/Duration: 1 hour / day, 5 days / week, for approximately 1-2 weeks.  Speech and Language Therapy: x  SLP Intensity/Duration: 1 hour / day, 5 days / week, for approximately 1-2  weeks.  Social Work: x  Therapeutic Recreation: x  Psychology: x  Registered Dietician: x  Updated (if changes indicated)    Anticipated Discharge Date/Estimated Length of Stay:   ELOS: 6/1 Home Health    Based on the patient's medical and functional status, their prognosis and  expected level of functional improvement is: Goals are to achieve a level of  supervision with  mobility and self-care and improved ADLs.   Rehabilitation  prognosis good.  Medical prognosis good.      Interdisciplinary Problem/Goals/Status    All Rehab Problems:  Psychosocial    [RN] Coping/Adjustment(Active)  Current Status(06/01/2023): At risk for poor coping due to recent medical  issues.  Weekly Goal(06/07/2023): Identify progress in functional status.  Discharge Goal: Demonstrates healthy coping skills.        Safety    [RN] Potential for Injury(Active)  Current Status(06/01/2023): Hx falls. At risk for falling in the hospital.  Weekly Goal(06/08/2023): Cue to use the call bell.  Discharge Goal: Patient/ family will be aware of the risk of falling in the home  setting.        Sphincter Control    [RN] Bladder Management(Active)  Current Status(06/01/2023): 100% continent of bladder.  Weekly Goal(06/07/2023): Remains 100% continent of bladder.  Discharge Goal: Goes home continent of bladder.    [RN] Bowel Management(Active)  Current Status(06/01/2023): 100% continent of bowel.  Weekly Goal(06/08/2023): Remains continent of bowel.  Discharge Goal: goes home continent of bowel.        Self Care    [OT] Bathing(Active)  Current Status(05/31/2023): SBA  Weekly Goal(06/01/2023): SBA  Discharge Goal: SBA    [OT] Dressing (Lower)(Active)  Current Status(05/31/2023): SBA  Weekly Goal(06/01/2023): SBA  Discharge Goal: SBA    [OT] Dressing  (Upper)(Active)  Current Status(05/31/2023): supervision  Weekly Goal(06/01/2023): Supervision  Discharge Goal: Supervision    [OT] Grooming(Active)  Current Status(05/31/2023): Supervision  Weekly Goal(06/01/2023): SUpervision  Discharge Goal: Supervision    [OT] Toileting(Active)  Current Status(05/31/2023): SUPervision  Weekly Goal(06/01/2023): Supervision  Discharge Goal: Supervision        Mobility    [OT] Toilet Transfers(Active)  Current Status(05/31/2023): SBA RWX level  Weekly Goal(06/01/2023): SBA RWX  Discharge Goal: SBA RWX    [OT] Tub/Shower Transfers(Active)  Current Status(05/31/2023): SBA  Weekly Goal(06/01/2023): SBA  Discharge Goal: SBA    [PT] Bed/Chair/Wheelchair(Active)  Current Status(05/24/2023): CG/min  Weekly Goal(06/07/2023): CG/SBA  Discharge Goal: mod Independent    [PT] Walk(Active)  Current Status(05/31/2023): 325' SBA/CGA , rwx  Weekly Goal(06/07/2023): PT only  Discharge Goal: 325' SBA/CGA, rwx    [PT] Stairs(Active)  Current Status(05/31/2023): CGA 4 steps, 2 rails  Weekly Goal(06/07/2023): CG with 4 steps and 2 rails like home  Discharge Goal: CGAand 2 rails 4 steps    [PT] Bed Mobility(Active)  Current Status(05/31/2023): Lester  Weekly Goal(06/07/2023): Mod I  Discharge Goal: mod independent        Cognition    [ST] Executive Functions(Active)  Current Status(05/31/2023): Mild cognitive impairment characterized by  difficulty with attention, memory, planning/organization, and executive  functioning.  Weekly Goal(06/07/2023): Patient will participate in functional therapy  activities given MIN cues  Discharge Goal: Functional cognition for home        Body Systems    [RN] Integumentary(Active)  Current Status(06/01/2023): Pt has laceration to scalp and incision to L hip.  Weekly Goal(06/08/2023): Skin will remain free from s/s infection .  Discharge Goal: Scalp will be healed and L hip will have no s/s infection.        Comments: 5/18 Bed mob CG / Min x 1, CG / Min transfers, amb 80'  CG rwx,  completed 4 steps x 2 rails CGA / Min A.  CGA toilet and shower transfer.  Mod A  toileting.  Mod A LBD.  Mild cognitive impairment.  Cont bowel and bladder.  BP  running high, increased Amlodipine.    5/25 Bed mob SBA, CG / Min A transfers, CG / Min to do 4 steps w/ 2 rails.  Amb  325' CG rwx.  SBA rwx toilet transfer.  CGA / SBA shower transfer.  CGA / SBA  toileting.  Mod w/ finance tasks, Min w/ medication tasks.  Cont bowel and  bladder.    6/1 Bed mob Mod I, CG / Min transfers, CGA 4 steps w/ 2 rails, amb 325' SBA /  CGA rwx.  SBA rwx toilet transfer, SBA shower transfer.  SBA ADL's, supervision  w/ toileting.  Cont bowel and bladder.  Mild cognitive impairment.    Signed by: Alicia Estrada RN    Physician CoSigned By: Artemio Omalley 06/01/2023 10:05:01

## 2023-06-01 NOTE — THERAPY TREATMENT NOTE
Inpatient Rehabilitation - Physical Therapy addendum to [facility]     Patient Name: Radha Azevedo  : 1935  MRN: [id number]    Today's Date: 2023                    Admit Date: 2023      Visit Dx:     ICD-10-CM ICD-9-CM   1. Impaired gait and mobility  R26.89 781.2       Patient Active Problem List   Diagnosis   • Absolute anemia   • Benign essential HTN   • Benign localized hyperplasia of prostate without urinary obstruction   • Type 2 diabetes mellitus with peripheral angiopathy   • Hypertension   • Hyperlipidemia   • Type 2 diabetes mellitus   • Diabetes mellitus type 2, noninsulin dependent   • Avitaminosis D   • Dyslipidemia   • Decrease in the ability to hear   • Personal history of colonic polyps   • Subdural hematoma       Past Medical History:   Diagnosis Date   • Colon polyps     Followed by Dr. Leo Jaeger at [facility].   • Coronary artery disease    • Diabetes mellitus    • Hyperlipidemia    • Hypertension        Past Surgical History:   Procedure Laterality Date   • CARDIAC CATHETERIZATION     • CAROTID STENT      patient denies, but family states he did have a stent placed   • COLONOSCOPY N/A 2014    Dr. Leo Jaeger at [facility].   • COLONOSCOPY N/A 2016    Dr. Leo Jaeger at [facility].   • COLONOSCOPY N/A 03/15/2023    2 TUBULAR ADENOMA POLYPS IN SIGMOID, BARIUM ENEMA ORDERED, DR. LEO JAEGER AT [facility]   • CORONARY ANGIOPLASTY     • FRACTURE SURGERY Left 2023    IM nailing for L femur fracture       PT ASSESSMENT (last 12 hours)     IRF PT Evaluation and Treatment     Row Name 23 1000          PT Time and Intention    Document Type daily treatment  -AE     Mode of Treatment individual therapy;physical therapy  -AE     Patient/Family/Caregiver Comments/Observations son and wife present at bedside for family teaching  -AE     Row Name 23 1000          General Information    Patient Profile Reviewed yes  -AE     General Observations of Patient R side  inattention  -AE     Comment, General Information addendum to DC performed for caregiver training  -AE     Row Name 06/01/23 1000          Pain Assessment    Pretreatment Pain Rating 0/10 - no pain  -AE     Posttreatment Pain Rating 0/10 - no pain  -AE     Row Name 06/01/23 1000          Cognition/Psychosocial    Affect/Mental Status (Cognition) WFL  -AE     Orientation Status (Cognition) oriented x 3  -AE     Follows Commands (Cognition) follows two-step commands;verbal cues/prompting required  -AE     Personal Safety Interventions fall prevention program maintained;gait belt;muscle strengthening facilitated;nonskid shoes/slippers when out of bed;supervised activity  -AE     Row Name 06/01/23 1000          Bed-Chair Transfer    Bed-Chair Monroe (Transfers) standby assist  -AE     Assistive Device (Bed-Chair Transfers) walker, front-wheeled;wheelchair  -AE     Row Name 06/01/23 1000          Chair-Bed Transfer    Chair-Bed Monroe (Transfers) standby assist  -AE     Assistive Device (Chair-Bed Transfers) walker, front-wheeled;wheelchair  -AE     Row Name 06/01/23 1000          Sit-Stand Transfer    Sit-Stand Monroe (Transfers) standby assist  -AE     Assistive Device (Sit-Stand Transfers) walker, front-wheeled  -AE     Comment, (Sit-Stand Transfer) perform with and without FWW  -AE     Row Name 06/01/23 1000          Stand-Sit Transfer    Stand-Sit Monroe (Transfers) standby assist  -AE     Assistive Device (Stand-Sit Transfers) walker, front-wheeled  -AE     Comment, (Stand-Sit Transfer) perform with and without FWW  -AE     Row Name 06/01/23 1000          Gait/Stairs (Locomotion)    Gait/Stairs Locomotion gait/ambulation assistive device  -AE     Monroe Level (Gait) standby assist;contact guard;verbal cues  -AE     Assistive Device (Gait) walker, front-wheeled  -AE     Distance in Feet (Gait) 300ft x 2  -AE     Pattern (Gait) step-through  -AE     Deviations/Abnormal Patterns (Gait)  crystal decreased;stride length decreased  -AE     Bilateral Gait Deviations forward flexed posture;heel strike decreased  -AE     Left Sided Gait Deviations weight shift ability decreased  -AE     Gait Assessment/Intervention occasional LLE shuffle. fixed with cues  -AE     Simpson Level (Stairs) contact guard;verbal cues;nonverbal cues (demo/gesture)  -AE     Handrail Location (Stairs) right side (ascending)  -AE     Number of Steps (Stairs) 4  -AE     Ascending Technique (Stairs) step-to-step  -AE     Descending Technique (Stairs) step-to-step  -AE     Stairs, Safety Issues balance decreased during turns  -AE     Stairs, Impairments impaired balance;pain;strength decreased  -AE     Comment, (Gait/Stairs) performed with son. cues for step-to pattern and to lead with RLE first  -AE     Row Name 06/01/23 1000          Balance    Comment, Balance reviewed HEP to include semitandem and rhomberg 3 x 30sec  -AE     Row Name 06/01/23 1000          Knee (Therapeutic Exercise)    Knee Strengthening (Therapeutic Exercise) bilateral;SLR (straight leg raise);5 repetitions  cues for quad activation  -AE     Row Name 06/01/23 1000          Positioning and Restraints    Pre-Treatment Position sitting in chair/recliner  -AE     Post Treatment Position other  -AE     Other Position return to room with caregiver  notified RN  -AE           User Key  (r) = Recorded By, (t) = Taken By, (c) = Cosigned By    Initials Name Provider Type    AE Mervat Light, JACK Physical Therapist              Wound 05/16/23 1831 Left lateral hip Incision (Active)   Dressing Appearance intact;dry 06/01/23 0907   Closure GUME 06/01/23 0907   Base dressing in place, unable to visualize 06/01/23 0907     Physical Therapy Education     Title: PT OT SLP Therapies (Resolved)     Topic: Physical Therapy (Resolved)     Point: Mobility training (Resolved)     Learning Progress Summary           Patient oRsa, E,TB,D, NR by BHUPENDRA at 5/29/2023 0930     Acceptance, D,E, NR by EE at 5/27/2023 1255    Acceptance, E,D, NR by DP at 5/26/2023 0855    Comment: transfers and hand palcemenmt    Acceptance, E, VU by WN at 5/26/2023 0215    Acceptance, E,D, NR by DP at 5/25/2023 1451    Eager, E,D, NR by KP at 5/24/2023 1506    Acceptance, E,D, VU,DU by DP at 5/23/2023 1110    Acceptance, E,D, VU,DU by DP at 5/22/2023 1148    Acceptance, E, VU by WN at 5/20/2023 2214    Acceptance, E, NR by LH at 5/20/2023 0927    Acceptance, E, VU by WN at 5/19/2023 2232    Acceptance, E,TB, VU,NR by MAURILIO at 5/18/2023 1206    Acceptance, E, VU by KN at 5/17/2023 1524    Acceptance, E,TB, VU,NR by MAURILIO at 5/17/2023 1209                   Point: Home exercise program (Resolved)     Learning Progress Summary           Patient Eager, E,TB,D, NR by KP at 5/29/2023 0946    Acceptance, D,E, NR by EE at 5/27/2023 1255    Acceptance, E,D, NR by DP at 5/26/2023 0855    Comment: transfers and hand palcemenmt    Acceptance, E, VU by WN at 5/26/2023 0215    Acceptance, E,D, NR by DP at 5/25/2023 1451    Eager, E,D, NR by KP at 5/24/2023 1506    Acceptance, E,D, VU,DU by DP at 5/23/2023 1110    Acceptance, E,D, VU,DU by DP at 5/22/2023 1148    Acceptance, E, VU by WN at 5/20/2023 2214    Acceptance, E, NR by ABDIEL at 5/20/2023 0927    Acceptance, E, VU by WN at 5/19/2023 2232    Acceptance, E, VU by KN at 5/17/2023 1524                   Point: Body mechanics (Resolved)     Learning Progress Summary           Patient Acceptance, D,E, NR by EE at 5/27/2023 1255    Acceptance, E,D, NR by DP at 5/26/2023 0855    Comment: transfers and hand palcemenmt    Acceptance, E, VU by WN at 5/26/2023 0215    Acceptance, E,D, NR by DP at 5/25/2023 1451    Eager, E,D, NR by KP at 5/24/2023 1506    Acceptance, E,D, VU,DU by DP at 5/23/2023 1110    Acceptance, E,D, VU,DU by DP at 5/22/2023 1148    Acceptance, E, VU by WN at 5/20/2023 2214    Acceptance, E, NR by LH at 5/20/2023 0927    Acceptance, E, VU by WN at 5/19/2023 2232     Acceptance, E, VU by KN at 5/17/2023 1524                   Point: Precautions (Resolved)     Learning Progress Summary           Patient Acceptance, D,E, NR by EE at 5/27/2023 1255    Acceptance, E,D, NR by DP at 5/26/2023 0855    Comment: transfers and hand palcemenmt    Acceptance, E, VU by WN at 5/26/2023 0215    Acceptance, E,D, NR by DP at 5/25/2023 1451    Eager, E,D, NR by KP at 5/24/2023 1506    Acceptance, E,D, VU,DU by DP at 5/23/2023 1110    Acceptance, E,D, VU,DU by DP at 5/22/2023 1148    Acceptance, E, VU by WN at 5/20/2023 2214    Acceptance, E, NR by  at 5/20/2023 0927    Acceptance, E, VU by WN at 5/19/2023 2232    Acceptance, E, VU by KN at 5/17/2023 1524                               User Key     Initials Effective Dates Name Provider Type Discipline     06/16/21 -  Ashlie Arnold, PT Physical Therapist PT     06/16/21 -  Aicha Payne, PT Physical Therapist PT     06/16/21 -  Angelika Echeverria, PT Physical Therapist PT     06/16/21 -  Keena London, PT Physical Therapist PT    WN 08/23/22 -  Kamaljit Elias, RN Registered Nurse Nurse    DP 08/24/21 -  George Lehman, PT Physical Therapist PT     08/02/22 -  Main De La Garza OT Occupational Therapist OT                PT Recommendation and Plan                          Time Calculation:      PT Charges     Row Name 06/01/23 1018             Time Calculation    Start Time 0930  -AE      Stop Time 1000  -AE      Time Calculation (min) 30 min  -AE      PT Received On 06/01/23  -AE      PT - Next Appointment 06/02/23  -AE         Time Calculation- PT    Total Timed Code Minutes- PT 30 minute(s)  -AE            User Key  (r) = Recorded By, (t) = Taken By, (c) = Cosigned By    Initials Name Provider Type    AE Mervat Light, PT Physical Therapist                Therapy Charges for Today     Code Description Service Date Service Provider Modifiers Qty    65583928538 HC GAIT TRAINING EA 15 MIN 5/31/2023 Mervat Light, PT GP 1     [id number]  PT THERAPEUTIC ACT EA 15 MIN 5/31/2023 Mervat Light, PT GP 1    [id number]  PT THERAPEUTIC ACT EA 15 MIN 6/1/2023 Mervat Light, PT GP 1    [id number]  PT THER PROC EA 15 MIN 6/1/2023 Mervat Light, PT GP 1                   Mervat Light, PT  6/1/2023

## 2023-06-01 NOTE — PROGRESS NOTES
Case Management Discharge Note      Final Note: Patient d/c home today. Patient's son will complete teaching with PT/OT before d/c. Walker and transport chair delivered to patient's room.  provided an update for patient and patient's son on the neuropscyh testing that was done by . Patient's son will transport client home and will stay with client until another relative comes to stay. Private caregivers and family will rotate staying with patient at home. [facility] will provide PT/OT/ST at home.         Selected Continued Care - Admitted Since 5/16/2023     Destination    No services have been selected for the patient.              Durable Medical Equipment    No services have been selected for the patient.              Dialysis/Infusion    No services have been selected for the patient.              Home Medical Care Coordination complete.    Service Provider Selected Services Address Phone Fax Patient Preferred    [facility] [address] [address] [phone] --          Therapy    No services have been selected for the patient.              Community Resources    No services have been selected for the patient.              Community & DME    No services have been selected for the patient.                       Final Discharge Disposition Code: 06 - home with home health care

## 2023-06-01 NOTE — PROGRESS NOTES
[facility]     Progress Note    Patient Name: Radha Azevedo  : 1935  MRN: [id number]  Primary Care Physician:  Amina Jiang MD  Date of admission: 2023    Subjective Pt is awake and alert. Pt is pleased with his progress and plan to dc home today after FT with son.   Subjective     Chief Complaint: same    History of Present Illness  Patient Reports     Review of Systems    Objective  exam unchnaged. Calves soft and NT. Resp unlabored and regular  Objective     Vitals:   Temp:  [97 °F (36.1 °C)-98.5 °F (36.9 °C)] 97 °F (36.1 °C)  Heart Rate:  [61-70] 61  Resp:  [18] 18  BP: (109-137)/(68-77) 137/76    Physical Exam     Result Review    Result Review:  I have personally reviewed the results from the time of this admission to 2023 09:12 EDT and agree with these findings:  []  Laboratory list / accordion  []  Microbiology  []  Radiology  []  EKG/Telemetry   []  Cardiology/Vascular   []  Pathology  []  Old records  []  Other:  Most notable findings include: BMP Na+133. CBC 10.4/32.4,       Assessment & Plan  Dc home today.   Assessment / Plan     Brief Patient Summary:  Radha Azevedo is a 87 y.o. male who     Active Hospital Problems:  Active Hospital Problems    Diagnosis    • **Subdural hematoma      Plan:       DVT prophylaxis:  Medical and mechanical DVT prophylaxis orders are present.    CODE STATUS:    Medical Intervention Limits: NO intubation (DNI)  Level Of Support Discussed With: Patient; Next of Kin (If No Surrogate)  Code Status (Patient has no pulse and is not breathing): CPR (Attempt to Resuscitate)  Medical Interventions (Patient has pulse or is breathing): Limited Support  Comments: Discussed with patient and son  Release to patient: Routine Release    Disposition:  I expect patient to be discharged today.     Artemio Omalley MD

## 2023-06-01 NOTE — PLAN OF CARE
Goal Outcome Evaluation:  Plan of Care Reviewed With: patient        Progress: improving     Patient is calm and cooperative. Family at bedside for teaching with the Heparin injections and with therapies. Patient correctly injected Heparin to lower abdominal. Discharge summary and hand -out information provided. Disc of LLE x-ray and medication brought to room. Equipment delivered.

## 2023-06-01 NOTE — PROGRESS NOTES
SECTION GG    Eating Performance Discharge: [place] sets up or cleans up; patient completes  activity. [place] assists only prior to or following the activity.    Section B. Health Literacy  Frequency of Needing Assistance Reading:  Sometimes    Section D. Mood  Presence of little interest or pleasure in doing things:   No  Frequency of having little interest or pleasure in doing things:   Never or 1  day  Presence of feeling down, depressed, or hopeless:   No  Frequency of feeling down, depressed, or hopeless:   Never or 1 day   Interview Ended. Above responses do not meet criteria to continue  Total Severity Score:   0    Section D. Social Isolation  Frequency of Feeling Lonely or Isolated:  Never    Section J. Health Conditions (Pain Effect on Sleep)  Pain Effect on Sleep:   Rarely or not at all    Signed by: Rafaela Adam RN

## 2023-06-01 NOTE — PROGRESS NOTES
[facility] will follow post hospital. Patient/son agreeable to service. Contact information confirmed.

## 2023-06-01 NOTE — THERAPY TREATMENT NOTE
Inpatient Rehabilitation - Occupational Therapy Treatment Note/addendum    [facility]     Patient Name: Radha Azevedo  : 1935  MRN: [id number]    Today's Date: 2023                 Admit Date: 2023         ICD-10-CM ICD-9-CM   1. Impaired gait and mobility  R26.89 781.2       Patient Active Problem List   Diagnosis   • Absolute anemia   • Benign essential HTN   • Benign localized hyperplasia of prostate without urinary obstruction   • Type 2 diabetes mellitus with peripheral angiopathy   • Hypertension   • Hyperlipidemia   • Type 2 diabetes mellitus   • Diabetes mellitus type 2, noninsulin dependent   • Avitaminosis D   • Dyslipidemia   • Decrease in the ability to hear   • Personal history of colonic polyps   • Subdural hematoma       Past Medical History:   Diagnosis Date   • Colon polyps     Followed by Dr. Leo Jaeger at [facility].   • Coronary artery disease    • Diabetes mellitus    • Hyperlipidemia    • Hypertension        Past Surgical History:   Procedure Laterality Date   • CARDIAC CATHETERIZATION     • CAROTID STENT      patient denies, but family states he did have a stent placed   • COLONOSCOPY N/A 2014    Dr. Leo Jaeger at [facility].   • COLONOSCOPY N/A 2016    Dr. Leo Jaeger at [facility].   • COLONOSCOPY N/A 03/15/2023    2 TUBULAR ADENOMA POLYPS IN SIGMOID, BARIUM ENEMA ORDERED, DR. LEO JAEGER AT [facility]   • CORONARY ANGIOPLASTY     • FRACTURE SURGERY Left 2023    IM nailing for L femur fracture             IRF OT ASSESSMENT FLOWSHEET (last 12 hours)     IRF OT Evaluation and Treatment     Row Name 23 1158          OT Time and Intention    Document Type daily treatment  addendum  -AF     Patient Effort good  -AF     Row Name 23 1158          General Information    Patient/Family/Caregiver Comments/Observations son and family member present for teaching  -AF     Existing Precautions/Restrictions fall  -AF     Row Name 23 1158          Pain  Assessment    Pretreatment Pain Rating 0/10 - no pain  -AF     Posttreatment Pain Rating 0/10 - no pain  -AF     Row Name 06/01/23 1158          Cognition/Psychosocial    Affect/Mental Status (Cognition) WFL  -AF     Orientation Status (Cognition) oriented x 3  -AF     Follows Commands (Cognition) follows two-step commands;verbal cues/prompting required  -AF     Personal Safety Interventions fall prevention program maintained;gait belt;nonskid shoes/slippers when out of bed  -AF     Row Name 06/01/23 1158          Bathing    [place] Level (Bathing) bathing skills;lower body;upper body;standby assist  -AF     Assistive Device (Bathing) grab bar/tub rail;hand held shower spray hose;tub bench  -AF     Comment (Bathing) son observed ADL  -AF     Row Name 06/01/23 1158          Upper Body Dressing    [place] Level (Upper Body Dressing) upper body dressing skills;set up assistance  -AF     Position (Upper Body Dressing) unsupported sitting  -AF     [facility] Name 06/01/23 1158          Lower Body Dressing    [place] Level (Lower Body Dressing) doff;don;pants/bottoms;shoes/slippers;socks;underwear;standby assist  -AF     Position (Lower Body Dressing) supported standing;supported sitting  -AF     Row Name 06/01/23 1158          Grooming    [place] Level (Grooming) grooming skills;standby assist  -AF     Comment (Grooming) RWX level  -AF     [facility] Name 06/01/23 1158          Functional Mobility    Functional Mobility- Comment RWX level in room SBA  -AF     [facility] Name 06/01/23 1158          Sit-Stand Transfer    Sit-Stand [place] (Transfers) standby assist  -AF     [facility] Name 06/01/23 1158          Stand-Sit Transfer    Stand-Sit [place] (Transfers) standby assist  -AF     [facility] Name 06/01/23 1158          Shower Transfer    Type (Shower Transfer) sit-stand;stand-sit  -AF     [place] Level (Shower Transfer) stand by assist  -AF     Assistive Device (Shower Transfer) grab bar, tub/shower;tub bench;walker,  front-wheeled  -AF     Row Name 06/01/23 1158          Balance    Static Sitting Balance independent  -AF     Static Standing Balance standby assist  -AF     Row Name 06/01/23 1158          Positioning and Restraints    Pre-Treatment Position sitting in chair/recliner  -AF     Post Treatment Position other  -AF     Bathroom with PT  in bathroom with PT  -AF           User Key  (r) = Recorded By, (t) = Taken By, (c) = Cosigned By    Initials Name Effective Dates    AF Bernarda Ricketts, OTR 06/16/21 -                  Occupational Therapy Education     Title: PT OT SLP Therapies (Done)     Topic: Occupational Therapy (Done)     Point: ADL training (Done)     Description:   Instruct learner(s) on proper safety adaptation and remediation techniques during self care or transfers.   Instruct in proper use of assistive devices.              Learning Progress Summary           Patient Acceptance, E, VU by AF at 6/1/2023 1203    Comment: son and sons mother in law educated on HEP with hand outs, answered all questions, observed ADL tasks    Acceptance, E, VU by AF at 5/31/2023 1528    Comment: pt and sons participated in meeting with rehab team disucssed current ADL status and transfers with AE. home health OT at d/c and 24 hour supervision. will continue to educate prior to d/c.    Acceptance, E, VU by WN at 5/26/2023 0215    Eager, E,D, NR by KP at 5/24/2023 1506    Acceptance, E, VU by WN at 5/20/2023 2214    Acceptance, E, VU by WN at 5/19/2023 2232    Acceptance, E, VU by KN at 5/17/2023 1524   Family Acceptance, E, VU by AF at 6/1/2023 1203    Comment: son and sons mother in law educated on HEP with hand outs, answered all questions, observed ADL tasks    Acceptance, E, VU by AF at 5/31/2023 1528    Comment: pt and sons participated in meeting with rehab team disucssed current ADL status and transfers with AE. home health OT at d/c and 24 hour supervision. will continue to educate prior to d/c.                   Point:  Home exercise program (Done)     Description:   Instruct learner(s) on appropriate technique for monitoring, assisting and/or progressing therapeutic exercises/activities.              Learning Progress Summary           Patient Acceptance, E, VU by AF at 6/1/2023 1203    Comment: son and sons mother in law educated on HEP with hand outs, answered all questions, observed ADL tasks    Acceptance, E, VU by AF at 5/31/2023 1528    Comment: pt and sons participated in meeting with rehab team disucssed current ADL status and transfers with AE. home health OT at d/c and 24 hour supervision. will continue to educate prior to d/c.    Acceptance, E, VU by WN at 5/26/2023 0215    Acceptance, E, VU by AF at 5/25/2023 1510    Comment: educated on and discussed exs for home    Eager, E,D, NR by KP at 5/24/2023 1506    Acceptance, E, VU by WN at 5/20/2023 2214    Acceptance, E, VU by WN at 5/19/2023 2232    Acceptance, E, VU by KN at 5/17/2023 1524   Family Acceptance, E, VU by AF at 6/1/2023 1203    Comment: son and sons mother in law educated on HEP with hand outs, answered all questions, observed ADL tasks    Acceptance, E, VU by AF at 5/31/2023 1528    Comment: pt and sons participated in meeting with rehab team disucssed current ADL status and transfers with AE. home health OT at d/c and 24 hour supervision. will continue to educate prior to d/c.    Acceptance, E, VU by AF at 5/25/2023 1510    Comment: educated on and discussed exs for home                   Point: Precautions (Done)     Description:   Instruct learner(s) on prescribed precautions during self-care and functional transfers.              Learning Progress Summary           Patient Acceptance, E, VU by AF at 6/1/2023 1203    Comment: son and sons mother in law educated on HEP with hand outs, answered all questions, observed ADL tasks    Acceptance, E, VU by AF at 5/31/2023 1528    Comment: pt and sons participated in meeting with rehab team disucssed current  ADL status and transfers with AE. home health OT at d/c and 24 hour supervision. will continue to educate prior to d/c.    Acceptance, E, VU by WN at 5/26/2023 0215    Acceptance, E, VU by AF at 5/25/2023 1510    Comment: educated on and discussed exs for home    Eager, E,D, NR by KP at 5/24/2023 1506    Acceptance, E, VU by WN at 5/20/2023 2214    Acceptance, E, VU by WN at 5/19/2023 2232    Acceptance, E, VU by KN at 5/17/2023 1524   Family Acceptance, E, VU by AF at 6/1/2023 1203    Comment: son and sons mother in law educated on HEP with hand outs, answered all questions, observed ADL tasks    Acceptance, E, VU by AF at 5/31/2023 1528    Comment: pt and sons participated in meeting with rehab team disucssed current ADL status and transfers with AE. home health OT at d/c and 24 hour supervision. will continue to educate prior to d/c.    Acceptance, E, VU by AF at 5/25/2023 1510    Comment: educated on and discussed exs for home                   Point: Body mechanics (Done)     Description:   Instruct learner(s) on proper positioning and spine alignment during self-care, functional mobility activities and/or exercises.              Learning Progress Summary           Patient Acceptance, E, VU by AF at 6/1/2023 1203    Comment: son and sons mother in law educated on HEP with hand outs, answered all questions, observed ADL tasks    Acceptance, E, VU by AF at 5/31/2023 1528    Comment: pt and sons participated in meeting with rehab team disucssed current ADL status and transfers with AE. home health OT at d/c and 24 hour supervision. will continue to educate prior to d/c.    Acceptance, E, VU by WN at 5/26/2023 0215    Eager, E,D, NR by BHUPENDRA at 5/24/2023 1506    Acceptance, E, VU by WN at 5/20/2023 2214    Acceptance, E, VU by WN at 5/19/2023 2232    Acceptance, E, VU by KN at 5/17/2023 1524   Family Acceptance, E, VU by AF at 6/1/2023 1203    Comment: son and sons mother in law educated on HEP with hand outs,  answered all questions, observed ADL tasks    Acceptance, E, VU by AF at 5/31/2023 1528    Comment: pt and sons participated in meeting with rehab team disucssed current ADL status and transfers with AE. home health OT at d/c and 24 hour supervision. will continue to educate prior to d/c.                               User Key     Initials Effective Dates Name Provider Type Discipline    AF 06/16/21 -  Bernarda Ricketts, OTR Occupational Therapist OT    KP 06/16/21 -  Keena London, PT Physical Therapist PT    WN 08/23/22 -  Kamaljit Elias RN Registered Nurse Nurse     08/02/22 -  Main De La Garza OT Occupational Therapist OT                    OT Recommendation and Plan                         Time Calculation:      Time Calculation- OT     Row Name 06/01/23 1203             Time Calculation- OT    OT Start Time 0900  -AF      OT Stop Time 0930  -AF      OT Time Calculation (min) 30 min  -AF            User Key  (r) = Recorded By, (t) = Taken By, (c) = Cosigned By    Initials Name Provider Type    AF Bernarda Ricketts, OTR Occupational Therapist              Therapy Charges for Today     Code Description Service Date Service Provider Modifiers Qty    20755840468 HC OT SELF CARE/MGMT/TRAIN EA 15 MIN 5/31/2023 Bernarda Ricketts, OTR GO 1    67917783549 HC OT THER PROC EA 15 MIN 5/31/2023 Bernarda Ricketts, OTR GO 2    78636253797 HC OT NEUROMUSC RE EDUCATION EA 15 MIN 5/31/2023 Bernarda Ricketts, OTR GO 1    69405905773 HC OT SELF CARE/MGMT/TRAIN EA 15 MIN 6/1/2023 Bernarda Ricketts OTR GO 2                   LUH Burgos  6/1/2023

## 2023-06-01 NOTE — PROGRESS NOTES
Inpatient Rehabilitation Plan of Care Note    Plan of Care  Care Plan Reviewed - No updates at this time.    Psychosocial    [RN] Coping/Adjustment(Active)  Current Status(06/01/2023): At risk for poor coping due to recent medical  issues.  Weekly Goal(06/07/2023): Identify progress in functional status.  Discharge Goal: Demonstrates healthy coping skills.    Performed Intervention(s)  Medication.  Group therapy.  therapeutic environmental set-up      Safety    [RN] Potential for Injury(Active)  Current Status(06/01/2023): Hx falls. At risk for falling in the hospital.  Weekly Goal(06/08/2023): Cue to use the call bell.  Discharge Goal: Patient/ family will be aware of the risk of falling in the home  setting.    Performed Intervention(s)  Items within reach.  Safety rounds.  Wheelchair, bed, and chair alarms.      Sphincter Control    [RN] Bladder Management(Active)  Current Status(06/01/2023): 100% continent of bladder.  Weekly Goal(06/07/2023): Remains 100% continent of bladder.  Discharge Goal: Goes home continent of bladder.    [RN] Bowel Management(Active)  Current Status(06/01/2023): 100% continent of bowel.  Weekly Goal(06/08/2023): Remains continent of bowel.  Discharge Goal: goes home continent of bowel.    Performed Intervention(s)  Offer restroom.  Bladder training program.  Use incontinence products.  Encourage appropriate diet.  Encourage fluid intake.      Body Systems    [RN] Integumentary(Active)  Current Status(06/01/2023): Pt has laceration to scalp and incision to L hip.  Weekly Goal(06/08/2023): Skin will remain free from s/s infection .  Discharge Goal: Scalp will be healed and L hip will have no s/s infection.    Performed Intervention(s)  Skin assessment q shift and prn.  Wound care as ordered.    Signed by: Monica Potter RN

## 2023-06-03 ENCOUNTER — HOME CARE VISIT (OUTPATIENT)
Dept: HOME HEALTH SERVICES | Facility: HOME HEALTHCARE | Age: 88
End: 2023-06-03
Payer: MEDICARE

## 2023-06-03 VITALS
SYSTOLIC BLOOD PRESSURE: 126 MMHG | OXYGEN SATURATION: 98 % | TEMPERATURE: 97.9 F | BODY MASS INDEX: 20.33 KG/M2 | RESPIRATION RATE: 20 BRPM | HEIGHT: 65 IN | WEIGHT: 122 LBS | HEART RATE: 76 BPM | DIASTOLIC BLOOD PRESSURE: 74 MMHG

## 2023-06-03 VITALS
TEMPERATURE: 97.8 F | DIASTOLIC BLOOD PRESSURE: 76 MMHG | HEART RATE: 71 BPM | RESPIRATION RATE: 18 BRPM | OXYGEN SATURATION: 98 % | SYSTOLIC BLOOD PRESSURE: 140 MMHG

## 2023-06-03 PROCEDURE — G0299 HHS/HOSPICE OF RN EA 15 MIN: HCPCS

## 2023-06-03 PROCEDURE — G0151 HHCP-SERV OF PT,EA 15 MIN: HCPCS

## 2023-06-03 NOTE — HOME HEALTH
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  Pt was transferred to a subacute rehab facility where he had another fall while transferring into bed.  Pt sustained a FRONTOPARIETAL SUBDURAL HEMATOMA & DISPLACED (L) FEMUR FRACTURE.  He underwent IM NAILING on 5/8/23 at [facility].  Pt transferred to [facility]  5/16/23 where he received comprehensive rehab services.  He was discharged home on 6/1/23 with referral for SN, PT, OT & ST services.    Patient denies dizziness, although did have it with his initial fall in April 2023.  He reports very little pain in the (L) hip but c/o of neuropathy in his feet with burning sensation that increases as the day progresses.  He denies having diabetes or glaucoma.  Son reports via telephone that pt has (R) side neglect.  He is making arrangements for CG assistance during the day.  Son reports patient was working out with a  prior to his fall & was very strong.    PERTINENT PAST MEDICAL HISTORY:    CAD  Depression (following the death of his wife in 2022)  DM  Glaucoma  HLD  HTN  Hypothyroidism    PERTINENT PAST SURGICAL HISTORY:    CORONARY ANGIOPLASTY WITH STENT PLACEMENT    PRIOR LEVEL OF FUNCTION:  Independent ambulation without assistive device.  Independent ADL's & I-ADL's.  Driving.   Has someone who stays at night x 1 year, since his wifes death.    CAREGIVER:  Patient currently has a sister in law & his son's mother in law (both who live out of state) assisting in his care.  Son is pursuing private duty caregiver to assist patient during the day.    HOME ENVIRONMENT:  Lives in single story home with 2 steps to enter.  Open staircase to lower level.  "Bedroom mildly cluttered.    MENTAL STATUS:  Alert & Oriented x 4, however mild cognitive deficits noted with 2-3 step commands, as pt became confused.    PATIENT GOAL FOR EPISODE OF CARE:  \"To be strong enough to walk safely & not fall again\"    EDEMA:  None    WOUND / SKIN CONDITION: Intact.  All incisions healed    POSTURE:  No gross deformities.  Pt demonstrates very mild posterior lean in unsupported sitting with arms crossed.  Standing posture- Shoulders slightly posterior to pelvis with arms crossed.    MUSCLE TONE/COORDINATION:  WNL    SENSATION/PROPRIOCEPTION:  WNL    DOMINANT SIDE:  Right    OCCULOMOTOR TESTING  SMOOTH PURSUITS:  WNL  SACCADES:  WNL  CONVERGENCE:  WNL  PERIPHERAL VISION:  WNL  VESTIBULO-OCCULOMOTOR FUNCTION:  WNL    STRENGTH GRADES  HIP      Flexion:  5/5 (R);  4/5 (L)      Extension: 5/5 (R);  4/5 (L)     Abduction:  4+/5 (R);  3+/5 (L)      Adduction:  5/5 (R);  4+/5 (L)        Internal Rot:  5/5 (R);  4+/5 (L)     External Rot: 5/5 (R);  4/5 (L)     KNEE      Flexion:  5/5 (R);  4+/5 (L)      Extension:  5/5 (R);  4/5 (L)      ANKLE       Dorsiflexion:  5/5 (B)    30 SECOND SIT TO STAND: 7 - indicates increased risk for falls   85-89: MEN<8, WOMEN<8     TIMED UP AND GO: 32  seconds- indicates DIMINISHED MOBILITY    (TUG Mobility: High > 10 secTypical 10-19Slower 20-29Diminished>30)    TINETTI SCORE:  18/28- indicates HIGH FALL RISK  (TINETTI FALL RISK SCORING: Under 19= High  19-24= Moderate  25 & up= Low)    ASSESSMENT:  Pt S/P FALL RESULTING IN FRONTOPARIETAL SUBDURAL HEMATOMA & DISPLACED (L) FEMUR FRACTURE.  He presents as a high risk for falls.  Pt demonstrates post center of gravity in unsupported sitting which carries over into transfers & gait.  He exhibited a post loss of balance with SIT<>STAND transfers & tends to maintain shoulders slightly posterior to pelvis in stance & during gait activities.  He also demonstrates decreased safety awareness & impulsivity with loss " of balance during turns, as pt tends to hold walker up or tip sideways while turning.  He would benefit from a rollator, as it would improve safety with turns.   PT recommends pt have supervision with all mobility due to fall risk.  PT discussed above extensively with pt, family members in home as well as his son- Rishioocher via phone.    Pt also noted to have mild cognitive deficits with moderate difficulty following 2 step commands.  He is scheduled for neuropsych testing in the fall.    MEDICAL NECESSITY & FOCUS OF CARE FOR ONGOING SKILLED PHYSICAL THERAPY:  Patient requires skilled physical therapy services to address deficits in (L) hip strength & balance, impeding ability to safely perform functional transfers, bed mobility, ADL's & gait activities, most especially during turning activities.  Patient is unable to ambulate the required distance or negotiate stairs to safely exit home for medical appointments.    PLAN FOR NEXT VISIT:  Progress HEP, transfer training, Sensory Integration/balance training, gt training with rollator

## 2023-06-03 NOTE — Clinical Note
Ysabel    PT performed an evaluation on 6/3/23.  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  Pt was transferred to a subacute rehab facility where he had another fall while transferring into bed.  Pt sustained a FRONTOPARIETAL SUBDURAL HEMATOMA & DISPLACED (L) FEMUR FRACTURE.  He underwent IM NAILING on 5/8/23 at [facility].  Pt transferred to [facility]  5/16/23 where he received comprehensive rehab services.  He was discharged home on 6/1/23 with referral for SN, PT, OT & ST services.    Patient denies dizziness, although did have it with his initial fall in April 2023.  He reports very little pain in the (L) hip but c/o of neuropathy in his feet with burning sensation that increases as the day progresses.  He denies having diabetes or glaucoma.  Son reports via telephone that pt has (R) side neglect.  He is making arrangements for CG assistance during the day.  Son reports patient was working out with a  prior to his fall & was very strong.    CAREGIVER:  Patient currently has a sister in law & his son's mother in law (both who live out of state) assisting in his care.  Son is pursuing private duty caregiver to assist patient during the day.    HOME ENVIRONMENT:  Lives in single story home with 2 steps to enter.  Open staircase to lower level. Bedroom mildly cluttered.    MENTAL STATUS:  Alert & Oriented x 4, however mild cognitive deficits noted with 2-3 step commands, as pt became confused.    EDEMA:  None    WOUND / SKIN CONDITION: Intact.  All incisions healed    POSTURE:  No gross deformities.  Pt demonstrates very mild posterior lean in unsupported sitting with arms crossed.  Standing posture- Shoulders slightly  posterior to pelvis with arms crossed.    MUSCLE TONE/COORDINATION:  WNL    SENSATION/PROPRIOCEPTION:  WNL    DOMINANT SIDE:  Right    OCCULOMOTOR TESTING  SMOOTH PURSUITS:  WNL  SACCADES:  WNL  CONVERGENCE:  WNL  PERIPHERAL VISION:  WNL  VESTIBULO-OCCULOMOTOR FUNCTION:  WNL    STRENGTH GRADES  HIP      Flexion:  5/5 (R);  4/5 (L)      Extension: 5/5 (R);  4/5 (L)     Abduction:  4+/5 (R);  3+/5 (L)      Adduction:  5/5 (R);  4+/5 (L)      Internal Rot:  5/5 (R);  4+/5 (L)     External Rot: 5/5 (R);  4/5 (L)    KNEE      Flexion:  5/5 (R);  4+/5 (L)      Extension:  5/5 (R);  4/5 (L)     ANKLE       Dorsiflexion:  5/5 (B)    BED MOBILITY:  Independent supine<>sit, rolling & scooting    TRANSFERS: SBA/CGA for safety.  Pt demonstrates post loss of balance with lifting of toes.  He tends to stand with shoulders posterior to pelvis.     GAIT:  Patient ambulates with rolling walker & close SBA on straight paths & CGA for safety during turns.  Pt demonstrates reciprocal pattern with essentially symmetrical step length & narrow base of support.  Posture erect with mild posterior lean.  He is able to maintain proximity to walker, however demonstrates impulsivity with poor safety awareness during turns.  Pt tends to either hold walker in air or tip walker on side when turning corners.     30 SECOND SIT TO STAND: 7 - indicates increased risk for falls   85-89: MEN<8, WOMEN<8     TIMED UP AND GO: 32  seconds- indicates DIMINISHED MOBILITY    (TUG Mobility: High > 10 secTypical 10-19Slower 20-29Diminished>30)    TINETTI SCORE:  18/28- indicates HIGH FALL RISK  (TINETTI FALL RISK SCORING: Under 19= High  19-24= Moderate  25 & up= Low)    ASSESSMENT:  Pt S/P FALL RESULTING IN FRONTOPARIETAL SUBDURAL HEMATOMA & DISPLACED (L) FEMUR FRACTURE.  He presents as a high risk for falls.  Pt demonstrates post center of gravity in unsupported sitting which carries over into transfers & gait.  He exhibited a post loss of balance  with SIT<>STAND transfers & tends to maintain shoulders slightly posterior to pelvis in stance & during gait activities.  He also demonstrates decreased safety awareness & impulsivity with loss of balance dur ing turns, as pt tends to hold walker up or tip sideways while turning.  He would benefit from a rollator, as it would improve safety with turns.   PT recommends pt have supervision with all mobility due to fall risk.  PT discussed above extensively with pt, family members in home as well as his son- Manoocher via phone.    Pt also noted to have mild cognitive deficits with moderate difficulty following 2 step commands.  He is scheduled for neuropsych testing in the fall.    PT plans to treat 1w1, 2w4.  Orders to be sent to your inbox for authorization if you agree.

## 2023-06-04 ENCOUNTER — HOME CARE VISIT (OUTPATIENT)
Dept: HOME HEALTH SERVICES | Facility: HOME HEALTHCARE | Age: 88
End: 2023-06-04
Payer: MEDICARE

## 2023-06-04 NOTE — HOME HEALTH
SN FOC IS FRONTOPARIETAL SUBDURAL HEMATOMA POST OP SURGICAL CARE OF IM NAILING  L FEMOR FRACTURE AFTER FALL, ANTICOGULANT THERAPY WITH COMORBIDITIES OF HTN, HYPOTHYROIDISM, DM TYPE 2 ( PT DENEIS THIS). PT A POOR HISTORIAN AND UNABLE TO SEE [facility] RECORDS  HX FROM SON NOTED PT FELL TO HOSPITAL DISPLACED L FEMUR ANDNO SURGERY PEROFRMED THEN PT WENT TO [facility] AND HE FELL AGAIN AND DISPLACED L FEMUR AGAIN REQUIRING SURGERY AND HIT HIS HEAD. SON NOTED BLOOD REABSORBING WELL TO GO FOR ANOTHER CT NEXT WEDNESDAY TO REEVLAUATE.  PT HOME ON THURSDAY PER REHAB HE WAS TAUGHT HEAPRIN  SQ BID  THROUGH 6/5 THEN 6/6 STARTS ASA 81MG BID  PT HADNOT STARTED HEPARIN PRIOR TO RN VISIT. 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 PT LIVES ALONE  FAMILY STAYING TO ASSIST WITH CARE  NEEDS BMP AND CBC X WEEKS    SN 1WK1, 2WK1 , 1WK3  PT, OT TO SLP ALSO ORDERED    Plan for next visit.C/PA SSESS OBTAIN CBC AND BMP, SEE IF  COMPLETED HEPARIN AND STARTED ASA  ASSESS NEURO STATUS

## 2023-06-05 ENCOUNTER — HOME CARE VISIT (OUTPATIENT)
Dept: HOME HEALTH SERVICES | Facility: HOME HEALTHCARE | Age: 88
End: 2023-06-05
Payer: MEDICARE

## 2023-06-05 VITALS
HEART RATE: 70 BPM | DIASTOLIC BLOOD PRESSURE: 64 MMHG | SYSTOLIC BLOOD PRESSURE: 128 MMHG | TEMPERATURE: 97.6 F | OXYGEN SATURATION: 95 % | RESPIRATION RATE: 18 BRPM

## 2023-06-05 PROCEDURE — G0152 HHCP-SERV OF OT,EA 15 MIN: HCPCS

## 2023-06-05 NOTE — Clinical Note
OTONIEL rueda completed 06-05-23. OT will follow 2w3 effective today.   FOCUS OF CARE: improve independence and safety w/ADL techniques, improve dynamic balance w/functional tasks, safe walker use, improve activity tolerance/functional strength (LUE > RUE) and improve deficit/safetey awareness

## 2023-06-05 NOTE — HOME HEALTH
"CURRENT SITUATION: Pt fell in  sustaining a non-displaced neck of femur fx, treated conservatively. Was d/c'd from hospital to [facility] and had another fall while there sustaining a traumatic frontoparietal SDH w/o LOC and a displaced femur fx and is now s/p IM nailing 05-08-23 (Arley Mcwilliams.) He was d/c'd on 05-16-23 for comprehensive rehab and d/c'd 06-01-23 home w/HH SN, PT, ST, OT.   PMHx: Essential (primary) HTN, hypothyroidism, constipation, DM-II, depression, HLD.  FOCUS OF CARE: improve independence and safety w/ADLs, improve balance w/functional tasks, safe walker use, improve activity tolerance, improve deficit and safetey awareness  SUBJECTIVE: \"I don't feel any different than I used to.\" He is agreeable to OT evaluation.  SOCIAL & ENVIRONMENTAL SITUATION: Pt lives alone, but has a famiy friend who stays w/him overnight (7p to 7a) for safety and per son's requrest. His 2 sons are helping out, 1 at a time staying w/him, but do not live locally ([facility] Wife recently passed away in late 2022. He is a retired physician. Home is spacious, wood floors w/area rugs, clear pathways.  PATIENT'S &/OR CAREGIVER'S GOAL: \"To be sure nothing like this happens again.\"  INTERVENTIONS: OT Eval, ADL training, Home Safety, Therapeutic Exercise/Activity, Transfer/Mobility training, Monitor vitals including SPO2 via pulse-oximeter (notifiy MD if resting O2 <90% on room air), Patient/CG education, Falls risk prevention, Recommendations on AE, DME, AD, environmental adaptations.  ASSESSMENT, MEDICAL NECESSITY, PLAN: Pt requires skilled O.T. to help remediate deficits caused by his current situation/diagnosis in order to improve safety and independence with his ADLs, functional transfers, mobility, condition management, functional strength, activity endurance, and for Pt/CG on appropriate AD, AE, and DME recommendaton/use. He presents w/impaired balance which puts him at a higher fall risk while " performing functional tasks and ADLs. He is in need of at least Supervision at this time for safety when mobile a nd engaging in tasks. He is cautious when ambulating, but is also noted to have limited insight into his deficits, optimal safe techniques in tasks.   PLAN FOR NEXT VISIT: Teach BUE AROM HEP w/resistive band. Discuss walker tray for improved safety w/transporting items.

## 2023-06-06 ENCOUNTER — LAB REQUISITION (OUTPATIENT)
Dept: LAB | Facility: HOSPITAL | Age: 88
End: 2023-06-06
Payer: MEDICARE

## 2023-06-06 ENCOUNTER — HOME CARE VISIT (OUTPATIENT)
Dept: HOME HEALTH SERVICES | Facility: HOME HEALTHCARE | Age: 88
End: 2023-06-06
Payer: MEDICARE

## 2023-06-06 DIAGNOSIS — S06.5X0D TRAUMATIC SUBDURAL HEMORRHAGE WITHOUT LOSS OF CONSCIOUSNESS, SUBSEQUENT ENCOUNTER: ICD-10-CM

## 2023-06-06 LAB
ANION GAP SERPL CALCULATED.3IONS-SCNC: 12.3 MMOL/L (ref 5–15)
BASOPHILS # BLD AUTO: 0.1 10*3/MM3 (ref 0–0.2)
BASOPHILS NFR BLD AUTO: 1.3 % (ref 0–1.5)
BUN SERPL-MCNC: 20 MG/DL (ref 8–23)
BUN/CREAT SERPL: 18.5 (ref 7–25)
CALCIUM SPEC-SCNC: 8.9 MG/DL (ref 8.6–10.5)
CHLORIDE SERPL-SCNC: 97 MMOL/L (ref 98–107)
CO2 SERPL-SCNC: 21.7 MMOL/L (ref 22–29)
CREAT SERPL-MCNC: 1.08 MG/DL (ref 0.76–1.27)
DEPRECATED RDW RBC AUTO: 54 FL (ref 37–54)
EGFRCR SERPLBLD CKD-EPI 2021: 66.4 ML/MIN/1.73
EOSINOPHIL # BLD AUTO: 0.3 10*3/MM3 (ref 0–0.4)
EOSINOPHIL NFR BLD AUTO: 4 % (ref 0.3–6.2)
ERYTHROCYTE [DISTWIDTH] IN BLOOD BY AUTOMATED COUNT: 17.6 % (ref 12.3–15.4)
GLUCOSE SERPL-MCNC: 135 MG/DL (ref 65–99)
HCT VFR BLD AUTO: 31.4 % (ref 37.5–51)
HGB BLD-MCNC: 10.5 G/DL (ref 13–17.7)
IMM GRANULOCYTES # BLD AUTO: 0.02 10*3/MM3 (ref 0–0.05)
IMM GRANULOCYTES NFR BLD AUTO: 0.3 % (ref 0–0.5)
LYMPHOCYTES # BLD AUTO: 1.99 10*3/MM3 (ref 0.7–3.1)
LYMPHOCYTES NFR BLD AUTO: 26.7 % (ref 19.6–45.3)
MCH RBC QN AUTO: 27.9 PG (ref 26.6–33)
MCHC RBC AUTO-ENTMCNC: 33.4 G/DL (ref 31.5–35.7)
MCV RBC AUTO: 83.3 FL (ref 79–97)
MONOCYTES # BLD AUTO: 0.61 10*3/MM3 (ref 0.1–0.9)
MONOCYTES NFR BLD AUTO: 8.2 % (ref 5–12)
NEUTROPHILS NFR BLD AUTO: 4.43 10*3/MM3 (ref 1.7–7)
NEUTROPHILS NFR BLD AUTO: 59.5 % (ref 42.7–76)
NRBC BLD AUTO-RTO: 0 /100 WBC (ref 0–0.2)
PLATELET # BLD AUTO: 240 10*3/MM3 (ref 140–450)
PMV BLD AUTO: 9.9 FL (ref 6–12)
POTASSIUM SERPL-SCNC: 3.9 MMOL/L (ref 3.5–5.2)
RBC # BLD AUTO: 3.77 10*6/MM3 (ref 4.14–5.8)
SODIUM SERPL-SCNC: 131 MMOL/L (ref 136–145)
WBC NRBC COR # BLD: 7.45 10*3/MM3 (ref 3.4–10.8)

## 2023-06-06 PROCEDURE — G0299 HHS/HOSPICE OF RN EA 15 MIN: HCPCS

## 2023-06-06 PROCEDURE — 80048 BASIC METABOLIC PNL TOTAL CA: CPT | Performed by: PHYSICAL MEDICINE & REHABILITATION

## 2023-06-06 PROCEDURE — 85025 COMPLETE CBC W/AUTO DIFF WBC: CPT | Performed by: PHYSICAL MEDICINE & REHABILITATION

## 2023-06-08 ENCOUNTER — HOME CARE VISIT (OUTPATIENT)
Dept: HOME HEALTH SERVICES | Facility: HOME HEALTHCARE | Age: 88
End: 2023-06-08
Payer: MEDICARE

## 2023-06-08 VITALS
OXYGEN SATURATION: 96 % | DIASTOLIC BLOOD PRESSURE: 72 MMHG | SYSTOLIC BLOOD PRESSURE: 122 MMHG | RESPIRATION RATE: 18 BRPM | TEMPERATURE: 97.7 F | HEART RATE: 80 BPM

## 2023-06-08 VITALS
TEMPERATURE: 97.6 F | DIASTOLIC BLOOD PRESSURE: 80 MMHG | SYSTOLIC BLOOD PRESSURE: 132 MMHG | RESPIRATION RATE: 15 BRPM | HEART RATE: 74 BPM | OXYGEN SATURATION: 97 %

## 2023-06-08 PROCEDURE — G0152 HHCP-SERV OF OT,EA 15 MIN: HCPCS

## 2023-06-08 PROCEDURE — G0151 HHCP-SERV OF PT,EA 15 MIN: HCPCS

## 2023-06-08 NOTE — HOME HEALTH
"SUBJECTIVE: \"I went on a very long walk, too long of a walk. I'm very tired now.\" Pt and CG went for a walk (estimated by pt to be about 1 mile) using 3-wheeled walker, no seat. Upon arrival, pt in bed resting.  FALLS: none reported  PLAN FOR NEXT VISIT: f/u on HEP performance and how his shoulders feel." Yes

## 2023-06-08 NOTE — CASE COMMUNICATION
"Pt's son requesting certain labs for next lab draw. I will provide his message below:    \"I just saw my dad's bloodwork results. In light of some of the results, might we consider a CMP, to see the protein and albumin count? Might we also consider doing a serum iron level test, in light of the low HGB and HCT? Thank you.\"    Pt's son's number if you would like to discuss results: [phone]    Please call me with any questions.  Doron Frank RN  [facility]  [phone]"

## 2023-06-09 ENCOUNTER — HOME CARE VISIT (OUTPATIENT)
Dept: HOME HEALTH SERVICES | Facility: HOME HEALTHCARE | Age: 88
End: 2023-06-09
Payer: MEDICARE

## 2023-06-09 VITALS
OXYGEN SATURATION: 98 % | TEMPERATURE: 98 F | DIASTOLIC BLOOD PRESSURE: 78 MMHG | RESPIRATION RATE: 16 BRPM | HEART RATE: 73 BPM | SYSTOLIC BLOOD PRESSURE: 132 MMHG

## 2023-06-09 VITALS
SYSTOLIC BLOOD PRESSURE: 122 MMHG | OXYGEN SATURATION: 94 % | DIASTOLIC BLOOD PRESSURE: 60 MMHG | HEART RATE: 69 BPM | TEMPERATURE: 97.7 F | RESPIRATION RATE: 18 BRPM

## 2023-06-09 VITALS
OXYGEN SATURATION: 95 % | TEMPERATURE: 97.9 F | SYSTOLIC BLOOD PRESSURE: 138 MMHG | HEART RATE: 75 BPM | RESPIRATION RATE: 18 BRPM | DIASTOLIC BLOOD PRESSURE: 80 MMHG

## 2023-06-09 PROCEDURE — G0299 HHS/HOSPICE OF RN EA 15 MIN: HCPCS

## 2023-06-09 PROCEDURE — G0153 HHCP-SVS OF S/L PATH,EA 15MN: HCPCS

## 2023-06-09 PROCEDURE — G0151 HHCP-SERV OF PT,EA 15 MIN: HCPCS

## 2023-06-09 NOTE — HOME HEALTH
Pt A&O, reports walking outside this morning with caregiver and family. Pt denies pain. Left hip incision has healed. Filled pill box for the week for pt, provided education for caregiver and pt regarding medications, will need reinforcement. VSS. Next visit: BMP and CBC on 6/13, neuro assess, CP assess, med education.

## 2023-06-09 NOTE — HOME HEALTH
_____________________________________________  PHYSICAL THERAPY ROUTINE VISIT NOTE    SUBJECTIVE:  Patient states he has some pain in his (L) thigh.  He denies taking Tylenol or using ice for pain, stating he can tolerate it.    MEDICATION CHANGES:  None    FALLS SINCE LAST VISIT:  None    MENTAL STATUS:  Alert & Oriented x 4    EDEMA:  None    WOUND / SKIN CONDITION:  Intact    POST TREATMENT ASSESSMENT:  Patient performed very well this date.  Gait with rolling walker is much improved, as he is not holding up or tipping it sideways during turns.  He does have a 3 wheel rollator, however was instructed not to ambulate with it in the home, as the (L) brake is broken & the back wheel base is too wide, resulting in it catching on furniture.  PT did acknowledge that it rolls more smoothly on pavement & gave him permission to use it outdoors only with CG assistance.      Gait assessed with cane this date & patient performed very well.  He was instructed to continue to ambulate with rolling walker for safety at all times.  Informed pt that he is at a very high risk for falls as he transitions with improvement due to his tendency to place too much wt through his heels, despite improvement since initial evaluation.  PT expressed concern that pt will become over confident in his balance & abilities, resulting in fall.  Pt verbalized understanding.  Patient did become confused when he was facing the front of his walker & attempted to ambulate by pushing it backwards.  He was unable to process instructions for turning walker around, requiring PT to intervene & turn it for him.    MEDICAL NECESSITY & FOCUS OF CARE FOR ONGOING SKILLED PHYSICAL THERAPY:  Patient requires skilled physical therapy services to address deficits in (L) hip strength & balance, impeding ability to safely perform functional transfers, bed mobility, ADL's & gait activities, most especially during turning activities.  Patient is unable to ambulate the  required distance or negotiate stairs to safely exit home for medical appointments.    PLAN FOR NEXT VISIT:  Progress HEP, transfer training with emphasis on ant wt shift, gt training

## 2023-06-09 NOTE — HOME HEALTH
_____________________________________________  PHYSICAL THERAPY ROUTINE VISIT NOTE    SUBJECTIVE:  Patient states his pain is better, however does have some when he walks.   Pt reports he used to walk 4 miles on a daily basis & hopes he can do it again.    MEDICATION CHANGES:  None    FALLS SINCE LAST VISIT:  None    MENTAL STATUS:  Alert & Oriented x 4    EDEMA:  None    WOUND / SKIN CONDITION:  Intact    ASSESSMENT:  Pt performed well this date. He tolerated 2.5 lb wt without difficulty.  Gait/balance progressing with no loss of balance noted  with rolling walker or cane.  Pt is to continue ambulating with rolling walker at all times.      MEDICAL NECESSITY & FOCUS OF CARE FOR ONGOING SKILLED PHYSICAL THERAPY:  Patient requires skilled physical therapy services to address deficits in (L) hip strength & balance, impeding ability to safely perform functional transfers, bed mobility, ADL's & gait activities, most especially during turning activities.  Patient is unable to ambulate the required distance or negotiate stairs to safely exit home for medical appointments.    PLAN FOR NEXT VISIT:

## 2023-06-12 ENCOUNTER — HOME CARE VISIT (OUTPATIENT)
Dept: HOME HEALTH SERVICES | Facility: HOME HEALTHCARE | Age: 88
End: 2023-06-12
Payer: MEDICARE

## 2023-06-12 VITALS
TEMPERATURE: 97.8 F | DIASTOLIC BLOOD PRESSURE: 70 MMHG | RESPIRATION RATE: 18 BRPM | OXYGEN SATURATION: 99 % | HEART RATE: 71 BPM | SYSTOLIC BLOOD PRESSURE: 132 MMHG

## 2023-06-12 PROCEDURE — G0152 HHCP-SERV OF OT,EA 15 MIN: HCPCS

## 2023-06-12 NOTE — HOME HEALTH
"SUBJECTIVE: \"My hands hurt when I have to hold the band w/a tight pinch.\"  FALLS: none reported.  PLAN FOR NEXT VISIT: bring extra long band to accomodate loops at the ends for joint protection gripping of band when exercising w/it. Needs a green and red band."

## 2023-06-13 ENCOUNTER — HOME CARE VISIT (OUTPATIENT)
Dept: HOME HEALTH SERVICES | Facility: HOME HEALTHCARE | Age: 88
End: 2023-06-13
Payer: MEDICARE

## 2023-06-13 ENCOUNTER — LAB REQUISITION (OUTPATIENT)
Dept: LAB | Facility: HOSPITAL | Age: 88
End: 2023-06-13
Payer: MEDICARE

## 2023-06-13 VITALS
TEMPERATURE: 98.2 F | HEART RATE: 73 BPM | SYSTOLIC BLOOD PRESSURE: 138 MMHG | OXYGEN SATURATION: 99 % | DIASTOLIC BLOOD PRESSURE: 80 MMHG | RESPIRATION RATE: 16 BRPM

## 2023-06-13 DIAGNOSIS — S06.5X0D TRAUMATIC SUBDURAL HEMORRHAGE WITHOUT LOSS OF CONSCIOUSNESS, SUBSEQUENT ENCOUNTER: ICD-10-CM

## 2023-06-13 LAB
ANION GAP SERPL CALCULATED.3IONS-SCNC: 15.6 MMOL/L (ref 5–15)
BUN SERPL-MCNC: 19 MG/DL (ref 8–23)
BUN/CREAT SERPL: 20.4 (ref 7–25)
CALCIUM SPEC-SCNC: 9 MG/DL (ref 8.6–10.5)
CHLORIDE SERPL-SCNC: 101 MMOL/L (ref 98–107)
CO2 SERPL-SCNC: 20.4 MMOL/L (ref 22–29)
CREAT SERPL-MCNC: 0.93 MG/DL (ref 0.76–1.27)
DEPRECATED RDW RBC AUTO: 47.1 FL (ref 37–54)
EGFRCR SERPLBLD CKD-EPI 2021: 79.5 ML/MIN/1.73
ERYTHROCYTE [DISTWIDTH] IN BLOOD BY AUTOMATED COUNT: 15.6 % (ref 12.3–15.4)
GLUCOSE SERPL-MCNC: 131 MG/DL (ref 65–99)
HCT VFR BLD AUTO: 32 % (ref 37.5–51)
HGB BLD-MCNC: 10.6 G/DL (ref 13–17.7)
MCH RBC QN AUTO: 27.4 PG (ref 26.6–33)
MCHC RBC AUTO-ENTMCNC: 33.1 G/DL (ref 31.5–35.7)
MCV RBC AUTO: 82.7 FL (ref 79–97)
PLATELET # BLD AUTO: 246 10*3/MM3 (ref 140–450)
PMV BLD AUTO: 9.6 FL (ref 6–12)
POTASSIUM SERPL-SCNC: 3.8 MMOL/L (ref 3.5–5.2)
RBC # BLD AUTO: 3.87 10*6/MM3 (ref 4.14–5.8)
SODIUM SERPL-SCNC: 137 MMOL/L (ref 136–145)
WBC NRBC COR # BLD: 5.77 10*3/MM3 (ref 3.4–10.8)

## 2023-06-13 PROCEDURE — 85027 COMPLETE CBC AUTOMATED: CPT | Performed by: INTERNAL MEDICINE

## 2023-06-13 PROCEDURE — G0299 HHS/HOSPICE OF RN EA 15 MIN: HCPCS

## 2023-06-13 PROCEDURE — 80048 BASIC METABOLIC PNL TOTAL CA: CPT | Performed by: INTERNAL MEDICINE

## 2023-06-13 PROCEDURE — G0151 HHCP-SERV OF PT,EA 15 MIN: HCPCS

## 2023-06-13 NOTE — HOME HEALTH
Labs drawn as ordered. Pt tolerated well. Reeducated pt and family on medications. Next visit: predischarge from

## 2023-06-14 ENCOUNTER — HOME CARE VISIT (OUTPATIENT)
Dept: HOME HEALTH SERVICES | Facility: HOME HEALTHCARE | Age: 88
End: 2023-06-14
Payer: MEDICARE

## 2023-06-14 VITALS
TEMPERATURE: 98.1 F | RESPIRATION RATE: 18 BRPM | OXYGEN SATURATION: 95 % | DIASTOLIC BLOOD PRESSURE: 60 MMHG | HEART RATE: 78 BPM | SYSTOLIC BLOOD PRESSURE: 120 MMHG

## 2023-06-14 NOTE — HOME HEALTH
_____________________________________________  PHYSICAL THERAPY ROUTINE VISIT NOTE    SUBJECTIVE:  Patient states he's doing well, but still has a little pain when he puts weight on his (L) leg.  He did take Tylenol today, which has helped.    MEDICATION CHANGES:  None    FALLS SINCE LAST VISIT:  None    MENTAL STATUS:  Alert & Oriented x 4    EDEMA:  None    WOUND / SKIN CONDITION:  Intact    MEDICAL NECESSITY & FOCUS OF CARE FOR ONGOING SKILLED PHYSICAL THERAPY:  Patient requires skilled physical therapy services to address deficits in (L) hip strength & balance, impeding ability to safely perform functional transfers, bed mobility, ADL's & gait activities, most especially during turning activities.  Patient is unable to ambulate the required distance or negotiate stairs to safely exit home for medical appointments.    PLAN FOR NEXT VISIT:  Progress HEP & gt training with cane, in attempts to safely transition off a walker with supervision of caregiver.

## 2023-06-15 ENCOUNTER — HOME CARE VISIT (OUTPATIENT)
Dept: HOME HEALTH SERVICES | Facility: HOME HEALTHCARE | Age: 88
End: 2023-06-15
Payer: MEDICARE

## 2023-06-15 VITALS
SYSTOLIC BLOOD PRESSURE: 116 MMHG | RESPIRATION RATE: 18 BRPM | HEART RATE: 79 BPM | TEMPERATURE: 97.8 F | OXYGEN SATURATION: 97 % | DIASTOLIC BLOOD PRESSURE: 64 MMHG

## 2023-06-15 VITALS
RESPIRATION RATE: 17 BRPM | DIASTOLIC BLOOD PRESSURE: 64 MMHG | OXYGEN SATURATION: 97 % | SYSTOLIC BLOOD PRESSURE: 132 MMHG | HEART RATE: 74 BPM | TEMPERATURE: 97.8 F

## 2023-06-15 PROCEDURE — G0152 HHCP-SERV OF OT,EA 15 MIN: HCPCS

## 2023-06-15 PROCEDURE — G0151 HHCP-SERV OF PT,EA 15 MIN: HCPCS

## 2023-06-15 NOTE — HOME HEALTH
"_____________________________________________  PHYSICAL THERAPY ROUTINE VISIT NOTE    SUBJECTIVE:  Patient states he's doing well & is having \"no pain\".    MEDICATION CHANGES:  None    FALLS SINCE LAST VISIT:  None    MENTAL STATUS:  Alert & Oriented x 4    EDEMA:  None    WOUND / SKIN CONDITION:  Intact    ASSESSMENT:  Pt continues to progress in all areas.  He may now transition to a cane for gt activities, however should utilize walker for safety at night, early am or if he is tierd or experiencing pain.  Balance continues to progress, as he was able to perform unipedal stance x 6 seconds (B) LE's.  He achieved 30 second sit<>stand test by more than doubling his score.    MEDICAL NECESSITY & FOCUS OF CARE FOR ONGOING SKILLED PHYSICAL THERAPY:  Patient requires skilled physical therapy services to address deficits in (L) hip strength & balance, impeding ability to safely perform functional transfers, bed mobility, ADL's & gait activities, most especially during turning activities.  Patient is unable to ambulate the required distance or negotiate stairs to safely exit home for medical appointments.    PLAN FOR NEXT VISIT:  Progress gt training, strengthening & balance."

## 2023-06-15 NOTE — HOME HEALTH
"SUBJECTIVE: \"I feel like I'm doing very well.\"  PLAN FOR NEXT VISIT: pre-d/c visit. ADL review and safety. Transfer practice throughout home. Walker use in kitchen w/tray (bring it in for demo.)"

## 2023-06-16 ENCOUNTER — HOME CARE VISIT (OUTPATIENT)
Dept: HOME HEALTH SERVICES | Facility: HOME HEALTHCARE | Age: 88
End: 2023-06-16
Payer: MEDICARE

## 2023-06-16 PROCEDURE — G0153 HHCP-SVS OF S/L PATH,EA 15MN: HCPCS

## 2023-06-19 ENCOUNTER — HOME CARE VISIT (OUTPATIENT)
Dept: HOME HEALTH SERVICES | Facility: HOME HEALTHCARE | Age: 88
End: 2023-06-19
Payer: MEDICARE

## 2023-06-19 VITALS
TEMPERATURE: 98 F | RESPIRATION RATE: 18 BRPM | SYSTOLIC BLOOD PRESSURE: 132 MMHG | HEART RATE: 74 BPM | DIASTOLIC BLOOD PRESSURE: 64 MMHG | OXYGEN SATURATION: 96 %

## 2023-06-19 PROCEDURE — G0152 HHCP-SERV OF OT,EA 15 MIN: HCPCS

## 2023-06-19 NOTE — HOME HEALTH
"SUBJECTIVE: \"I walk outside everyday. I'm using the cane now all the time.\" Family; \"We wany him in therapy longer than this week. Please get more visits.\"  PLAN FOR NEXT VISIT: OT D/C if appropriate with R/A testing."

## 2023-06-20 ENCOUNTER — HOME CARE VISIT (OUTPATIENT)
Dept: HOME HEALTH SERVICES | Facility: HOME HEALTHCARE | Age: 88
End: 2023-06-20
Payer: MEDICARE

## 2023-07-24 ENCOUNTER — TREATMENT (OUTPATIENT)
Dept: PHYSICAL THERAPY | Facility: CLINIC | Age: 88
End: 2023-07-24
Payer: MEDICARE

## 2023-07-24 DIAGNOSIS — R53.1 WEAKNESS: ICD-10-CM

## 2023-07-24 DIAGNOSIS — Z74.09 IMPAIRED FUNCTIONAL MOBILITY, BALANCE, GAIT, AND ENDURANCE: ICD-10-CM

## 2023-07-24 DIAGNOSIS — Z87.81 STATUS POST FRACTURE OF LEFT HIP: Primary | ICD-10-CM

## 2023-07-24 PROCEDURE — 97112 NEUROMUSCULAR REEDUCATION: CPT | Performed by: PHYSICAL THERAPIST

## 2023-07-24 PROCEDURE — 97110 THERAPEUTIC EXERCISES: CPT | Performed by: PHYSICAL THERAPIST

## 2023-07-24 NOTE — PROGRESS NOTES
Physical Therapy Daily Treatment Note    [facility]  [address] [place] [address]  [phone] (phone)  [phone] (fax)    Patient: Radha Azevedo   : 1935  Diagnosis/ICD-10 Code:  Status post fracture of left hip [Z87.81]  Referring practitioner: ALVARO Dao  Today's Date: 2023  Patient seen for 2 sessions    Visit Diagnoses:    ICD-10-CM ICD-9-CM   1. Status post fracture of left hip  Z87.81 V15.51   2. Impaired functional mobility, balance, gait, and endurance  Z74.09 V49.89   3. Weakness  R53.1 780.79              Subjective   Pt reports that his hip was sore after his initial eval. It feels fine today. His primary issue is that his hands/fingers are sore.    Objective   See Exercise, Manual, and Modality Logs for complete treatment.       Assessment/Plan  Today was pt's first treatment session following initial eval. Instructed pt in proper form for all exercises. Pt required mod to max tactile cues and demonstrations for proper exercise form. Continue PT POC.          Timed:    Manual Therapy:    0     mins  98653;  Therapeutic Exercise:    32     mins  51440;     Neuromuscular Elizabeth:    12    mins  06048;    Therapeutic Activity:     0     mins  60010;     Gait Trainin     mins  11069;     Ultrasound:     0     mins  00635;    Aquatic Therapy    0     mins 28760;  Self Care                       0     mins   02850        Untimed:  Electrical Stimulation:    0     mins  38892 (MC );  Traction:    0     mins  21086;   Dry Needling  (1-2 muscles)            0     mins  (Self-pay)  Dry Needling (3-4 muscles) 0      (Self-pay)  Dry Needling Trial    0     DRYNDLTRIAL  (No Charge)    Timed Treatment:   44   mins   Total Treatment:     44   mins    Khadra Chamorro, PT  Physical Therapist    [place] License:[id number]

## 2023-07-24 NOTE — PROGRESS NOTES
I have reviewed the notes, assessments, and/or procedures performed by Khadra Chamorro, I concur with her/his documentation of Radha Azevedo.

## 2023-07-26 ENCOUNTER — TREATMENT (OUTPATIENT)
Dept: PHYSICAL THERAPY | Facility: CLINIC | Age: 88
End: 2023-07-26
Payer: MEDICARE

## 2023-07-26 NOTE — PROGRESS NOTES
Physical Therapy Daily Note    Patient Name: Radha Azevedo         :  1935  Referring Physician: Paulo Marroquin PA  Treatment Date: 2023  Date of Initial Visit: Type: THERAPY  Noted: 2023    Visit Diagnoses:  No diagnosis found.    _____________________________________________________    Subjective   Radha Azevedo reports: no problems after last session - Pain today 7/10 -     Objective   Pt ambulated into PT w/ st cane, but ambulated in PT w/o AD - guarded gait w/o AD -     EXERCISES:   BRIDGES: 2x10 / 5 sec hold  SLR (towel roll under knee) w/ emphasis on maintaining TKE 2x10  SL CLAMS w/ ADD set  (L) hip 2x10 (cuing not to roll trunk back)  SL HIP ABDuction (L) 2x5 (Tactile / verbal cuing for form to avoid hip flexion and trunk backward rotation compensation) - frequent rests to ensure proper form      Palpable click at Greater Trochanter region (L) with ABD - no c/o pain though  SUPINE ISOMETRIC LEG PRESS into BALL;  15 (B); 15 (L) 5 sec holds  SUPINE HS CURLS w/ LEGS on BALL;  15 x   SIT <_> STAND off side of BED (22 in from floor) cues for glutes / hip ext at top -2x10  (lower seat as yanira)      (Noted onset of anterior knee/thigh discomfort near end of second set )-   SIDE STEPPING 30 ft each direction x 2 ea  MONSTER WALK Fwd/Retro w/ SBA - add next   STEP UP - (2nd Hole from bottom at Hip machine) 2x10 each leg w/ UE Support (Cues not to pull up with his arms)  TRACK x 1/2 LAP w/o AD - assessing gait and cuing for correction prn -   NUSTEP Seat 6 Arms 10 L2  x 5 min       Functional / Therapeutic Activities:    TAPING / BRACING: NA  SEE EXERCISEs -       Assessment/Plan  88 yo male; S/P (L) hip fracture w/ IM Pinning 2023.   Persistent pain, but did well w/ increased PT activities and ambulating w/o AD in clinic -     Progress strengthening /stabilization /functional activity as pain allows - Introduce        _________________________________________________    Manual  Therapy:                 mins  33463;  Therapeutic Exercise:    25    mins  26148;     Neuromuscular Elizabeth:        mins  15454;    Therapeutic Activity:     15      mins  05356;     Ultrasound:                          mins  78962;  Iontophoresis:                     mins  20969;    Electrical Stimulation:         mins  00023 ( );  Mechanical Traction:          mins  28261  Dry Needling                       mins self-pay     Timed Treatment:   40    mins                  Total Treatment:     40    mins        Micheal Schwarz PT  Physical Therapist  Lic # [id number]

## 2023-07-31 ENCOUNTER — TREATMENT (OUTPATIENT)
Dept: PHYSICAL THERAPY | Facility: CLINIC | Age: 88
End: 2023-07-31
Payer: MEDICARE

## 2023-07-31 DIAGNOSIS — Z74.09 IMPAIRED FUNCTIONAL MOBILITY, BALANCE, GAIT, AND ENDURANCE: ICD-10-CM

## 2023-07-31 DIAGNOSIS — Z87.81 STATUS POST FRACTURE OF LEFT HIP: Primary | ICD-10-CM

## 2023-07-31 DIAGNOSIS — R53.1 WEAKNESS: ICD-10-CM

## 2023-07-31 PROCEDURE — 97110 THERAPEUTIC EXERCISES: CPT | Performed by: PHYSICAL THERAPIST

## 2023-07-31 PROCEDURE — 97112 NEUROMUSCULAR REEDUCATION: CPT | Performed by: PHYSICAL THERAPIST

## 2023-08-02 ENCOUNTER — TREATMENT (OUTPATIENT)
Dept: PHYSICAL THERAPY | Facility: CLINIC | Age: 88
End: 2023-08-02
Payer: MEDICARE

## 2023-08-02 DIAGNOSIS — Z74.09 IMPAIRED FUNCTIONAL MOBILITY, BALANCE, GAIT, AND ENDURANCE: ICD-10-CM

## 2023-08-02 DIAGNOSIS — R53.1 WEAKNESS: ICD-10-CM

## 2023-08-02 DIAGNOSIS — Z87.81 STATUS POST FRACTURE OF LEFT HIP: Primary | ICD-10-CM

## 2023-08-08 ENCOUNTER — TREATMENT (OUTPATIENT)
Dept: PHYSICAL THERAPY | Facility: CLINIC | Age: 88
End: 2023-08-08
Payer: MEDICARE

## 2023-08-08 DIAGNOSIS — R53.1 WEAKNESS: ICD-10-CM

## 2023-08-08 DIAGNOSIS — Z74.09 IMPAIRED FUNCTIONAL MOBILITY, BALANCE, GAIT, AND ENDURANCE: ICD-10-CM

## 2023-08-08 DIAGNOSIS — Z87.81 STATUS POST FRACTURE OF RIGHT HIP: Primary | ICD-10-CM

## 2023-08-08 PROCEDURE — 97530 THERAPEUTIC ACTIVITIES: CPT | Performed by: PHYSICAL THERAPIST

## 2023-08-08 PROCEDURE — 97112 NEUROMUSCULAR REEDUCATION: CPT | Performed by: PHYSICAL THERAPIST

## 2023-08-08 PROCEDURE — 97110 THERAPEUTIC EXERCISES: CPT | Performed by: PHYSICAL THERAPIST

## 2023-08-08 NOTE — PROGRESS NOTES
Physical Therapy Daily Note    Patient Name: Radha Azevedo         :  1935  Referring Physician: Paulo Marroquin PA  Treatment Date: 2023  Date of Initial Visit: Type: THERAPY  Noted: 2023    Visit Diagnoses:    ICD-10-CM ICD-9-CM   1. Status post fracture of right hip  Z87.81 V15.51   2. Impaired functional mobility, balance, gait, and endurance  Z74.09 V49.89   3. Weakness  R53.1 780.79       _____________________________________________________    Subjective   Radha Azevedo reports: no pain - has stairs at home -     Objective   Pt ambulating w/ st cane, but doesn't seem to really need it - increased WB-ing RLE -   Did not utilize AD in clinic and ambulated well w/ good WB-ing thru RLE    EXERCISES:   BRIDGES: 2x10 / 5 sec hold  SUPINE ISOMETRIC LEG PRESS into BALL;  20 (B); 20 (R) 5 sec holds  SUPINE HS CURLS w/ LEGS on BALL;  20  SIT <_> STAND out of chair cues for glutes / hip ext at top and to scoot to edge of chair -2x10    SIDE STEPPING 30 ft each direction x 2 ea  MONSTER WALK Fwd/Retro w/ SBA -   FWD RUNNER STEP UP - (3rd Hole from bottom at Hip machine) 15 each leg w/ UE Support - Cuing for proper form  LATERAL STEP UP w/ ABDuction -(3 Hole from bottom at Hip machine) 15 each leg w/ UE Support  LEG PRESS - JUAN DAVID; 85 # 15 reps (B); R/L  HIP ADDUCTION - JUAN DAVID; 50# x 20  HIP ABDUCTION - JUAN DAVID  30# x 20  TRACK x 1/2 LAP w/o AD - assessing gait and cuing for correction prn -   NUSTEP Seat 6 Arms 10 L2  x 5 min     Functional / Therapeutic Activities:    TAPING / BRACING: NA  SEE EXERCISE FLOW SHEET -     Assessment/Plan  88 yo male; S/P (R) hip fracture w/ IM Pinning 2023.  Improving w/ decreasing pain and improving, gait, and function / mobility -       Progress strengthening /stabilization /functional activity       _________________________________________________    Manual Therapy:                 mins  47053;  Therapeutic Exercise:    20    mins  97855;      Neuromuscular Elizabeth:    05    mins  21217;    Therapeutic Activity:     15      mins  63018;     Ultrasound:                          mins  91349;  Iontophoresis:                     mins  10652;    Electrical Stimulation:         mins  63540 ( );  Mechanical Traction:          mins  17329  Dry Needling                       mins self-pay     Timed Treatment:   40    mins                  Total Treatment:     40    mins        Micheal Schwarz PT  Physical Therapist  Lic # [id number]

## 2023-08-10 NOTE — PROGRESS NOTES
Physical Therapy Daily Treatment Note    [facility] [place] [address]  [phone] (phone)  [phone] (fax)    Patient: Radha Azevedo   : 1935  Diagnosis/ICD-10 Code:  Status post fracture of right hip [Z87.81]  Referring practitioner: ALVARO Dao  Today's Date: 2023  Patient seen for 7 sessions    Visit Diagnoses:    ICD-10-CM ICD-9-CM   1. Status post fracture of right hip  Z87.81 V15.51   2. Impaired functional mobility, balance, gait, and endurance  Z74.09 V49.89   3. Weakness  R53.1 780.79   4. Status post fracture of left hip  Z87.81 V15.51              Subjective   Pt reports he is not having any hip pain today. He is interested in working with a  at [facility].    Objective   See Exercise, Manual, and Modality Logs for complete treatment.          Assessment/Plan  Pt tolerated session well overall, reporting an appropriate level of muscle fatigue following strength exercises. Required min vcs to slow down and control eccentric phase of resistance exercises, which he was able to correct. Also required continuous vcs to hold static stretches at end of session. Continue PT POC.        Timed:    Manual Therapy:    0     mins  49016;  Therapeutic Exercise:    42     mins  94888;     Neuromuscular Elizabeth:    0    mins  53668;    Therapeutic Activity:     0     mins  08139;     Gait Trainin     mins  25931;     Ultrasound:     0     mins  89809;    Aquatic Therapy    0     mins 86766;  Self Care                       0     mins   20166        Untimed:  Electrical Stimulation:    0     mins  92847 ( );  Traction:    0     mins  98930;   Dry Needling  (1-2 muscles)            0     mins 99379 (Self-pay)  Dry Needling (3-4 muscles) 0      (Self-pay)  Dry Needling Trial    0     DRYNDLTRIAL  (No Charge)    Timed Treatment:   42   mins   Total Treatment:     42   mins    Khadra Chamorro  PT  Physical Therapist    [place] License:[id number]

## 2023-08-11 ENCOUNTER — TREATMENT (OUTPATIENT)
Dept: PHYSICAL THERAPY | Facility: CLINIC | Age: 88
End: 2023-08-11
Payer: MEDICARE

## 2023-08-11 DIAGNOSIS — R53.1 WEAKNESS: ICD-10-CM

## 2023-08-11 DIAGNOSIS — Z87.81 STATUS POST FRACTURE OF RIGHT HIP: Primary | ICD-10-CM

## 2023-08-11 DIAGNOSIS — Z87.81 STATUS POST FRACTURE OF LEFT HIP: ICD-10-CM

## 2023-08-11 DIAGNOSIS — Z74.09 IMPAIRED FUNCTIONAL MOBILITY, BALANCE, GAIT, AND ENDURANCE: ICD-10-CM

## 2023-08-15 ENCOUNTER — TREATMENT (OUTPATIENT)
Dept: PHYSICAL THERAPY | Facility: CLINIC | Age: 88
End: 2023-08-15
Payer: MEDICARE

## 2023-08-15 DIAGNOSIS — R53.1 WEAKNESS: ICD-10-CM

## 2023-08-15 DIAGNOSIS — Z74.09 IMPAIRED FUNCTIONAL MOBILITY, BALANCE, GAIT, AND ENDURANCE: ICD-10-CM

## 2023-08-15 DIAGNOSIS — Z87.81 STATUS POST FRACTURE OF RIGHT HIP: Primary | ICD-10-CM

## 2023-08-15 DIAGNOSIS — Z87.81 STATUS POST FRACTURE OF LEFT HIP: ICD-10-CM

## 2023-08-15 NOTE — PROGRESS NOTES
Physical Therapy Daily Note    Patient Name: Radha Azevedo         :  1935  Referring Physician: Paulo Marroquin PA  Treatment Date: 8/15/2023  Date of Initial Visit: No linked episodes    Visit Diagnoses:    ICD-10-CM ICD-9-CM   1. Status post fracture of right hip  Z87.81 V15.51   2. Impaired functional mobility, balance, gait, and endurance  Z74.09 V49.89   3. Weakness  R53.1 780.79   4. Status post fracture of left hip  Z87.81 V15.51       _____________________________________________________    Subjective   Radha Azevedo reports: no pain in (R) hip, but did not anterior knee pain (L) w/ knee extension and lateral step up exercise  His caregiver said they are working hard on exercises at home - Says he is anxious to return to the gym and work out on all the machines -     Objective   Pt ambulating w/o AD w/ equal WB-ing and good pace / stride length etc    EXERCISES:   FWD RUNNER STEP UP - (4th Hole from bottom at Hip machine) 15 each leg w/ UE Support - Cuing for proper form  LATERAL STEP UP w/ ABDuction -(3 Hole from bottom at Hip machine) 15 each leg w/ UE Support   JUAN DAVID LEG PRESS -; 85 # 15reps (B); R/L  HIP ADDUCTION - JUAN DAVID; 50# x 20  HIP ABDUCTION - JUAN DAVID   30# x 20  JUAN DAVID KNEE EXTENSION, but Pt c/o anterior knee pain (L) - Try (R) leg only next time  MATRIX LEG PRESS Seat 4    20# x 20  MATRIX SEATED HS CURLS;  Seat 1   20# x 20  NUSTEP Seat 6 Arms 10 L7  x 5 min  Ambulation in clinic to assess gait/WB-ing, pace, etc     Functional / Therapeutic Activities:    TAPING / BRACING: NA  SEE EXERCISE FLOW SHEET -   Jt protection, ADL modification; Posture and      Assessment/Plan  86 yo male; S/P (R) hip fracture w/ IM Pinning 2023.  Significantly improved w/ alleviation of (R) hip pain and improving, gait, and function / mobility -    Pt very anxious to returning to gym and doing UE exercises and working w/ a  -         Progress strengthening /stabilization  /functional activity- anticipate DC to HEP and  at [facility] in near future if improvement continues -      _________________________________________________     Manual Therapy:                 mins  10986;  Therapeutic Exercise:   23    mins  05251;     Neuromuscular Elizabeth:        mins  77728;    Therapeutic Activity:     10     mins  40277;     Ultrasound:                          mins  78740;  Iontophoresis:                     mins  93816;    Electrical Stimulation:         mins  09681 ( );  Mechanical Traction:          mins  82023  Dry Needling                       mins self-pay     Timed Treatment:   33    mins                  Total Treatment:     33    mins        Micheal Schwarz PT  Physical Therapist  Lic # [id number]

## 2023-08-17 ENCOUNTER — TREATMENT (OUTPATIENT)
Dept: PHYSICAL THERAPY | Facility: CLINIC | Age: 88
End: 2023-08-17
Payer: MEDICARE

## 2023-08-17 DIAGNOSIS — R53.1 WEAKNESS: ICD-10-CM

## 2023-08-17 DIAGNOSIS — Z87.81 STATUS POST FRACTURE OF LEFT HIP: ICD-10-CM

## 2023-08-17 DIAGNOSIS — Z74.09 IMPAIRED FUNCTIONAL MOBILITY, BALANCE, GAIT, AND ENDURANCE: ICD-10-CM

## 2023-08-17 DIAGNOSIS — Z87.81 STATUS POST FRACTURE OF RIGHT HIP: Primary | ICD-10-CM

## 2023-08-17 NOTE — PROGRESS NOTES
Physical Therapy Daily Note    Patient Name: Radha Azevedo         :  1935  Referring Physician: Paulo Marroquin PA  Treatment Date: 2023  Date of Initial Visit: No linked episodes    Visit Diagnoses:    ICD-10-CM ICD-9-CM   1. Status post fracture of right hip  Z87.81 V15.51   2. Impaired functional mobility, balance, gait, and endurance  Z74.09 V49.89   3. Weakness  R53.1 780.79   4. Status post fracture of left hip  Z87.81 V15.51       _____________________________________________________    Subjective   Radha Azevedo reports: no pain - waking every morning - [facility] -     Objective   Pt ambulating w/o AD - equal WB-ing LE -     EXERCISES:   FWD RUNNER STEP UP - (4th Hole from bottom at Hip machine) 15 each leg w/ UE Support - Cuing for proper form  LATERAL STEP UP w/ ABDuction -(3 Hole from bottom at Hip machine) 15 each leg w/ UE Support   JUAN DAVID LEG PRESS -; 85 # 15reps (B); R/L  HIP ADDUCTION - JUAN DAVID; 50# x 20  HIP ABDUCTION - JUAN DAVID   30# x 20  JUAN DAVID KNEE EXTENSION, 3# RLE ONLY -  NO LLE due to Pt c/o (L) anterior knee pain when attempted   MATRIX LEG PRESS Seat 4    20# x 20  MATRIX SEATED HS CURLS;  Seat 1   20# x 20  NUSTEP Seat 6 Arms 10  L5  x 5 min     Functional / Therapeutic Activities:    TAPING / BRACING: NA  SEE EXERCISE FLOW SHEET -     Assessment/Plan  86 yo male; S/P (R) hip fracture w/ IM Pinning 2023.  Significantly improved w/ alleviation of (R) hip pain and improving, gait, and function / mobility -    Pt very anxious to returning to gym and doing UE exercises and working w/ a  -     Progress strengthening /stabilization /functional activity       _________________________________________________    Manual Therapy:                 mins  37535;  Therapeutic Exercise:    23    mins  72857;     Neuromuscular Elizabeth:        mins  17544;    Therapeutic Activity:     08      mins  66286;      Ultrasound:                          mins  68111;  Iontophoresis:                     mins  19006;    Electrical Stimulation:         mins  24651 ( );  Mechanical Traction:          mins  50540  Dry Needling                       mins self-pay     Timed Treatment:   31    mins                  Total Treatment:     31    mins        Micheal Schwarz, PT  Physical Therapist  Lic # [id number]

## 2023-08-18 ENCOUNTER — TELEPHONE (OUTPATIENT)
Dept: PHYSICAL THERAPY | Facility: OTHER | Age: 88
End: 2023-08-18
Payer: MEDICARE

## 2023-08-18 NOTE — TELEPHONE ENCOUNTER
Caller: FELICIA CHRISTENSEN    Relationship: Emergency Contact    Best call back number: [phone]    What is the best time to reach you: ANY    Who are you requesting to speak with (clinical staff, provider,  specific staff member): CURTIS      What was the call regarding:WANTS TO KNOW IF HE SHOULD CONTINUE. PLEASE CALL SON BACK AT THE NUMBER ABOVE OR [phone]

## 2023-08-20 NOTE — PROGRESS NOTES
Physical Therapy Daily Treatment Note    [facility]  [address] [place] [address]  [phone] (phone)  [phone] (fax)    Patient: Radha Azevedo   : 1935  Diagnosis/ICD-10 Code:  Status post fracture of right hip [Z87.81]  Referring practitioner: ALVARO Dao  Today's Date: 2023  Patient seen for 10 sessions    Visit Diagnoses:    ICD-10-CM ICD-9-CM   1. Status post fracture of right hip  Z87.81 V15.51   2. Impaired functional mobility, balance, gait, and endurance  Z74.09 V49.89   3. Weakness  R53.1 780.79   4. Status post fracture of left hip  Z87.81 V15.51              Subjective   Pt reports that his hip feels good today.    Objective   See Exercise, Manual, and Modality Logs for complete treatment.       Assessment/Plan  Pt tolerated session well overall, reporting an appropriate level of muscle fatigue following strength exercises. Required mod vcs to maintain static stretches for 30 seconds, which he was able to correct. Continue PT POC.          Timed:    Manual Therapy:    0     mins  91136;  Therapeutic Exercise:    28     mins  69411;     Neuromuscular Elizabeth:    15    mins  86680;    Therapeutic Activity:     0     mins  42625;     Gait Trainin   mins  01782;     Ultrasound:    0     mins  70056;    Aquatic Therapy    0     mins 71947;  Self Care                       0     mins   42788        Untimed:  Electrical Stimulation:    0     mins  02617 (MC );  Traction:    0     mins  15050;   Dry Needling  (1-2 muscles)            0     mins  (Self-pay)  Dry Needling (3-4 muscles) 0      (Self-pay)  Dry Needling Trial    0     DRYNDLTRIAL  (No Charge)    Timed Treatment:   43   mins   Total Treatment:     43   mins    Khadra Chamorro, PT  Physical Therapist    [place] License:[id number]

## 2023-08-21 ENCOUNTER — TREATMENT (OUTPATIENT)
Dept: PHYSICAL THERAPY | Facility: CLINIC | Age: 88
End: 2023-08-21
Payer: MEDICARE

## 2023-08-21 DIAGNOSIS — R53.1 WEAKNESS: ICD-10-CM

## 2023-08-21 DIAGNOSIS — Z87.81 STATUS POST FRACTURE OF LEFT HIP: ICD-10-CM

## 2023-08-21 DIAGNOSIS — Z74.09 IMPAIRED FUNCTIONAL MOBILITY, BALANCE, GAIT, AND ENDURANCE: ICD-10-CM

## 2023-08-21 DIAGNOSIS — Z87.81 STATUS POST FRACTURE OF RIGHT HIP: Primary | ICD-10-CM

## 2023-08-23 ENCOUNTER — TREATMENT (OUTPATIENT)
Dept: PHYSICAL THERAPY | Facility: CLINIC | Age: 88
End: 2023-08-23
Payer: MEDICARE

## 2023-08-23 DIAGNOSIS — Z74.09 IMPAIRED FUNCTIONAL MOBILITY, BALANCE, GAIT, AND ENDURANCE: ICD-10-CM

## 2023-08-23 DIAGNOSIS — Z87.81 STATUS POST FRACTURE OF LEFT HIP: Primary | ICD-10-CM

## 2023-08-23 DIAGNOSIS — R53.1 WEAKNESS: ICD-10-CM

## 2023-08-23 NOTE — PROGRESS NOTES
Physical Therapy Daily Note    Patient Name: Radha Azevedo         :  1935  Referring Physician: Paulo Marroquin PA  Treatment Date: 2023  Date of Initial Visit: Type: THERAPY  Noted: 2023    Visit Diagnoses:    ICD-10-CM ICD-9-CM   1. Status post fracture of left hip  Z87.81 V15.51   2. Impaired functional mobility, balance, gait, and endurance  Z74.09 V49.89   3. Weakness  R53.1 780.79       _____________________________________________________    Subjective   Radha Azevedo reports: continued improvement - Walked this morning about 1 hour -     Objective   Pt ambulating w/o AD w/ equal WB-ing (B) Les - Very good ability to transfer on/off exercise equipment -   Trial of stairs taking one step at a time down stair - and able to go reciprocally up stairs w/ handrail, but w/ a LOB backwards at top of stairs - PT was behnd him to prevent falling -   AROM: WFL (L) Hip  Strength: Functional strength (L) Hip -  Gait - near normal as noted above -   Non-tender to palpation -     EXERCISES:   FWD RUNNER STEP UP - (4th Hole from bottom at Hip machine) 15 each leg w/ UE Support - Cuing for proper form  LATERAL STEP UP w/ ABDuction -(3 Hole from bottom at Hip machine) 15 each leg w/ UE Support   JUAN DAVID LEG PRESS -; 85 # 20ps (B); R/L  HIP ADDUCTION - JUAN DAVID; 50#  2x15  HIP ABDUCTION - JUAN DAVID   25# x 20  JUAN DAVID KNEE EXTENSION, 5# RLE ONLY -  NO LLE due to Pt c/o (L) anterior knee pain when attempted   MATRIX LEG PRESS Seat 4    20#  2x15  MATRIX SEATED HS CURLS;  Seat 1   20# x 20  MATRIX HIP ABDuction 30# 2x15  NUSTEP Seat 6 Arms 10  L5  x 5 min  Up/Down 1 FLIGHT of STAIRS w/ HR (Reciprocal going UP Stairs, but not Down) - SHOULD HAVE SBA behind PATIENT WHEN GOING UP STAIRS due to LOB backwards at top of stairs -      Functional / Therapeutic Activities:    TAPING / BRACING: NA  SEE EXERCISE FLOW SHEET -   FUNCTIONAL ASSESSMENT    Assessment/Plan  86 yo male; S/P (R) hip fracture w/ IM Pinning  5/08/2023.  Significantly improved w/ alleviation of (R) hip pain and improving, gait, and function / mobility -    Pt very anxious to returning to gym and doing UE exercises and working w/ a  -   STG - ALL MET  LTG: Most/ALL MET    Pt would benefit from continuing PT for one more week then DC to HEP and [place] at [facility] -     Progress strengthening /stabilization /functional activity 2x/wk x 1 week then DC to [facility]P and [place] at [facility].       _________________________________________________    Manual Therapy:                 mins  78448;  Therapeutic Exercise:    18    mins  95703;     Neuromuscular Elizabeth:        mins  95316;    Therapeutic Activity:     20      mins  34968;     Ultrasound:                          mins  70522;  Iontophoresis:                     mins  35381;    Electrical Stimulation:         mins  68810 ( );  Mechanical Traction:          mins  50763  Dry Needling                       mins self-pay     Timed Treatment:   38   mins                  Total Treatment:     38    mins        Micheal Schwarz, PT  Physical Therapist  Lic # [id number]

## 2023-08-28 ENCOUNTER — TELEPHONE (OUTPATIENT)
Dept: PHYSICAL THERAPY | Facility: OTHER | Age: 88
End: 2023-08-28
Payer: MEDICARE

## 2023-08-28 NOTE — TELEPHONE ENCOUNTER
Caller: Radha Azevedo    Relationship: Self    What was the call regarding: PATIENT CANCELLED APPT TODAY DUE TO NOT FEELING WELL

## 2023-08-30 ENCOUNTER — TREATMENT (OUTPATIENT)
Dept: PHYSICAL THERAPY | Facility: CLINIC | Age: 88
End: 2023-08-30
Payer: MEDICARE

## 2023-08-30 DIAGNOSIS — Z74.09 IMPAIRED FUNCTIONAL MOBILITY, BALANCE, GAIT, AND ENDURANCE: Primary | ICD-10-CM

## 2023-08-30 DIAGNOSIS — R53.1 WEAKNESS: ICD-10-CM

## 2023-08-30 DIAGNOSIS — Z87.81 STATUS POST FRACTURE OF LEFT HIP: ICD-10-CM

## 2023-08-30 NOTE — PROGRESS NOTES
Physical Therapy Daily Note    Patient Name: Radha Azevedo         :  1935  Referring Physician: Paulo Marroquin PA  Treatment Date: 2023  Date of Initial Visit: No linked episodes    Visit Diagnoses:    ICD-10-CM ICD-9-CM   1. Impaired functional mobility, balance, gait, and endurance  Z74.09 V49.89   2. Weakness  R53.1 780.79   3. Status post fracture of left hip  Z87.81 V15.51       _____________________________________________________    Subjective   Radha Azevedo reports: taking his walk this morning w/o problems -     Objective   Pt ambulating w/o AD - good WB-ing LLE -     EXERCISES:   FWD RUNNER STEP UP - (4th Hole from bottom at Hip machine) 15 each leg w/ UE Support - Cuing for proper form  LATERAL STEP UP w/ ABDuction -(3 Hole from bottom at Hip machine) 15 each leg w/ UE Support   JUAN DAVID LEG PRESS -; 85 # 20ps (B); R/L  HIP ADDUCTION - JUAN DAVID; 50#  2x15  HIP ABDUCTION - JUAN DAVID   25# x 20  MATRIX LEG PRESS Seat 4    20#  2x15  MATRIX SEATED HS CURLS;  Seat 1   15# x 20  MATRIX HIP ABDuction 20# 2x15  NUSTEP Seat 6 Arms 10  L5  x 5 min  Up/Down 1 FLIGHT of STAIRS w/ HR (Reciprocal going UP Stairs, but not Down) - SHOULD HAVE SBA behind PATIENT WHEN GOING UP STAIRS due to prevent possible LOB backwards at top of stairs -   Ambulation x 2 laps total in gym      Functional / Therapeutic Activities:    TAPING / BRACING: NA  SEE EXERCISE FLOW SHEET -     Assessment/Plan  86 yo male; S/P (R) hip fracture w/ IM Pinning 2023.  Significantly improved w/ alleviation of (R) hip pain and improving, gait, and function / mobility -    Pt very anxious to returning to gym and doing UE exercises and working w/ a  -     Progress strengthening /stabilization /functional activity - modified DC date to 2023 for release to working w/  at [employer] -        _________________________________________________    Manual Therapy:                 mins   82162;  Therapeutic Exercise:    20    mins  34525;     Neuromuscular Elizabeth:        mins  80000;    Therapeutic Activity:     10      mins  17109;     Ultrasound:                          mins  21674;  Iontophoresis:                     mins  43267;    Electrical Stimulation:         mins  24992 ( );  Mechanical Traction:          mins  23958  Dry Needling                       mins self-pay     Timed Treatment:   30    mins                  Total Treatment:     30    mins        Micheal Schwarz PT  Physical Therapist  Lic # [id number]

## 2024-06-12 NOTE — THERAPY TREATMENT NOTE
Called pt and left msg for pt to call the office regarding results   Inpatient Rehabilitation - Occupational Therapy Treatment Note    [facility]     Patient Name: Radha Azevedo  : 1935  MRN: [id number]    Today's Date: 2023                 Admit Date: 2023         ICD-10-CM ICD-9-CM   1. Impaired gait and mobility  R26.89 781.2       Patient Active Problem List   Diagnosis   • Absolute anemia   • Benign essential HTN   • Benign localized hyperplasia of prostate without urinary obstruction   • Type 2 diabetes mellitus with peripheral angiopathy   • Hypertension   • Hyperlipidemia   • Type 2 diabetes mellitus   • Diabetes mellitus type 2, noninsulin dependent   • Avitaminosis D   • Dyslipidemia   • Decrease in the ability to hear   • Personal history of colonic polyps   • Subdural hematoma       Past Medical History:   Diagnosis Date   • Colon polyps     Followed by Dr. Leo Jaeger at [facility].   • Coronary artery disease    • Diabetes mellitus    • Hyperlipidemia    • Hypertension        Past Surgical History:   Procedure Laterality Date   • CARDIAC CATHETERIZATION     • CAROTID STENT      patient denies, but family states he did have a stent placed   • COLONOSCOPY N/A 2014    Dr. Leo Jaeger at [facility].   • COLONOSCOPY N/A 2016    Dr. Leo Jaeger at [facility].   • COLONOSCOPY N/A 03/15/2023    2 TUBULAR ADENOMA POLYPS IN SIGMOID, BARIUM ENEMA ORDERED, DR. LEO JAEGER AT [facility]   • CORONARY ANGIOPLASTY     • FRACTURE SURGERY Left 2023    IM nailing for L femur fracture             IRF OT ASSESSMENT FLOWSHEET (last 12 hours)     IRF OT Evaluation and Treatment     Row Name 23 1100          OT Time and Intention    Document Type daily treatment  -CE     Mode of Treatment occupational therapy  -CE     Total Minutes, Occupational Therapy 45  -CE     Patient Effort good  -CE     Row Name 23 1100          General Information    Patient Profile Reviewed yes  -CE     Patient/Family/Caregiver Comments/Observations pt seated in w/c prior to  session  -CE     Existing Precautions/Restrictions fall;left  WBAT  -CE     Row Name 05/20/23 1100          Pain Assessment    Pretreatment Pain Rating 0/10 - no pain  -CE     Posttreatment Pain Rating 0/10 - no pain  -CE     Pain Location - Side/Orientation Left  -CE     Pain Location - hip  -CE     Row Name 05/20/23 1100          Cognition/Psychosocial    Affect/Mental Status (Cognition) WFL  -CE     Follows Commands (Cognition) follows two-step commands;repetition of directions required  -CE     Personal Safety Interventions fall prevention program maintained  -CE     Row Name 05/20/23 1100          Basic Activities of Daily Living (BADLs)    78454 - OT Self Care/Mgmt Minutes 45  -CE     Row Name 05/20/23 1100          Bathing    [place] Level (Bathing) bathing skills;lower body;upper body;minimum assist (75% patient effort)  alternating sit<>stand  -CE     Assistive Device (Bathing) shower chair;grab bar/tub rail;hand held shower spray hose  -CE     Position (Bathing) supported sitting;supported standing  -CE     Set-up Assistance (Bathing) adjust water temperature;obtain supplies  -CE     Row Name 05/20/23 1100          Upper Body Dressing    [place] Level (Upper Body Dressing) doff;don;pull over garment;set up assistance  -CE     Position (Upper Body Dressing) supported sitting  -CE     Set-up Assistance (Upper Body Dressing) obtain clothing  -     Row Name 05/20/23 1100          Lower Body Dressing    [place] Level (Lower Body Dressing) doff;don;pants/bottoms;shoes/slippers;socks;underwear;moderate assist (50% patient effort)  assist for donning socks and shoes  -CE     Position (Lower Body Dressing) --  alternating sit<>stand from shower chair using grab bar  -CE     Set-up Assistance (Lower Body Dressing) obtain clothing  -CE     Row Name 05/20/23 1100          Grooming    [place] Level (Grooming) grooming skills;hair care, combing/brushing;oral care regimen;shave face;set up;contact  guard assist  -CE     Position (Grooming) sink side;unsupported standing  -CE     Set-up Assistance (Grooming) obtain supplies  -CE     Row Name 05/20/23 1100          Toileting    [place] Level (Toileting) contact guard assist  -CE     Assistive Device Use (Toileting) grab bar/safety frame  -CE     Position (Toileting) supported standing  -CE     Comment (Toileting) standing at toilet  -CE     Row Name 05/20/23 1100          Sit-Stand Transfer    Sit-Stand [place] (Transfers) standby assist;verbal cues;1 person assist  -CE     Assistive Device (Sit-Stand Transfers) walker, front-wheeled  -CE     Row Name 05/20/23 1100          Stand-Sit Transfer    Stand-Sit [place] (Transfers) standby assist  -CE     Assistive Device (Stand-Sit Transfers) wheelchair;walker, front-wheeled  -     Row Name 05/20/23 1100          Shower Transfer    Type (Shower Transfer) sit-stand;stand-sit  -CE     [place] Level (Shower Transfer) minimum assist (75% patient effort);1 person assist  -CE     Assistive Device (Shower Transfer) grab bar, tub/shower;tub bench;walker, front-wheeled  -CE     Row Name 05/20/23 1100          Positioning and Restraints    Pre-Treatment Position sitting in chair/recliner  -CE     Post Treatment Position wheelchair  -CE     In Wheelchair sitting;call light within reach;encouraged to call for assist;exit alarm on;with family/caregiver  -CE           User Key  (r) = Recorded By, (t) = Taken By, (c) = Cosigned By    Initials Name Effective Dates    CE Carrie Morales OT 10/17/22 -                  Occupational Therapy Education     Title: PT OT SLP Therapies (In Progress)     Topic: Occupational Therapy (Done)     Point: ADL training (Done)     Description:   Instruct learner(s) on proper safety adaptation and remediation techniques during self care or transfers.   Instruct in proper use of assistive devices.              Learning Progress Summary           Patient Acceptance, E, VU by WN  at 5/19/2023 2232    Acceptance, E, VU by KN at 5/17/2023 1524                   Point: Home exercise program (Done)     Description:   Instruct learner(s) on appropriate technique for monitoring, assisting and/or progressing therapeutic exercises/activities.              Learning Progress Summary           Patient Acceptance, E, VU by WN at 5/19/2023 2232    Acceptance, E, VU by KN at 5/17/2023 1524                   Point: Precautions (Done)     Description:   Instruct learner(s) on prescribed precautions during self-care and functional transfers.              Learning Progress Summary           Patient Acceptance, E, VU by WN at 5/19/2023 2232    Acceptance, E, VU by KN at 5/17/2023 1524                   Point: Body mechanics (Done)     Description:   Instruct learner(s) on proper positioning and spine alignment during self-care, functional mobility activities and/or exercises.              Learning Progress Summary           Patient Acceptance, E, VU by WN at 5/19/2023 2232    Acceptance, E, VU by DARVIN at 5/17/2023 1524                               User Key     Initials Effective Dates Name Provider Type Discipline    RICKI 08/23/22 -  Kamaljit Elias, RN Registered Nurse Nurse    DARVIN 08/02/22 -  Main DeL a Garza OT Occupational Therapist OT                    OT Recommendation and Plan            Daily Progress Summary (OT)  Overall Progress Toward Functional Goals (OT): progressing toward functional goals as expected            Time Calculation:      Time Calculation- OT     Row Name 05/20/23 1405 05/20/23 1100          Time Calculation- OT    OT Start Time 1100  -CE --     OT Stop Time 1145  -CE --     OT Time Calculation (min) 45 min  -CE --     Total Timed Code Minutes- OT 45 minute(s)  -CE --        Timed Charges    22741 - OT Self Care/Mgmt Minutes 45  -CE 45  -CE        Total Minutes    Timed Charges Total Minutes 45  -CE 45  -CE      Total Minutes 45  -CE 45  -CE           User Key  (r) = Recorded By, (t) =  Taken By, (c) = Cosigned By    Initials Name Provider Type    Carrie Matthew OT Occupational Therapist              Therapy Charges for Today     Code Description Service Date Service Provider Modifiers Qty    [id number] HC OT SELF CARE/MGMT/TRAIN EA 15 MIN 5/20/2023 Carrie Morales OT GO 3                   Carrie Morales OT  5/20/2023

## 2024-07-20 ENCOUNTER — DOCUMENTATION (OUTPATIENT)
Dept: PHYSICAL THERAPY | Facility: CLINIC | Age: 89
End: 2024-07-20
Payer: MEDICARE

## 2024-07-20 DIAGNOSIS — Z87.81 STATUS POST FRACTURE OF RIGHT HIP: Primary | ICD-10-CM
